# Patient Record
Sex: FEMALE | Race: BLACK OR AFRICAN AMERICAN | NOT HISPANIC OR LATINO | Employment: OTHER | URBAN - METROPOLITAN AREA
[De-identification: names, ages, dates, MRNs, and addresses within clinical notes are randomized per-mention and may not be internally consistent; named-entity substitution may affect disease eponyms.]

---

## 2017-02-07 ENCOUNTER — HOSPITAL ENCOUNTER (EMERGENCY)
Facility: HOSPITAL | Age: 72
Discharge: HOME | End: 2017-02-07
Attending: FAMILY MEDICINE
Payer: MEDICARE

## 2017-02-07 VITALS
OXYGEN SATURATION: 99 % | WEIGHT: 283 LBS | RESPIRATION RATE: 20 BRPM | BODY MASS INDEX: 47.15 KG/M2 | HEIGHT: 65 IN | SYSTOLIC BLOOD PRESSURE: 195 MMHG | DIASTOLIC BLOOD PRESSURE: 94 MMHG | TEMPERATURE: 97 F | HEART RATE: 62 BPM

## 2017-02-07 DIAGNOSIS — Z79.4 CONTROLLED TYPE 2 DIABETES MELLITUS WITH HYPOGLYCEMIA, WITH LONG-TERM CURRENT USE OF INSULIN: ICD-10-CM

## 2017-02-07 DIAGNOSIS — R00.2 PALPITATIONS: ICD-10-CM

## 2017-02-07 DIAGNOSIS — E11.649 CONTROLLED TYPE 2 DIABETES MELLITUS WITH HYPOGLYCEMIA, WITH LONG-TERM CURRENT USE OF INSULIN: ICD-10-CM

## 2017-02-07 DIAGNOSIS — E87.6 ACUTE HYPOKALEMIA: Primary | ICD-10-CM

## 2017-02-07 LAB
ALBUMIN SERPL BCP-MCNC: 3.9 G/DL
ALP SERPL-CCNC: 114 IU/L
ALT SERPL W/O P-5'-P-CCNC: 34 IU/L
ANION GAP SERPL CALC-SCNC: 11 MMOL/L
AST SERPL-CCNC: 31 IU/L
BACTERIA #/AREA URNS AUTO: ABNORMAL /HPF
BASOPHILS # BLD AUTO: 0.03 K/UL
BASOPHILS NFR BLD: 0.4 %
BILIRUB SERPL-MCNC: 0.3 MG/DL
BILIRUB UR QL STRIP: NEGATIVE
BUN SERPL-MCNC: 12 MG/DL
CALCIUM SERPL-MCNC: 9.7 MG/DL
CHLORIDE SERPL-SCNC: 105 MMOL/L
CLARITY UR REFRACT.AUTO: CLEAR
CO2 SERPL-SCNC: 26 MMOL/L
COLOR UR AUTO: YELLOW
CREAT SERPL-MCNC: 0.58 MG/DL
DIFFERENTIAL METHOD: ABNORMAL
EOSINOPHIL # BLD AUTO: 0.3 K/UL
EOSINOPHIL NFR BLD: 3.7 %
ERYTHROCYTE [DISTWIDTH] IN BLOOD BY AUTOMATED COUNT: 12.2 %
EST. GFR  (AFRICAN AMERICAN): >60 ML/MIN/1.73 M^2
EST. GFR  (NON AFRICAN AMERICAN): >60 ML/MIN/1.73 M^2
GLUCOSE SERPL-MCNC: 68 MG/DL
GLUCOSE UR QL STRIP: NEGATIVE
HCT VFR BLD AUTO: 37.2 %
HGB BLD-MCNC: 12.4 G/DL
HGB UR QL STRIP: ABNORMAL
KETONES UR QL STRIP: NEGATIVE
LEUKOCYTE ESTERASE UR QL STRIP: ABNORMAL
LYMPHOCYTES # BLD AUTO: 3.3 K/UL
LYMPHOCYTES NFR BLD: 46.6 %
MCH RBC QN AUTO: 31.1 PG
MCHC RBC AUTO-ENTMCNC: 33.3 %
MCV RBC AUTO: 93 FL
MICROSCOPIC COMMENT: ABNORMAL
MONOCYTES # BLD AUTO: 0.7 K/UL
MONOCYTES NFR BLD: 9.5 %
NEUTROPHILS # BLD AUTO: 2.8 K/UL
NEUTROPHILS NFR BLD: 39.7 %
NITRITE UR QL STRIP: NEGATIVE
PH UR STRIP: 5 [PH] (ref 5–8)
PLATELET # BLD AUTO: 252 K/UL
PMV BLD AUTO: 10.6 FL
POCT GLUCOSE: 180 MG/DL (ref 70–110)
POCT GLUCOSE: 77 MG/DL (ref 70–110)
POCT GLUCOSE: 90 MG/DL (ref 70–110)
POTASSIUM SERPL-SCNC: 3 MMOL/L
PROT SERPL-MCNC: 7.6 G/DL
PROT UR QL STRIP: NEGATIVE
RBC # BLD AUTO: 3.99 M/UL
RBC #/AREA URNS AUTO: 0 /HPF (ref 0–4)
SODIUM SERPL-SCNC: 142 MMOL/L
SP GR UR STRIP: 1.02 (ref 1–1.03)
TROPONIN I SERPL DL<=0.01 NG/ML-MCNC: <0.012 NG/ML
URN SPEC COLLECT METH UR: ABNORMAL
UROBILINOGEN UR STRIP-ACNC: NEGATIVE EU/DL
WBC # BLD AUTO: 7.06 K/UL
WBC #/AREA URNS AUTO: 10 /HPF (ref 0–5)

## 2017-02-07 PROCEDURE — 85025 COMPLETE CBC W/AUTO DIFF WBC: CPT

## 2017-02-07 PROCEDURE — 93005 ELECTROCARDIOGRAM TRACING: CPT

## 2017-02-07 PROCEDURE — 82962 GLUCOSE BLOOD TEST: CPT

## 2017-02-07 PROCEDURE — 81000 URINALYSIS NONAUTO W/SCOPE: CPT

## 2017-02-07 PROCEDURE — 25000003 PHARM REV CODE 250: Performed by: FAMILY MEDICINE

## 2017-02-07 PROCEDURE — 80053 COMPREHEN METABOLIC PANEL: CPT

## 2017-02-07 PROCEDURE — 99284 EMERGENCY DEPT VISIT MOD MDM: CPT | Mod: 25

## 2017-02-07 PROCEDURE — 84484 ASSAY OF TROPONIN QUANT: CPT

## 2017-02-07 RX ORDER — POTASSIUM CHLORIDE 20 MEQ/1
40 TABLET, EXTENDED RELEASE ORAL
Status: COMPLETED | OUTPATIENT
Start: 2017-02-07 | End: 2017-02-07

## 2017-02-07 RX ORDER — INSULIN LISPRO 100 [IU]/ML
INJECTION, SOLUTION INTRAVENOUS; SUBCUTANEOUS
COMMUNITY
End: 2017-08-15

## 2017-02-07 RX ADMIN — POTASSIUM CHLORIDE 40 MEQ: 1500 TABLET, EXTENDED RELEASE ORAL at 04:02

## 2017-02-07 NOTE — ED NOTES
MD notified , states pt ok for D/C. Pt educated on hypocalcemia and importance of checking blood sugar prior to administering insulin, pt and spouse verbalize understanding. HR 62. Safety maintained. No falls or injuries noted.

## 2017-02-07 NOTE — ED TRIAGE NOTES
"Pt states "My heart is beating triple fast." Denies history of a-fib. Denies chest pain. Pt states she was diaphoretic. Denies nausea. Pt she took two 81 mg ASA prior to arrival.   "

## 2017-02-07 NOTE — ED NOTES
Pt given apple juice, oral fluids encouraged per MD orders. Pt asleep, respirations even and unlabored. MD notified HR 46-47, no new orders given.

## 2017-02-07 NOTE — ED NOTES
Patient discharge has been delayed due to CBG 90. Orders noted to give another 4 oz of juice until CBG is 100. Pt tolerating juice well. Will continue to monitor.

## 2017-02-07 NOTE — ED PROVIDER NOTES
"Encounter Date: 2017       History     Chief Complaint   Patient presents with    Palpitations     Pt states "My heart is beating triple fast." Denies history of a-fib. Denies chest pain.      Review of patient's allergies indicates:   Allergen Reactions    Tylenol [acetaminophen] Other (See Comments)     Affects memory/confused     HPI Comments: She complains of palpitations about 30 minutes prior to arrival.  No chest pain or shortness of breath.  Palpitations resolved on arrival to the ER.  He shouldn't is comfortable in the bed without any distress.  Patient with computer broke and she was not checking regularly but taking her insulin as he was supposed to.    The history is provided by the patient.     Past Medical History   Diagnosis Date    Breast cancer      Right breast     Diabetes mellitus     Hyperlipidemia     Hypertension      No past medical history pertinent negatives.  Past Surgical History   Procedure Laterality Date    Cholecystectomy       laparoscopic     section, low transverse  ,1979     x2    Tubal ligation       @ time of     Dilation and curettage of uterus  2012     Family History   Problem Relation Age of Onset    Hypertension Mother     Heart failure Mother     Hypertension Brother     Hypertension Sister     Breast cancer Cousin     Breast cancer Cousin     Coronary artery disease Brother     Stroke Brother     Coronary artery disease Brother     Stroke Brother     Colon cancer Neg Hx     Ovarian cancer Neg Hx      Social History   Substance Use Topics    Smoking status: Former Smoker     Packs/day: 1.00     Years: 14.00     Types: Cigarettes     Quit date: 1975    Smokeless tobacco: Never Used    Alcohol use No     Review of Systems   Constitutional: Positive for diaphoresis. Negative for activity change, appetite change, chills, fatigue and fever.   HENT: Negative for ear pain and sore throat.    Eyes: Negative " for pain.   Respiratory: Negative for cough, choking, chest tightness, shortness of breath and wheezing.    Cardiovascular: Positive for palpitations. Negative for chest pain.   Gastrointestinal: Negative for abdominal distention, diarrhea, nausea and vomiting.   Endocrine: Negative for polyphagia and polyuria.   Genitourinary: Negative for dysuria and flank pain.   Musculoskeletal: Negative for back pain, neck pain and neck stiffness.   Skin: Negative for rash.   Neurological: Negative for dizziness, weakness, light-headedness, numbness and headaches.   Psychiatric/Behavioral: The patient is not nervous/anxious.    All other systems reviewed and are negative.      Physical Exam   Initial Vitals   BP Pulse Resp Temp SpO2   02/07/17 1500 02/07/17 1500 02/07/17 1500 02/07/17 1500 02/07/17 1500   207/92 88 20 97.6 °F (36.4 °C) 100 %     Physical Exam    Nursing note and vitals reviewed.  Constitutional: She appears well-developed and well-nourished.   HENT:   Head: Normocephalic.   Right Ear: External ear normal.   Left Ear: External ear normal.   Nose: Nose normal.   Mouth/Throat: Oropharynx is clear and moist.   Eyes: Conjunctivae and EOM are normal. Pupils are equal, round, and reactive to light.   Neck: Normal range of motion. Neck supple.   Cardiovascular: Normal rate, regular rhythm, normal heart sounds and intact distal pulses. Exam reveals no gallop and no friction rub.    No murmur heard.  Pulmonary/Chest: Breath sounds normal.   Abdominal: Soft. Bowel sounds are normal. She exhibits no distension and no mass. There is no tenderness. There is no rebound and no guarding.   Musculoskeletal: Normal range of motion.   Neurological: She is alert and oriented to person, place, and time. She has normal strength and normal reflexes. She displays normal reflexes. No cranial nerve deficit or sensory deficit.   Skin: Skin is warm.   Psychiatric: She has a normal mood and affect. Her behavior is normal. Judgment and  thought content normal.         ED Course   Procedures  Labs Reviewed   CBC W/ AUTO DIFFERENTIAL   COMPREHENSIVE METABOLIC PANEL   TROPONIN I   URINALYSIS   POCT GLUCOSE        ECG Results         EKG 12-LEAD (Final result) Result time:  02/07/17 15:42:58    Final result by Interface, Lab In [facility] (02/07/17 15:42:58)    Narrative:    Test Reason : r00.2  Blood Pressure :  mmHG  Vent. Rate : 062 BPM     Atrial Rate : 062 BPM     P-R Int : 176 ms          QRS Dur : 090 ms      QT Int : 430 ms       P-R-T Axes : 049 -06 026 degrees     QTc Int : 436 ms    Normal sinus rhythm  Left atrial enlargement  LVH  Abnormal ECG  When compared with ECG of 24-JAN-2012 11:21,  No significant change was found  Confirmed by Miguel Tejeda MD ([id number]) on 2/7/2017 3:42:48 PM    Referred By: SELF REFERRAL           Confirmed By:Miguel Tejeda MD                                 ED Course     Clinical Impression:   The primary encounter diagnosis was Acute hypokalemia. Diagnoses of Palpitations and Controlled type 2 diabetes mellitus with hypoglycemia, with long-term current use of insulin were also pertinent to this visit.    Disposition:   Disposition: Discharged  Condition: Fair  Advised to get a new glucometer and have regular Accu-Cheks readings also.  And decrease insulin intake or candy but for hypoglycemic episodes.       Rubén Diane MD  02/07/17 6904

## 2017-02-07 NOTE — ED AVS SNAPSHOT
[facility]  [address]  [place] [address]               Noris Demarco   2017  2:58 PM   ED    Description:  Female : 1945   Department:  [facility]           Your Care was Coordinated By:     Provider Role From To    Rubén Diane MD Attending Provider 17 2162 --      Reason for Visit     Palpitations           Diagnoses this Visit        Comments    Acute hypokalemia    -  Primary     Palpitations         Controlled type 2 diabetes mellitus with hypoglycemia, with long-term current use of insulin           ED Disposition     None           To Do List           [facility]     [facility] -  Assistance  Registered nurses in the [facility] provide clinical advisement, health education, appointment booking, and other advisory services.  Call for this free service at [phone].             Medications           Message regarding Medications     Verify the changes and/or additions to your medication regime listed below are the same as discussed with your clinician today.  If any of these changes or additions are incorrect, please notify your healthcare provider.        These medications were administered today        Dose Freq    potassium chloride SA CR tablet 40 mEq 40 mEq ED 1 Time    Sig: Take 2 tablets (40 mEq total) by mouth ED 1 Time.    Class: Normal    Route: Oral           Verify that the below list of medications is an accurate representation of the medications you are currently taking.  If none reported, the list may be blank. If incorrect, please contact your healthcare provider. Carry this list with you in case of emergency.           Current Medications     atenolol (TENORMIN) 50 MG tablet Take 50 mg by mouth once daily.     cloNIDine (CATAPRES) 0.1 MG tablet Take 1 tablet (0.1 mg total) by mouth 2 (two) times daily.    coenzyme Q10 (COQ-10) 100 mg capsule Take 100 mg by mouth  "once daily.      fish oil-omega-3 fatty acids 300-1,000 mg capsule Take 1 g by mouth once daily.    furosemide (LASIX) 20 MG tablet Take 1 tablet by mouth once daily.    insulin glargine (LANTUS) 100 unit/mL injection Inject 15 Units into the skin 2 (two) times daily.    insulin lispro (HUMALOG) 100 unit/mL injection Inject into the skin 3 (three) times daily before meals.    insulin NPH (NOVOLIN N) 100 unit/mL injection Inject 10 Units into the skin 2 (two) times daily before meals.    insulin regular 100 unit/mL Inj injection Inject 4 Units into the skin 3 (three) times daily before meals.    lisinopril (PRINIVIL,ZESTRIL) 40 MG tablet Take 40 mg by mouth 2 (two) times daily.     metformin (GLUCOPHAGE) 1000 MG tablet Take 2 tablets by mouth 2 (two) times daily.    MV,FE,MIN/LUTEIN (CENTRUM SILVER ULTRA WOMEN'S ORAL) Take 1 tablet by mouth once daily.    neomycin-polymyxin-hydrocortisone (CORTISPORIN) otic solution INSTILL 2 DROPS 4 TIMES A DAY FOR 10 DAYS IN THE AFFECTED EAR    niacin 500 MG Tab Take 500 mg by mouth every evening.     potassium chloride SA CR tablet 40 mEq Take 2 tablets (40 mEq total) by mouth ED 1 Time.           Clinical Reference Information           Your Vitals Were     BP Pulse Temp Resp Height Weight    153/75 47 97.6 °F (36.4 °C) (Oral) 18 5' 5" (1.651 m) 128.4 kg (283 lb)    SpO2 BMI             100% 47.09 kg/m2         Allergies as of 2/7/2017        Reactions    Tylenol [Acetaminophen] Other (See Comments)    Affects memory/confused      Immunizations Administered on Date of Encounter - 2/7/2017     None      ED Micro, Lab, POCT     Start Ordered       Status Ordering Provider    02/07/17 1537 02/07/17 1537  POCT glucose  Once      Final result     02/07/17 1517 02/07/17 1516  Urinalysis  STAT      In process     02/07/17 1516 02/07/17 1516  CBC auto differential  STAT      Final result     02/07/17 1516 02/07/17 1516  Comprehensive metabolic panel  STAT      Final result     02/07/17 " 1516 02/07/17 1516  Troponin I  STAT      Final result       ED Imaging Orders     Start Ordered       Status Ordering Provider    02/07/17 1510 02/07/17 1510  X-Ray Chest PA And Lateral  1 time imaging      Final result       Discharge References/Attachments     BLOOD SUGAR, LOW; HYPOGLYCEMIA (ENGLISH)      [facility] Sign-Up     Activating your [facility] account is as easy as 1-2-3!     1) Visit [email or web address], select Sign Up Now, enter this activation code and your date of birth, then select Next.  [email or web address]  Expires: 3/24/2017  4:33 PM      2) Create a username and password to use when you visit [facility] in the future and select a security question in case you lose your password and select Next.    3) Enter your e-mail address and click Sign Up!    Additional Information  If you have questions, please e-mail [email or web address] or call [phone] to talk to our [facility] staff. Remember, [facility] is NOT to be used for urgent needs. For medical emergencies, dial 911.         Smoking Cessation     If you would like to quit smoking:   You may be eligible for free services if you are a [place] resident and started smoking cigarettes before September 1, 1988.  Call the [employer]oking [employer]essation [employer] ([employer]) toll free at (787) 148-2926 or (444) [phone].   Call 9-210-QUIT-NOW if you do not meet the above criteria.             [facility] complies with applicable Federal civil rights laws and does not discriminate on the basis of race, color, national origin, age, disability, or sex.        Language Assistance Services     ATTENTION: Language assistance services are available, free of charge. Please call [phone].      ATENCIÓN: Si habla español, tiene a nichole disposición servicios gratuitos de asistencia lingüística. Llame al [phone].     CHÚ Ý: N?u b?n nói Ti?ng Vi?t, có các d?ch v? h? tr? ngôn ng? mi?n phí dành cho b?n. G?i s? [phone].

## 2017-03-13 ENCOUNTER — TELEPHONE (OUTPATIENT)
Dept: HEMATOLOGY/ONCOLOGY | Facility: CLINIC | Age: 72
End: 2017-03-13

## 2017-03-13 NOTE — TELEPHONE ENCOUNTER
I called pt back to get dates and times she would be available to come after 4/27/17 for her mammogram. She states she has to talk to her daughter and call me back.    ----- Message from Helen Garner sent at 3/13/2017 10:37 AM CDT -----  Contact: [phone]  Patient is requesting orders for a mammogram

## 2017-04-26 ENCOUNTER — TELEPHONE (OUTPATIENT)
Dept: HEMATOLOGY/ONCOLOGY | Facility: CLINIC | Age: 72
End: 2017-04-26

## 2017-04-26 NOTE — TELEPHONE ENCOUNTER
Pt was given the number to scheduling dept [phone] to schedule her mammogram. She will give them a call.    -- Message from Carlos Talley sent at 4/26/2017  9:12 AM CDT -----  Contact: [phone]/self  Pt would like to speak with the nurse concerning scheduling an breast cancer Mammo.  Pt states she can only come on tomorrow.  Please advise

## 2017-05-02 ENCOUNTER — HOSPITAL ENCOUNTER (OUTPATIENT)
Dept: RADIOLOGY | Facility: HOSPITAL | Age: 72
Discharge: HOME | End: 2017-05-02
Attending: INTERNAL MEDICINE
Payer: MEDICARE

## 2017-05-02 DIAGNOSIS — D05.11 DUCTAL CARCINOMA IN SITU (DCIS) OF RIGHT BREAST: ICD-10-CM

## 2017-05-02 PROCEDURE — 77066 DX MAMMO INCL CAD BI: CPT | Mod: 26,,, | Performed by: RADIOLOGY

## 2017-05-02 PROCEDURE — 77062 BREAST TOMOSYNTHESIS BI: CPT | Mod: TC

## 2017-05-02 PROCEDURE — 77062 BREAST TOMOSYNTHESIS BI: CPT | Mod: 26,,, | Performed by: RADIOLOGY

## 2017-07-20 ENCOUNTER — HOSPITAL ENCOUNTER (EMERGENCY)
Facility: HOSPITAL | Age: 72
Discharge: HOME | End: 2017-07-20
Attending: EMERGENCY MEDICINE
Payer: MEDICARE

## 2017-07-20 VITALS
SYSTOLIC BLOOD PRESSURE: 170 MMHG | WEIGHT: 180 LBS | DIASTOLIC BLOOD PRESSURE: 82 MMHG | HEART RATE: 69 BPM | OXYGEN SATURATION: 98 % | BODY MASS INDEX: 29.99 KG/M2 | RESPIRATION RATE: 18 BRPM | TEMPERATURE: 98 F | HEIGHT: 65 IN

## 2017-07-20 DIAGNOSIS — M17.12 OSTEOARTHRITIS OF LEFT KNEE, UNSPECIFIED OSTEOARTHRITIS TYPE: Primary | ICD-10-CM

## 2017-07-20 DIAGNOSIS — M79.89 LEG SWELLING: ICD-10-CM

## 2017-07-20 PROCEDURE — 99284 EMERGENCY DEPT VISIT MOD MDM: CPT

## 2017-07-20 RX ORDER — FUROSEMIDE 40 MG/1
40 TABLET ORAL DAILY
COMMUNITY

## 2017-07-20 RX ORDER — KETOROLAC TROMETHAMINE 10 MG/1
10 TABLET, FILM COATED ORAL EVERY 8 HOURS
Qty: 15 TABLET | Refills: 0 | Status: SHIPPED | OUTPATIENT
Start: 2017-07-20 | End: 2017-08-15

## 2017-07-20 RX ORDER — AMLODIPINE BESYLATE 2.5 MG/1
2.5 TABLET ORAL DAILY
COMMUNITY
End: 2017-08-15 | Stop reason: DRUGHIGH

## 2017-07-20 RX ORDER — METOPROLOL TARTRATE 50 MG/1
50 TABLET ORAL 2 TIMES DAILY
COMMUNITY
End: 2017-08-15

## 2017-07-21 NOTE — ED PROVIDER NOTES
Encounter Date: 2017       History     Chief Complaint   Patient presents with    Leg Pain     left leg pain for 3 weeks denies injury     The history is provided by the patient.   Leg Pain    The injury mechanism is unknown. The incident occurred several weeks ago. The pain location is generalized. The pain is at a severity of 5/10. The pain has been constant since onset. Pertinent negatives include no numbness, no inability to bear weight, no loss of motion, no muscle weakness, no loss of sensation and no tingling. She reports no foreign bodies present. Nothing aggravates the symptoms. She has tried nothing for the symptoms.     Review of patient's allergies indicates:   Allergen Reactions    Tylenol [acetaminophen] Other (See Comments)     Affects memory/confused     Past Medical History:   Diagnosis Date    Breast cancer     Right breast     Diabetes mellitus     Hyperlipidemia     Hypertension      Past Surgical History:   Procedure Laterality Date     SECTION, LOW TRANSVERSE  ,1979    x2    CHOLECYSTECTOMY      laparoscopic    DILATION AND CURETTAGE OF UTERUS  2012    TUBAL LIGATION      @ time of      Family History   Problem Relation Age of Onset    Hypertension Mother     Heart failure Mother     Hypertension Brother     Hypertension Sister     Breast cancer Cousin     Breast cancer Cousin     Coronary artery disease Brother     Stroke Brother     Coronary artery disease Brother     Stroke Brother     Colon cancer Neg Hx     Ovarian cancer Neg Hx      Social History   Substance Use Topics    Smoking status: Former Smoker     Packs/day: 1.00     Years: 14.00     Types: Cigarettes     Quit date: 1975    Smokeless tobacco: Never Used    Alcohol use No     Review of Systems   Musculoskeletal: Positive for arthralgias and myalgias.   Neurological: Negative for tingling and numbness.   All other systems reviewed and are  negative.      Physical Exam     Initial Vitals [07/20/17 2011]   BP Pulse Resp Temp SpO2   (!) 184/94 97 18 98 °F (36.7 °C) 98 %      MAP       124         Physical Exam    Nursing note and vitals reviewed.  Constitutional: She appears well-developed and well-nourished.   HENT:   Head: Normocephalic and atraumatic.   Eyes: EOM are normal.   Neck: Normal range of motion. Neck supple.   Cardiovascular: Normal rate, regular rhythm, normal heart sounds and intact distal pulses.   Pulmonary/Chest: Breath sounds normal.   Abdominal: Soft.   Musculoskeletal: Normal range of motion.        Legs:  Neurological: She is alert and oriented to person, place, and time.   Skin: Skin is warm and dry. Capillary refill takes less than 2 seconds.   Psychiatric: She has a normal mood and affect. Her behavior is normal. Judgment and thought content normal.         ED Course   Procedures  Labs Reviewed - No data to display       X-Rays:   Independently Interpreted Readings:   Other Readings:  No DVT present    Medical Decision Making:   Clinical Tests:   Radiological Study: Ordered and Reviewed                   ED Course     Clinical Impression:   The primary encounter diagnosis was Osteoarthritis of left knee, unspecified osteoarthritis type. A diagnosis of Leg swelling was also pertinent to this visit.    Disposition:   Disposition: Discharged  Condition: Stable                        Sonia Woodall MD  07/20/17 2300

## 2017-08-15 ENCOUNTER — OFFICE VISIT (OUTPATIENT)
Dept: CARDIOLOGY | Facility: CLINIC | Age: 72
End: 2017-08-15
Payer: MEDICARE

## 2017-08-15 VITALS
SYSTOLIC BLOOD PRESSURE: 154 MMHG | DIASTOLIC BLOOD PRESSURE: 84 MMHG | OXYGEN SATURATION: 99 % | HEIGHT: 65 IN | HEART RATE: 82 BPM

## 2017-08-15 DIAGNOSIS — I10 HTN (HYPERTENSION), BENIGN: ICD-10-CM

## 2017-08-15 DIAGNOSIS — R60.0 BILATERAL LEG EDEMA: ICD-10-CM

## 2017-08-15 DIAGNOSIS — I10 ESSENTIAL HYPERTENSION: ICD-10-CM

## 2017-08-15 DIAGNOSIS — R01.1 CARDIAC MURMUR: ICD-10-CM

## 2017-08-15 PROCEDURE — 99999 PR PBB SHADOW E&M-EST. PATIENT-LVL III: CPT | Mod: PBBFAC,,, | Performed by: INTERNAL MEDICINE

## 2017-08-15 PROCEDURE — 3008F BODY MASS INDEX DOCD: CPT | Mod: S$GLB,,, | Performed by: INTERNAL MEDICINE

## 2017-08-15 PROCEDURE — 1126F AMNT PAIN NOTED NONE PRSNT: CPT | Mod: S$GLB,,, | Performed by: INTERNAL MEDICINE

## 2017-08-15 PROCEDURE — 3079F DIAST BP 80-89 MM HG: CPT | Mod: S$GLB,,, | Performed by: INTERNAL MEDICINE

## 2017-08-15 PROCEDURE — 93000 ELECTROCARDIOGRAM COMPLETE: CPT | Mod: S$GLB,,, | Performed by: INTERNAL MEDICINE

## 2017-08-15 PROCEDURE — 99204 OFFICE O/P NEW MOD 45 MIN: CPT | Mod: S$GLB,,, | Performed by: INTERNAL MEDICINE

## 2017-08-15 PROCEDURE — 1159F MED LIST DOCD IN RCRD: CPT | Mod: S$GLB,,, | Performed by: INTERNAL MEDICINE

## 2017-08-15 PROCEDURE — 3077F SYST BP >= 140 MM HG: CPT | Mod: S$GLB,,, | Performed by: INTERNAL MEDICINE

## 2017-08-15 RX ORDER — AMLODIPINE BESYLATE 10 MG/1
1 TABLET ORAL DAILY
COMMUNITY
Start: 2017-08-07 | End: 2022-09-06 | Stop reason: SDUPTHER

## 2017-08-15 RX ORDER — MELOXICAM 15 MG/1
1 TABLET ORAL DAILY
COMMUNITY
Start: 2017-07-24 | End: 2018-06-13

## 2017-08-15 RX ORDER — ASPIRIN 81 MG/1
81 TABLET ORAL DAILY
COMMUNITY

## 2017-08-15 RX ORDER — UBIDECARENONE 75 MG
500 CAPSULE ORAL DAILY
COMMUNITY

## 2017-08-15 NOTE — PROGRESS NOTES
Subjective:   Patient ID:  Noris Demarco is a 71 y.o. female who presents for evaluation of Consult; Knee Pain; and Angioedema      HPI: 70 y/o AA female with PMH of HTN and HLD present with complaints of leg edema located bilaterally left> right. Leg swelling started 1 month ago and is associated with some aching. As per patient clonidine was discontinued 1 month ago and she was started on norvasc. BP is controlled. She denies chest pain or dyspnea. No orthopnea and PND. Ef unknown. She has been on lasix. She does not eat out but admit to using salt. US of legs last month showed no DVT. As per patient pain was more intense to the touch a few days ago but now she is able to touch leg especially left knee. No injury to left leg that is able to recall.     Past Medical History:   Diagnosis Date    Breast cancer     Right breast     Diabetes mellitus     Hyperlipidemia     Hypertension        Past Surgical History:   Procedure Laterality Date     SECTION, LOW TRANSVERSE  ,1979    x2    CHOLECYSTECTOMY      laparoscopic    DILATION AND CURETTAGE OF UTERUS  2012    TUBAL LIGATION      @ time of        Social History   Substance Use Topics    Smoking status: Former Smoker     Packs/day: 1.00     Years: 14.00     Types: Cigarettes     Quit date: 1975    Smokeless tobacco: Never Used    Alcohol use No       Family History   Problem Relation Age of Onset    Hypertension Mother     Heart failure Mother     Hypertension Brother     Hypertension Sister     Breast cancer Cousin     Breast cancer Cousin     Coronary artery disease Brother     Stroke Brother     Coronary artery disease Brother     Stroke Brother     Colon cancer Neg Hx     Ovarian cancer Neg Hx        Patient's Medications   New Prescriptions    No medications on file   Previous Medications    AMLODIPINE (NORVASC) 10 MG TABLET    Take 1 tablet by mouth once daily.    ASPIRIN (ECOTRIN) 81 MG EC  TABLET    Take 81 mg by mouth once daily.    CLONIDINE (CATAPRES) 0.1 MG TABLET    Take 1 tablet (0.1 mg total) by mouth 2 (two) times daily.    COENZYME Q10 (COQ-10) 100 MG CAPSULE    Take 100 mg by mouth once daily.      CYANOCOBALAMIN (VITAMIN B-12) 500 MCG TABLET    Take 500 mcg by mouth once daily.    FISH OIL-OMEGA-3 FATTY ACIDS 300-1,000 MG CAPSULE    Take 1 g by mouth once daily.    FUROSEMIDE (LASIX) 40 MG TABLET    Take 40 mg by mouth 2 (two) times daily.    INSULIN NPH (NOVOLIN N) 100 UNIT/ML INJECTION    Inject 12 Units into the skin 2 (two) times daily before meals.     LISINOPRIL (PRINIVIL,ZESTRIL) 40 MG TABLET    Take 40 mg by mouth 2 (two) times daily.     MELOXICAM (MOBIC) 15 MG TABLET    Take 1 tablet by mouth once daily.    UNABLE TO FIND    400 Int'l Units. Vitamin d3   Modified Medications    No medications on file   Discontinued Medications    AMLODIPINE (NORVASC) 2.5 MG TABLET    Take 2.5 mg by mouth once daily.    ATENOLOL (TENORMIN) 50 MG TABLET    Take 50 mg by mouth once daily.     FUROSEMIDE (LASIX) 20 MG TABLET    Take 1 tablet by mouth once daily.    INSULIN GLARGINE (LANTUS) 100 UNIT/ML INJECTION    Inject 15 Units into the skin 2 (two) times daily.    INSULIN LISPRO (HUMALOG) 100 UNIT/ML INJECTION    Inject into the skin 3 (three) times daily before meals.    INSULIN REGULAR 100 UNIT/ML INJ INJECTION    Inject 4 Units into the skin 3 (three) times daily before meals.    KETOROLAC (TORADOL) 10 MG TABLET    Take 1 tablet (10 mg total) by mouth every 8 (eight) hours.    METFORMIN (GLUCOPHAGE) 1000 MG TABLET    Take 2 tablets by mouth 2 (two) times daily.    METOPROLOL TARTRATE (LOPRESSOR) 50 MG TABLET    Take 50 mg by mouth 2 (two) times daily.    MV,FE,MIN/LUTEIN (CENTRUM SILVER ULTRA WOMEN'S ORAL)    Take 1 tablet by mouth once daily.    NEOMYCIN-POLYMYXIN-HYDROCORTISONE (CORTISPORIN) OTIC SOLUTION    INSTILL 2 DROPS 4 TIMES A DAY FOR 10 DAYS IN THE AFFECTED EAR    NIACIN 500 MG TAB     Take 500 mg by mouth every evening.         Review of patient's allergies indicates:   Allergen Reactions    Tylenol [acetaminophen] Other (See Comments)     Affects memory/confused       Review of Systems   Constitution: Negative.   HENT: Negative.    Eyes: Negative.    Cardiovascular: Positive for leg swelling.   Respiratory: Negative.    Endocrine: Negative.    Hematologic/Lymphatic: Negative.    Skin: Negative.    Musculoskeletal: Negative.    Gastrointestinal: Negative.    Neurological: Negative.    Psychiatric/Behavioral: Negative.      Objective:   Physical Exam   Constitutional: She is oriented to person, place, and time. She appears well-developed and well-nourished. No distress.   Examination of the digits showed no clubbing or cyanosis   HENT:   Head: Normocephalic and atraumatic.   Eyes: Conjunctivae are normal. Pupils are equal, round, and reactive to light. Right eye exhibits no discharge.   Neck: Normal range of motion. Neck supple. No JVD present. No thyromegaly present.   No carotid bruits   Cardiovascular: Normal rate, regular rhythm, S1 normal, S2 normal, intact distal pulses and normal pulses.  PMI is not displaced.  Exam reveals no gallop, no friction rub and no opening snap.    Murmur heard.  Pulmonary/Chest: Effort normal and breath sounds normal. No respiratory distress. She has no wheezes. She has no rales. She exhibits no tenderness.   Abdominal: Soft. Bowel sounds are normal. She exhibits no distension and no mass. There is no tenderness. There is no guarding.   No hepatosplenomegaly   Musculoskeletal: Normal range of motion. She exhibits edema. She exhibits no tenderness.   Lymphadenopathy:     She has no cervical adenopathy.   Neurological: She is alert and oriented to person, place, and time.   Skin: Skin is warm. No rash noted. She is not diaphoretic. No erythema.   Psychiatric: She has a normal mood and affect.   Nursing note and vitals reviewed.      Lab Results   Component Value  Date    WBC 7.06 02/07/2017    HGB 12.4 02/07/2017    HCT 37.2 02/07/2017    MCV 93 02/07/2017     02/07/2017         Chemistry        Component Value Date/Time     02/07/2017 1535    K 3.0 (L) 02/07/2017 1535     02/07/2017 1535    CO2 26 02/07/2017 1535    BUN 12 02/07/2017 1535    BUN 13 10/19/2015 1439    CREATININE 0.58 02/07/2017 1535    GLU 68 (L) 02/07/2017 1535        Component Value Date/Time    CALCIUM 9.7 02/07/2017 1535    ALKPHOS 114 02/07/2017 1535    ALKPHOS 93 10/19/2015 1439    AST 31 02/07/2017 1535    AST 32 10/19/2015 1439    ALT 34 02/07/2017 1535    BILITOT 0.3 02/07/2017 1535    ESTGFRAFRICA >60.0 02/07/2017 1535    EGFRNONAA >60.0 02/07/2017 1535            ECGs reviewed-NSR  LABS reviewed      Assessment:     1. Bilateral leg edema    2. HTN (hypertension), benign    3. Cardiac murmur        Plan:     Swelling likely post gouty attack.   Leg elevation  Compression stocking  Low salt diet  2d echo complete    F/u in 1 month. If still swollen will make medication changes. Reduce Norvasc and start HCTZ    Clinic time spent with this patient is 40 minutes.     .

## 2017-08-15 NOTE — LETTER
August 15, 2017      Leanne Estrada MD  [address]   [place] [address]           [place] [facility]  [address], [address]  [facility] [address]  Phone: [phone]          Patient: Noris Deamrco   MR Number: [id number]   YOB: 1945   Date of Visit: 8/15/2017       Dear Dr. Leanne Estrada:    Thank you for referring Noris Demarco to me for evaluation. Attached you will find relevant portions of my assessment and plan of care.    If you have questions, please do not hesitate to call me. I look forward to following Noris Demarco along with you.    Sincerely,    Jayne Burgos MD    Enclosure  CC:  No Recipients    If you would like to receive this communication electronically, please contact [email or web address] or [phone] to request more information on [facility] Link access.    For providers and/or their staff who would like to refer a patient to [facility], please contact us through our one-stop-shop provider referral line, [facility], at [phone].    If you feel you have received this communication in error or would no longer like to receive these types of communications, please e-mail [email or web address]

## 2017-08-17 DIAGNOSIS — I10 ESSENTIAL HYPERTENSION: Primary | ICD-10-CM

## 2017-08-23 ENCOUNTER — HOSPITAL ENCOUNTER (OUTPATIENT)
Dept: CARDIOLOGY | Facility: HOSPITAL | Age: 72
Discharge: HOME | End: 2017-08-23
Attending: INTERNAL MEDICINE
Payer: MEDICARE

## 2017-08-23 DIAGNOSIS — R60.0 BILATERAL LEG EDEMA: ICD-10-CM

## 2017-08-23 DIAGNOSIS — R01.1 CARDIAC MURMUR: ICD-10-CM

## 2017-08-23 LAB
DIASTOLIC DYSFUNCTION: YES
ESTIMATED PA SYSTOLIC PRESSURE: 31.52
GLOBAL PERICARDIAL EFFUSION: ABNORMAL
MITRAL VALVE REGURGITATION: ABNORMAL
RETIRED EF AND QEF - SEE NOTES: 65 (ref 55–65)
TRICUSPID VALVE REGURGITATION: ABNORMAL

## 2017-08-23 PROCEDURE — 93306 TTE W/DOPPLER COMPLETE: CPT | Mod: PO

## 2017-08-23 PROCEDURE — 93306 TTE W/DOPPLER COMPLETE: CPT | Mod: 26,,, | Performed by: INTERNAL MEDICINE

## 2017-08-28 ENCOUNTER — TELEPHONE (OUTPATIENT)
Dept: CARDIOLOGY | Facility: CLINIC | Age: 72
End: 2017-08-28

## 2017-08-28 NOTE — TELEPHONE ENCOUNTER
Informed patient that her echo is normal except for slow relaxation which is secondary to aging, weight or blood pressure and doctor  will monitor this. No concerning findings.   Patient expressed understanding.

## 2017-09-15 ENCOUNTER — OFFICE VISIT (OUTPATIENT)
Dept: CARDIOLOGY | Facility: CLINIC | Age: 72
End: 2017-09-15
Payer: MEDICARE

## 2017-09-15 VITALS
OXYGEN SATURATION: 95 % | HEART RATE: 73 BPM | WEIGHT: 184.31 LBS | SYSTOLIC BLOOD PRESSURE: 146 MMHG | DIASTOLIC BLOOD PRESSURE: 86 MMHG | BODY MASS INDEX: 30.71 KG/M2 | HEIGHT: 65 IN

## 2017-09-15 DIAGNOSIS — I51.89 DIASTOLIC DYSFUNCTION: ICD-10-CM

## 2017-09-15 DIAGNOSIS — I10 HTN (HYPERTENSION), BENIGN: Primary | ICD-10-CM

## 2017-09-15 DIAGNOSIS — R01.1 CARDIAC MURMUR: ICD-10-CM

## 2017-09-15 PROCEDURE — 1159F MED LIST DOCD IN RCRD: CPT | Mod: S$GLB,,, | Performed by: INTERNAL MEDICINE

## 2017-09-15 PROCEDURE — 1126F AMNT PAIN NOTED NONE PRSNT: CPT | Mod: S$GLB,,, | Performed by: INTERNAL MEDICINE

## 2017-09-15 PROCEDURE — 99999 PR PBB SHADOW E&M-EST. PATIENT-LVL III: CPT | Mod: PBBFAC,,, | Performed by: INTERNAL MEDICINE

## 2017-09-15 PROCEDURE — 3077F SYST BP >= 140 MM HG: CPT | Mod: S$GLB,,, | Performed by: INTERNAL MEDICINE

## 2017-09-15 PROCEDURE — 3079F DIAST BP 80-89 MM HG: CPT | Mod: S$GLB,,, | Performed by: INTERNAL MEDICINE

## 2017-09-15 PROCEDURE — 99213 OFFICE O/P EST LOW 20 MIN: CPT | Mod: S$GLB,,, | Performed by: INTERNAL MEDICINE

## 2017-09-15 PROCEDURE — 3008F BODY MASS INDEX DOCD: CPT | Mod: S$GLB,,, | Performed by: INTERNAL MEDICINE

## 2017-09-15 NOTE — PROGRESS NOTES
Subjective:   Patient ID:  Noris Demarco is a 71 y.o. female who presents for follow-up of Follow-up      Problem List Items Addressed This Visit        Cardiac/Vascular    HTN (hypertension), benign - Primary    Cardiac murmur    Diastolic dysfunction      Other Visit Diagnoses    None.         HPI: Patient is here for f/u of leg swelling though to be gout. Leg swelling is now better. BP is controlled on norvasc and lisinopril. No chest pain or dyspnea. No orthopnea or PND.     Review of Systems   Constitution: Negative.   HENT: Negative.    Eyes: Negative.    Cardiovascular: Negative.    Respiratory: Negative.    Endocrine: Negative.    Hematologic/Lymphatic: Negative.    Skin: Negative.    Musculoskeletal: Negative.    Gastrointestinal: Negative.    Neurological: Negative.    Psychiatric/Behavioral: Negative.        Patient's Medications   New Prescriptions    No medications on file   Previous Medications    AMLODIPINE (NORVASC) 10 MG TABLET    Take 1 tablet by mouth once daily.    ASPIRIN (ECOTRIN) 81 MG EC TABLET    Take 81 mg by mouth once daily.    CLONIDINE (CATAPRES) 0.1 MG TABLET    Take 1 tablet (0.1 mg total) by mouth 2 (two) times daily.    COENZYME Q10 (COQ-10) 100 MG CAPSULE    Take 100 mg by mouth once daily.      CYANOCOBALAMIN (VITAMIN B-12) 500 MCG TABLET    Take 500 mcg by mouth once daily.    FISH OIL-OMEGA-3 FATTY ACIDS 300-1,000 MG CAPSULE    Take 1 g by mouth once daily.    FUROSEMIDE (LASIX) 40 MG TABLET    Take 40 mg by mouth 2 (two) times daily.    INSULIN NPH (NOVOLIN N) 100 UNIT/ML INJECTION    Inject 12 Units into the skin 2 (two) times daily before meals.     LISINOPRIL (PRINIVIL,ZESTRIL) 40 MG TABLET    Take 40 mg by mouth 2 (two) times daily.     MELOXICAM (MOBIC) 15 MG TABLET    Take 1 tablet by mouth once daily.    UNABLE TO FIND    400 Int'l Units. Vitamin d3   Modified Medications    No medications on file   Discontinued Medications    No medications on file       Objective:    Physical Exam   Constitutional: She is oriented to person, place, and time. She appears well-developed and well-nourished. No distress.   Examination of the digits showed no clubbing or cyanosis   HENT:   Head: Normocephalic and atraumatic.   Eyes: Conjunctivae are normal. Pupils are equal, round, and reactive to light. Right eye exhibits no discharge.   Neck: Normal range of motion. Neck supple. No JVD present. No thyromegaly present.   No carotid bruits   Cardiovascular: Normal rate, regular rhythm, S1 normal, S2 normal, normal heart sounds, intact distal pulses and normal pulses.  PMI is not displaced.  Exam reveals no gallop, no friction rub and no opening snap.    No murmur heard.  Pulmonary/Chest: Effort normal and breath sounds normal. No respiratory distress. She has no wheezes. She has no rales. She exhibits no tenderness.   Abdominal: Soft. Bowel sounds are normal. She exhibits no distension and no mass. There is no tenderness. There is no guarding.   No hepatosplenomegaly   Musculoskeletal: Normal range of motion. She exhibits no edema or tenderness.   Lymphadenopathy:     She has no cervical adenopathy.   Neurological: She is alert and oriented to person, place, and time.   Skin: Skin is warm. No rash noted. She is not diaphoretic. No erythema.   Psychiatric: She has a normal mood and affect.   Nursing note and vitals reviewed.      ECGs reviewed-NSR  LABS reviewed  Imaging including Echoes reviewed- normal ef with DD    Assessment:     1. HTN (hypertension), benign    2. Cardiac murmur    3. Diastolic dysfunction        Plan:   Echo showed DD with no CHF symptoms.  Continue current medication  Leg elevations  Low salt diet  F/u in 6 months

## 2017-12-07 ENCOUNTER — LAB VISIT (OUTPATIENT)
Dept: LAB | Facility: HOSPITAL | Age: 72
End: 2017-12-07
Attending: INTERNAL MEDICINE
Payer: MEDICARE

## 2017-12-07 DIAGNOSIS — C50.911 BREAST CANCER, RIGHT: ICD-10-CM

## 2017-12-07 LAB
ALBUMIN SERPL BCP-MCNC: 3.8 G/DL
ALP SERPL-CCNC: 128 U/L
ALT SERPL W/O P-5'-P-CCNC: 34 U/L
ANION GAP SERPL CALC-SCNC: 7 MMOL/L
AST SERPL-CCNC: 25 U/L
BASOPHILS # BLD AUTO: 0.03 K/UL
BASOPHILS NFR BLD: 0.6 %
BILIRUB SERPL-MCNC: 0.6 MG/DL
BUN SERPL-MCNC: 17 MG/DL
CALCIUM SERPL-MCNC: 9.4 MG/DL
CHLORIDE SERPL-SCNC: 102 MMOL/L
CO2 SERPL-SCNC: 30 MMOL/L
CREAT SERPL-MCNC: 0.65 MG/DL
DIFFERENTIAL METHOD: ABNORMAL
EOSINOPHIL # BLD AUTO: 0.3 K/UL
EOSINOPHIL NFR BLD: 4.9 %
ERYTHROCYTE [DISTWIDTH] IN BLOOD BY AUTOMATED COUNT: 12.1 %
EST. GFR  (AFRICAN AMERICAN): >60 ML/MIN/1.73 M^2
EST. GFR  (NON AFRICAN AMERICAN): >60 ML/MIN/1.73 M^2
GLUCOSE SERPL-MCNC: 213 MG/DL
HCT VFR BLD AUTO: 38.8 %
HGB BLD-MCNC: 12.9 G/DL
LYMPHOCYTES # BLD AUTO: 2.2 K/UL
LYMPHOCYTES NFR BLD: 43.8 %
MCH RBC QN AUTO: 31.1 PG
MCHC RBC AUTO-ENTMCNC: 33.2 G/DL
MCV RBC AUTO: 94 FL
MONOCYTES # BLD AUTO: 0.6 K/UL
MONOCYTES NFR BLD: 11.3 %
NEUTROPHILS # BLD AUTO: 2 K/UL
NEUTROPHILS NFR BLD: 39.4 %
PLATELET # BLD AUTO: 256 K/UL
PMV BLD AUTO: 11.1 FL
POTASSIUM SERPL-SCNC: 3.8 MMOL/L
PROT SERPL-MCNC: 7.3 G/DL
RBC # BLD AUTO: 4.15 M/UL
SODIUM SERPL-SCNC: 139 MMOL/L
WBC # BLD AUTO: 5.12 K/UL

## 2017-12-07 PROCEDURE — 36415 COLL VENOUS BLD VENIPUNCTURE: CPT | Mod: PO

## 2017-12-07 PROCEDURE — 85025 COMPLETE CBC W/AUTO DIFF WBC: CPT | Mod: PO

## 2017-12-07 PROCEDURE — 80053 COMPREHEN METABOLIC PANEL: CPT | Mod: PO

## 2017-12-08 ENCOUNTER — OFFICE VISIT (OUTPATIENT)
Dept: HEMATOLOGY/ONCOLOGY | Facility: CLINIC | Age: 72
End: 2017-12-08
Payer: MEDICARE

## 2017-12-08 VITALS
BODY MASS INDEX: 31.11 KG/M2 | DIASTOLIC BLOOD PRESSURE: 85 MMHG | WEIGHT: 186.75 LBS | HEART RATE: 73 BPM | SYSTOLIC BLOOD PRESSURE: 163 MMHG | HEIGHT: 65 IN

## 2017-12-08 DIAGNOSIS — D05.11 DUCTAL CARCINOMA IN SITU (DCIS) OF RIGHT BREAST: Primary | ICD-10-CM

## 2017-12-08 PROCEDURE — 99213 OFFICE O/P EST LOW 20 MIN: CPT | Mod: S$GLB,,, | Performed by: INTERNAL MEDICINE

## 2017-12-08 PROCEDURE — 99999 PR PBB SHADOW E&M-EST. PATIENT-LVL III: CPT | Mod: PBBFAC,,, | Performed by: INTERNAL MEDICINE

## 2017-12-08 RX ORDER — FLUTICASONE PROPIONATE 50 MCG
SPRAY, SUSPENSION (ML) NASAL
COMMUNITY
Start: 2017-10-04 | End: 2018-06-13

## 2017-12-08 RX ORDER — CITALOPRAM 10 MG/1
10 TABLET ORAL DAILY
COMMUNITY
Start: 2017-11-01 | End: 2018-06-13

## 2017-12-08 NOTE — PROGRESS NOTES
Subjective:       Patient ID: Noris Demarco is a 72 y.o. female.    Chief Complaint: Breast Cancer    DIAGNOSIS: right breast ductal carcinoma in situ.     HPI   Ms.Emma Demarco underwent a lumpectomy in 01/2008 for a 5-mm Right Breast DCIS that was found on a routine screening mammogram.   She completed adjuvant RT in July 2008.  She was started on Arimidex at [facility] following radiation treatment and she was intolerant to it because of debilitating and disabling joint pains, she hence was switched to tamoxifen in 12/2008 and completed it in Dec 2013.    She is here for a followup today.   She performs self-breast examinations frequently and has not felt any lumps or bumps recently.   She denies any enlarged lymph nodes or new skin rashes.    Review of Systems    CONSTITUTIONAL: No weight loss or fevers.  HEME/LYMPH: No lymph node enlargements or bruises  CARDIOVASCULAR: No chest pain or palpitations.  RESPIRATORY: No cough, hemoptysis or dyspnea.  GASTROINTESTINAL: No abdominal pain, nausea or change in bowel habits.    Objective:      Physical Exam    GENERAL: Well-groomed, moderately-built. Vitals noted.  NECK: Supple without any masses. Thyroid is non-tender.  LYMPH NODES: None felt in the neck or supraclavicular areas.  LUNGS: Clear bilaterally without crackles or wheeze. Breathing nonlabored.  BREASTS: She has large bilateral symmetrical pendulous breasts with evidence of a lumpectomy scar on the right breast. No breast abnormalities or discrete masses felt on physical examination of either breast. There is no nipple inversion or nipple discharge. There is no palpable lymphadenopathy in either axilla.   HEART: S1, S2 heard. Rhythm regular. No tachycardia or gallops. No pedal edema.   ABDOMEN: Soft and non-tender. No palpable masses, hepatosplenomegaly or ascites.    Assessment:       1. Ductal carcinoma in situ (DCIS) of right breast        Plan:   She is doing well from a breast cancer standpoint with MARSHALL.    She has taken tamoxifen for 5 years and discontinued it in December 2013.   Next mammogram due May 2018.  RTC in 1 year for follow-up.   Will check cbc, cmp - once a year - 1 day prior to f/u - labs at [facility].

## 2018-03-23 ENCOUNTER — OFFICE VISIT (OUTPATIENT)
Dept: CARDIOLOGY | Facility: CLINIC | Age: 73
End: 2018-03-23
Payer: MEDICARE

## 2018-03-23 VITALS
DIASTOLIC BLOOD PRESSURE: 76 MMHG | SYSTOLIC BLOOD PRESSURE: 160 MMHG | HEART RATE: 76 BPM | BODY MASS INDEX: 31.18 KG/M2 | WEIGHT: 187.13 LBS | HEIGHT: 65 IN

## 2018-03-23 DIAGNOSIS — R01.1 CARDIAC MURMUR: ICD-10-CM

## 2018-03-23 DIAGNOSIS — I87.2 VENOUS INSUFFICIENCY: ICD-10-CM

## 2018-03-23 DIAGNOSIS — I51.89 DIASTOLIC DYSFUNCTION: ICD-10-CM

## 2018-03-23 DIAGNOSIS — I10 HTN (HYPERTENSION), BENIGN: Primary | ICD-10-CM

## 2018-03-23 PROCEDURE — 99999 PR PBB SHADOW E&M-EST. PATIENT-LVL III: CPT | Mod: PBBFAC,,, | Performed by: INTERNAL MEDICINE

## 2018-03-23 PROCEDURE — 3077F SYST BP >= 140 MM HG: CPT | Mod: CPTII,S$GLB,, | Performed by: INTERNAL MEDICINE

## 2018-03-23 PROCEDURE — 3078F DIAST BP <80 MM HG: CPT | Mod: CPTII,S$GLB,, | Performed by: INTERNAL MEDICINE

## 2018-03-23 PROCEDURE — 99214 OFFICE O/P EST MOD 30 MIN: CPT | Mod: S$GLB,,, | Performed by: INTERNAL MEDICINE

## 2018-03-23 NOTE — PROGRESS NOTES
Subjective:   Patient ID:  Noris Demarco is a 72 y.o. female who presents for follow-up of Follow-up      Problem List Items Addressed This Visit        Cardiac/Vascular    HTN (hypertension), benign - Primary    Cardiac murmur    Diastolic dysfunction          HPI: Patient with the above medical problems present for f/u. She is doing well with no complaints. She denies chest pain or dyspnea. No orthopnea or PND. BP is mildly elevated today. She is compliant with medications. No further leg swelling. She exercise 4-5 days weekly for 35-40 minutes.Repeat /72.    Review of Systems   Constitution: Negative.   HENT: Negative.    Eyes: Negative.    Cardiovascular: Negative.    Respiratory: Negative.    Endocrine: Negative.    Hematologic/Lymphatic: Negative.    Skin: Negative.    Musculoskeletal: Negative.    Gastrointestinal: Negative.    Neurological: Negative.    Psychiatric/Behavioral: Negative.        Patient's Medications   New Prescriptions    No medications on file   Previous Medications    AMLODIPINE (NORVASC) 10 MG TABLET    Take 1 tablet by mouth once daily.    ASPIRIN (ECOTRIN) 81 MG EC TABLET    Take 81 mg by mouth once daily.    CITALOPRAM (CELEXA) 10 MG TABLET        CLONIDINE (CATAPRES) 0.1 MG TABLET    Take 1 tablet (0.1 mg total) by mouth 2 (two) times daily.    COENZYME Q10 (COQ-10) 100 MG CAPSULE    Take 100 mg by mouth once daily.      CYANOCOBALAMIN (VITAMIN B-12) 500 MCG TABLET    Take 500 mcg by mouth once daily.    FISH OIL-OMEGA-3 FATTY ACIDS 300-1,000 MG CAPSULE    Take 1 g by mouth once daily.    FLUTICASONE (FLONASE) 50 MCG/ACTUATION NASAL SPRAY        FUROSEMIDE (LASIX) 40 MG TABLET    Take 40 mg by mouth 2 (two) times daily.    INSULIN NPH (NOVOLIN N) 100 UNIT/ML INJECTION    Inject 12 Units into the skin 2 (two) times daily before meals.     LISINOPRIL (PRINIVIL,ZESTRIL) 40 MG TABLET    Take 40 mg by mouth 2 (two) times daily.     MELOXICAM (MOBIC) 15 MG TABLET    Take 1 tablet by  mouth once daily.    UNABLE TO FIND    400 Int'l Units. Vitamin d3   Modified Medications    No medications on file   Discontinued Medications    No medications on file       Objective:   Physical Exam   Constitutional: She is oriented to person, place, and time. She appears well-developed and well-nourished. No distress.   Examination of the digits showed no clubbing or cyanosis   HENT:   Head: Normocephalic and atraumatic.   Eyes: Conjunctivae are normal. Pupils are equal, round, and reactive to light. Right eye exhibits no discharge.   Neck: Normal range of motion. Neck supple. No JVD present. No thyromegaly present.   No carotid bruits   Cardiovascular: Normal rate, regular rhythm, S1 normal, S2 normal, normal heart sounds, intact distal pulses and normal pulses.  PMI is not displaced.  Exam reveals no gallop, no friction rub and no opening snap.    No murmur heard.  Pulmonary/Chest: Effort normal and breath sounds normal. No respiratory distress. She has no wheezes. She has no rales. She exhibits no tenderness.   Abdominal: Soft. Bowel sounds are normal. She exhibits no distension and no mass. There is no tenderness. There is no guarding.   No hepatosplenomegaly   Musculoskeletal: Normal range of motion. She exhibits no edema or tenderness.   Lymphadenopathy:     She has no cervical adenopathy.   Neurological: She is alert and oriented to person, place, and time.   Skin: Skin is warm. No rash noted. She is not diaphoretic. No erythema.   Psychiatric: She has a normal mood and affect.   Nursing note and vitals reviewed.      ECGs reviewed-NSR  LABS reviewed  Imaging including Echoes reviewed- normal ef with DD    Assessment:     1. HTN (hypertension), benign    2. Diastolic dysfunction    3. Cardiac murmur        Plan:   Echo showed DD with no CHF symptoms.  Continue current medication  Leg elevations  Low salt diet  F/u in 6 months

## 2018-05-07 ENCOUNTER — HOSPITAL ENCOUNTER (OUTPATIENT)
Dept: RADIOLOGY | Facility: HOSPITAL | Age: 73
Discharge: HOME | End: 2018-05-07
Attending: INTERNAL MEDICINE
Payer: MEDICARE

## 2018-05-07 DIAGNOSIS — D05.11 DUCTAL CARCINOMA IN SITU (DCIS) OF RIGHT BREAST: ICD-10-CM

## 2018-05-07 DIAGNOSIS — R92.8 ABNORMAL MAMMOGRAM: ICD-10-CM

## 2018-05-07 PROCEDURE — 77066 DX MAMMO INCL CAD BI: CPT | Mod: 26,,, | Performed by: RADIOLOGY

## 2018-05-07 PROCEDURE — 77066 DX MAMMO INCL CAD BI: CPT | Mod: TC

## 2018-05-07 PROCEDURE — 76642 ULTRASOUND BREAST LIMITED: CPT | Mod: TC,LT

## 2018-05-07 PROCEDURE — 77062 BREAST TOMOSYNTHESIS BI: CPT | Mod: 26,,, | Performed by: RADIOLOGY

## 2018-05-07 PROCEDURE — 76642 ULTRASOUND BREAST LIMITED: CPT | Mod: 26,LT,, | Performed by: RADIOLOGY

## 2018-05-08 ENCOUNTER — TELEPHONE (OUTPATIENT)
Dept: RADIOLOGY | Facility: HOSPITAL | Age: 73
End: 2018-05-08

## 2018-05-08 NOTE — TELEPHONE ENCOUNTER
Spoke with patient. Reviewed breast biopsy procedure and reviewed instructions for breast biopsy. Patient expressed understanding and all questions were answered. Provided patient with my phone number to call for any further concerns or questions.   Patient scheduled breast biopsy at the [facility] on 5/25/18.

## 2018-05-25 ENCOUNTER — HOSPITAL ENCOUNTER (OUTPATIENT)
Dept: RADIOLOGY | Facility: HOSPITAL | Age: 73
Discharge: HOME | End: 2018-05-25
Attending: INTERNAL MEDICINE
Payer: MEDICARE

## 2018-05-25 DIAGNOSIS — N63.0 LUMP OR MASS IN BREAST: Primary | ICD-10-CM

## 2018-05-25 DIAGNOSIS — R92.8 ABNORMAL MAMMOGRAM: ICD-10-CM

## 2018-05-25 PROCEDURE — 19081 BX BREAST 1ST LESION STRTCTC: CPT | Mod: TC,PO,LT

## 2018-05-25 PROCEDURE — 88305 TISSUE EXAM BY PATHOLOGIST: CPT | Mod: 26,,, | Performed by: PATHOLOGY

## 2018-05-25 PROCEDURE — 19081 BX BREAST 1ST LESION STRTCTC: CPT | Mod: LT,,, | Performed by: RADIOLOGY

## 2018-05-25 PROCEDURE — 27201044 MAMMO BREAST STEREOTACTIC BREAST BIOPSY LEFT: Mod: PO

## 2018-05-25 PROCEDURE — 88305 TISSUE EXAM BY PATHOLOGIST: CPT | Performed by: PATHOLOGY

## 2018-05-25 PROCEDURE — 88360 TUMOR IMMUNOHISTOCHEM/MANUAL: CPT | Mod: 26,,, | Performed by: PATHOLOGY

## 2018-05-29 ENCOUNTER — TELEPHONE (OUTPATIENT)
Dept: SURGERY | Facility: CLINIC | Age: 73
End: 2018-05-29

## 2018-05-29 NOTE — TELEPHONE ENCOUNTER
Called patient to discuss biopsy results from Friday. Explained that biopsy came back with invasive ductal carcinoma. We discussed what this means, and that the next step is to meet with a breast surgeon to discuss treatment options. Patient had previous breast surgery on the contralateral breast, but had that done in [place]. She would like to continue her care with the breast center. She is interested in Dr. Johnson since he will be spending some days in [place] in the future. She states [place] is much more convenient for her. Scheduled with Dr. Johnson for Thursday, reviewed the location of the breast center and provided my direct number. Encouraged her to call with any questions/concerns

## 2018-05-29 NOTE — TELEPHONE ENCOUNTER
----- Message from Rocío Krishnan RT sent at 5/28/2018  5:21 PM CDT -----  Regarding: RE: biopsy results  Yes, please.  Thank you,  Rocío  ----- Message -----  From: Helen Taveras RN  Sent: 5/28/2018   4:42 PM  To: Reilly Peralta MD, Rocío Krishnan, RT  Subject: biopsy results                                   Hi Dr. Peralta,     Ms. Deamrco's biopsy unfortunately came back positive. Please let me know if you'd like me to call her and give her the results.     Since Dr. Johnson will be at [place] every other Friday starting late June, I'm happy to get her in to see him for a consult this week or early next week at [facility]. Thanks in advance

## 2018-05-29 NOTE — TELEPHONE ENCOUNTER
Called patient with the results of her breast biopsy from 5/25/18. No answer, left voicemial with my callback number requesting return call at earliest convenience

## 2018-05-31 ENCOUNTER — OFFICE VISIT (OUTPATIENT)
Dept: SURGERY | Facility: CLINIC | Age: 73
End: 2018-05-31
Payer: MEDICARE

## 2018-05-31 ENCOUNTER — RESEARCH ENCOUNTER (OUTPATIENT)
Dept: RESEARCH | Facility: HOSPITAL | Age: 73
End: 2018-05-31

## 2018-05-31 ENCOUNTER — DOCUMENTATION ONLY (OUTPATIENT)
Dept: SURGERY | Facility: CLINIC | Age: 73
End: 2018-05-31

## 2018-05-31 VITALS
DIASTOLIC BLOOD PRESSURE: 90 MMHG | TEMPERATURE: 99 F | BODY MASS INDEX: 30.49 KG/M2 | HEIGHT: 65 IN | SYSTOLIC BLOOD PRESSURE: 187 MMHG | WEIGHT: 183 LBS | HEART RATE: 85 BPM

## 2018-05-31 DIAGNOSIS — C50.219: Primary | ICD-10-CM

## 2018-05-31 PROCEDURE — 3080F DIAST BP >= 90 MM HG: CPT | Mod: CPTII,S$GLB,, | Performed by: SURGERY

## 2018-05-31 PROCEDURE — 99999 PR PBB SHADOW E&M-EST. PATIENT-LVL III: CPT | Mod: PBBFAC,,, | Performed by: SURGERY

## 2018-05-31 PROCEDURE — 99204 OFFICE O/P NEW MOD 45 MIN: CPT | Mod: S$GLB,,, | Performed by: SURGERY

## 2018-05-31 PROCEDURE — 3077F SYST BP >= 140 MM HG: CPT | Mod: CPTII,S$GLB,, | Performed by: SURGERY

## 2018-05-31 NOTE — LETTER
Sarita 3, 2018      Reilly Peralta MD  [address] [place] [address]           [facility]  [address]  [facility] [address]  Phone: [phone]  Fax: [phone]          Patient: Noris Demarco   MR Number: [id number]   YOB: 1945   Date of Visit: 5/31/2018       Dear Dr. Reilly Peralta:    Thank you for referring Noris Demarco to me for evaluation. Attached you will find relevant portions of my assessment and plan of care.    If you have questions, please do not hesitate to call me. I look forward to following Noris Demarco along with you.    Sincerely,    Luca Johnson MD    Enclosure  CC:  No Recipients    If you would like to receive this communication electronically, please contact [email or web address] or [phone] to request more information on [email or web address] Link access.    For providers and/or their staff who would like to refer a patient to [facility], please contact us through our one-stop-shop provider referral line, [facility] [place], at [phone].    If you feel you have received this communication in error or would no longer like to receive these types of communications, please e-mail [email or web address]

## 2018-05-31 NOTE — PROGRESS NOTES
[facility]  Attending: Alex  Resident: Emmett   CC: L breast IDC    HPI: Noris Demarco is a pleasant 72 y.o. woman with hx R breast DCIS s/p lumpectomy (), radiation, and endocrine therapy x 5 years, who presents to clinic for newly diagnosed invasive ductal carcinoma.    She is asymptomatic, specifically no breast masses, pain, or nipple discharge.      After screening mammogram demonstrated the abnormality, a diagnostic MMG was performed.  This showed a 1.3 cm lesion.  This was not sonographically evident.    Eze guided bx was performed, which revealed Grade 3, 1.4 cm IDC and DCIS.  ER (95%) +, MD moderate to strongly positive (5%), and Her 2 negative.  Ki 67 30%.    Would like excision of left forearm lesion, which has been prsent for 40 years, but occasionally changes and ulcerates    Personal hx of DCIS, as stated above  Family history- no immediate family history of breast cancer.  Her maternal cousin (who does not have breast cancer) has 5 kids who had early breast cancer, perhaps in their 20s.    , first child at age 21  Menarche: 13  Menopause status  OCP use: None  HRT use: None    Onc hx:  R breast DCIS s/p lumpectomy (), radiation, and endocrine therapy x 5 years.  Unable to tolerate Arimidex due to joint pain, so was switched to tamoxifen.  Completed in     Past Medical History:   Diagnosis Date    Breast cancer 2008    Right breast     Diabetes mellitus     Hyperlipidemia     Hypertension        Past Surgical History:   Procedure Laterality Date    BREAST BIOPSY Right 2008    BREAST LUMPECTOMY Right 2008     SECTION, LOW TRANSVERSE  ,1979    x2    CHOLECYSTECTOMY      laparoscopic    DILATION AND CURETTAGE OF UTERUS  2012    TUBAL LIGATION      @ time of        Family History   Problem Relation Age of Onset    Hypertension Mother     Heart failure Mother     Hypertension Brother     Hypertension Sister     Breast cancer Cousin      Breast cancer Cousin     Coronary artery disease Brother     Stroke Brother     Coronary artery disease Brother     Stroke Brother     Colon cancer Neg Hx     Ovarian cancer Neg Hx        Social History     Social History    Marital status:      Spouse name: N/A    Number of children: N/A    Years of education: N/A     Occupational History    Not on file.     Social History Main Topics    Smoking status: Former Smoker     Packs/day: 1.00     Years: 14.00     Types: Cigarettes     Quit date: 4/30/1975    Smokeless tobacco: Never Used    Alcohol use No    Drug use: No    Sexual activity: Yes     Partners: Male     Birth control/ protection: Post-menopausal     Other Topics Concern    Not on file     Social History Narrative    No narrative on file       Current Outpatient Prescriptions on File Prior to Visit   Medication Sig Dispense Refill    amlodipine (NORVASC) 10 MG tablet Take 1 tablet by mouth once daily.      aspirin (ECOTRIN) 81 MG EC tablet Take 81 mg by mouth once daily.      coenzyme Q10 (COQ-10) 100 mg capsule Take 100 mg by mouth once daily.        cyanocobalamin (VITAMIN B-12) 500 MCG tablet Take 500 mcg by mouth once daily.      fish oil-omega-3 fatty acids 300-1,000 mg capsule Take 1 g by mouth once daily.      fluticasone (FLONASE) 50 mcg/actuation nasal spray       furosemide (LASIX) 40 MG tablet Take 40 mg by mouth 2 (two) times daily.      insulin NPH (NOVOLIN N) 100 unit/mL injection Inject 12 Units into the skin 2 (two) times daily before meals.       lisinopril (PRINIVIL,ZESTRIL) 40 MG tablet Take 40 mg by mouth 2 (two) times daily.       meloxicam (MOBIC) 15 MG tablet Take 1 tablet by mouth once daily.      citalopram (CELEXA) 10 MG tablet       cloNIDine (CATAPRES) 0.1 MG tablet Take 1 tablet (0.1 mg total) by mouth 2 (two) times daily. (Patient taking differently: Take 0.2 mg by mouth 3 (three) times daily. ) 60 tablet 0    UNABLE TO FIND 400 Int'l  "Units. Vitamin d3       No current facility-administered medications on file prior to visit.        Review of patient's allergies indicates:   Allergen Reactions    Tylenol [acetaminophen] Other (See Comments)     Affects memory/confused       ROS: Constitutional: no fever or chills, pain controlled   Respiratory: no cough or shortness of breath   Cardiovascular: no chest pain or palpitations   Gastrointestinal: no abdominal pain or vomiting   Genitourinary: no hematuria or dysuria   Hematologic/Lymphatic: no easy bruising or lymphadenopathy   Musculoskeletal: no arthralgias or myalgias   Neurological: no seizures or tremors     Phys:  Vitals:    05/31/18 0911   BP: (!) 187/90   BP Location: Left arm   Patient Position: Sitting   BP Method: Medium (Automatic)   Pulse: 85   Temp: 98.6 °F (37 °C)   TempSrc: Oral   Weight: 83 kg (183 lb)   Height: 5' 5" (1.651 m)     Phys:   Gen: NAD   HEENT: NCAT, trachea midline  CV: RRR, no m/r/g   Breast: Biopsy cavity palpable in upper inner quadrant.  Otherwise, no masses or axillary LAD  Pulm: Unlabored  Abd: Soft, nttp. No rebound, guarding.   Extremities: 1.5 x 1 cm left medial forearm skin lesion  Skin: Skin color, texture, turgor normal. No rashes or lesions     A/P 72 year old woman with 1.4 cm ER+, NE 5%+ Her 2 negative IDC of L breast    To OR for L magseed lumpectomy with SLNBx and excisional biopsy of 1.5 x 1 cm left medial forearm skin lesion  Genetic counseling    Jac Christine MD  General Surgery, PGY-4  [phone]   I have personally taken the history and examined this patient and agree with the resident's note as stated above.  DATE OF INITIAL CONSULTATION:  05/31/2018    Noris Demarco is a very pleasant 72-year-old -American female who   presents with her , Gregory, to discuss her newly diagnosed left invasive   breast carcinoma of the upper inner quadrant.    Briefly, she had a history of contralateral right-sided DCIS in 2008, 10 years   ago, treated with " breast conservation surgery, whole breast radiotherapy and   endocrine therapy.  She was initially treated with aromatase inhibitor and   subsequently switched to tamoxifen, which she took until 2013.    She now presents with a 1.3 cm area of abnormality in the left upper inner   quadrant noted on mammogram, but not on ultrasound.  The subsequent stereotactic   image-guided core needle biopsy revealed a high-grade grade III 1.4 cm invasive   ductal carcinoma that was estrogen receptor positive, HER-2/josé miguel negative as   noted above with Ki-67 index of 30%.    The lesion is not palpable.  There are no abnormal skin changes.  There is no   palpable mass.  There is no lymphadenopathy and her clinical breast exam is   relatively within normal limits.  Options of breast conservation surgery and   radiotherapy versus mastectomy with or without reconstruction were discussed at   length.  She is highly motivated again for attempted breast conservation and   adjuvant radiotherapy since this is how her right side had been treated 10 years   ago.  She is tentatively scheduled, consented and set up for left breast   conservation surgery with Magseed localization, partial mastectomy (lumpectomy   for margins) with left axillary sentinel lymph node biopsy on Friday,   06/15/2018.  Since now she has had bilateral breast cancer, we will also set her   up for a Genetics counseling appointment and referral to discuss whether or not   she would qualify for possible genetic testing based on family pedigree,   although there is no immediate family history of breast cancer.  She states she   has a maternal cousin who herself does not have breast cancer, but has five   children who have had early breast cancer, perhaps as early as in their 20s, but   this may be a familial trait coming from that family's paternal side, which   would not be related to our patient.  Nonetheless, since she has had bilateral   breast cancer, we will refer her  for genetic counseling and she is tentatively   again scheduled for breast conservation surgery with Magseed localization for a   T1c 1.4 cm invasive ductal cancer in the left upper inner quadrant with sentinel   lymph node biopsy of the left axilla on Friday, 06/15/2018.  Time spent with   the patient today was greater than 45 minutes with greater than 50% of this time   in counseling and coordination of care.      RLC/HN  dd: 06/03/2018 11:24:01 (CDT)  td: 06/04/2018 02:03:31 (CDT)  Doc ID   #[id number]  Job ID #[id number]    CC:     Job # [id number].  Patient also has a left medial forearm skin lesion which she brought attention to at the end of the consultation which is pigmented and has been present for 40 years but recently changing with some ulcerative changes.  Will perform excisional biopsy of this lesion at the time of her breast cancer surgery.  It measures 1 x 1.5 cm and likely is consistent with a pigmented SK, but I will perform excisional biopsy at time of surgery give recently reported changes although it has been longstanding.

## 2018-05-31 NOTE — PROGRESS NOTES
The patient and her  were approached by me in [facility] regarding participation in [employer] ([id number]) study (IRB#[id number].B). Pt and her  were agreeable.    The Informed Consent Form (ICF) was reviewed with pt and her . The discussion included:    - participation is voluntary and will not prohibit her from participating in future research studies;  - the blood specimen (3-4 tablespoons) will be collected pre-operatively, if possible;    - tissue will be collected from Pathology after routine tests have been conducted;   - pt can change her mind about participating at any time;  - if she changes her mind about participation, she can call us at contact info in the ICF, and we will discard samples remaining;  - samples that have been used prior to her notification will still be included in research;  - specimens will be stored with unique code that can only be linked to pt by [employer] staff;  - all medical information released to researchers will be stripped of identifiers;  - samples will not be released to outside researchers unless approved by internal committee;  - there is a small risk of loss of confidentiality, but we make every effort to ensure privacy;  - no other physical risks outside of those involved in standard of care procedure.    Dr. Johnson approved of the patient's participation and will continue to take care of the patient per his usual protocol - participation in [employer] program will not change the patient's present or future medical care. Pt did not have any questions. Pt willingly and independently signed the ICF. A copy of the signed ICF and my business card were given to pt with instructions to call with any questions that may arise or if she should change her mind regarding participation in [employer] program.

## 2018-05-31 NOTE — PROGRESS NOTES
Nurse Navigator Note:     Met with patient during her consult with Dr. Johnson. Patient and I reviewed the information she discussed with Dr. Johnson, including treatment options, diagnosis, and future plans for workup. Patient and I went through the new patient binder, explained some of the information and why it is provided.     Also offered patient consults with our other specialty clinics: Dr. Shook for gynecological health during treatment, Vianey Kapoor for physical therapy evaluation, Dr. Sams for psychological support, and Aimee Wetzel for nutritional counseling. Explained to patient that all of these support services are completely optional. Discussed that physical therapy is the only service that is recommended pre-op specifically, everything else can be requested at a later time. Patient was given a copy of  Dr. Johnson's card and my card. Encouraged her to call me if she has any questions or concerns or would like to schedule any additional appointments. Verbalized understanding of all information.

## 2018-05-31 NOTE — LETTER
[facility]  [address]  [facility] [address]  Phone: [phone]  Fax: [phone] Sarita 3, 2018      Reilly Peralta MD  [address]  [place] [address]    Patient: Noris Demarco   MR Number: [id number]   YOB: 1945   Date of Visit: 5/31/2018     Dear Dr. Peralta:    Thank you for referring Noris Demarco to me for evaluation. Below are the relevant portions of my assessment and plan of care.    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    Briefly, she had a history of contralateral right-sided DCIS in 2008, 10 years ago, treated with breast conservation surgery, whole breast radiotherapy and endocrine therapy.  She was initially treated with aromatase inhibitor and subsequently switched to tamoxifen, which she took until 2013.    She now presents with a 1.3 cm area of abnormality in the left upper inner quadrant noted on mammogram, but not on ultrasound.  The subsequent stereotactic image-guided core needle biopsy revealed a high-grade grade III 1.4 cm invasive ductal carcinoma that was estrogen receptor positive, HER-2/josé miguel negative as noted above with Ki-67 index of 30%.    The lesion is not palpable.  There are no abnormal skin changes.  There is no palpable mass.  There is no lymphadenopathy and her clinical breast exam is relatively within normal limits.  Options of breast conservation surgery and radiotherapy versus mastectomy with or without reconstruction were discussed at length.  She is highly motivated again for attempted breast conservation and adjuvant radiotherapy since this is how her right side had been treated 10 years ago.  She is tentatively scheduled, consented and set up for left breast conservation surgery with Magseed localization, partial mastectomy (lumpectomy for margins) with left  axillary sentinel lymph node biopsy on Friday, 06/15/2018.  Since now she has had bilateral breast cancer, we will also set her up for a Genetics counseling appointment and referral to discuss whether or not she would qualify for possible genetic testing based on family pedigree, although there is no immediate family history of breast cancer.  She states she has a maternal cousin who herself does not have breast cancer, but has five children who have had early breast cancer, perhaps as early as in their 20s, but this may be a familial trait coming from that family's paternal side, which would not be related to our patient.  Nonetheless, since she has had bilateral breast cancer, we will refer her for genetic counseling and she is tentatively again scheduled for breast conservation surgery with Magseed localization for a T1c 1.4 cm invasive ductal cancer in the left upper inner quadrant with sentinel lymph node biopsy of the left axilla on Friday, 06/15/2018.  Time spent with the patient today was greater than 45 minutes with greater than 50% of this time in counseling and coordination of care.    Patient also has a left medial forearm skin lesion which she brought attention to at the end of the consultation which is pigmented and has been present for 40 years but recently changing with some ulcerative changes. Will perform excisional biopsy of this lesion at the time of her breast cancer surgery.  It measures 1 x 1.5 cm and likely is consistent with a pigmented SK, but I will perform excisional biopsy at time of surgery give recently reported changes although it has been longstanding.    If you have questions, please do not hesitate to call me. I look forward to following Noris along with you.    Sincerely,    Luca Johnson MD   Medical Director, [facility]  Staff Attending Surgeon - Department of Surgery  [facility]  Associate Professor of Surgery  [employer]  [employer]    RLC/hcr    CC  Escipion MD Leanne Tolbert MD

## 2018-06-01 DIAGNOSIS — L98.9 SKIN LESION OF LEFT ARM: ICD-10-CM

## 2018-06-01 DIAGNOSIS — D05.11 DUCTAL CARCINOMA IN SITU (DCIS) OF RIGHT BREAST: Primary | ICD-10-CM

## 2018-06-03 PROBLEM — C50.219: Status: ACTIVE | Noted: 2018-06-03

## 2018-06-06 ENCOUNTER — HOSPITAL ENCOUNTER (OUTPATIENT)
Dept: RADIOLOGY | Facility: HOSPITAL | Age: 73
Discharge: HOME | End: 2018-06-06
Attending: SURGERY
Payer: MEDICARE

## 2018-06-06 DIAGNOSIS — C50.912 BREAST CANCER, LEFT: ICD-10-CM

## 2018-06-06 PROCEDURE — 19281 PERQ DEVICE BREAST 1ST IMAG: CPT | Mod: LT,,, | Performed by: RADIOLOGY

## 2018-06-06 PROCEDURE — A4648 IMPLANTABLE TISSUE MARKER: HCPCS | Mod: PO

## 2018-06-14 ENCOUNTER — TELEPHONE (OUTPATIENT)
Dept: SURGERY | Facility: CLINIC | Age: 73
End: 2018-06-14

## 2018-06-14 NOTE — TELEPHONE ENCOUNTER
Spoke with pt regarding surgery, pt advised to arrive to [facility] at 0530 for 0700 surgery, pt verbalized understanding, pre-op education reinforced, all questions answered at this time, pt given reassurance

## 2018-06-15 ENCOUNTER — SURGERY (OUTPATIENT)
Age: 73
End: 2018-06-15

## 2018-06-15 ENCOUNTER — ANESTHESIA (OUTPATIENT)
Dept: SURGERY | Facility: HOSPITAL | Age: 73
End: 2018-06-15
Payer: MEDICARE

## 2018-06-15 ENCOUNTER — HOSPITAL ENCOUNTER (OUTPATIENT)
Dept: RADIOLOGY | Facility: HOSPITAL | Age: 73
Discharge: HOME | End: 2018-06-15
Attending: SURGERY | Admitting: SURGERY
Payer: MEDICARE

## 2018-06-15 ENCOUNTER — HOSPITAL ENCOUNTER (OUTPATIENT)
Facility: HOSPITAL | Age: 73
Discharge: HOME | End: 2018-06-15
Attending: SURGERY | Admitting: SURGERY
Payer: MEDICARE

## 2018-06-15 ENCOUNTER — ANESTHESIA EVENT (OUTPATIENT)
Dept: SURGERY | Facility: HOSPITAL | Age: 73
End: 2018-06-15
Payer: MEDICARE

## 2018-06-15 VITALS
RESPIRATION RATE: 16 BRPM | OXYGEN SATURATION: 100 % | BODY MASS INDEX: 27.74 KG/M2 | HEART RATE: 73 BPM | HEIGHT: 68 IN | TEMPERATURE: 98 F | DIASTOLIC BLOOD PRESSURE: 80 MMHG | SYSTOLIC BLOOD PRESSURE: 163 MMHG | WEIGHT: 183 LBS

## 2018-06-15 DIAGNOSIS — C50.912 INFILTRATING DUCTAL CARCINOMA OF BREAST, LEFT: ICD-10-CM

## 2018-06-15 DIAGNOSIS — C50.912 BREAST CANCER, LEFT: ICD-10-CM

## 2018-06-15 LAB
POCT GLUCOSE: 212 MG/DL (ref 70–110)
POCT GLUCOSE: 246 MG/DL (ref 70–110)

## 2018-06-15 PROCEDURE — 82962 GLUCOSE BLOOD TEST: CPT | Performed by: SURGERY

## 2018-06-15 PROCEDURE — S0020 INJECTION, BUPIVICAINE HYDRO: HCPCS | Performed by: SURGERY

## 2018-06-15 PROCEDURE — 94761 N-INVAS EAR/PLS OXIMETRY MLT: CPT

## 2018-06-15 PROCEDURE — 88342 IMHCHEM/IMCYTCHM 1ST ANTB: CPT | Mod: 26,,, | Performed by: PATHOLOGY

## 2018-06-15 PROCEDURE — 88305 TISSUE EXAM BY PATHOLOGIST: CPT | Performed by: PATHOLOGY

## 2018-06-15 PROCEDURE — 38900 IO MAP OF SENT LYMPH NODE: CPT | Mod: ,,, | Performed by: SURGERY

## 2018-06-15 PROCEDURE — 64461 PVB THORACIC SINGLE INJ SITE: CPT | Mod: 59,LT,, | Performed by: ANESTHESIOLOGY

## 2018-06-15 PROCEDURE — 76098 X-RAY EXAM SURGICAL SPECIMEN: CPT | Mod: TC,PO

## 2018-06-15 PROCEDURE — D9220A PRA ANESTHESIA: Mod: CRNA,,, | Performed by: NURSE ANESTHETIST, CERTIFIED REGISTERED

## 2018-06-15 PROCEDURE — 19301 PARTIAL MASTECTOMY: CPT | Mod: 51,LT,, | Performed by: SURGERY

## 2018-06-15 PROCEDURE — 63600175 PHARM REV CODE 636 W HCPCS: Performed by: NURSE ANESTHETIST, CERTIFIED REGISTERED

## 2018-06-15 PROCEDURE — D9220A PRA ANESTHESIA: Mod: ANES,,, | Performed by: ANESTHESIOLOGY

## 2018-06-15 PROCEDURE — 63600175 PHARM REV CODE 636 W HCPCS: Mod: JW,JG | Performed by: SURGERY

## 2018-06-15 PROCEDURE — 63600175 PHARM REV CODE 636 W HCPCS: Performed by: ANESTHESIOLOGY

## 2018-06-15 PROCEDURE — 88307 TISSUE EXAM BY PATHOLOGIST: CPT | Mod: 59 | Performed by: PATHOLOGY

## 2018-06-15 PROCEDURE — 88305 TISSUE EXAM BY PATHOLOGIST: CPT | Mod: 26,,, | Performed by: PATHOLOGY

## 2018-06-15 PROCEDURE — 12032 INTMD RPR S/A/T/EXT 2.6-7.5: CPT | Mod: 59,LT,, | Performed by: SURGERY

## 2018-06-15 PROCEDURE — 63600175 PHARM REV CODE 636 W HCPCS: Performed by: STUDENT IN AN ORGANIZED HEALTH CARE EDUCATION/TRAINING PROGRAM

## 2018-06-15 PROCEDURE — 76098 X-RAY EXAM SURGICAL SPECIMEN: CPT | Mod: 26,,, | Performed by: RADIOLOGY

## 2018-06-15 PROCEDURE — 38525 BIOPSY/REMOVAL LYMPH NODES: CPT | Mod: 51,LT,, | Performed by: SURGERY

## 2018-06-15 PROCEDURE — 25000003 PHARM REV CODE 250: Performed by: SURGERY

## 2018-06-15 PROCEDURE — 25000003 PHARM REV CODE 250: Performed by: NURSE ANESTHETIST, CERTIFIED REGISTERED

## 2018-06-15 PROCEDURE — 71000033 HC RECOVERY, INTIAL HOUR: Performed by: SURGERY

## 2018-06-15 PROCEDURE — 82962 GLUCOSE BLOOD TEST: CPT | Mod: 91 | Performed by: SURGERY

## 2018-06-15 PROCEDURE — 88341 IMHCHEM/IMCYTCHM EA ADD ANTB: CPT | Mod: 26,,, | Performed by: PATHOLOGY

## 2018-06-15 PROCEDURE — 64520 N BLOCK LUMBAR/THORACIC: CPT | Performed by: ANESTHESIOLOGY

## 2018-06-15 PROCEDURE — 37000008 HC ANESTHESIA 1ST 15 MINUTES: Performed by: SURGERY

## 2018-06-15 PROCEDURE — 36000707: Performed by: SURGERY

## 2018-06-15 PROCEDURE — 36000706: Performed by: SURGERY

## 2018-06-15 PROCEDURE — 11403 EXC TR-EXT B9+MARG 2.1-3CM: CPT | Mod: 59,LT,, | Performed by: SURGERY

## 2018-06-15 PROCEDURE — 71000015 HC POSTOP RECOV 1ST HR: Performed by: SURGERY

## 2018-06-15 PROCEDURE — 14301 TIS TRNFR ANY 30.1-60 SQ CM: CPT | Mod: LT,,, | Performed by: SURGERY

## 2018-06-15 PROCEDURE — 37000009 HC ANESTHESIA EA ADD 15 MINS: Performed by: SURGERY

## 2018-06-15 PROCEDURE — 27000221 HC OXYGEN, UP TO 24 HOURS

## 2018-06-15 PROCEDURE — 88342 IMHCHEM/IMCYTCHM 1ST ANTB: CPT | Performed by: PATHOLOGY

## 2018-06-15 PROCEDURE — 88307 TISSUE EXAM BY PATHOLOGIST: CPT | Mod: 26,,, | Performed by: PATHOLOGY

## 2018-06-15 PROCEDURE — 25000003 PHARM REV CODE 250: Performed by: STUDENT IN AN ORGANIZED HEALTH CARE EDUCATION/TRAINING PROGRAM

## 2018-06-15 RX ORDER — DOCUSATE SODIUM 100 MG/1
100 CAPSULE, LIQUID FILLED ORAL 2 TIMES DAILY
Refills: 0 | COMMUNITY
Start: 2018-06-15 | End: 2018-06-28

## 2018-06-15 RX ORDER — ISOSULFAN BLUE 50 MG/5ML
INJECTION, SOLUTION SUBCUTANEOUS
Status: DISCONTINUED | OUTPATIENT
Start: 2018-06-15 | End: 2018-06-15 | Stop reason: HOSPADM

## 2018-06-15 RX ORDER — ONDANSETRON 2 MG/ML
INJECTION INTRAMUSCULAR; INTRAVENOUS
Status: DISCONTINUED | OUTPATIENT
Start: 2018-06-15 | End: 2018-06-15

## 2018-06-15 RX ORDER — MIDAZOLAM HYDROCHLORIDE 2 MG/2ML
2 INJECTION, SOLUTION INTRAMUSCULAR; INTRAVENOUS ONCE
Status: COMPLETED | OUTPATIENT
Start: 2018-06-15 | End: 2018-06-15

## 2018-06-15 RX ORDER — SODIUM CHLORIDE 9 MG/ML
INJECTION, SOLUTION INTRAVENOUS CONTINUOUS
Status: DISCONTINUED | OUTPATIENT
Start: 2018-06-15 | End: 2018-06-15 | Stop reason: HOSPADM

## 2018-06-15 RX ORDER — CEFAZOLIN SODIUM 1 G/3ML
2 INJECTION, POWDER, FOR SOLUTION INTRAMUSCULAR; INTRAVENOUS
Status: COMPLETED | OUTPATIENT
Start: 2018-06-15 | End: 2018-06-15

## 2018-06-15 RX ORDER — PHENYLEPHRINE HYDROCHLORIDE 10 MG/ML
INJECTION INTRAVENOUS
Status: DISCONTINUED | OUTPATIENT
Start: 2018-06-15 | End: 2018-06-15

## 2018-06-15 RX ORDER — BUPIVACAINE HYDROCHLORIDE 5 MG/ML
INJECTION, SOLUTION EPIDURAL; INTRACAUDAL
Status: DISCONTINUED | OUTPATIENT
Start: 2018-06-15 | End: 2018-06-15 | Stop reason: HOSPADM

## 2018-06-15 RX ORDER — PROPOFOL 10 MG/ML
VIAL (ML) INTRAVENOUS
Status: DISCONTINUED | OUTPATIENT
Start: 2018-06-15 | End: 2018-06-15

## 2018-06-15 RX ORDER — FENTANYL CITRATE 50 UG/ML
INJECTION, SOLUTION INTRAMUSCULAR; INTRAVENOUS
Status: DISCONTINUED | OUTPATIENT
Start: 2018-06-15 | End: 2018-06-15

## 2018-06-15 RX ORDER — SODIUM CHLORIDE 0.9 % (FLUSH) 0.9 %
3 SYRINGE (ML) INJECTION
Status: DISCONTINUED | OUTPATIENT
Start: 2018-06-15 | End: 2018-06-15 | Stop reason: HOSPADM

## 2018-06-15 RX ORDER — DIPHENHYDRAMINE HYDROCHLORIDE 50 MG/ML
INJECTION INTRAMUSCULAR; INTRAVENOUS
Status: DISCONTINUED
Start: 2018-06-15 | End: 2018-06-15 | Stop reason: HOSPADM

## 2018-06-15 RX ORDER — EPHEDRINE SULFATE 50 MG/ML
INJECTION, SOLUTION INTRAVENOUS
Status: DISCONTINUED | OUTPATIENT
Start: 2018-06-15 | End: 2018-06-15

## 2018-06-15 RX ORDER — OXYCODONE HYDROCHLORIDE 5 MG/1
5-10 CAPSULE ORAL EVERY 4 HOURS PRN
Qty: 20 CAPSULE | Refills: 0 | Status: SHIPPED | OUTPATIENT
Start: 2018-06-15 | End: 2018-06-28

## 2018-06-15 RX ORDER — LIDOCAINE HCL/PF 100 MG/5ML
SYRINGE (ML) INTRAVENOUS
Status: DISCONTINUED | OUTPATIENT
Start: 2018-06-15 | End: 2018-06-15

## 2018-06-15 RX ORDER — FENTANYL CITRATE 50 UG/ML
25 INJECTION, SOLUTION INTRAMUSCULAR; INTRAVENOUS ONCE
Status: COMPLETED | OUTPATIENT
Start: 2018-06-15 | End: 2018-06-15

## 2018-06-15 RX ORDER — DIPHENHYDRAMINE HYDROCHLORIDE 50 MG/ML
12.5 INJECTION INTRAMUSCULAR; INTRAVENOUS ONCE
Status: COMPLETED | OUTPATIENT
Start: 2018-06-15 | End: 2018-06-15

## 2018-06-15 RX ORDER — MIDAZOLAM HYDROCHLORIDE 1 MG/ML
INJECTION, SOLUTION INTRAMUSCULAR; INTRAVENOUS
Status: DISCONTINUED | OUTPATIENT
Start: 2018-06-15 | End: 2018-06-15

## 2018-06-15 RX ADMIN — PROPOFOL 170 MG: 10 INJECTION, EMULSION INTRAVENOUS at 07:06

## 2018-06-15 RX ADMIN — PHENYLEPHRINE HYDROCHLORIDE 200 MCG: 10 INJECTION INTRAVENOUS at 07:06

## 2018-06-15 RX ADMIN — PHENYLEPHRINE HYDROCHLORIDE 100 MCG: 10 INJECTION INTRAVENOUS at 07:06

## 2018-06-15 RX ADMIN — FENTANYL CITRATE 25 MCG: 50 INJECTION, SOLUTION INTRAMUSCULAR; INTRAVENOUS at 06:06

## 2018-06-15 RX ADMIN — SODIUM CHLORIDE: 0.9 INJECTION, SOLUTION INTRAVENOUS at 06:06

## 2018-06-15 RX ADMIN — BUPIVACAINE HYDROCHLORIDE 7 ML: 5 INJECTION, SOLUTION EPIDURAL; INTRACAUDAL; PERINEURAL at 08:06

## 2018-06-15 RX ADMIN — CEFAZOLIN 2 G: 330 INJECTION, POWDER, FOR SOLUTION INTRAMUSCULAR; INTRAVENOUS at 07:06

## 2018-06-15 RX ADMIN — EPHEDRINE SULFATE 10 MG: 50 INJECTION, SOLUTION INTRAMUSCULAR; INTRAVENOUS; SUBCUTANEOUS at 08:06

## 2018-06-15 RX ADMIN — FENTANYL CITRATE 50 MCG: 50 INJECTION, SOLUTION INTRAMUSCULAR; INTRAVENOUS at 07:06

## 2018-06-15 RX ADMIN — MIDAZOLAM HYDROCHLORIDE 2 MG: 1 INJECTION, SOLUTION INTRAMUSCULAR; INTRAVENOUS at 06:06

## 2018-06-15 RX ADMIN — SODIUM CHLORIDE, SODIUM GLUCONATE, SODIUM ACETATE, POTASSIUM CHLORIDE, MAGNESIUM CHLORIDE, SODIUM PHOSPHATE, DIBASIC, AND POTASSIUM PHOSPHATE: .53; .5; .37; .037; .03; .012; .00082 INJECTION, SOLUTION INTRAVENOUS at 08:06

## 2018-06-15 RX ADMIN — ISOSULFAN BLUE 3 ML: 10 INJECTION, SOLUTION SUBCUTANEOUS at 07:06

## 2018-06-15 RX ADMIN — ONDANSETRON 4 MG: 2 INJECTION INTRAMUSCULAR; INTRAVENOUS at 08:06

## 2018-06-15 RX ADMIN — SODIUM CHLORIDE, SODIUM GLUCONATE, SODIUM ACETATE, POTASSIUM CHLORIDE, MAGNESIUM CHLORIDE, SODIUM PHOSPHATE, DIBASIC, AND POTASSIUM PHOSPHATE: .53; .5; .37; .037; .03; .012; .00082 INJECTION, SOLUTION INTRAVENOUS at 07:06

## 2018-06-15 RX ADMIN — DIPHENHYDRAMINE HYDROCHLORIDE 12.5 MG: 50 INJECTION, SOLUTION INTRAMUSCULAR; INTRAVENOUS at 09:06

## 2018-06-15 RX ADMIN — LIDOCAINE HYDROCHLORIDE 75 MG: 20 INJECTION, SOLUTION INTRAVENOUS at 07:06

## 2018-06-15 NOTE — INTERVAL H&P NOTE
The patient has been examined and the H&P has been reviewed:    I concur with the findings and no changes have occurred since H&P was written.    Anesthesia/Surgery risks, benefits and alternative options discussed and understood by patient/family.          Active Hospital Problems    Diagnosis  POA    Infiltrating ductal carcinoma of breast, left [C50.912]  Yes      Resolved Hospital Problems    Diagnosis Date Resolved POA   No resolved problems to display.

## 2018-06-15 NOTE — ANESTHESIA PROCEDURE NOTES
Paravertebral Single Injection Block(s)    Patient location during procedure: pre-op   Block not for primary anesthetic.  Reason for block: at surgeon's request and post-op pain management   Post-op Pain Location: left breast   Start time: 6/15/2018 6:32 AM  Timeout: 6/15/2018 6:30 AM   End time: 6/15/2018 6:49 AM  Surgery related to: left lumbectomy LN Dissection   Staffing  Anesthesiologist: GÉNESIS RICE  Resident/CRNA: CYNTHIA GOYAL  Performed: resident/CRNA   Preanesthetic Checklist  Completed: patient identified, site marked, surgical consent, pre-op evaluation, timeout performed, IV checked, risks and benefits discussed and monitors and equipment checked  Peripheral Block  Patient position: sitting  Prep: ChloraPrep  Patient monitoring: heart rate, cardiac monitor, continuous pulse ox, continuous capnometry and frequent blood pressure checks  Block type: paravertebral - thoracic  Laterality: left  Injection technique: single shot  Needle  Needle type: Tuohy   Needle gauge: 17 G  Needle length: 3.5 in  Needle localization: anatomical landmarks     Assessment  Injection assessment: negative aspiration and negative parasthesia  Paresthesia pain: none  Heart rate change: no  Slow fractionated injection: yes  Medications:  Bolus administered: 30 mL of 0.5 ropivacaine  Epinephrine added: 3.75 mcg/mL (1/300,000)  Additional Notes  T2 os at  cm  T4 os at 3.5 cm  VSS.  DOS[facility] RN monitoring vitals throughout procedure.  Patient tolerated procedure well.

## 2018-06-15 NOTE — BRIEF OP NOTE
[facility]  Brief Operative Note     SUMMARY     Surgery Date: 6/15/2018     Surgeon(s) and Role:     * Dandy Cat MD - Resident - Assisting     * Luca Johnson MD - Primary        Pre-op Diagnosis:  Skin lesion of left arm [L98.9]  Ductal carcinoma in situ (DCIS) of right breast [D05.11]    Post-op Diagnosis:  Post-Op Diagnosis Codes:     * Skin lesion of left arm [L98.9]     * Ductal carcinoma in situ (DCIS) of right breast [D05.11]    Procedure(s) (LRB):  LUMPECTOMY,BREAST with MAGSEED (Left)  BIOPSY, LYMPH NODE, SENTINEL (Left)  INJECTION, FOR SENTINEL NODE IDENTIFICATION (Left)  EXCISION-WIDE LOCAL LEFT FOREARM SKIN (Left)  ADJACENT TISSUE TRANSFER Tissue Rearrangement (Left)    Anesthesia: General    Description of the findings of the procedure: Left forearm excisional skin biopsy 2 mm gross margins, 3x2cm.  Left breast lumpectomy and SLNB.  Clip and magseed in specimen on [place].  SLNB 400 and blue    Findings/Key Components: As above    Estimated Blood Loss: minimal         Specimens:   Specimen (12h ago through future)    Start     Ordered    06/15/18 0839  Specimen to Pathology - Surgery  Once     Comments:  1.) Left Arm Excisional Biopsy-Permanent.2.) Left Axillary [place] Lymph Node; Hot, Blue, 400-Permanent.3.) Left Additional Axillary Tissue-Permanent.4.) Left Breast Lumpectomy; Green=Inferior, Blue=Superior, Orange=Lateral, Yellow=Anterior, Black=Posterior, Red=Medial-Permanent      06/15/18 0840          Discharge Note    SUMMARY     Admit Date: 6/15/2018    Discharge Date and Time:  06/15/2018 9:27 AM    Hospital Course (synopsis of major diagnoses, care, treatment, and services provided during the course of the hospital stay): OP surgery     Final Diagnosis: Post-Op Diagnosis Codes:     * Skin lesion of left arm [L98.9]     * Ductal carcinoma in situ (DCIS) of right breast [D05.11]    Disposition: Home or Self Care    Follow Up/Patient Instructions:      Medications:  Reconciled Home Medications:      Medication List      START taking these medications    docusate sodium 100 MG capsule  Commonly known as:  COLACE  Take 1 capsule (100 mg total) by mouth 2 (two) times daily.     oxyCODONE 5 mg Cap  Commonly known as:  OXY-IR  Take 1-2 capsules (5-10 mg total) by mouth every 4 (four) hours as needed for Pain.        CONTINUE taking these medications    amLODIPine 10 MG tablet  Commonly known as:  NORVASC  Take 1 tablet by mouth once daily.     aspirin 81 MG EC tablet  Commonly known as:  ECOTRIN  Take 81 mg by mouth once daily.     COQ-10 100 mg capsule  Generic drug:  coenzyme Q10  Take 100 mg by mouth once daily.     fish oil-omega-3 fatty acids 300-1,000 mg capsule  Take 1 g by mouth once daily.     furosemide 40 MG tablet  Commonly known as:  LASIX  Take 40 mg by mouth once daily.     insulin  unit/mL injection  Commonly known as:  NovoLIN N  Inject 12 Units into the skin 2 (two) times daily before meals.     insulin regular 100 unit/mL injection  Commonly known as:  Humulin R  Inject into the skin 3 (three) times daily before meals.     lisinopril 40 MG tablet  Commonly known as:  PRINIVIL,ZESTRIL  Take 40 mg by mouth 2 (two) times daily.     VITAMIN B-12 500 MCG tablet  Generic drug:  cyanocobalamin  Take 500 mcg by mouth once daily.            Discharge Procedure Orders  Lifting restrictions   Order Comments: Avoid lifting with surgical extremity for the next couple weeks.     Notify your health care provider if you experience any of the following:  increased confusion or weakness     Notify your health care provider if you experience any of the following:  persistent dizziness, light-headedness, or visual disturbances     Notify your health care provider if you experience any of the following:  worsening rash     Notify your health care provider if you experience any of the following:  severe persistent headache     Notify your health care provider  if you experience any of the following:  difficulty breathing or increased cough     Notify your health care provider if you experience any of the following:  redness, tenderness, or signs of infection (pain, swelling, redness, odor or green/yellow discharge around incision site)     Notify your health care provider if you experience any of the following:  severe uncontrolled pain     Notify your health care provider if you experience any of the following:  persistent nausea and vomiting or diarrhea     Notify your health care provider if you experience any of the following:  temperature >100.4     Remove dressing in 48 hours   Order Comments: Ok to remove arm dressing in 48 hours and shower gently over the incision.  No swimming or soaking.  Please keep gentle compression to left breast for the next 2 weeks.  Ok to change to a different bra and remove gauze fluffs in 48 hours.       Follow-up Information     Luca Johnson MD. Schedule an appointment as soon as possible for a visit in 2 weeks.    Specialty:  General Surgery  Contact information:  [address]  [facility] [id number] [phone]

## 2018-06-15 NOTE — OP NOTE
DATE OF PROCEDURE:  06/15/2018.    PRIMARY SURGEON:  Luca Johnson M.D.    ASSISTANT SURGEON:  Dandy Cat M.D., Surgery resident.    PREOPERATIVE DIAGNOSIS:  T1c invasive infiltrating carcinoma of the left breast   lower inner quadrant.    POSTOPERATIVE DIAGNOSIS:  T1c invasive infiltrating carcinoma of the left breast   lower inner quadrant.    PROCEDURES PERFORMED:  1.  Left breast Magseed seed-localization partial mastectomy (lumpectomy for   margins with Magseed localization).  2.  Injection of left breast with isosulfan Lymphazurin blue dye and   technetium-labeled radiocolloid for sentinel lymph node mapping and   identification;  3.  Identification and isolation of a left deep axillary sentinel lymph node x1.  4.  Left deep axillary sentinel lymph node biopsy x1.  5.  Excisional biopsy of left forearm pigmented skin lesion with specimen of   resection 2 x 3 cm.  6.  Local tissue rearrangement of the left breast, greater than 30 sq cm for   oncoplastic closure of left breast partial mastectomy defect.    PROCEDURE IN DETAIL:  The patient underwent informed consent.  The history and   physical examination was reviewed and updated.  The left breast was marked.  She   underwent a regional paravertebral block in the preop holding area by the   Regional Anesthesia team.  She was brought to the Operating Room under general   anesthesia.  She was in a supine position.  The left breast subareolar region   was injected with the isosulfan Lymphazurin blue dye and technetium-labeled   radiocolloid in the standard fashion.  This allowed mapping to the left axilla   while the left breast, left anterior chest, left arm and axilla were prepped and   draped in a sterile fashion.  The left forearm was prepped for the pigmented 1   x 1.5 cm lesion on the left forearm.  Approximately a 2 x 3 cm longitudinal   vertical ellipse was marked to excise this.  Full-thickness skin and   subcutaneous tissue was excised and this  was submitted as left forearm pigmented   lesion.  The wound was made hemostatic with cautery and closed with a deep   dermal and subcutaneous running 3-0 Vicryl suture followed by a running 4-0   Monocryl subcuticular skin closure.    We turned our attention to the left breast and axilla.  A hot spot was noted in   the axilla.  A small transverse inferior axillary incision was made over the hot   spot.  We dissected through the skin sharply and through the subcutaneous   tissue with cautery.  We opened the clavipectoral fascia.  We could see a blue   afferent lymphatic channel leading to a level 1 hot blue sentinel lymph node,   which was circumferentially dissected with Harmonic scalpel.  The lymph node was   excised.  It was hot and blue with an ex vivo count of 400 and submitted to   Pathology as specimen #2 for permanent sectioning.  We had trimmed some   additional axillary tissue, which may have had additional lymphatic tissue, off   of the true sentinel lymph node tissue on the back table.  The specimen #3 was   the left additional axillary tissue, which was nonsentinel tissue trimmed off of   the sentinel lymph node.  The axilla was irrigated.  It was made hemostatic   with no evidence of bleeding.  The clavipectoral fascia was reapproximated with   interrupted 3-0 Vicryl figure-of-eight sutures.  The deep dermal and   subcutaneous layers were also closed with 3-0 Vicryl suture and the skin was   closed with a running 4-0 Monocryl subcuticular skin closure.  There had been no   further palpable nodes in the axilla and no further blue dye staining and no   radioactivity of any significance in the level 1 or level 2 region indicating   appropriate and adequate sentinel lymph node identification, mapping, staging,   and biopsy.    We then turned our attention to the left breast itself.  A curvilinear   circumareolar incision was made along the lower outer aspect of the areolar   margin.  We created a 1 cm  anterior subcutaneous flap around the area of   interest as detected by the Magseed detector.  The dissection was carried   beneath the nipple-areolar complex posteriorly, but not down to the pectoralis   fascia.  The lump was circumferentially dissected with intermittent use of the   Magseed to identify the area of interest to keep it centered and within the area   of resection.  The specimen was removed.  It was oriented on the back table   with the multicolored inks in the standard fashion, fixed with acetic acid, and   specimen radiograph was performed with 3D tomosynthesis, which confirmed the   clip, Magseed, and area of interest within the center of the specimen on the 3D   evaluation, indicating adequate gross excision.  The lumpectomy cavity was   irrigated.  It was made hemostatic.  To fill in the breast defect from the   lumpectomy, we mobilized approximately a 4 x 4 sq cm area of breast parenchyma   from superolaterally as well as another 4 x 4 cm area of breast parenchyma   inferomedially and these were rotated together and used to fill in the defect.    The hemostasis was assured.  The breast parenchyma rotational flaps were   reapproximated with interrupted Vicryl suture and this filled in the defect   nicely with an excellent cosmetic results.  With hemostasis achieved, the deep   dermal and subcutaneous layers at the circumareolar site were closed with 3-0   Vicryl suture.  The skin was closed with a running 4-0 Monocryl subcuticular   skin closure.  Dermabond was placed over all incision sites followed by sterile   fluff gauze dressing.  She tolerated the procedure well without complication and   was turned over to Anesthesia for extubation and transport to the Recovery area   in a satisfactory condition.  Again, the total amount of tissue mobilized for   the oncoplastic closure was 4 x 4 cm superolaterally and 4 x 4 cm   inferomedially, which were rotated together equaling an area of mobilization  of   greater than 30 sq cm for oncoplastic closure.      RLC/HN  dd: 06/15/2018 09:01:51 (CDT)  td: 06/15/2018 10:57:49 (CDT)  Doc ID   #[id number]  Job ID #[id number]    CC:

## 2018-06-15 NOTE — TRANSFER OF CARE
"Anesthesia Transfer of Care Note    Patient: Noris Demarco    Procedure(s) Performed: Procedure(s) (LRB):  LUMPECTOMY,BREAST with MAGSEED (Left)  BIOPSY, LYMPH NODE, SENTINEL (Left)  INJECTION, FOR SENTINEL NODE IDENTIFICATION (Left)  EXCISION-WIDE LOCAL LEFT FOREARM SKIN (Left)  ADJACENT TISSUE TRANSFER Tissue Rearrangement (Left)    Patient location: PACU    Anesthesia Type: general    Transport from OR: Transported from OR on 6-10 L/min O2 by face mask with adequate spontaneous ventilation    Post pain: adequate analgesia    Post assessment: no apparent anesthetic complications and tolerated procedure well    Post vital signs: stable    Level of consciousness: awake, alert and oriented    Nausea/Vomiting: no nausea/vomiting    Complications: none    Transfer of care protocol was followed      Last vitals:   Visit Vitals  BP (!) 140/63 (BP Location: Left arm, Patient Position: Lying)   Pulse 65   Temp 36.5 °C (97.7 °F) (Oral)   Resp 16   Ht 5' 8" (1.727 m)   Wt 83 kg (183 lb)   LMP  (LMP Unknown)   SpO2 100%   Breastfeeding? No   BMI 27.83 kg/m²     "

## 2018-06-15 NOTE — PROGRESS NOTES
I spoke to Dr. Worley about patient's elevated blood pressure she said it was okay to discharge patient with current vitals just to tell her to take her blood pressure medication at home.

## 2018-06-15 NOTE — ANESTHESIA PREPROCEDURE EVALUATION
06/15/2018  Noris Demarco is a 72 y.o., female.  Past Medical History:   Diagnosis Date    Breast cancer 2008    Right breast     Diabetes mellitus     Hyperlipidemia     Hypertension      Patient Active Problem List   Diagnosis    Ductal carcinoma in situ (DCIS) of right breast    Bilateral leg edema    HTN (hypertension), benign    Cardiac murmur    Diastolic dysfunction    Venous insufficiency    Primary cancer of upper inner quadrant of female breast    Infiltrating ductal carcinoma of breast, left     Past Surgical History:   Procedure Laterality Date    BREAST BIOPSY Right 2008    BREAST LUMPECTOMY Right 2008     SECTION, LOW TRANSVERSE  1977,1979    x2    CHOLECYSTECTOMY      laparoscopic    DILATION AND CURETTAGE OF UTERUS  2012    TUBAL LIGATION  1979    @ time of      Review of patient's allergies indicates:   Allergen Reactions    Tylenol [acetaminophen]      Affects memory/confused       Anesthesia Evaluation    I have reviewed the Patient Summary Reports.    I have reviewed the Nursing Notes.   I have reviewed the Medications.     Review of Systems  Cardiovascular:   Hypertension        Physical Exam  General:  Well nourished    Airway/Jaw/Neck:  Airway Findings: Mouth Opening: Normal Tongue: Normal  TM Distance: Normal, at least 6 cm      Dental:  Dental Findings:        Mental Status:  Mental Status Findings:  Cooperative         Anesthesia Plan  Type of Anesthesia, risks & benefits discussed:  Anesthesia Type:  general  Patient's Preference: ga  Intra-op Monitoring Plan: standard ASA monitors  Intra-op Monitoring Plan Comments:   Post Op Pain Control Plan: per primary service following discharge from PACU and multimodal analgesia  Post Op Pain Control Plan Comments:   Induction:   IV  Beta Blocker:  Patient is not currently on a Beta-Blocker (No  further documentation required).       Informed Consent: Patient understands risks and agrees with Anesthesia plan.  Questions answered. Anesthesia consent signed with patient.  ASA Score: 2     Day of Surgery Review of History & Physical:    H&P update referred to the surgeon.     Anesthesia Plan Notes: Tylenol Allergy        Ready For Surgery From Anesthesia Perspective.       CONCLUSIONS     1 - Normal left ventricular systolic function (EF 60-65%).     2 - Impaired LV relaxation, normal LAP (grade 1 diastolic dysfunction).     3 - Normal right ventricular systolic function .     4 - The estimated PA systolic pressure is 32 mmHg.     5 - Mild mitral regurgitation.     6 - Mild tricuspid regurgitation.

## 2018-06-15 NOTE — ANESTHESIA POSTPROCEDURE EVALUATION
"Anesthesia Post Evaluation    Patient: Noris Demarco    Procedure(s) Performed: Procedure(s) (LRB):  LUMPECTOMY,BREAST with MAGSEED (Left)  BIOPSY, LYMPH NODE, SENTINEL (Left)  INJECTION, FOR SENTINEL NODE IDENTIFICATION (Left)  EXCISION-WIDE LOCAL LEFT FOREARM SKIN (Left)  ADJACENT TISSUE TRANSFER Tissue Rearrangement (Left)    Final Anesthesia Type: general  Patient location during evaluation: PACU  Patient participation: Yes- Able to Participate  Level of consciousness: awake and alert and oriented  Post-procedure vital signs: reviewed and stable  Pain management: adequate  Airway patency: patent  PONV status at discharge: No PONV  Anesthetic complications: no      Cardiovascular status: hemodynamically stable  Respiratory status: unassisted and spontaneous ventilation  Hydration status: euvolemic  Follow-up not needed.        Visit Vitals  BP (!) 163/80   Pulse 73   Temp 36.8 °C (98.2 °F) (Temporal)   Resp 16   Ht 5' 8" (1.727 m)   Wt 83 kg (183 lb)   LMP  (LMP Unknown)   SpO2 100%   Breastfeeding? No   BMI 27.83 kg/m²       Pain/Carlos Score: Pain Assessment Performed: Yes (6/15/2018 11:00 AM)  Presence of Pain: denies (6/15/2018 11:00 AM)  Pain Rating Prior to Med Admin: 0 (6/15/2018  6:30 AM)  Carlos Score: 10 (6/15/2018  9:50 AM)      "

## 2018-06-15 NOTE — DISCHARGE INSTRUCTIONS
Ok to remove arm dressing in 48 hours and shower gently over the incision.  No swimming or soaking.  Please keep gentle compression to left breast for the next 2 weeks.  Ok to change to a different bra and remove gauze fluffs in 48 hours.    Discharge Instructions for Lumpectomy or Breast Biopsy  You just had a procedure to remove a lump or a small piece of tissue from your breast. After surgery, be sure to have an adult drive you home. Ask your healthcare provider when you will get the results of the biopsy. Will they call you or will you talk about it at your next visit?  Make a follow-up appointment as directed by your healthcare provider.  What to expect  The following are common after a lumpectomy or breast biopsy:   · Bruising and mild swelling around the incision  · Mild discomfort for a few days.  · Feeling tired for a day or so.  · Feeling anxious or down.   Diet  What to eat and drink after a lumpectomy or breast biopsy:   · Start with liquids and light, easy-to-digest foods, such as bananas and dry toast. As you feel up to it, slowly return to your normal diet.  · Drink at least 6 to 8 glasses of water or other nonalcoholic fluids a day, unless directed otherwise.  Activity  What you can do after a lumpectomy or breast biopsy:   · After the procedure, take it easy for the rest of the day.  · If you had general anesthesia, don't use machinery or power tools, drink alcohol, or make any major decisions for at least the first 24 hours.  · Ask your healthcare providers how you should care for the dressing and when you can take it off.  · You may shower and pat the incision (cut) dry, but don't soak in a tub until you see the healthcare provider again.   · Return to normal activities (including driving) in 24 hours unless otherwise instructed by your healthcare provider.   Bandage and incision care  Suggestions for care include:  · Take pain medicines as directed. Dont wait until the pain gets bad before  taking them. Dont drink alcohol while on pain medicines.  · Wrap a waterproof ice pack or bag of frozen peas in a thin cloth. Place this over the bandaged incision for no longer than 20 minutes at a time. Do this as instructed by your healthcare provider.  · Wear a comfortable, snug-fitting bra at all times, even to bed, to help keep swelling down.  · If your incision has been closed with glue, do not apply lotion, ointments, or creams to the area. Doing so can cause the glue to dissolve.  · If strips of tape have been used to close your incision, do not pull them off. Let them fall off on their own.  · If you have a gauze bandage, keep it and the wound dry for 48 hours. If the gauze bandage gets wet, replace it with a clean, dry bandage.    When to call your healthcare provider  Call your healthcare provider right away if you have any of the following:  · Vomiting or nausea that does not go away  · Fever over 100.4°F (38°C) or chills  · Foul-smelling discharge from the incision  · Pain not relieved by pain medicines  · Bleeding, warmth, redness, or hard swelling around the incision  · Chest pain or shortness of breath  Be sure you know how to get help anytime you have problems or questions, including after office hours, on weekends, and on holidays.   Date Last Reviewed: 10/31/2015  © 3553-3340 [employer]. [address] [place] [address]. All rights reserved. This information is not intended as a substitute for professional medical care. Always follow your healthcare professional's instructions.          Surgical Breast Biopsy: Types of Biopsies  A surgical breast biopsy requires an incision in the skin. This allows your doctor to take a large sample of tissue from the breast. In fact, the whole lump is often removed. The sample is then sent to a lab for study.        Open surgical biopsy       Wire localization      Open surgical biopsy  Open surgical biopsy removes a tissue sample  through a skin incision. To keep you from feeling pain during the biopsy, you are likely to be given intravenous (IV) sedation. This produces a light sleep and you don't feel the surgery. Your surgeon then makes one incision in your breast. If possible, this is done in a way that hides the scar. In most cases, all of the lump is removed. The incision is closed with stitches. Some stitches dissolve on their own. Others may need to be removed when the incision heals.  Wire localization  A lump that cant be felt may be hard to locate. In such a case, a mammogram or ultrasound is used to locate the area. One or more thin guide wires may be placed in your breast before biopsy surgery to cheikh the tissue that is to be removed. Then youre taken to the operating room for surgery. The wire is removed during the biopsy.  Date Last Reviewed: 10/31/2015  © 3525-7513 [employer]. [address] [place] [address]. All rights reserved. This information is not intended as a substitute for professional medical care. Always follow your healthcare professional's instructions.        PATIENT INSTRUCTIONS  POST-ANESTHESIA    IMMEDIATELY FOLLOWING SURGERY:  Do not drive or operate machinery for the first twenty four hours after surgery.  Do not make any important decisions for twenty four hours after surgery or while taking narcotic pain medications or sedatives.  If you develop intractable nausea and vomiting or a severe headache please notify your doctor immediately.    FOLLOW-UP:  Please make an appointment with your surgeon as instructed. You do not need to follow up with anesthesia unless specifically instructed to do so.          QUESTIONS?:  Please feel free to call your physician or the hospital  if you have any questions, and they will be happy to assist you.       [facility] Anesthesia Department  [address] [facility]  [phone]

## 2018-06-15 NOTE — H&P (VIEW-ONLY)
Surgery H and P  Attending: Alex  Resident: Emmett   CC: L breast IDC    HPI: Noris Demarco is a pleasant 72 y.o. woman with hx R breast DCIS s/p lumpectomy (), radiation, and endocrine therapy x 5 years, who presents to clinic for newly diagnosed invasive ductal carcinoma.    She is asymptomatic, specifically no breast masses, pain, or nipple discharge.      After screening mammogram demonstrated the abnormality, a diagnostic MMG was performed.  This showed a 1.3 cm lesion.  This was not sonographically evident.    Eze guided bx was performed, which revealed Grade 3, 1.4 cm IDC and DCIS.  ER (95%) +, ND moderate to strongly positive (5%), and Her 2 negative.  Ki 67 30%.    Would like excision of left forearm lesion, which has been prsent for 40 years, but occasionally changes and ulcerates    Personal hx of DCIS, as stated above  Family history- no immediate family history of breast cancer.  Her maternal cousin (who does not have breast cancer) has 5 kids who had early breast cancer, perhaps in their 20s.    , first child at age 21  Menarche: 13  Menopause status  OCP use: None  HRT use: None    Onc hx:  R breast DCIS s/p lumpectomy (), radiation, and endocrine therapy x 5 years.  Unable to tolerate Arimidex due to joint pain, so was switched to tamoxifen.  Completed in     Past Medical History:   Diagnosis Date    Breast cancer 2008    Right breast     Diabetes mellitus     Hyperlipidemia     Hypertension        Past Surgical History:   Procedure Laterality Date    BREAST BIOPSY Right 2008    BREAST LUMPECTOMY Right 2008     SECTION, LOW TRANSVERSE  ,1979    x2    CHOLECYSTECTOMY      laparoscopic    DILATION AND CURETTAGE OF UTERUS  2012    TUBAL LIGATION      @ time of        Family History   Problem Relation Age of Onset    Hypertension Mother     Heart failure Mother     Hypertension Brother     Hypertension Sister     Breast cancer Cousin      Breast cancer Cousin     Coronary artery disease Brother     Stroke Brother     Coronary artery disease Brother     Stroke Brother     Colon cancer Neg Hx     Ovarian cancer Neg Hx        Social History     Social History    Marital status:      Spouse name: N/A    Number of children: N/A    Years of education: N/A     Occupational History    Not on file.     Social History Main Topics    Smoking status: Former Smoker     Packs/day: 1.00     Years: 14.00     Types: Cigarettes     Quit date: 4/30/1975    Smokeless tobacco: Never Used    Alcohol use No    Drug use: No    Sexual activity: Yes     Partners: Male     Birth control/ protection: Post-menopausal     Other Topics Concern    Not on file     Social History Narrative    No narrative on file       Current Outpatient Prescriptions on File Prior to Visit   Medication Sig Dispense Refill    amlodipine (NORVASC) 10 MG tablet Take 1 tablet by mouth once daily.      aspirin (ECOTRIN) 81 MG EC tablet Take 81 mg by mouth once daily.      coenzyme Q10 (COQ-10) 100 mg capsule Take 100 mg by mouth once daily.        cyanocobalamin (VITAMIN B-12) 500 MCG tablet Take 500 mcg by mouth once daily.      fish oil-omega-3 fatty acids 300-1,000 mg capsule Take 1 g by mouth once daily.      fluticasone (FLONASE) 50 mcg/actuation nasal spray       furosemide (LASIX) 40 MG tablet Take 40 mg by mouth 2 (two) times daily.      insulin NPH (NOVOLIN N) 100 unit/mL injection Inject 12 Units into the skin 2 (two) times daily before meals.       lisinopril (PRINIVIL,ZESTRIL) 40 MG tablet Take 40 mg by mouth 2 (two) times daily.       meloxicam (MOBIC) 15 MG tablet Take 1 tablet by mouth once daily.      citalopram (CELEXA) 10 MG tablet       cloNIDine (CATAPRES) 0.1 MG tablet Take 1 tablet (0.1 mg total) by mouth 2 (two) times daily. (Patient taking differently: Take 0.2 mg by mouth 3 (three) times daily. ) 60 tablet 0    UNABLE TO FIND 400 Int'l  "Units. Vitamin d3       No current facility-administered medications on file prior to visit.        Review of patient's allergies indicates:   Allergen Reactions    Tylenol [acetaminophen] Other (See Comments)     Affects memory/confused       ROS: Constitutional: no fever or chills, pain controlled   Respiratory: no cough or shortness of breath   Cardiovascular: no chest pain or palpitations   Gastrointestinal: no abdominal pain or vomiting   Genitourinary: no hematuria or dysuria   Hematologic/Lymphatic: no easy bruising or lymphadenopathy   Musculoskeletal: no arthralgias or myalgias   Neurological: no seizures or tremors     Phys:  Vitals:    05/31/18 0911   BP: (!) 187/90   BP Location: Left arm   Patient Position: Sitting   BP Method: Medium (Automatic)   Pulse: 85   Temp: 98.6 °F (37 °C)   TempSrc: Oral   Weight: 83 kg (183 lb)   Height: 5' 5" (1.651 m)     Phys:   Gen: NAD   HEENT: NCAT, trachea midline  CV: RRR, no m/r/g   Breast: Biopsy cavity palpable in upper inner quadrant.  Otherwise, no masses or axillary LAD  Pulm: Unlabored  Abd: Soft, nttp. No rebound, guarding.   Extremities: 1.5 x 1 cm left medial forearm skin lesion  Skin: Skin color, texture, turgor normal. No rashes or lesions     A/P 72 year old woman with 1.4 cm ER+, OK 5%+ Her 2 negative IDC of L breast    To OR for L magseed lumpectomy with SLNBx and excisional biopsy of 1.5 x 1 cm left medial forearm skin lesion  Genetic counseling    Jac Christine MD  General Surgery, PGY-4  [phone]   I have personally taken the history and examined this patient and agree with the resident's note as stated above.  DATE OF INITIAL CONSULTATION:  05/31/2018    Noris Demarco is a very pleasant 72-year-old -American female who   presents with her , Gregory, to discuss her newly diagnosed left invasive   breast carcinoma of the upper inner quadrant.    Briefly, she had a history of contralateral right-sided DCIS in 2008, 10 years   ago, treated with " breast conservation surgery, whole breast radiotherapy and   endocrine therapy.  She was initially treated with aromatase inhibitor and   subsequently switched to tamoxifen, which she took until 2013.    She now presents with a 1.3 cm area of abnormality in the left upper inner   quadrant noted on mammogram, but not on ultrasound.  The subsequent stereotactic   image-guided core needle biopsy revealed a high-grade grade III 1.4 cm invasive   ductal carcinoma that was estrogen receptor positive, HER-2/josé miguel negative as   noted above with Ki-67 index of 30%.    The lesion is not palpable.  There are no abnormal skin changes.  There is no   palpable mass.  There is no lymphadenopathy and her clinical breast exam is   relatively within normal limits.  Options of breast conservation surgery and   radiotherapy versus mastectomy with or without reconstruction were discussed at   length.  She is highly motivated again for attempted breast conservation and   adjuvant radiotherapy since this is how her right side had been treated 10 years   ago.  She is tentatively scheduled, consented and set up for left breast   conservation surgery with Magseed localization, partial mastectomy (lumpectomy   for margins) with left axillary sentinel lymph node biopsy on Friday,   06/15/2018.  Since now she has had bilateral breast cancer, we will also set her   up for a Genetics counseling appointment and referral to discuss whether or not   she would qualify for possible genetic testing based on family pedigree,   although there is no immediate family history of breast cancer.  She states she   has a maternal cousin who herself does not have breast cancer, but has five   children who have had early breast cancer, perhaps as early as in their 20s, but   this may be a familial trait coming from that family's paternal side, which   would not be related to our patient.  Nonetheless, since she has had bilateral   breast cancer, we will refer her  for genetic counseling and she is tentatively   again scheduled for breast conservation surgery with Magseed localization for a   T1c 1.4 cm invasive ductal cancer in the left upper inner quadrant with sentinel   lymph node biopsy of the left axilla on Friday, 06/15/2018.  Time spent with   the patient today was greater than 45 minutes with greater than 50% of this time   in counseling and coordination of care.      RLC/HN  dd: 06/03/2018 11:24:01 (CDT)  td: 06/04/2018 02:03:31 (CDT)  Doc ID   #[id number]  Job ID #[id number]    CC:     Job # [id number].  Patient also has a left medial forearm skin lesion which she brought attention to at the end of the consultation which is pigmented and has been present for 40 years but recently changing with some ulcerative changes.  Will perform excisional biopsy of this lesion at the time of her breast cancer surgery.  It measures 1 x 1.5 cm and likely is consistent with a pigmented SK, but I will perform excisional biopsy at time of surgery give recently reported changes although it has been longstanding.

## 2018-06-20 ENCOUNTER — TELEPHONE (OUTPATIENT)
Dept: SURGERY | Facility: CLINIC | Age: 73
End: 2018-06-20

## 2018-06-20 NOTE — TELEPHONE ENCOUNTER
Spoke with pt, schedule post op appointment, and appointment with Dr Chavez per DR Johnson,   The pt will schedule a F/U with Dr Peralta

## 2018-06-26 ENCOUNTER — TUMOR BOARD CONFERENCE (OUTPATIENT)
Dept: SURGERY | Facility: CLINIC | Age: 73
End: 2018-06-26

## 2018-06-26 NOTE — PROGRESS NOTES
Interdisciplinary Breast Cancer Conference    Noris Demarco    Female    Date Presented to Tumor Board: 06/26/18    HOSPTIAL/CLINIC PRESENTING: [facility]    TUMOR SITE: LEFT    TUMOR SITE: LOQ (middle depth)    Presenter: Jac Rodney (on behalf of Luca Johnson MD)    Reason For Consultation: Initial Presentation   History of DCIS on contralateral breast    Specialties Present: Medical Oncology;Radiation Oncology;Research;Radiology;Navigation;Surgical Oncology;Pathology    Patient Status: a current patient    Treatment to Date: Surgical Intervention(s) (partial mastectomy SLNB)    Clinical Trial Eligibility: None available    Estrogen Receptor Status: Positive    Progesterone Status: Positive (5%)    Her2/BETO Status: Negative    Cancer Staging  Invasive ductal carcinoma, grade 2, T1c(sn)N0  Stage IA per AJCC 8th ed. pathologic prognostic staging system      RECOMMENDED PLAN: Radiation;Endocrine Therapy   Send Oncotype to ensure chemo is not necessary, high Ki67    History of AI, unable to tolerate due to joint pain. May try exemestane      UNSTAGEABLE: No         PRESENTATION AT CANCER CONFERENCE: [facility]

## 2018-06-28 ENCOUNTER — OFFICE VISIT (OUTPATIENT)
Dept: SURGERY | Facility: CLINIC | Age: 73
End: 2018-06-28
Payer: MEDICARE

## 2018-06-28 VITALS
HEART RATE: 69 BPM | SYSTOLIC BLOOD PRESSURE: 176 MMHG | TEMPERATURE: 98 F | HEIGHT: 65 IN | DIASTOLIC BLOOD PRESSURE: 85 MMHG | BODY MASS INDEX: 30.49 KG/M2 | WEIGHT: 183 LBS

## 2018-06-28 DIAGNOSIS — C50.912 INFILTRATING DUCTAL CARCINOMA OF BREAST, LEFT: Primary | ICD-10-CM

## 2018-06-28 PROCEDURE — 99024 POSTOP FOLLOW-UP VISIT: CPT | Mod: S$GLB,,, | Performed by: SURGERY

## 2018-06-28 PROCEDURE — 99999 PR PBB SHADOW E&M-EST. PATIENT-LVL III: CPT | Mod: PBBFAC,,, | Performed by: SURGERY

## 2018-06-28 NOTE — LETTER
[facility]  [address]  [place] [address]  Phone: [phone]  Fax: [phone] June 30, 2018      Yan Estrada MD  [address]  [place] [address]    Patient: Noris Demarco   MR Number: [id number]   YOB: 1945   Date of Visit: 6/28/2018     Dear Dr. Estrada:    Thank you for referring Noris Demarco to me for evaluation. Below are the relevant portions of my assessment and plan of care.    Noris Demarco is a 72-year-old  female well known to me, status post breast conservation surgery on 06/15/2018 when she underwent a left Magseed localization lumpectomy through an upper outer curvilinear circumareolar incision using hidden scar technique of the left breast along with a left axillary sentinel lymph node biopsy.  We also excised a benign skin lesion from her left forearm.  She offers no subjective complaints other than some postop pruritus, but there are no signs of infection and all incision sites are clean, dry and intact, healing appropriately without signs of infection.  The pathology was reviewed from 06/15/2018.  It revealed a 1.3 cm invasive infiltrating ductal carcinoma grade 2 with associated DCIS.  The closest margin was 1 mm from the deep posterior margin. The sentinel lymph node biopsies were negative and she had a total of four negative nodes.  The tumor was estrogen and progesterone receptor positive with 95% ER positivity and 5% progesterone positivity.  It was HER-2/josé miguel negative. Postoperatively, her incision sites are clean, dry and intact, healing appropriately without signs of infection.  She has excellent symmetry and no signs of infection.    Her past history had included right-sided breast cancer treated in 2008 with surgery at [facility] in [place] with radiation treatment at [facility].  She underwent endocrine treatment at [facility] and initially was on aromatase inhibitors, but subsequently changed to  tamoxifen due to side effects.    The patient will be referred to both Medical and Radiation Oncology for consult. She has a Medical Oncology consultation on 07/10/2018 with Dr. Tam and Radiation Oncology consult with Dr. Chavez on 07/12/2018.  She was presented at Breast Cancer conference today.  Oncotype DX result is currently ordered and pending.  She will be a potential candidate for Aromasin, exemestane aromatase inhibitor therapy given her history as noted above and she will otherwise follow up with me in six months.  The patient declines outpatient PT/OT consults at this point in time.    If you have questions, please do not hesitate to call me. I look forward to following Noris along with you.    Sincerely,    Luca Johnson MD   Medical Director, [facility]  Staff Attending Surgeon - Department of Surgery  [facility]  Associate Professor of Surgery  [employer]  [employer]    RLC/hcr    CC  MD Theresa Rodrigues MD

## 2018-06-28 NOTE — PROGRESS NOTES
"Patient is a 72 y.o. woman s/p Left breast magseed guided lumpectomy and SLNbx, with excision of left forearm skin lesion 2 weeks ago who presents to clinic today for post op check    S:  Pain is resolving.  No redness or discharge around incision.  Denies fever, chills, nausea, vomiting.  Did have one day of clear yellow drainage from incision on Sunday, which has since resolved.    O:   Vitals:    06/28/18 0813   BP: (!) 176/85   BP Location: Right arm   Patient Position: Sitting   BP Method: Medium (Automatic)   Pulse: 69   Temp: 98.3 °F (36.8 °C)   TempSrc: Oral   Weight: 83 kg (183 lb)   Height: 5' 5" (1.651 m)     Phys:   Gen: NAD   HEENT: NCAT, trachea midline  Breast: Lumpectomy and axillary incisions c/d/i, no signs of hematoma or infection  Pulm: Unlabored  Extremities: Left forearm incision c/d/i, no signs of hematoma or infection    Path: 1.3 cm IDC with DCIS.  Closest margin is DCIS 1mm from posterior margin.  Negative SLN.      Forearm lesion VERRUCOUS SQUAMOUS EPIDERMAL HYPERPLASIA WITH NO MALIGNANCY  IDENTIFIED    A/P 72 y.o. woman with hx R breast cancer, L breast 1.3 cm IDC, negative SLN and negative margins s/p lumpectomy SLNBx    Refer to rad onc for adjuvant RT  Refer to med onc for AI. At tumor board, we had discusses exemestane (as anastrazole gave her joint pain).  Oncotype is pending  F/u with us in 6 mo    Jac Christine MD  General Surgery, PGY-4  [phone]  I have personally taken the history and examined this patient and agree with the resident's note as stated above.  HISTORY OF PRESENT ILLNESS:  Noris Demarco is a 72-year-old  female   well known to me, status post breast conservation surgery on 06/15/2018 when she   underwent a left Magseed localization lumpectomy through an upper outer   curvilinear circumareolar incision using hidden scar technique of the left   breast along with a left axillary sentinel lymph node biopsy.  We also excised a   benign skin lesion from her left " forearm.  She offers no subjective complaints   other than some postop pruritus, but there are no signs of infection and all   incision sites are clean, dry and intact, healing appropriately without signs of   infection.  The pathology was reviewed from 06/15/2018.  It revealed a 1.3 cm   invasive infiltrating ductal carcinoma grade 2 with associated DCIS.  The   closest margin was 1 mm from the deep posterior margin.  The sentinel lymph node   biopsies were negative and she had a total of four negative nodes.  The tumor   was estrogen and progesterone receptor positive with 95% ER positivity and 5%   progesterone positivity.  It was HER-2/josé miguel negative.  Postoperatively, her   incision sites are clean, dry and intact, healing appropriately without signs of   infection.  She has excellent symmetry and no signs of infection.    Her past history had included right-sided breast cancer treated in 2008 with   surgery at [facility] in [place] with radiation treatment at   [facility].  She underwent endocrine treatment at [facility] and initially was on   aromatase inhibitors, but subsequently changed to tamoxifen due to side effects.    ASSESSMENT AND PLAN:  The patient will be referred to both Medical and Radiation   Oncology for consult.  She has a Medical Oncology consultation on 07/10/2018   with Dr. Tam and Radiation Oncology consult with Dr. Chavez on   07/12/2018.  She was presented at Breast Cancer conference today.  Oncotype DX   result is currently ordered and pending.  She will be a potential candidate for   Aromasin, exemestane aromatase inhibitor therapy given her history as noted   above and she will otherwise follow up with me in six months.  The patient   declines outpatient PT/OT consults at this point in time.      RLC/HN  dd: 06/30/2018 16:59:09 (CDT)  td: 07/01/2018 03:12:01 (CDT)  Doc ID   #[id number]  Job ID #[id number]    CC:     Job # [id number].

## 2018-07-05 ENCOUNTER — OFFICE VISIT (OUTPATIENT)
Dept: SURGERY | Facility: CLINIC | Age: 73
End: 2018-07-05
Payer: MEDICARE

## 2018-07-05 DIAGNOSIS — C50.912 INFILTRATING DUCTAL CARCINOMA OF BREAST, LEFT: ICD-10-CM

## 2018-07-05 DIAGNOSIS — Z85.3 PERSONAL HISTORY OF BREAST CANCER: ICD-10-CM

## 2018-07-05 DIAGNOSIS — Z71.83 ENCOUNTER FOR NONPROCREATIVE GENETIC COUNSELING: Primary | ICD-10-CM

## 2018-07-05 DIAGNOSIS — Z80.3 FAMILY HISTORY OF BREAST CANCER: ICD-10-CM

## 2018-07-05 PROCEDURE — 99215 OFFICE O/P EST HI 40 MIN: CPT | Mod: 24,S$GLB,, | Performed by: NURSE PRACTITIONER

## 2018-07-05 NOTE — PROGRESS NOTES
Mrs Demarco presents for genetic counseling, referred by Dr Johnson. She is a 72 year old  female with recent diagnosis of IDC, ER/PA+ and history of contralateral breast cancer diagnosed at age 62. See pedigree for full family history which will be scanned into Epic media.  This patient does not have a known hereditary cancer genetic mutation on either side of the family and does not have known Ashkenazi Shinto ancestry.      We reviewed her medical and family history and discussed the genetics of breast cancer, cancer risks associated with a hereditary predisposition to cancer, and the benefits, risks, and limitations of genetic testing according to current NCCN guidelines.  Discussed sporadic verses family clustering verses hereditary cancer. The patients history raises the possibility of a genetic susceptibility to breast cancer, with the two genes most strongly associated with early-onset breast cancer are BRCA1 and BRCA2. Mutations in these genes increase the risk for both breast and ovarian cancer in women and breast and early prostate cancer in men, along with an elevated risk of pancreatic cancer and melanoma. Due to significant clinical overlap in hereditary cancer susceptibility genes, a molecular diagnosis of a hereditary cancer condition is necessary to clarify the cancer risks for this patient and multigene testing was discussed based on his history of breast cancer. Genetic testing for mutations in the BRCA1 and BRCA2 genes involves the complete sequencing of both BRCA1 and BRCA2, testing for 5 large gene rearrangement mutations in the BRCA1 gene, and the BRACAnalysis Large Rearrangement Test (WILLIE accounts for 6-10% of all mutations in HBOC). This testing is estimated to identify greater than 92% of mutations in the BRCA1 and BRCA2 genes.      Possible results of genetic testing: positive result would mean that a mutation known to be associated with a higher risk of cancer was  identified; negative result would mean that no mutation known to increase the risk of cancer was identified; variant of uncertain significance (VUS) in which a genetic change was identified in a gene, but it is unclear if this change causes an increase in cancer risk normally associated with mutations in the gene. There is an estimated 1-2% chance of a reported variant of uncertain significance in BRCA1 and BRCA2 and up to a 30% with newer genes included in multigene panels. Due to the clinical uncertainty of this type of change, VUSs are not used to make medical management decisions for a patient. Finally, I advised the patient that his medical management may change based on these results and may include increased surveillance, use of chemoprevention, or prophylactic surgery. A tailored cancer prevention plan and implications of the patients test results for relatives will be reviewed once results are available.      Women who carry mutations in the BRCA2 gene have a 41-87% risk to develop breast cancer and up to a 11-27 % risk of developing ovarian cancer during their lifetime. Women who carry a BRCA2 mutation also have a greater chance (about 62%) of developing contralateral breast cancer. Men who carry a BRCA2 gene mutation have up to a 6-7% risk to develop breast cancer and an increased risk for early-onset aggressive prostate cancer (2-6 fold increase). Mutations in the BRCA2 gene have also been associated with an increased risk other types of cancer in both men and women in some families, most notably pancreatic cancer and melanoma. Specific cancer risks vary depending on the tumor suppressor gene in which a mutation arises. We reviewed that if she carries a mutation within an autosomal inherited gene, her daughter would have a 50% chance of having the same mutation, along with her siblings.      Discussed the cost of testing, various labs which can conduct genetic testing and potential insurance coverage.  She desires to proceed with testing, expressed her understanding of the information discussed today and provided informed consent for testing.       RECOMMENDATIONS:                                                                1. Integrated BRACAnalysis with [facility] through [employer], results expected in approximately 2-3 weeks.   2. Post test counseling will be provided once results are available with healthcare management per results, including testing of any additional family members if pathogenic mutation is identified.     Time in counseling 45 min, total time 45 min

## 2018-07-10 ENCOUNTER — OFFICE VISIT (OUTPATIENT)
Dept: SURGERY | Facility: CLINIC | Age: 73
End: 2018-07-10
Payer: MEDICARE

## 2018-07-10 VITALS
SYSTOLIC BLOOD PRESSURE: 150 MMHG | DIASTOLIC BLOOD PRESSURE: 60 MMHG | TEMPERATURE: 98 F | WEIGHT: 183 LBS | BODY MASS INDEX: 30.49 KG/M2 | HEIGHT: 65 IN | RESPIRATION RATE: 16 BRPM

## 2018-07-10 DIAGNOSIS — D05.11 DUCTAL CARCINOMA IN SITU (DCIS) OF RIGHT BREAST: ICD-10-CM

## 2018-07-10 DIAGNOSIS — C50.912 INFILTRATING DUCTAL CARCINOMA OF BREAST, LEFT: Primary | ICD-10-CM

## 2018-07-10 PROCEDURE — 3077F SYST BP >= 140 MM HG: CPT | Mod: CPTII,S$GLB,, | Performed by: INTERNAL MEDICINE

## 2018-07-10 PROCEDURE — 99999 PR PBB SHADOW E&M-EST. PATIENT-LVL III: CPT | Mod: PBBFAC,,, | Performed by: INTERNAL MEDICINE

## 2018-07-10 PROCEDURE — 3078F DIAST BP <80 MM HG: CPT | Mod: CPTII,S$GLB,, | Performed by: INTERNAL MEDICINE

## 2018-07-10 PROCEDURE — 99215 OFFICE O/P EST HI 40 MIN: CPT | Mod: S$GLB,,, | Performed by: INTERNAL MEDICINE

## 2018-07-10 NOTE — PROGRESS NOTES
Subjective:       Patient ID: Noris Demarco is a 72 y.o. female.    Chief Complaint: Consult (torsten referral)    HPI   REFERRING PHYSICIAN:  Luca Johnson M.D.    REASON FOR REFERRAL:  Recent diagnosis of infiltrating ductal carcinoma,   consideration for adjuvant treatment.    HISTORY OF PRESENT ILLNESS:  Mrs. Demarco is a 72-year-old -American   female with a remote history of right-sided DCIS for which she underwent a right   lumpectomy and radiation therapy.  She was subsequently treated with Arimidex,   which she tolerated poorly and was switched to tamoxifen.  She completed five   years of adjuvant hormonal therapy in .  Apparently, she recently had an   abnormal mammogram.  A biopsy was performed at our facility on 2018 and   showed an infiltrating ductal carcinoma that was ER strongly positive, LA   positive, and HER-2 negative.  The Ki-67 was 30%.  The patient was seen by Dr. Johnson and on 06/15/2018, she underwent a left-sided lumpectomy and sentinel   lymph node biopsy.  The pathology report from the procedure indicates that she   had a 1.3 cm carcinoma; resection margins were clear; the left sentinel lymph   node and three additional left axillary lymph nodes were negative for metastatic   disease.  She has made an uneventful recovery and she is here to discuss   adjuvant treatment options.  Of note, an Oncotype was sent and her Oncotype   score is 40, suggesting that she has a 25% chance of recurrence within 10 years.    PAST MEDICAL HISTORY:  As above.  She has a history of hyperlipidemia,   hypertension and diabetes.    PAST SURGICAL HISTORY:  Significant for the prior lumpectomy in , two   C-sections in  and , laparoscopic cholecystectomy in  and tubal   ligation in  at the time of her  section.    FAMILY HISTORY:  Two cousins have been diagnosed with breast cancer.  There is   no cancer in the immediate family.    SOCIAL HISTORY:  She is   and has a [employer] where she works.  She   quit smoking more than 50 years ago.  She does not drink alcohol.    GYN HISTORY:  Menarche was at 11, first live birth was at 31 and menopause in   her 50s.      Review of Systems    Overall she feels well. She is still somewhat sore from her surgery, but she denies any anxiety, depression, easy bruising, fevers, chills, night  sweats, weight loss, nausea, vomiting, diarrhea, constipation, diplopia,     blurred vision, headache, chest pain, palpitations, shortness of breath, breast pain, abdominal pain, extremity pain, or difficulty ambulating.  The remainder of the ten-point ROS, including general, skin, lymph, H/N, cardiorespiratory, GI, , Neuro, Endocrine, and psychiatric is negative.     Objective:      Physical Exam      She is alert, oriented to time, place, person, pleasant, well      nourished, in no acute physical distress.  She is here with her .                                  VITAL SIGNS:  Reviewed                                      HEENT:  Normal.  There are no nasal, oral, lip, gingival, auricular, lid,    or conjunctival lesions.  Mucosae are moist and pink, and there is no        thrush.  Pupils are equal, reactive to light and accommodation.              Extraocular muscle movements are intact.  Dentition is good.  There is no frontal or maxillary tenderness.                                     NECK:  Supple without JVD, adenopathy, or thyromegaly.                       LUNGS:  Clear to auscultation without wheezing, rales, or rhonchi.           CARDIOVASCULAR:  Reveals an S1, S2, no murmurs, no rubs, no gallops.         ABDOMEN:  Soft, nontender, without organomegaly.  Bowel sounds are    present.                                                                     EXTREMITIES:  No cyanosis, clubbing, or edema.                               BREASTS:  She is status post left lumpectomy with a well-healed periareolar incision.     There are no masses in either breast.  The left breast is somewhat swollen and she may have a fluid collection.   A sentinel node biopsy scar is seen in the left axilla.  It is also well  Healed.                                     A lumpectomy scar is seen in the upper outer quadrant of the contralateral (right) breast.  It is also well healed, and there are no messes in her right breast.  LYMPHATIC:  There is no cervical, axillary, or supraclavicular adenopathy.   SKIN:  Warm and moist, without petechiae, rashes, induration, or ecchymoses.           NEUROLOGIC:  DTRs are 0-1+ bilaterally, symmetrical, motor function is 5/5,  and cranial nerves are  within normal limits.      Assessment:       1. Infiltrating ductal carcinoma of breast, left    2. Ductal carcinoma in situ (DCIS) of right breast      3.    Possible seroma, left breast  Plan:        I  had a long discussion with her and her .  Based on the Oncotype score of 40, I recommended that she consider chemotherapy, however, she stated in no uncertain terms that she will not do that.  In view of the above, I recommended she see the radiation oncologist and discuss adjuvant radiation therapy. She actually has an appointment with Dr. Chavez two days from now.    Assuming she will be offered XRT, I would offer adjuvant hormonal therapy with examestane upon completion of the XRT.  If she does not tolerate the examestane, I would switch her to tamoxifen.  The pros and cons of such an approach were explained to her.   She has previously seen Dr. Peralta, and I recommended that she follow up with him, however, she stated she would prefer that we assume her care at this point.  RTC 4 weeks.  I spent approximately 60 minutes reviewing the available records and evaluating the patient, out of which over 50% of the time was spent face to face with the patient in counseling and coordinating this patient's care.    Her multiple questions were answered to her  satisfaction.

## 2018-07-12 ENCOUNTER — OFFICE VISIT (OUTPATIENT)
Dept: SURGERY | Facility: CLINIC | Age: 73
End: 2018-07-12
Payer: MEDICARE

## 2018-07-12 VITALS
RESPIRATION RATE: 20 BRPM | WEIGHT: 184.31 LBS | HEART RATE: 68 BPM | SYSTOLIC BLOOD PRESSURE: 185 MMHG | TEMPERATURE: 98 F | BODY MASS INDEX: 27.3 KG/M2 | HEIGHT: 69 IN | DIASTOLIC BLOOD PRESSURE: 97 MMHG

## 2018-07-12 DIAGNOSIS — D05.11 DUCTAL CARCINOMA IN SITU (DCIS) OF RIGHT BREAST: Primary | ICD-10-CM

## 2018-07-12 DIAGNOSIS — C50.912 INFILTRATING DUCTAL CARCINOMA OF BREAST, LEFT: ICD-10-CM

## 2018-07-12 PROCEDURE — 99204 OFFICE O/P NEW MOD 45 MIN: CPT | Mod: S$GLB,,, | Performed by: RADIOLOGY

## 2018-07-12 PROCEDURE — 99999 PR PBB SHADOW E&M-EST. PATIENT-LVL III: CPT | Mod: PBBFAC,,, | Performed by: RADIOLOGY

## 2018-07-12 PROCEDURE — 3080F DIAST BP >= 90 MM HG: CPT | Mod: CPTII,S$GLB,, | Performed by: RADIOLOGY

## 2018-07-12 PROCEDURE — 3077F SYST BP >= 140 MM HG: CPT | Mod: CPTII,S$GLB,, | Performed by: RADIOLOGY

## 2018-07-12 NOTE — PATIENT INSTRUCTIONS
Radiation Therapy Treatment  Radiation therapy can help you in your fight against cancer. It begins with a session to discuss treatment with your doctor. If you and your doctor decide on radiation, you will return for a simulation. The simulation is a planning session that helps the doctor target your cancer. He or she will design a radiation plan to protect normal tissues. When the simulation and plan are completed, you will begin your daily treatments. Treatment is usually once daily Monday to Friday. It takes less than a half an hour. Sometimes you may need radiation twice a day, with usually 6 hours between treatments. After the course of radiation is complete, you will be scheduled for follow-up appointments. This is to make sure the cancer is under control. The follow-ups will also make sure that any side effects from the treatment are taken care of.  Radiation therapy uses high-energy X-rays to kill cancer cells.   Your treatment planning visit: The simulation  Your radiation therapy team uses a special machine called a simulator to map out your treatment. The simulator is usually an X-ray machine (fluoroscopy), CT scanner, MRI scanner, or PET-CT scanner machine. Laser lights act as guides to help position your body accurately. During this visit:  · The team figures out the best position for your body. They make notes in your chart so youll be placed the same way each time.  · You may use special devices to keep your body correctly positioned and still during treatment. These may include molds, masks, rests, and blocks.  · The team makes ink marks on your skin. These will help you get in the same position for each treatment. Tiny permanent tattoos may also be used.   · Markers such as metal balls or wires may be put on or in your body. Sometime these are taped to the skin to help with the imaging process. These work with the X-rays to position your body. The markers are removed when the visit is  over.  After the team has the imaging and data, the information is sent into the computer planning system. Your doctor and the team of physicists and dosimetrists design a treatment field. The field will best target your cancer and how it might spread. It will also help limit radiation to nearby normal tissues.  Your treatments  Each treatment usually takes 10 to 30 minutes. You may need to change into a hospital gown. The radiation therapist puts you in the correct position on the treatment table, then leaves the room. Sometimes you may need more imaging before each treatment. The machine may take digital X-rays or a CT scan to help make sure you are lined up correctly. During treatment, lie as still as you can and breathe normally. You will hear noises coming from the machine. You can talk to the radiation therapist, who watches you from the control room on a TV monitor. After treatment, the therapist will help you off the table. You can then get dressed and go back to your normal activities.  Date Last Reviewed: 1/14/2016 © 2000-2017 [employer]. [address] [place] [address]. All rights reserved. This information is not intended as a substitute for professional medical care. Always follow your healthcare professional's instructions.        Discharge Instructions for Radiation Therapy  Radiation therapy uses high-energy X-rays to kill cancer cells and help you in your fight against cancer. Radiation destroys cancer cells gradually, over time. The goal of therapy is to focus on and kill as many cancer cells as possible. Radiation can also damage or kill some of the normal cells that are closest to the tumor. Damaged normal cells can repair themselves, often within a few days.  Caring for your skin  You should ask your healthcare provider for specific products that he or she recommends for washing and bathing. In general, use a mild nondetergent soap and warm (not hot) water to clean the  "area receiving radiation. Pat the region dry rather than rubbing.  Your healthcare provider may give you products to moisturize the skin and prevent infection. The goal is to prevent cracks or breaks in the skin that may be sensitive from treatment:   · Dont be surprised if your treatment causes skin redness, and a type of "sunburn" over time. Some radiation treatments can cause this.   · Ask your therapy team what lotion to use. Also ask for directions about when and how to apply it.  · Avoid prolonged or direct sunlight on the treated area. Ask your therapy team about using a sunscreen. You do not have to avoid going outside altogether, but must take appropriate precautions.  · Dont remove ink marks unless your radiation therapist says its OK. Dont scrub or use soap on the marks when you wash. Let water run over them and pat them dry.  · Protect your skin from heat or cold. Avoid hot tubs, saunas, heating pads, or ice packs.  · Avoid clothing that causes friction or rubbing on the skin.  Fighting fatigue  Radiation therapy may cause you to feel tired. Your body is working hard to heal and repair itself. To feel better, try these things:  · Do light exercise each day. Take short walks.  · Plan tasks for the times when you tend to have the most energy. Ask for help when you need it.  · Relax before you go to bed. This will help you sleep better. Try reading or listening to soothing music.  Coping with appetite changes  Here are ways to cope:  · Tell your therapy team if you find it hard to eat or you have no appetite. You may be referred to a nutritionist to help you with meal planning.  · Radiation to certain internal sites can cause nausea, depending on the location of treatment. This can affect your appetite. Think of healthy eating as part of your treatment. Try these tips:  ¨ Eat slowly.  ¨ Eat small meals several times a day.  ¨ Eat more food when youre feeling better.  ¨ Ask others to keep you company " when you eat.  ¨ Stock up on easy-to-prepare foods.  ¨ Eat foods high in protein and calories. Your healthcare provider may recommend liquid meal supplements.  ¨ Drink plenty of water and other fluids.  ¨ Ask your healthcare provider before taking any vitamins or over-the-counter supplements. Such products are not regulated by the FDA and can sometimes interfere with your treatments.   Dealing with other side effects  Here are suggestions to deal with other side effects:   · Be prepared for hair loss in the area being treated. The hair loss may be permanent. Be sure to discuss this with your healthcare provider.  · Sip cool water if your mouth or throat becomes dry or sore. Ice chips may also help.  · Tell your healthcare provider if you have diarrhea or constipation. You may be given a special diet.  · If you have trouble swallowing liquids, tell your healthcare provider.  Follow-up  Make a follow-up appointment as directed by your healthcare provider.     When to call your healthcare provider  Call your healthcare provider right away if you have any of the following:  · Unexpected headaches  · Trouble concentrating  · Ongoing fatigue  · Wheezing, shortness of breath, or trouble breathing  · Pain that doesnt go away, especially if its always in the same place  · New or unusual lumps, bumps, or swelling  · Dizziness or lightheadedness  · Unusual rashes, bruises, or bleeding  · Fever of 100.4°F (38°C) or higher, or chills  · Nausea and vomiting  · Diarrhea that doesnt improve with time  · Skin breakdown; significant pain due to skin irritation   Date Last Reviewed: 1/13/2016  © 8226-5260 [employer]. [address] [place] [address], [place] [address]. All rights reserved. This information is not intended as a substitute for professional medical care. Always follow your healthcare professional's instructions.        Radiation Therapy: Managing Short-Term Side Effects     Take short walks daily.   Radiation  therapy uses high-energy X-rays or particles to kill cancer cells. Some normal cells can also be affected. This causes side effects such as dry skin, tiredness (fatigue), or changes in your appetite. Most side effects go away when your radiation therapy is over.  Having side effects of radiation therapy does not mean that your cancer is getting worse or that therapy isnt working.   Caring for your skin  Skin problems may happen where your body gets radiation. Your skin may become dry, itchy, red, and peeling. It may darken in that spot, like a tan. To care for your skin:  · Dont scrub on the treatment area. Clean that area of the skin every day. Use warm water and mild soap, or as your healthcare provider advises. Pat the skin afterward or let it air dry.  · Ask your therapy team what lotion to use and when to use it.  · Keep the treated area out of the sun. Ask your team about using a sunscreen.  · Don't remove ink marks unless your radiation therapist says you can. Dont scrub the marks when you wash. Let water run over them and pat them dry.  · Protect your skin from heat or cold. Avoid hot tubs, saunas, hot pads, and ice packs.  · Wear soft, loose clothing to avoid rubbing skin.  Fighting tiredness  The cancer itself or the radiation therapy may cause you to feel tired. Your body is working hard to heal and repair itself. To feel better:  · Try light exercise each day. Take short walks.  · Plan tasks for the times when you tend to have the most energy. Ask for help when you need it.  · Relax before you go to bed to sleep better. Try reading or listening to soothing music.  · Be sure to let your cancer care team know if you continue to have fatigue that is not getting better. They may be able to offer ways to help.   Coping with appetite changes  Tell your therapy team if you find it hard to eat or have no appetite. You may need to see a nutritionist. This is a healthcare provider with special training in meal  planning. To keep your strength up, you need to eat well and maintain your weight. Think of healthy eating as part of your treatment. Try these tips:  · Eat slowly.  · Eat small meals several times a day.  · Eat more food when youre feeling better, even if it is not mealtime.  · Ask others to keep you company when you eat.  · Stock up on easy-to-prepare foods.  · Eat foods high in protein and calories.  · Drink plenty of water and other fluids.  · Ask your healthcare provider before taking any vitamins.  Site-specific side effects  These side effects include the following:   · You may lose hair in the area being treated. The hair often grows back after treatment.  · Your mouth or throat can become dry or sore if your head or neck is being treated. Sip cool water to help ease discomfort.  · Nausea and bowel changes can happen with radiation to the pelvic region. Tell your healthcare provider if you have nausea, diarrhea, or constipation. You may need to take medicine or follow a special diet.  Talk with your healthcare team  Radiation therapy can also have other side effects, including some that might not show up until years later. Be sure to talk with your healthcare team about what to expect with the type of radiation therapy you are getting, including when you should call them with concerns.   Date Last Reviewed: 5/1/2016  © 8500-4612 [employer]. [address] [place] [address], [place] [address]. All rights reserved. This information is not intended as a substitute for professional medical care. Always follow your healthcare professional's instructions.    Return for ct/si in 3 weeks

## 2018-07-12 NOTE — PROGRESS NOTES
REFERRING PHYSICIAN:   Luca Johnson M.D.    DIAGNOSIS:    1) pT1c N0 M0,  infiltrating ductal carcinoma of the left breast  2) history of DCIS of the right breast     HISTORY OF PRESENT ILLNESS:   Ms. Demarco is a 72 year old female with history of DCIS of the right breast cancer in  status post lumpectomy, radiation, and Tamoxifen, who was recently diagnosed with left breast cancer after an abnormal mammogram in May 2018 which revealed a focal asymmetry in the lower outer quadrant in the left breast. A core needle biopsy on 2018 revealed grade 3, invasive ductal carcinoma, which is ER positive (>95%), NC positive (5%), and Her 2 negative, Ki 67 30%. On 2018 she underwent left breast lumpectomy and sentinel node biopsy. The pathology revealed the left breast with grade 2, invasive ductal carcinoma measuring 1.3 cm with the closest margin posteriorly at 0.1 cm. One out of one sentinel lymph node and 4 additional lymph nodes from the left axilla were negative for involvement. Her Oncotype score returned at 40, with 25% chance of recurrence within 10 years. Therefore, adjuvant chemotherapy was recommended after consultation with Dr. Tam, which she declined. She is here today for recommendation regarding radiation.     Of note, she received whole breast radiation to the right breast in  at [facility] per patient.  She reports moist desquamation of the right axillary region secondary to radiation at that time. At present, she reports heaviness of the left breast without pain,erythema,  discharge, or fever. She denies left arm edema, fever, night sweats, or weight loss.     REVIEW OF SYSTEMS:  As above.  In addition, patient denies headaches, visual problems, dizziness, chest pain, shortness of breath, cough, nausea, vomiting, diarrhea, or any new bony pains. Patient also denies easy bruising, skin rashes, or numbness or tingling.    GYN HISTORY:   Menarche at age 11, , menopause at age  59. She reports history of hormone replacement therapy of unknown duration.     ECO-1    PAST MEDICAL HISTORY:  Past Medical History:   Diagnosis Date    Breast cancer     Right breast     Diabetes mellitus     Hyperlipidemia     Hypertension        PAST SURGICAL HISTORY:  Past Surgical History:   Procedure Laterality Date    ADJACENT TISSUE TRANSFER Left 6/15/2018    Procedure: ADJACENT TISSUE TRANSFER Tissue Rearrangement;  Surgeon: Luca Johnson MD;  Location: [facility] OR [address] [place] [address];  Service: General;  Laterality: Left;    BREAST BIOPSY Right 2008    BREAST LUMPECTOMY Right 2008     SECTION, LOW TRANSVERSE  1977,1979    x2    CHOLECYSTECTOMY      laparoscopic    DILATION AND CURETTAGE OF UTERUS  2012    INJECTION FOR SENTINEL NODE IDENTIFICATION Left 6/15/2018    Procedure: INJECTION, FOR SENTINEL NODE IDENTIFICATION;  Surgeon: Luca Johnson MD;  Location: [facility] OR [address] [place] [address];  Service: General;  Laterality: Left;    SENTINEL LYMPH NODE BIOPSY Left 6/15/2018    Procedure: BIOPSY, LYMPH NODE, SENTINEL;  Surgeon: Luca Johnson MD;  Location: [facility] OR [address] [place] [address];  Service: General;  Laterality: Left;    TUBAL LIGATION      @ time of        ALLERGIES:   Review of patient's allergies indicates:   Allergen Reactions    Tylenol [acetaminophen]      Affects memory/confused       MEDICATIONS:  Current Outpatient Prescriptions   Medication Sig    amlodipine (NORVASC) 10 MG tablet Take 1 tablet by mouth once daily.    aspirin (ECOTRIN) 81 MG EC tablet Take 81 mg by mouth once daily.    coenzyme Q10 (COQ-10) 100 mg capsule Take 100 mg by mouth once daily.      cyanocobalamin (VITAMIN B-12) 500 MCG tablet Take 500 mcg by mouth once daily.    fish oil-omega-3 fatty acids 300-1,000 mg capsule Take 1 g by mouth once daily.    furosemide (LASIX) 40 MG tablet Take 40 mg by mouth once daily.     insulin NPH (NOVOLIN N) 100 unit/mL injection Inject 12 Units into the  "skin 2 (two) times daily before meals.     insulin regular 100 unit/mL Inj injection Inject into the skin 3 (three) times daily before meals.    lisinopril (PRINIVIL,ZESTRIL) 40 MG tablet Take 40 mg by mouth 2 (two) times daily.      No current facility-administered medications for this visit.        SOCIAL HISTORY:  Social History     Social History    Marital status:      Spouse name: N/A    Number of children: N/A    Years of education: N/A     Occupational History    Not on file.     Social History Main Topics    Smoking status: Former Smoker     Packs/day: 1.00     Years: 14.00     Types: Cigarettes     Quit date: 4/30/1975    Smokeless tobacco: Never Used    Alcohol use No    Drug use: No    Sexual activity: Yes     Partners: Male     Birth control/ protection: Post-menopausal     Other Topics Concern    Not on file     Social History Narrative    No narrative on file       FAMILY HISTORY:  Family History   Problem Relation Age of Onset    Hypertension Mother     Heart failure Mother     Hypertension Brother     Hypertension Sister     Breast cancer Cousin     Breast cancer Cousin     Coronary artery disease Brother     Stroke Brother     Coronary artery disease Brother     Stroke Brother     Colon cancer Neg Hx     Ovarian cancer Neg Hx          PHYSICAL EXAMINATION:  Vitals:    07/12/18 0827   BP: (!) 185/97   BP Location: Right arm   Pulse: 68   Resp: 20   Temp: 98.2 °F (36.8 °C)   TempSrc: Oral   Weight: 83.6 kg (184 lb 4.9 oz)   Height: 5' 9" (1.753 m)   Body mass index is 27.22 kg/m².  GENERAL: Patient is alert and oriented, in no acute distress.  HEENT:Extraocular muscles are intact.  Oropharynx is clear without lesions.  There is no cervical or supraclavicular lymphadenopathy palpated.  No thyromegaly noted.  HEART: Regular rate and rhythm.  LUNGS: Clear to auscultation bilaterally.  BREAST EXAM: The has large pendulous breasts bilaterally. The left breast is slightly " larger than the right. The scar secondary to recent lumpectomy is noted in the periareolar region of the left breast. THere is also a scar in the left axilla secondary to sentinel node biopsy. THere is a seroma collection palpated at the lumpectomy site, without pain or erythema. No suspicious masses palpated in the left breast or left axilla. On the right breast, the scar secondary to lumpectomy is noted in the UOQ of the right breast. No abnormal masses palpated in the righ brast or right axilla.   ABDOMEN:Soft, nontender, nondistended, without hepatosplenomegaly.  Normoactive bowel sounds.  EXTREMITIES: No clubbing, cyanosis, or edema.  NEUROLOGICAL: Cranial nerve II through XII grossly intact.  Sensation is intact.  Strength is 5 out of 5 in the upper and lower extremities bilaterally.     ASSESSMENT:   This is a 72 year old female with history of DCIS of the right breast in 2008 treated with lumpectomy, radiation, and endocriner therapy, now with p T1c N0 M0, Stage IA, invaisive ductal carcinoma of the lef tbreast who underwent lumpecomy and seninel node biopsy on 6/15/2018 with 1.3 cm lesion, which is ER/ND positive and HER 2 negative, with Ki 67 30%, Oncotype score 40%.    PLAN:   After review of the avaialble pathology and radiological images, Ms. Demarco is recommended to undergo post operative radiation to the left breast to reduce her chance of local recurrence. I recommend 4500 cGy + 2000 cGy boost based on very close margins after lumpectomy. After discussion with Dr. Tam, she has declined chemotherapy and plans to initiate endocrine therapy upon completion of radiation.     The risks, benefits, and side effects of radiation were explained in detail to the patient and her .  All questions were answered and informed consent was signed.  I plan to see the patient back for radiation planning CT in approximately 3 weeks.    Psychosocial Distress screening score of Distress Score: 0 noted and  reviewed. No intervention indicated.    I spent approximately 60 minutes reviewing the available records and evaluating the patient, out of which over 50% of the time was spent face to face with the patient in counseling and coordinating this patient's care.

## 2018-07-13 ENCOUNTER — DOCUMENTATION ONLY (OUTPATIENT)
Dept: SURGERY | Facility: CLINIC | Age: 73
End: 2018-07-13

## 2018-07-13 ENCOUNTER — TELEPHONE (OUTPATIENT)
Dept: HEMATOLOGY/ONCOLOGY | Facility: CLINIC | Age: 73
End: 2018-07-13

## 2018-07-13 NOTE — TELEPHONE ENCOUNTER
----- Message from Emma Sullivan sent at 7/13/2018  9:04 AM CDT -----  No.  [phone]   Patient will start radiation on 8/1/18.   She will be cancelling the appointment with Dr. Tam.   She wants to see Dr. Peralta.  When should she schedule her follow up appointment.    Please call.

## 2018-07-13 NOTE — PROGRESS NOTES
Results received from [facility], negative [facility] with [facility], patient was phoned and results discussed. Genetic testing for her daughter is not recommended since she tested negative.

## 2018-07-18 ENCOUNTER — TELEPHONE (OUTPATIENT)
Dept: SURGERY | Facility: CLINIC | Age: 73
End: 2018-07-18

## 2018-07-18 NOTE — TELEPHONE ENCOUNTER
Returned the call to Miri at [facility] regarding the patient.  Answered of the questions asked regarding genetic testing for the patient.  Lali Ahsley NP notified of this matter.

## 2018-07-20 ENCOUNTER — NURSE TRIAGE (OUTPATIENT)
Dept: ADMINISTRATIVE | Facility: CLINIC | Age: 73
End: 2018-07-20

## 2018-07-20 ENCOUNTER — TELEPHONE (OUTPATIENT)
Dept: SURGERY | Facility: CLINIC | Age: 73
End: 2018-07-20

## 2018-07-20 NOTE — TELEPHONE ENCOUNTER
"  Reason for Disposition   Caller has URGENT question and triager unable to answer question    Answer Assessment - Initial Assessment Questions  1. SYMPTOM: "What's the main symptom you're concerned about?" (e.g., pain, fever, vomiting)      Left breast hard, painless knot the size of a basebll  2. ONSET: "When did ________  start?"      2 days ago  3. SURGERY: "What surgery was performed?"      Tumor removal  4. DATE of SURGERY: "When was surgery performed?"       7/12/18  5. ANESTHESIA: " What type of anesthesia did you have?" (e.g., general, spinal, epidural, local)      General  6. PAIN: "Is there any pain?" If so, ask: "How bad is it?"  (Scale 1-10; or mild, moderate, severe)      No.   7. FEVER: "Do you have a fever?" If so, ask: "What is your temperature, how was it measured, and when did it start?"      No  8. VOMITING: "Is there any vomiting?" If yes, ask: "How many times?"      No  9. BLEEDING: "Is there any bleeding?" If so, ask: "How much?" and "Where?"      No  10. OTHER SYMPTOMS: "Do you have any other symptoms?" (e.g., drainage from wound, painful urination, constipation)        No    Protocols used: ST POST-OP SYMPTOMS AND [id number]    Patient transferred to 's office to speak with nurse. Please contact caller directly to discuss any further care advice.  "

## 2018-07-20 NOTE — TELEPHONE ENCOUNTER
Pt stated her questions were answered by Dr. Johnson's nurse-Nancy Bass. Client had no other concerns.

## 2018-07-20 NOTE — TELEPHONE ENCOUNTER
Spoke with patient , she is having fluid build up in her breast, she denies pain and signs  of infection, no fever or redness. I instructed her to continue to place warm compresses on the elenita to help with the fluid, she is schedule to se Dr Johnson on Tuesday, she will call the office in Monday to let me know if she needs to seen

## 2018-07-24 ENCOUNTER — OFFICE VISIT (OUTPATIENT)
Dept: SURGERY | Facility: CLINIC | Age: 73
End: 2018-07-24
Payer: MEDICARE

## 2018-07-24 VITALS
BODY MASS INDEX: 26.94 KG/M2 | DIASTOLIC BLOOD PRESSURE: 86 MMHG | TEMPERATURE: 98 F | HEART RATE: 76 BPM | WEIGHT: 181.88 LBS | HEIGHT: 69 IN | SYSTOLIC BLOOD PRESSURE: 154 MMHG

## 2018-07-24 DIAGNOSIS — C50.912 INFILTRATING DUCTAL CARCINOMA OF BREAST, LEFT: Primary | ICD-10-CM

## 2018-07-24 PROCEDURE — 99024 POSTOP FOLLOW-UP VISIT: CPT | Mod: S$GLB,,, | Performed by: SURGERY

## 2018-07-24 PROCEDURE — 99999 PR PBB SHADOW E&M-EST. PATIENT-LVL III: CPT | Mod: PBBFAC,,, | Performed by: SURGERY

## 2018-07-29 NOTE — PROGRESS NOTES
Pt presents with  to evaluate her left breast nopw approx 6 weeks post op.  Concerned about induration arpound lumpectomy site.  Benign seroma of left lumpectomy cavity of left breast, s/p L BCS on 6-15-18 as previously outlined.  Seroma and post-op induration WNL as for what is typically expected s/p BCS at this point.  No signs of infection. No erythema or drainage.  No hematoma or ecchymosis.  Wounds healing well with excellent B symmetry.  Pt reassured.  She may proceed with Radiotherapy as scheduled.  F/U as previously outlined.

## 2018-08-01 ENCOUNTER — HOSPITAL ENCOUNTER (OUTPATIENT)
Dept: RADIATION THERAPY | Facility: HOSPITAL | Age: 73
Discharge: HOME | End: 2018-08-01
Attending: RADIOLOGY
Payer: MEDICARE

## 2018-08-01 PROCEDURE — 77290 THER RAD SIMULAJ FIELD CPLX: CPT | Mod: 26,,, | Performed by: RADIOLOGY

## 2018-08-01 PROCEDURE — 77334 RADIATION TREATMENT AID(S): CPT | Mod: 26,,, | Performed by: RADIOLOGY

## 2018-08-01 PROCEDURE — 77263 THER RADIOLOGY TX PLNG CPLX: CPT | Mod: ,,, | Performed by: RADIOLOGY

## 2018-08-01 PROCEDURE — 77290 THER RAD SIMULAJ FIELD CPLX: CPT | Mod: TC | Performed by: RADIOLOGY

## 2018-08-01 PROCEDURE — 77334 RADIATION TREATMENT AID(S): CPT | Mod: TC | Performed by: RADIOLOGY

## 2018-08-01 PROCEDURE — 77014 HC CT GUIDANCE RADIATION THERAPY FLDS PLACEMENT: CPT | Mod: TC | Performed by: RADIOLOGY

## 2018-08-06 PROCEDURE — 77300 RADIATION THERAPY DOSE PLAN: CPT | Mod: TC | Performed by: RADIOLOGY

## 2018-08-06 PROCEDURE — 77334 RADIATION TREATMENT AID(S): CPT | Mod: 26,,, | Performed by: RADIOLOGY

## 2018-08-06 PROCEDURE — 77300 RADIATION THERAPY DOSE PLAN: CPT | Mod: 26,,, | Performed by: RADIOLOGY

## 2018-08-06 PROCEDURE — 77295 3-D RADIOTHERAPY PLAN: CPT | Mod: TC | Performed by: RADIOLOGY

## 2018-08-06 PROCEDURE — 77295 3-D RADIOTHERAPY PLAN: CPT | Mod: 26,,, | Performed by: RADIOLOGY

## 2018-08-06 PROCEDURE — 77334 RADIATION TREATMENT AID(S): CPT | Mod: TC | Performed by: RADIOLOGY

## 2018-08-07 PROCEDURE — 77280 THER RAD SIMULAJ FIELD SMPL: CPT | Mod: 26,,, | Performed by: RADIOLOGY

## 2018-08-07 PROCEDURE — 77280 THER RAD SIMULAJ FIELD SMPL: CPT | Mod: TC | Performed by: RADIOLOGY

## 2018-08-08 PROCEDURE — 77412 RADIATION TX DELIVERY LVL 3: CPT | Performed by: RADIOLOGY

## 2018-08-09 PROCEDURE — 77412 RADIATION TX DELIVERY LVL 3: CPT | Performed by: RADIOLOGY

## 2018-08-10 PROCEDURE — 77412 RADIATION TX DELIVERY LVL 3: CPT | Performed by: RADIOLOGY

## 2018-08-13 PROCEDURE — 77412 RADIATION TX DELIVERY LVL 3: CPT | Performed by: RADIOLOGY

## 2018-08-13 PROCEDURE — 77336 RADIATION PHYSICS CONSULT: CPT | Performed by: RADIOLOGY

## 2018-08-14 ENCOUNTER — DOCUMENTATION ONLY (OUTPATIENT)
Dept: RADIATION ONCOLOGY | Facility: CLINIC | Age: 73
End: 2018-08-14

## 2018-08-14 PROCEDURE — 77412 RADIATION TX DELIVERY LVL 3: CPT | Performed by: RADIOLOGY

## 2018-08-14 NOTE — PLAN OF CARE
Problem: Patient Care Overview  Goal: Plan of Care Review  Outcome: Ongoing (interventions implemented as appropriate)  Pt. On day 4 of xrt to breast.  Pt. Doing well.  Just started.  Using cream.

## 2018-08-15 PROCEDURE — 77412 RADIATION TX DELIVERY LVL 3: CPT | Performed by: RADIOLOGY

## 2018-08-15 PROCEDURE — 77417 THER RADIOLOGY PORT IMAGE(S): CPT | Performed by: RADIOLOGY

## 2018-08-16 PROCEDURE — 77412 RADIATION TX DELIVERY LVL 3: CPT | Performed by: RADIOLOGY

## 2018-08-17 PROCEDURE — 77412 RADIATION TX DELIVERY LVL 3: CPT | Performed by: RADIOLOGY

## 2018-08-20 ENCOUNTER — DOCUMENTATION ONLY (OUTPATIENT)
Dept: RADIATION ONCOLOGY | Facility: CLINIC | Age: 73
End: 2018-08-20

## 2018-08-20 PROCEDURE — 77336 RADIATION PHYSICS CONSULT: CPT | Performed by: RADIOLOGY

## 2018-08-20 PROCEDURE — 77412 RADIATION TX DELIVERY LVL 3: CPT | Performed by: RADIOLOGY

## 2018-08-20 NOTE — PLAN OF CARE
Problem: Patient Care Overview  Goal: Plan of Care Review  Outcome: Ongoing (interventions implemented as appropriate)  Pt. On day 9 of xrt. To breast.  Pt. Doing well.  Using cream. No c/o.

## 2018-08-21 PROCEDURE — 77412 RADIATION TX DELIVERY LVL 3: CPT | Performed by: RADIOLOGY

## 2018-08-22 PROCEDURE — 77412 RADIATION TX DELIVERY LVL 3: CPT | Performed by: RADIOLOGY

## 2018-08-22 PROCEDURE — 77417 THER RADIOLOGY PORT IMAGE(S): CPT | Performed by: RADIOLOGY

## 2018-08-23 PROCEDURE — 77412 RADIATION TX DELIVERY LVL 3: CPT | Performed by: RADIOLOGY

## 2018-08-24 PROCEDURE — 77412 RADIATION TX DELIVERY LVL 3: CPT | Performed by: RADIOLOGY

## 2018-08-27 PROCEDURE — 77412 RADIATION TX DELIVERY LVL 3: CPT | Performed by: RADIOLOGY

## 2018-08-27 PROCEDURE — 77336 RADIATION PHYSICS CONSULT: CPT | Performed by: RADIOLOGY

## 2018-08-28 PROCEDURE — 77412 RADIATION TX DELIVERY LVL 3: CPT | Performed by: RADIOLOGY

## 2018-08-29 ENCOUNTER — DOCUMENTATION ONLY (OUTPATIENT)
Dept: RADIATION ONCOLOGY | Facility: CLINIC | Age: 73
End: 2018-08-29

## 2018-08-29 PROCEDURE — 77412 RADIATION TX DELIVERY LVL 3: CPT | Performed by: RADIOLOGY

## 2018-08-29 NOTE — PLAN OF CARE
Problem: Patient Care Overview  Goal: Plan of Care Review  Outcome: Ongoing (interventions implemented as appropriate)  Pt. On day 10 of xrt to breast.  Pt. Doing well.  No c/o.  Using cream.

## 2018-08-29 NOTE — PLAN OF CARE
Problem: Patient Care Overview  Goal: Plan of Care Review  Outcome: Ongoing (interventions implemented as appropriate)  Pt. On day 10 of xrt to breast.  Pt. With no c/o.  Using cream.

## 2018-08-30 PROCEDURE — 77412 RADIATION TX DELIVERY LVL 3: CPT | Performed by: RADIOLOGY

## 2018-08-30 PROCEDURE — 77417 THER RADIOLOGY PORT IMAGE(S): CPT | Performed by: RADIOLOGY

## 2018-08-31 PROCEDURE — 77412 RADIATION TX DELIVERY LVL 3: CPT | Performed by: RADIOLOGY

## 2018-09-04 ENCOUNTER — HOSPITAL ENCOUNTER (OUTPATIENT)
Dept: RADIATION THERAPY | Facility: HOSPITAL | Age: 73
Discharge: HOME | End: 2018-09-04
Attending: RADIOLOGY
Payer: MEDICARE

## 2018-09-05 ENCOUNTER — DOCUMENTATION ONLY (OUTPATIENT)
Dept: RADIATION ONCOLOGY | Facility: CLINIC | Age: 73
End: 2018-09-05

## 2018-09-05 PROCEDURE — 77412 RADIATION TX DELIVERY LVL 3: CPT | Performed by: RADIOLOGY

## 2018-09-05 PROCEDURE — 77336 RADIATION PHYSICS CONSULT: CPT | Performed by: RADIOLOGY

## 2018-09-05 NOTE — PLAN OF CARE
Problem: Patient Care Overview  Goal: Plan of Care Review  Outcome: Ongoing (interventions implemented as appropriate)  Pt. On day 19 of xrt to breast.  Scheduled CT for boost.  Pt. C/o slight pain.  Increase miaderm 3-4x daily.

## 2018-09-06 PROCEDURE — 77412 RADIATION TX DELIVERY LVL 3: CPT | Performed by: RADIOLOGY

## 2018-09-07 PROCEDURE — 77412 RADIATION TX DELIVERY LVL 3: CPT | Performed by: RADIOLOGY

## 2018-09-10 ENCOUNTER — DOCUMENTATION ONLY (OUTPATIENT)
Dept: RADIATION ONCOLOGY | Facility: CLINIC | Age: 73
End: 2018-09-10

## 2018-09-10 PROCEDURE — 77417 THER RADIOLOGY PORT IMAGE(S): CPT | Performed by: RADIOLOGY

## 2018-09-10 PROCEDURE — 77014 HC CT GUIDANCE RADIATION THERAPY FLDS PLACEMENT: CPT | Mod: TC | Performed by: RADIOLOGY

## 2018-09-10 PROCEDURE — 77412 RADIATION TX DELIVERY LVL 3: CPT | Performed by: RADIOLOGY

## 2018-09-10 NOTE — PLAN OF CARE
Problem: Patient Care Overview  Goal: Plan of Care Review  Outcome: Ongoing (interventions implemented as appropriate)  Pt. On day 22 of xrt to breast.  Pt. Re/ct'd for boost.  Skin intact.  Hyperpigmentation noted.

## 2018-09-11 PROCEDURE — 77412 RADIATION TX DELIVERY LVL 3: CPT | Performed by: RADIOLOGY

## 2018-09-12 PROCEDURE — 77334 RADIATION TREATMENT AID(S): CPT | Mod: 26,,, | Performed by: RADIOLOGY

## 2018-09-12 PROCEDURE — 77334 RADIATION TREATMENT AID(S): CPT | Mod: TC | Performed by: RADIOLOGY

## 2018-09-12 PROCEDURE — 77336 RADIATION PHYSICS CONSULT: CPT | Performed by: RADIOLOGY

## 2018-09-12 PROCEDURE — 77412 RADIATION TX DELIVERY LVL 3: CPT | Performed by: RADIOLOGY

## 2018-09-12 PROCEDURE — 77307 TELETHX ISODOSE PLAN CPLX: CPT | Mod: TC | Performed by: RADIOLOGY

## 2018-09-12 PROCEDURE — 77307 TELETHX ISODOSE PLAN CPLX: CPT | Mod: 26,,, | Performed by: RADIOLOGY

## 2018-09-13 PROCEDURE — 77412 RADIATION TX DELIVERY LVL 3: CPT | Performed by: RADIOLOGY

## 2018-09-14 PROCEDURE — 77417 THER RADIOLOGY PORT IMAGE(S): CPT | Performed by: RADIOLOGY

## 2018-09-14 PROCEDURE — 77412 RADIATION TX DELIVERY LVL 3: CPT | Performed by: RADIOLOGY

## 2018-09-17 ENCOUNTER — DOCUMENTATION ONLY (OUTPATIENT)
Dept: RADIATION ONCOLOGY | Facility: CLINIC | Age: 73
End: 2018-09-17

## 2018-09-17 PROCEDURE — 77412 RADIATION TX DELIVERY LVL 3: CPT | Performed by: RADIOLOGY

## 2018-09-17 NOTE — PLAN OF CARE
Problem: Patient Care Overview  Goal: Plan of Care Review  Outcome: Ongoing (interventions implemented as appropriate)  Pt. On dya 27 of xrt to breast.  Pt. With hyperpigmentation and brisk erythema.  Skin intact.  Pt. Given gel packs for relief of discomfort.

## 2018-09-18 PROCEDURE — 77412 RADIATION TX DELIVERY LVL 3: CPT | Performed by: RADIOLOGY

## 2018-09-19 PROCEDURE — 77336 RADIATION PHYSICS CONSULT: CPT | Performed by: RADIOLOGY

## 2018-09-19 PROCEDURE — 77412 RADIATION TX DELIVERY LVL 3: CPT | Performed by: RADIOLOGY

## 2018-09-20 PROCEDURE — 77412 RADIATION TX DELIVERY LVL 3: CPT | Performed by: RADIOLOGY

## 2018-09-21 PROCEDURE — 77412 RADIATION TX DELIVERY LVL 3: CPT | Performed by: RADIOLOGY

## 2018-09-21 PROCEDURE — 77417 THER RADIOLOGY PORT IMAGE(S): CPT | Performed by: RADIOLOGY

## 2018-09-24 ENCOUNTER — DOCUMENTATION ONLY (OUTPATIENT)
Dept: RADIATION ONCOLOGY | Facility: CLINIC | Age: 73
End: 2018-09-24

## 2018-09-24 PROCEDURE — 77412 RADIATION TX DELIVERY LVL 3: CPT | Performed by: RADIOLOGY

## 2018-09-24 NOTE — PLAN OF CARE
Problem: Patient Care Overview  Goal: Plan of Care Review  Outcome: Ongoing (interventions implemented as appropriate)  Pt. On day 32 of xrt to breast.  Brisk erythema and hyperpigmentation.  Skin intact.  Using miaderm.

## 2018-09-25 ENCOUNTER — TELEPHONE (OUTPATIENT)
Dept: HEMATOLOGY/ONCOLOGY | Facility: CLINIC | Age: 73
End: 2018-09-25

## 2018-09-25 PROCEDURE — 77412 RADIATION TX DELIVERY LVL 3: CPT | Performed by: RADIOLOGY

## 2018-09-25 NOTE — TELEPHONE ENCOUNTER
Appts made for 10/3 and 10/5. Pt confirmed.     ----- Message from Zahira Arias sent at 9/25/2018  9:02 AM CDT -----  Contact: [phone]/self  Patient would like to be seen Friday 9/28/18 for a follow up. Please advise.

## 2018-09-26 PROCEDURE — 77412 RADIATION TX DELIVERY LVL 3: CPT | Performed by: RADIOLOGY

## 2018-09-27 ENCOUNTER — DOCUMENTATION ONLY (OUTPATIENT)
Dept: RADIATION ONCOLOGY | Facility: CLINIC | Age: 73
End: 2018-09-27

## 2018-09-27 PROCEDURE — 77412 RADIATION TX DELIVERY LVL 3: CPT | Performed by: RADIOLOGY

## 2018-09-27 PROCEDURE — 77336 RADIATION PHYSICS CONSULT: CPT | Performed by: RADIOLOGY

## 2018-09-27 NOTE — PLAN OF CARE
Problem: Patient Care Overview  Goal: Plan of Care Review  Outcome: Outcome(s) achieved Date Met: 09/27/18  Pt. Completed xrt to 9/27 to breast.  rtc 6 weeks.

## 2018-10-03 ENCOUNTER — LAB VISIT (OUTPATIENT)
Dept: LAB | Facility: HOSPITAL | Age: 73
End: 2018-10-03
Attending: INTERNAL MEDICINE
Payer: MEDICARE

## 2018-10-03 DIAGNOSIS — D05.11 DUCTAL CARCINOMA IN SITU (DCIS) OF RIGHT BREAST: ICD-10-CM

## 2018-10-03 LAB
ALBUMIN SERPL BCP-MCNC: 3.6 G/DL
ALP SERPL-CCNC: 144 U/L
ALT SERPL W/O P-5'-P-CCNC: 16 U/L
ANION GAP SERPL CALC-SCNC: 6 MMOL/L
AST SERPL-CCNC: 24 U/L
BASOPHILS # BLD AUTO: 0.03 K/UL
BASOPHILS NFR BLD: 0.8 %
BILIRUB SERPL-MCNC: 0.7 MG/DL
BUN SERPL-MCNC: 11 MG/DL
CALCIUM SERPL-MCNC: 9.1 MG/DL
CHLORIDE SERPL-SCNC: 103 MMOL/L
CO2 SERPL-SCNC: 26 MMOL/L
CREAT SERPL-MCNC: 0.55 MG/DL
DIFFERENTIAL METHOD: ABNORMAL
EOSINOPHIL # BLD AUTO: 0.2 K/UL
EOSINOPHIL NFR BLD: 6.1 %
ERYTHROCYTE [DISTWIDTH] IN BLOOD BY AUTOMATED COUNT: 12 %
EST. GFR  (AFRICAN AMERICAN): >60 ML/MIN/1.73 M^2
EST. GFR  (NON AFRICAN AMERICAN): >60 ML/MIN/1.73 M^2
GLUCOSE SERPL-MCNC: 341 MG/DL
HCT VFR BLD AUTO: 38.5 %
HGB BLD-MCNC: 12.6 G/DL
LYMPHOCYTES # BLD AUTO: 1.1 K/UL
LYMPHOCYTES NFR BLD: 29.6 %
MCH RBC QN AUTO: 31 PG
MCHC RBC AUTO-ENTMCNC: 32.7 G/DL
MCV RBC AUTO: 95 FL
MONOCYTES # BLD AUTO: 0.6 K/UL
MONOCYTES NFR BLD: 15.3 %
NEUTROPHILS # BLD AUTO: 1.8 K/UL
NEUTROPHILS NFR BLD: 48.2 %
PLATELET # BLD AUTO: 220 K/UL
PMV BLD AUTO: 10.5 FL
POTASSIUM SERPL-SCNC: 4.3 MMOL/L
PROT SERPL-MCNC: 7.2 G/DL
RBC # BLD AUTO: 4.07 M/UL
SODIUM SERPL-SCNC: 135 MMOL/L
WBC # BLD AUTO: 3.78 K/UL

## 2018-10-03 PROCEDURE — 80053 COMPREHEN METABOLIC PANEL: CPT | Mod: PO

## 2018-10-03 PROCEDURE — 36415 COLL VENOUS BLD VENIPUNCTURE: CPT | Mod: PO

## 2018-10-03 PROCEDURE — 85025 COMPLETE CBC W/AUTO DIFF WBC: CPT | Mod: PO

## 2018-10-04 ENCOUNTER — OFFICE VISIT (OUTPATIENT)
Dept: HEMATOLOGY/ONCOLOGY | Facility: CLINIC | Age: 73
End: 2018-10-04
Payer: MEDICARE

## 2018-10-04 VITALS
TEMPERATURE: 98 F | HEART RATE: 68 BPM | WEIGHT: 175.69 LBS | BODY MASS INDEX: 25.95 KG/M2 | OXYGEN SATURATION: 96 % | SYSTOLIC BLOOD PRESSURE: 155 MMHG | DIASTOLIC BLOOD PRESSURE: 86 MMHG | RESPIRATION RATE: 16 BRPM

## 2018-10-04 DIAGNOSIS — M94.9 DISORDER OF CARTILAGE: ICD-10-CM

## 2018-10-04 DIAGNOSIS — E11.9 TYPE 2 DIABETES MELLITUS WITHOUT COMPLICATION, WITH LONG-TERM CURRENT USE OF INSULIN: ICD-10-CM

## 2018-10-04 DIAGNOSIS — C50.912 INFILTRATING DUCTAL CARCINOMA OF BREAST, LEFT: ICD-10-CM

## 2018-10-04 DIAGNOSIS — C50.219: ICD-10-CM

## 2018-10-04 DIAGNOSIS — D05.11 DUCTAL CARCINOMA IN SITU (DCIS) OF RIGHT BREAST: Primary | ICD-10-CM

## 2018-10-04 DIAGNOSIS — Z79.4 TYPE 2 DIABETES MELLITUS WITHOUT COMPLICATION, WITH LONG-TERM CURRENT USE OF INSULIN: ICD-10-CM

## 2018-10-04 PROCEDURE — 99214 OFFICE O/P EST MOD 30 MIN: CPT | Mod: PBBFAC,PO | Performed by: INTERNAL MEDICINE

## 2018-10-04 PROCEDURE — 3077F SYST BP >= 140 MM HG: CPT | Mod: CPTII,,, | Performed by: INTERNAL MEDICINE

## 2018-10-04 PROCEDURE — 99215 OFFICE O/P EST HI 40 MIN: CPT | Mod: S$PBB,,, | Performed by: INTERNAL MEDICINE

## 2018-10-04 PROCEDURE — 99999 PR PBB SHADOW E&M-EST. PATIENT-LVL IV: CPT | Mod: PBBFAC,,, | Performed by: INTERNAL MEDICINE

## 2018-10-04 PROCEDURE — 1101F PT FALLS ASSESS-DOCD LE1/YR: CPT | Mod: CPTII,,, | Performed by: INTERNAL MEDICINE

## 2018-10-04 PROCEDURE — 3079F DIAST BP 80-89 MM HG: CPT | Mod: CPTII,,, | Performed by: INTERNAL MEDICINE

## 2018-10-04 RX ORDER — EXEMESTANE 25 MG/1
25 TABLET ORAL DAILY
Qty: 30 TABLET | Refills: 0 | Status: SHIPPED | OUTPATIENT
Start: 2018-10-04 | End: 2018-10-08

## 2018-10-04 NOTE — PROGRESS NOTES
Subjective:       Patient ID: Noris Demarco is a 72 y.o. female.    Chief Complaint: Breast Cancer (DCIS)    DIAGNOSIS: right breast ductal carcinoma in situ.     HPI   Ms.Emma Demarco underwent a lumpectomy in 01/2008 for a 5-mm Right Breast DCIS that was found on a routine screening mammogram, completed adjuvant RT in July 2008.  She was started on Arimidex at [facility] following radiation treatment and she was intolerant to it because of debilitating and disabling joint pains, she hence was switched to tamoxifen in 12/2008 and completed it in Dec 2013.    She was then diagnosed with infiltrating ductal carcinoma of the lower outer quadrant of the left breast on routine screening mammogram done in May 2018.  Lumpectomy with sentinel lymph node biopsy was done in June 2018 and a 1.3 cm grade 2 ER positive 95%, OH positive 5% and HER2 Luther negative, Ki-67 30% was noted. Five lymph nodes were removed from the axilla and they were all negative.  Oncotype score came back at 40, she declined adjuvant chemotherapy.  She completed adjuvant radiation therapy under the care of Dr. Chavez on 09/27/2018.    She has a golf ball-sized seroma in the left breast that is hard to palpation but is not painful.    She presents for a follow-up visit to discuss adjuvant hormonal therapy options.  She is accompanied by her .    Review of Systems  Review of Systems   Constitutional: Negative for weight loss.   HENT: Negative for nosebleeds and sore throat.    Eyes: Negative for photophobia.   Respiratory: Negative for shortness of breath and wheezing.    Cardiovascular: Negative for palpitations, claudication and leg swelling.   Gastrointestinal: Negative for diarrhea, melena and vomiting.   Musculoskeletal: Negative for myalgias and neck pain.   Skin: Negative for itching and rash.   Neurological: Negative for speech change, focal weakness and weakness.   Endo/Heme/Allergies: Negative for polydipsia.   Psychiatric/Behavioral:  Negative for depression and substance abuse. The patient is not nervous/anxious.    All other systems reviewed and are negative.        Objective:      Physical Exam   Constitutional: She is oriented to person, place, and time. She appears well-developed and well-nourished. No distress.   HENT:   Head: Normocephalic and atraumatic.   Right Ear: Tympanic membrane, external ear and ear canal normal.   Left Ear: Tympanic membrane, external ear and ear canal normal.   Nose: Nose normal. No mucosal edema, sinus tenderness, septal deviation or nasal septal hematoma. No epistaxis. Right sinus exhibits no maxillary sinus tenderness and no frontal sinus tenderness. Left sinus exhibits no maxillary sinus tenderness and no frontal sinus tenderness.   Mouth/Throat: Oropharynx is clear and moist and mucous membranes are normal. Mucous membranes are not cyanotic. No oral lesions. No dental caries. No oropharyngeal exudate.   Hard and soft palate, tongue and posterior pharyngeal wall unremarkable   Eyes: Conjunctivae and lids are normal. No scleral icterus.   Neck: Trachea normal. Neck supple. No tracheal tenderness present. No tracheal deviation present. No thyroid mass (thyroid is non-tender) present.   Cardiovascular: Normal rate, regular rhythm, normal heart sounds, intact distal pulses and normal pulses. Exam reveals no gallop.   No murmur heard.  No edema   Pulmonary/Chest: Effort normal and breath sounds normal. No accessory muscle usage or stridor. No respiratory distress. She has no decreased breath sounds. She has no wheezes. She has no rhonchi. She has no rales.       Abdominal: Soft. Bowel sounds are normal. She exhibits no distension and no mass. There is no hepatosplenomegaly, splenomegaly or hepatomegaly. There is no tenderness.   Musculoskeletal: She exhibits no edema or tenderness.   Lymphadenopathy:        Head (right side): No submental and no submandibular adenopathy present.        Head (left side): No  submental and no submandibular adenopathy present.     She has no cervical adenopathy.     She has no axillary adenopathy.        Right: No supraclavicular adenopathy present.        Left: No supraclavicular adenopathy present.   Neurological: She is alert and oriented to person, place, and time. She has normal strength. She is not disoriented. No cranial nerve deficit or sensory deficit.   Skin: Skin is warm and intact. No petechiae and no rash noted. She is not diaphoretic. No cyanosis. No pallor. Nails show no clubbing.   Psychiatric: She has a normal mood and affect. Her speech is normal and behavior is normal. Judgment and thought content normal. Her mood appears not anxious. She expresses no homicidal and no suicidal ideation.     Assessment:       1. Ductal carcinoma in situ (DCIS) of right breast    2. 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 Infiltrating ductal carcinoma of breast, left        Plan:   She has DCIS of right breast diagnosed 2008, status post tamoxifen that was discontinued in December 2013.    She now has hormone receptor positive T1c N0 stage I infiltrating ductal carcinoma of the left breast status post lumpectomy and radiation therapy.    She has a hard seroma in the left breast that is painless and she will follow up with  for this.    Discussed adjuvant hormonal therapy.  She does not want to go back on Arimidex.  She had disabling joint pains in the past.  Will try Aromasin.  Discussed pros and cons.  Gave her a prescription.  She will try this and if she ends up with disabling arthralgias from this then we will put her on tamoxifen.    Her blood sugars are elevated and she is on insulin and states that this sometimes get very low.  Will check hemoglobin A1c.    Will see her back for follow-up in 4 weeks with repeat labs, vitamin-D and a baseline bone density study.    If she does not tolerate Aromasin, will switch to tamoxifen.    Plan next mammogram in  6 months.

## 2018-10-08 ENCOUNTER — TELEPHONE (OUTPATIENT)
Dept: HEMATOLOGY/ONCOLOGY | Facility: CLINIC | Age: 73
End: 2018-10-08

## 2018-10-08 DIAGNOSIS — C50.912 INFILTRATING DUCTAL CARCINOMA OF BREAST, LEFT: Primary | ICD-10-CM

## 2018-10-08 RX ORDER — TAMOXIFEN CITRATE 20 MG/1
20 TABLET ORAL DAILY
Qty: 90 TABLET | Refills: 3 | Status: SHIPPED | OUTPATIENT
Start: 2018-10-08 | End: 2019-11-26 | Stop reason: SDUPTHER

## 2018-10-08 NOTE — TELEPHONE ENCOUNTER
Pt called. Spoke to . She doesn't want to start AROMASIN. She wants prescription for Tamoxifen. Will honor her wishes. Prescription for Tamoxifen sent to [facility] in [place].

## 2018-10-08 NOTE — TELEPHONE ENCOUNTER
Pt has questions regarding medications. Requesting to speak to . Phone call transferred to MD.     ----- Message from Porsche Barrera sent at 10/8/2018  3:51 PM CDT -----  Contact: [phone] /self  Patient is requesting a call back regarding changing from exemestane (AROMASIN) 25 mg tablet TOMOXIFEN sent to [facility] #[id number] - [address] AT [facility]

## 2018-10-16 ENCOUNTER — TELEPHONE (OUTPATIENT)
Dept: RADIATION ONCOLOGY | Facility: CLINIC | Age: 73
End: 2018-10-16

## 2018-11-01 ENCOUNTER — HOSPITAL ENCOUNTER (OUTPATIENT)
Dept: RADIOLOGY | Facility: HOSPITAL | Age: 73
Discharge: HOME | End: 2018-11-01
Attending: INTERNAL MEDICINE
Payer: MEDICARE

## 2018-11-01 DIAGNOSIS — C50.912 INFILTRATING DUCTAL CARCINOMA OF BREAST, LEFT: ICD-10-CM

## 2018-11-01 DIAGNOSIS — M94.9 DISORDER OF CARTILAGE: ICD-10-CM

## 2018-11-01 PROCEDURE — 77080 DXA BONE DENSITY AXIAL: CPT | Mod: TC,PO

## 2018-11-05 ENCOUNTER — OFFICE VISIT (OUTPATIENT)
Dept: HEMATOLOGY/ONCOLOGY | Facility: CLINIC | Age: 73
End: 2018-11-05
Payer: MEDICARE

## 2018-11-05 VITALS
WEIGHT: 173.75 LBS | TEMPERATURE: 97 F | RESPIRATION RATE: 16 BRPM | OXYGEN SATURATION: 98 % | DIASTOLIC BLOOD PRESSURE: 86 MMHG | SYSTOLIC BLOOD PRESSURE: 153 MMHG | BODY MASS INDEX: 25.65 KG/M2 | HEART RATE: 71 BPM

## 2018-11-05 DIAGNOSIS — D05.11 DUCTAL CARCINOMA IN SITU (DCIS) OF RIGHT BREAST: ICD-10-CM

## 2018-11-05 DIAGNOSIS — M94.9 DISORDER OF CARTILAGE: ICD-10-CM

## 2018-11-05 DIAGNOSIS — E55.9 VITAMIN D DEFICIENCY: ICD-10-CM

## 2018-11-05 DIAGNOSIS — Z79.4 TYPE 2 DIABETES MELLITUS WITHOUT COMPLICATION, WITH LONG-TERM CURRENT USE OF INSULIN: ICD-10-CM

## 2018-11-05 DIAGNOSIS — Z71.89 ADVANCE CARE PLANNING: ICD-10-CM

## 2018-11-05 DIAGNOSIS — C50.219: ICD-10-CM

## 2018-11-05 DIAGNOSIS — C50.912 INFILTRATING DUCTAL CARCINOMA OF BREAST, LEFT: Primary | ICD-10-CM

## 2018-11-05 DIAGNOSIS — E11.9 TYPE 2 DIABETES MELLITUS WITHOUT COMPLICATION, WITH LONG-TERM CURRENT USE OF INSULIN: ICD-10-CM

## 2018-11-05 PROCEDURE — 3079F DIAST BP 80-89 MM HG: CPT | Mod: CPTII,S$GLB,, | Performed by: INTERNAL MEDICINE

## 2018-11-05 PROCEDURE — 99214 OFFICE O/P EST MOD 30 MIN: CPT | Mod: S$GLB,,, | Performed by: INTERNAL MEDICINE

## 2018-11-05 PROCEDURE — 99999 PR PBB SHADOW E&M-EST. PATIENT-LVL III: CPT | Mod: PBBFAC,,, | Performed by: INTERNAL MEDICINE

## 2018-11-05 PROCEDURE — 99497 ADVNCD CARE PLAN 30 MIN: CPT | Mod: S$GLB,,, | Performed by: INTERNAL MEDICINE

## 2018-11-05 PROCEDURE — 1101F PT FALLS ASSESS-DOCD LE1/YR: CPT | Mod: CPTII,S$GLB,, | Performed by: INTERNAL MEDICINE

## 2018-11-05 PROCEDURE — 3046F HEMOGLOBIN A1C LEVEL >9.0%: CPT | Mod: CPTII,S$GLB,, | Performed by: INTERNAL MEDICINE

## 2018-11-05 PROCEDURE — 3077F SYST BP >= 140 MM HG: CPT | Mod: CPTII,S$GLB,, | Performed by: INTERNAL MEDICINE

## 2018-11-05 NOTE — PROGRESS NOTES
Subjective:       Patient ID: Noris Demarco is a 72 y.o. female.    Chief Complaint: DCIS    DIAGNOSIS: right breast ductal carcinoma in situ.     HPI Ms.Emma Demarco underwent a lumpectomy in 01/2008 for a 5-mm Right Breast DCIS that was found on a routine screening mammogram, completed adjuvant RT in July 2008. She was started on Arimidex at [facility] following radiation treatment and she was intolerant to it because of debilitating and disabling joint pains, she hence was switched to tamoxifen in 12/2008 and completed it in Dec 2013.    She was then diagnosed with infiltrating ductal carcinoma of the lower outer quadrant of the left breast on routine screening mammogram done in May 2018.  Lumpectomy with SLN biopsy was done in June 2018 and a 1.3 cm grade 2 ER positive 95%, IN positive 5% and HER2 Luther negative, Ki-67 30% was noted. Five lymph nodes were removed from the axilla and they were all negative.  Oncotype score came back at 40, she declined adjuvant chemotherapy.  She completed adjuvant radiation therapy under the care of Dr. Chavez on 09/27/2018.    She has a golf ball-sized seroma in the left breast that is hard to palpation but is not painful.    She was intolerant to Arimidex before.  So she was given a prescription for Aromasin, she did not take it secondary to fear of arthralgias.  Hence tamoxifen was started on 10/08/2018.    Review of Systems   Constitutional: Negative for weight loss.   HENT: Negative for nosebleeds and sore throat.    Eyes: Negative for photophobia.   Respiratory: Negative for shortness of breath and wheezing.    Cardiovascular: Negative for palpitations, claudication and leg swelling.   Gastrointestinal: Negative for diarrhea, melena and vomiting.   Endocrine: Negative for polydipsia.   Musculoskeletal: Negative for myalgias and neck pain.   Skin: Negative for itching and rash.   Neurological: Negative for speech change, focal weakness and weakness.   Psychiatric/Behavioral:  Negative for depression and substance abuse. The patient is not nervous/anxious.    All other systems reviewed and are negative.    Review of Systems   Constitutional: Negative for weight loss.   HENT: Negative for nosebleeds and sore throat.    Eyes: Negative for photophobia.   Respiratory: Negative for shortness of breath and wheezing.    Cardiovascular: Negative for palpitations, claudication and leg swelling.   Gastrointestinal: Negative for diarrhea, melena and vomiting.   Musculoskeletal: Negative for myalgias and neck pain.   Skin: Negative for itching and rash.   Neurological: Negative for speech change, focal weakness and weakness.   Endo/Heme/Allergies: Negative for polydipsia.   Psychiatric/Behavioral: Negative for depression and substance abuse. The patient is not nervous/anxious.    All other systems reviewed and are negative.        Objective:      Physical Exam   Constitutional: She is oriented to person, place, and time. She appears well-developed and well-nourished. No distress.   HENT:   Head: Normocephalic and atraumatic.   Right Ear: Tympanic membrane, external ear and ear canal normal.   Left Ear: Tympanic membrane, external ear and ear canal normal.   Nose: Nose normal. No mucosal edema, sinus tenderness, septal deviation or nasal septal hematoma. No epistaxis. Right sinus exhibits no maxillary sinus tenderness and no frontal sinus tenderness. Left sinus exhibits no maxillary sinus tenderness and no frontal sinus tenderness.   Mouth/Throat: Oropharynx is clear and moist and mucous membranes are normal. Mucous membranes are not cyanotic. No oral lesions. No dental caries. No oropharyngeal exudate.   Hard and soft palate, tongue and posterior pharyngeal wall unremarkable   Eyes: Conjunctivae and lids are normal. No scleral icterus.   Neck: Trachea normal. Neck supple. No tracheal tenderness present. No tracheal deviation present. No thyroid mass (thyroid is non-tender) present.   Cardiovascular:  Normal rate, regular rhythm, normal heart sounds, intact distal pulses and normal pulses. Exam reveals no gallop.   No murmur heard.  No edema   Pulmonary/Chest: Effort normal and breath sounds normal. No accessory muscle usage or stridor. No respiratory distress. She has no decreased breath sounds. She has no wheezes. She has no rhonchi. She has no rales.       Abdominal: Soft. Bowel sounds are normal. She exhibits no distension and no mass. There is no hepatosplenomegaly, splenomegaly or hepatomegaly. There is no tenderness.   Musculoskeletal: She exhibits no edema or tenderness.   Lymphadenopathy:        Head (right side): No submental and no submandibular adenopathy present.        Head (left side): No submental and no submandibular adenopathy present.     She has no cervical adenopathy.     She has no axillary adenopathy.        Right: No supraclavicular adenopathy present.        Left: No supraclavicular adenopathy present.   Neurological: She is alert and oriented to person, place, and time. She has normal strength. She is not disoriented. No cranial nerve deficit or sensory deficit.   Skin: Skin is warm and intact. No petechiae and no rash noted. She is not diaphoretic. No cyanosis. No pallor. Nails show no clubbing.   Psychiatric: She has a normal mood and affect. Her speech is normal and behavior is normal. Judgment and thought content normal. Her mood appears not anxious. She expresses no homicidal and no suicidal ideation.     Assessment:       1. Infiltrating ductal carcinoma of breast, left    2. Ductal carcinoma in situ (DCIS) of right breast    3. Primary cancer of upper inner quadrant of female breast    4. Disorder of cartilage     5. Vitamin D deficiency    6. Type 2 diabetes mellitus without complication, with long-term current use of insulin        Plan:   She has DCIS of right breast diagnosed 2008, status post tamoxifen that was discontinued in December 2013.    She now has hormone receptor  positive T1c N0 stage I infiltrating ductal carcinoma of the left breast status post lumpectomy (June 2018) and radiation therapy (September 2018).    She has a hard seroma in the left breast that is painless and she will follow up with  for this.    She started tamoxifen October 8, 2018 and is tolerating it well.    Labs reviewed.  Low vitamin-D noted.  Will start over-the-counter vitamin-D 2000 units daily.    A1c elevated, greater than 11.  She will follow up with primary care regarding this.    DEXA scan 11/01/2018 within normal limits.    Check CBC, chemistries, vitamin-D, A1c with a repeat mammogram and follow-up in 6 months.    Advance Care Planning - I initiated the process of advance care planning today and explained the importance of this process. The concept of Advance Directive was introduced. The patient received detailed information about the importance of designating a Health Care Power of  (HCPOA) and was also instructed to communicate with this person about their wishes for future healthcare, should they become sick and lose decision-making capacity. After our discussion, the patient filled out the HCPOA form which will be scanned into [employer]. I spent over 16 mins discussing this aspect with the patient.

## 2018-11-07 ENCOUNTER — TELEPHONE (OUTPATIENT)
Dept: HEMATOLOGY/ONCOLOGY | Facility: CLINIC | Age: 73
End: 2018-11-07

## 2018-11-07 NOTE — TELEPHONE ENCOUNTER
Spoke with pt regarding wanting to cancel appt on 11/12/2018. Pt informed that she already saw Dr Peralta on 11/5 and feels she does not need to be seen by anyone else this month. Pt knows to call if she needs anything else.       ----- Message from Gerardo Nelson sent at 11/7/2018  9:14 AM CST -----  Contact: pt  Needs Advice    Reason for call: states she would like to cancel appt on 11-12-18        Communication Preference:    Additional Information:

## 2018-11-08 ENCOUNTER — OFFICE VISIT (OUTPATIENT)
Dept: RADIATION ONCOLOGY | Facility: CLINIC | Age: 73
End: 2018-11-08
Payer: MEDICARE

## 2018-11-08 VITALS
TEMPERATURE: 98 F | RESPIRATION RATE: 16 BRPM | HEIGHT: 69 IN | BODY MASS INDEX: 25.61 KG/M2 | WEIGHT: 172.88 LBS | SYSTOLIC BLOOD PRESSURE: 131 MMHG | HEART RATE: 71 BPM | DIASTOLIC BLOOD PRESSURE: 74 MMHG

## 2018-11-08 DIAGNOSIS — D05.11 DUCTAL CARCINOMA IN SITU (DCIS) OF RIGHT BREAST: ICD-10-CM

## 2018-11-08 DIAGNOSIS — C50.912 INFILTRATING DUCTAL CARCINOMA OF BREAST, LEFT: Primary | ICD-10-CM

## 2018-11-08 PROCEDURE — 1101F PT FALLS ASSESS-DOCD LE1/YR: CPT | Mod: CPTII,S$GLB,, | Performed by: RADIOLOGY

## 2018-11-08 PROCEDURE — 99999 PR PBB SHADOW E&M-EST. PATIENT-LVL III: CPT | Mod: PBBFAC,,, | Performed by: RADIOLOGY

## 2018-11-08 PROCEDURE — 99213 OFFICE O/P EST LOW 20 MIN: CPT | Mod: S$GLB,,, | Performed by: RADIOLOGY

## 2018-11-08 PROCEDURE — 3075F SYST BP GE 130 - 139MM HG: CPT | Mod: CPTII,S$GLB,, | Performed by: RADIOLOGY

## 2018-11-08 PROCEDURE — 3078F DIAST BP <80 MM HG: CPT | Mod: CPTII,S$GLB,, | Performed by: RADIOLOGY

## 2018-11-08 RX ORDER — ACETAMINOPHEN 500 MG
1 TABLET ORAL DAILY
COMMUNITY

## 2018-11-08 NOTE — PATIENT INSTRUCTIONS
Continue tamoxifen as planned.  Followup with Dr. Johnson during November 2018 and repeat mammogram  Return to see me in 6 months.

## 2018-11-08 NOTE — PROGRESS NOTES
REFERRING PHYSICIAN: Alvino Peralta MD    DIAGNOSIS:   1) pT1c N0 M0, infiltrating ductal carcinoma of the left breast  2) history of DCIS of the right breast 2008    Radiation Treatment Summary:  Ms. Demarco completed radiation to the left breast as shown below.     Treatment Site Energy Dose/Fx (Gy) #Fx Total Dose (Gy) Start Date End Date   BOOST PHOTON_ 6X 2 10 / 10 20 9/14/2018 9/27/2018   LT BREAST 6X 1.8 25 / 25 45 8/8/2018 9/13/2018     INTERVAL HISTORY:   Ms. Demarco is a 72 year old female with history of DCIS of the right breast in 2008 status post lumpectomy, radiation, and Tamoxifen, who was recently diagnosed with left breast cancer after an abnormal mammogram in May 2018 which revealed a focal asymmetry in the lower outer quadrant in the left breast. A core needle biopsy on 5/25/2018 revealed grade 3, invasive ductal carcinoma, which is ER positive (>95%), AZ positive (5%), and Her 2 negative, Ki 67 30%. On June 16, 2018 she underwent left breast lumpectomy and sentinel node biopsy. The pathology revealed the left breast with grade 2, invasive ductal carcinoma measuring 1.3 cm with the closest margin posteriorly at 0.1 cm. One out of one sentinel lymph node and 4 additional lymph nodes from the left axilla were negative for involvement. Her Oncotype score returned at 40, with 25% chance of recurrence within 10 years. Therefore, adjuvant chemotherapy was recommended after consultation with Dr. Tam, which she declined. To reduce her chance of local recurrence, she received postoperative radiation to the left breast as above.  She is here today for routine follow-up.    She is currently on tamoxifen.  She reports occasional shooting pain in the left breast.  She denies left breast edema or nipple discharge. She has intentional weight loss of approximately 12 lb, but denies fever or night sweats.    PHYSICAL EXAMINATION:  VITAL SINGS: /74   Pulse 71   Temp 97.6 °F (36.4 °C) (Oral)   Resp  "16   Ht 5' 9" (1.753 m)   Wt 78.4 kg (172 lb 14.4 oz)   LMP  (LMP Unknown)   BMI 25.53 kg/m²   GENERAL: Patient is alert and oriented, in no acute distress.  HEENT: Extraocular muscles are intact.  Oropharynx is clear without lesions.  There is no cervical or supraclavicular adenopathy palpated.    CHEST: Breath sounds clear bilaterally.  No rales.  No rhonchi.  Unlabored respirations.  CARDIOVASCULAR: Normal S1, S2.  Normal rate.  Regular rhythm.  BREAST EXAM: The has large pendulous breasts bilaterally. The left breast is larger than the right.  There is hyperpigmentation of the skin and mild lymphatic stasis noted in the left breast.  The scar secondary to recent lumpectomy is noted in the periareolar region of the left breast.  There is palpable fibrosis due to previous seroma associated with the lumpectomy cavity.  There is also a scar in the left axilla secondary to sentinel node biopsy. No suspicious masses palpated in the left breast or left axilla. On the right breast, the scar secondary to lumpectomy is noted in the UOQ of the right breast. No abnormal masses palpated in the righ brast or right axilla  ABDOMEN: Bowel sounds normal.  No tenderness.  No abdominal distention.  No hepatomegaly.  No splenomegaly.  EXTREMITIES: No clu cyst bbing, cyanosis, edema  NEUROLOGIC: Cranial nerves II through XII are grossly intact.  Sensation is intact.  Strength is 5 out of 5 in the upper and lower extremities bilaterally.    ASSESSMENT:   This is a 72 year old female with history of DCIS of the right breast in 2008 treated with lumpectomy, radiation, and endocriner therapy, now with p T1c N0 M0, Stage IA, invaisive ductal carcinoma of the lef tbreast who underwent lumpecomy and seninel node biopsy on 6/15/2018 with 1.3 cm lesion, which is ER/NE positive and HER 2 negative, with Ki 67 30%, Oncotype score 40%.    PLAN:   Ms. Demarco is recovering from the radiation without any unexpected side effects.  She is " currently on tamoxifen which she will continue under the care of Dr. Peralta.  She has planned follow-up with Dr. Johnson later this month and repeat a mammogram.  She will continue follow-up with Dr. Peralta as planned.  I plan to see her back in approximately 6 months, unless symptoms warrant otherwise.

## 2018-12-03 ENCOUNTER — TELEPHONE (OUTPATIENT)
Dept: SURGERY | Facility: CLINIC | Age: 73
End: 2018-12-03

## 2018-12-04 ENCOUNTER — OFFICE VISIT (OUTPATIENT)
Dept: SURGERY | Facility: CLINIC | Age: 73
End: 2018-12-04
Payer: MEDICARE

## 2018-12-04 VITALS
SYSTOLIC BLOOD PRESSURE: 172 MMHG | DIASTOLIC BLOOD PRESSURE: 83 MMHG | BODY MASS INDEX: 26.07 KG/M2 | HEIGHT: 69 IN | HEART RATE: 69 BPM | WEIGHT: 176 LBS | TEMPERATURE: 98 F

## 2018-12-04 DIAGNOSIS — N64.52 NIPPLE DISCHARGE: Primary | ICD-10-CM

## 2018-12-04 DIAGNOSIS — C50.912 INFILTRATING DUCTAL CARCINOMA OF LEFT BREAST: ICD-10-CM

## 2018-12-04 PROCEDURE — 3077F SYST BP >= 140 MM HG: CPT | Mod: CPTII,S$GLB,, | Performed by: SURGERY

## 2018-12-04 PROCEDURE — 1101F PT FALLS ASSESS-DOCD LE1/YR: CPT | Mod: CPTII,S$GLB,, | Performed by: SURGERY

## 2018-12-04 PROCEDURE — 99213 OFFICE O/P EST LOW 20 MIN: CPT | Mod: S$GLB,,, | Performed by: SURGERY

## 2018-12-04 PROCEDURE — 99999 PR PBB SHADOW E&M-EST. PATIENT-LVL III: CPT | Mod: PBBFAC,,, | Performed by: SURGERY

## 2018-12-04 PROCEDURE — 3079F DIAST BP 80-89 MM HG: CPT | Mod: CPTII,S$GLB,, | Performed by: SURGERY

## 2018-12-04 NOTE — MEDICAL/APP STUDENT
"Subjective    Interval History:    Mrs. Demarco is a 73 y/o woman who presents for f/u appointment 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 She completed radiation therapy, and is currently on tamoxifen being treated by Dr. Chavez.     Today she admits to having nipple discharge for the past few days from her left incisional site, paul-areolar. The discharge was described as "yellow" changed to "red" and then eventually resolving today. She also admits to occasional shooting pain in her right breast. She denies swelling of her breast, and any constant pain, but complains of a "knot" in her left breast after some recent trauma.       HPI    Noris Demarco is a pleasant 72 y.o. woman with hx R breast DCIS s/p lumpectomy (), radiation, and endocrine therapy x 5 years, who presents to clinic for newly diagnosed invasive ductal carcinoma.     She is asymptomatic, specifically no breast masses, pain, or nipple discharge.       After screening mammogram demonstrated the abnormality, a diagnostic MMG was performed.  This showed a 1.3 cm lesion.  This was not sonographically evident.     Eze guided bx was performed, which revealed Grade 3, 1.4 cm IDC and DCIS.  ER (95%) +, TX moderate to strongly positive (5%), and Her 2 negative.  Ki 67 30%.     Would like excision of left forearm lesion, which has been prsent for 40 years, but occasionally changes and ulcerates     Personal hx of DCIS, as stated above  Family history- no immediate family history of breast cancer.  Her maternal cousin (who does not have breast cancer) has 5 kids who had early breast cancer, perhaps in their 20s.    , first child at age 21  Menarche: 13  Menopause status  OCP use: None  HRT use: None     Onc hx:  R breast DCIS s/p lumpectomy (), radiation, and endocrine therapy x 5 years.  Unable to tolerate Arimidex due to joint pain, so was switched to tamoxifen.  Completed in          Review of Systems   Constitutional: " Negative.    HENT: Negative.    Respiratory: Negative.    Cardiovascular: Negative.    Gastrointestinal: Negative.    Genitourinary: Negative.    Musculoskeletal: Negative.    Skin: Negative.         Seeping incisional site on the left breast, not actively draining fluid currently     Past Medical History:   Diagnosis Date    Breast cancer     Right breast     Diabetes mellitus     Hyperlipidemia     Hypertension      Past Surgical History:   Procedure Laterality Date    ADJACENT TISSUE TRANSFER Left 6/15/2018    Procedure: ADJACENT TISSUE TRANSFER Tissue Rearrangement;  Surgeon: Luca Johnson MD;  Location: [facility] OR [address] [place] [address];  Service: General;  Laterality: Left;    ADJACENT TISSUE TRANSFER Tissue Rearrangement Left 6/15/2018    Performed by Luca Johnson MD at [facility] OR [address] [place] [address]    BIOPSY, LYMPH NODE, SENTINEL Left 6/15/2018    Performed by Luca Johnson MD at [facility] OR [address] [place] [address]    BREAST BIOPSY Right 2008    BREAST LUMPECTOMY Right 2008     SECTION, LOW TRANSVERSE  1977,1979    x2    CHOLECYSTECTOMY      laparoscopic    DILATION AND CURETTAGE OF UTERUS  2012    EXCISION-WIDE LOCAL LEFT FOREARM SKIN Left 6/15/2018    Performed by Luca Johnson MD at [facility] OR [address] [place] [address]    INJECTION FOR SENTINEL NODE IDENTIFICATION Left 6/15/2018    Procedure: INJECTION, FOR SENTINEL NODE IDENTIFICATION;  Surgeon: Luca Johnson MD;  Location: [address];  Service: General;  Laterality: Left;    INJECTION, FOR SENTINEL NODE IDENTIFICATION Left 6/15/2018    Performed by Luca Johnson MD at [facility] OR [address] [place] [address]    LUMPECTOMY,BREAST with MAGSEED Left 6/15/2018    Performed by Luca Johnson MD at [facility] OR [address] [place] [address]    SENTINEL LYMPH NODE BIOPSY Left 6/15/2018    Procedure: BIOPSY, LYMPH NODE, SENTINEL;  Surgeon: Luca Johnson MD;  Location: [facility] OR [address] [place] [address];  Service: General;  Laterality: Left;    TUBAL LIGATION      @ time of        Vitals:    18 1105    BP: (!) 172/83   Pulse: 69   Temp: 98.1 °F (36.7 °C)       Physical Exam   Pulmonary/Chest: Left breast exhibits nipple discharge and skin change. Left breast exhibits no tenderness.   There is some benign post-radiation therapy skin changes (follicular dimpling).              Assessment/Plan:    1) S/P left lumpectomy with ALNB for IDC   - Order f/u b/l MMG and left sided U/S in May    -

## 2018-12-05 NOTE — PROGRESS NOTES
"Interval History:     Mrs. Demarco is a 73 y/o woman who presents for f/u appointment 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 She completed radiation therapy, and is currently on tamoxifen being treated by Dr. Chavez.      Today she admits to having nipple discharge for the past few days from her left incisional site, paul-areolar. The discharge was described as "yellow" changed to "red" and then eventually resolving today. She also admits to occasional shooting pain in her right breast. She denies swelling of her breast, and any constant pain, but complains of a "knot" in her left breast after some recent trauma.         HPI     Noris Demarco is a pleasant 72 y.o. woman with hx R breast DCIS s/p lumpectomy (), radiation, and endocrine therapy x 5 years, who presents to clinic for newly diagnosed invasive ductal carcinoma.     She is asymptomatic, specifically no breast masses, pain, or nipple discharge.       After screening mammogram demonstrated the abnormality, a diagnostic MMG was performed.  This showed a 1.3 cm lesion.  This was not sonographically evident.     Eze guided bx was performed, which revealed Grade 3, 1.4 cm IDC and DCIS.  ER (95%) +, UT moderate to strongly positive (5%), and Her 2 negative.  Ki 67 30%.     Would like excision of left forearm lesion, which has been prsent for 40 years, but occasionally changes and ulcerates     Personal hx of DCIS, as stated above  Family history- no immediate family history of breast cancer.  Her maternal cousin (who does not have breast cancer) has 5 kids who had early breast cancer, perhaps in their 20s.    , first child at age 21  Menarche: 13  Menopause status  OCP use: None  HRT use: None     Onc hx:  R breast DCIS s/p lumpectomy (), radiation, and endocrine therapy x 5 years.  Unable to tolerate Arimidex due to joint pain, so was switched to tamoxifen.  Completed in            Review of Systems   Constitutional: Negative.  "   HENT: Negative.    Respiratory: Negative.    Cardiovascular: Negative.    Gastrointestinal: Negative.    Genitourinary: Negative.    Musculoskeletal: Negative.    Skin: Negative.         Seeping incisional site on the left breast, not actively draining fluid currently           Past Medical History:   Diagnosis Date    Breast cancer      Right breast     Diabetes mellitus      Hyperlipidemia      Hypertension              Past Surgical History:   Procedure Laterality Date    ADJACENT TISSUE TRANSFER Left 6/15/2018     Procedure: ADJACENT TISSUE TRANSFER Tissue Rearrangement;  Surgeon: Luca Johnson MD;  Location: [facility] OR [address] [place] [address];  Service: General;  Laterality: Left;    ADJACENT TISSUE TRANSFER Tissue Rearrangement Left 6/15/2018     Performed by Luca Johnson MD at [facility] OR [address] [place] [address]    BIOPSY, LYMPH NODE, SENTINEL Left 6/15/2018     Performed by Luca Johnson MD at [facility] OR [address] [place] [address]    BREAST BIOPSY Right 2008    BREAST LUMPECTOMY Right 2008     SECTION, LOW TRANSVERSE   ,     x2    CHOLECYSTECTOMY        laparoscopic    DILATION AND CURETTAGE OF UTERUS   2012    EXCISION-WIDE LOCAL LEFT FOREARM SKIN Left 6/15/2018     Performed by Luca Johnson MD at [facility] OR [address] [place] [address]    INJECTION FOR SENTINEL NODE IDENTIFICATION Left 6/15/2018     Procedure: INJECTION, FOR SENTINEL NODE IDENTIFICATION;  Surgeon: Luca Johnson MD;  Location: [facility] OR [address] [place] [address];  Service: General;  Laterality: Left;    INJECTION, FOR SENTINEL NODE IDENTIFICATION Left 6/15/2018     Performed by Luca Johnson MD at [facility] OR [address] [place] [address]    LUMPECTOMY,BREAST with MAGSEED Left 6/15/2018     Performed by Luca Johnson MD at [facility] OR [address] [place] [address]    SENTINEL LYMPH NODE BIOPSY Left 6/15/2018     Procedure: BIOPSY, LYMPH NODE, SENTINEL;  Surgeon: Luca Johnson MD;  Location: [facility] OR [address] [place] [address];  Service: General;  Laterality: Left;    TUBAL LIGATION        @ time of               Vitals:     12/04/18 1105   BP: (!) 172/83   Pulse: 69   Temp: 98.1 °F (36.7 °C)         Physical Exam   Pulmonary/Chest: Left breast exhibits nipple discharge and skin change. Left breast exhibits no tenderness.   There is some benign post-radiation therapy skin changes (follicular dimpling).                 Assessment/Plan:     1) S/P left lumpectomy with ALNB for IDC              - Order f/u b/l MMG and left sided U/S in May               -      I have personally taken the history and examined this patient and agree with the student's note as stated above.  HISTORY OF PRESENT ILLNESS:  Noris Demarco presents with her  for a   followup visit.  Her left breast surgery was on 06/15/2018 when we performed   breast conservation surgery and oncoplastic closure of a lumpectomy site via an   inferior lower outer circumareolar incision.  She completed her adjuvant   radiotherapy on 09/20/2018.  She did not receive any adjuvant chemotherapy.  She   is currently on tamoxifen 20 mg daily.    She recently had some trauma to the left breast.  She bumped it against a door   and this caused trauma to the left breast.  There was some opening of the   inferior circumareolar margin and some resultant drainage.  The area has now   scabbed up and it is not draining today.  There are no signs of infection.    There is significant fibrosis following the oncoplastic closure and adjuvant   radiotherapy.  There is benign breast edema secondary to the breast conservation   surgery, sentinel lymph node biopsy and radiotherapy with benign pitting edema   of the left breast.  There is significant fibrosis from the breast conservation   surgery and radiotherapy to the central lower outer left breast.  There are no   suspicious clinical findings.  We offered the patient a six-month interval   followup imaging.  She states that her breast is too sore at the present time.    Certainly, there are no signs of infection and the area where  it had   spontaneously opened and drained from the trauma has now scabbed over and   healed.  The patient would like to delay the bilateral diagnostic mammogram and   left breast ultrasound until middle May 2019 after 05/07/2019 when she would be   due for her contralateral imaging.  We will see her the same day.  Clinical   findings are benign, likely trauma related.  There are no suspicious clinical   findings as outlined above.    The patient has been scheduled for a bilateral diagnostic mammogram with left   breast ultrasound in mid May 2019 after 05/07/2019, which is when she would be   due for her annual followup.  We will see her same day for clinical breast exam   as well.      JAGDISH/JORGE  dd: 12/05/2018 11:48:25 (CST)  td: 12/06/2018 06:59:50 (CST)  Doc ID   #[id number]  Job ID #[id number]    CC:     Job # [id number].

## 2019-03-17 ENCOUNTER — HOSPITAL ENCOUNTER (EMERGENCY)
Facility: HOSPITAL | Age: 74
Discharge: HOME | End: 2019-03-17
Attending: FAMILY MEDICINE
Payer: MEDICARE

## 2019-03-17 VITALS
TEMPERATURE: 98 F | BODY MASS INDEX: 26.07 KG/M2 | DIASTOLIC BLOOD PRESSURE: 79 MMHG | HEART RATE: 79 BPM | HEIGHT: 69 IN | OXYGEN SATURATION: 97 % | WEIGHT: 176 LBS | RESPIRATION RATE: 27 BRPM | SYSTOLIC BLOOD PRESSURE: 160 MMHG

## 2019-03-17 DIAGNOSIS — I10 HYPERTENSION, UNSPECIFIED TYPE: Primary | ICD-10-CM

## 2019-03-17 DIAGNOSIS — R07.9 CHEST PAIN: ICD-10-CM

## 2019-03-17 LAB
ALBUMIN SERPL BCP-MCNC: 3.7 G/DL
ALP SERPL-CCNC: 110 U/L
ALT SERPL W/O P-5'-P-CCNC: 16 U/L
ANION GAP SERPL CALC-SCNC: 7 MMOL/L
AST SERPL-CCNC: 26 U/L
BASOPHILS # BLD AUTO: 0.02 K/UL
BASOPHILS NFR BLD: 0.4 %
BILIRUB SERPL-MCNC: 0.3 MG/DL
BUN SERPL-MCNC: 15 MG/DL
CALCIUM SERPL-MCNC: 9.3 MG/DL
CHLORIDE SERPL-SCNC: 103 MMOL/L
CO2 SERPL-SCNC: 27 MMOL/L
CREAT SERPL-MCNC: 0.75 MG/DL
DIFFERENTIAL METHOD: ABNORMAL
EOSINOPHIL # BLD AUTO: 0.1 K/UL
EOSINOPHIL NFR BLD: 2.6 %
ERYTHROCYTE [DISTWIDTH] IN BLOOD BY AUTOMATED COUNT: 11.8 %
EST. GFR  (AFRICAN AMERICAN): >60 ML/MIN/1.73 M^2
EST. GFR  (NON AFRICAN AMERICAN): >60 ML/MIN/1.73 M^2
GLUCOSE SERPL-MCNC: 183 MG/DL
HCT VFR BLD AUTO: 36.8 %
HGB BLD-MCNC: 12.1 G/DL
LYMPHOCYTES # BLD AUTO: 1.5 K/UL
LYMPHOCYTES NFR BLD: 28.3 %
MCH RBC QN AUTO: 31.3 PG
MCHC RBC AUTO-ENTMCNC: 32.9 G/DL
MCV RBC AUTO: 95 FL
MONOCYTES # BLD AUTO: 0.6 K/UL
MONOCYTES NFR BLD: 11.7 %
NEUTROPHILS # BLD AUTO: 3.1 K/UL
NEUTROPHILS NFR BLD: 57 %
PLATELET # BLD AUTO: 251 K/UL
PMV BLD AUTO: 10.2 FL
POTASSIUM SERPL-SCNC: 4 MMOL/L
PROT SERPL-MCNC: 7.2 G/DL
RBC # BLD AUTO: 3.87 M/UL
SODIUM SERPL-SCNC: 137 MMOL/L
TROPONIN I SERPL DL<=0.01 NG/ML-MCNC: 0.01 NG/ML
WBC # BLD AUTO: 5.38 K/UL

## 2019-03-17 PROCEDURE — 99285 EMERGENCY DEPT VISIT HI MDM: CPT | Mod: ER

## 2019-03-17 PROCEDURE — 93010 ELECTROCARDIOGRAM REPORT: CPT | Mod: ,,, | Performed by: INTERNAL MEDICINE

## 2019-03-17 PROCEDURE — 85025 COMPLETE CBC W/AUTO DIFF WBC: CPT | Mod: ER

## 2019-03-17 PROCEDURE — 96360 HYDRATION IV INFUSION INIT: CPT | Mod: ER

## 2019-03-17 PROCEDURE — 80053 COMPREHEN METABOLIC PANEL: CPT | Mod: ER

## 2019-03-17 PROCEDURE — 25000003 PHARM REV CODE 250: Mod: ER | Performed by: FAMILY MEDICINE

## 2019-03-17 PROCEDURE — 93010 EKG 12-LEAD: ICD-10-PCS | Mod: ,,, | Performed by: INTERNAL MEDICINE

## 2019-03-17 PROCEDURE — 93005 ELECTROCARDIOGRAM TRACING: CPT | Mod: ER

## 2019-03-17 PROCEDURE — 84484 ASSAY OF TROPONIN QUANT: CPT | Mod: ER

## 2019-03-17 RX ADMIN — LIDOCAINE HYDROCHLORIDE: 20 SOLUTION ORAL; TOPICAL at 06:03

## 2019-03-17 RX ADMIN — SODIUM CHLORIDE 500 ML: 0.9 INJECTION, SOLUTION INTRAVENOUS at 06:03

## 2019-03-17 NOTE — ED PROVIDER NOTES
"Encounter Date: 3/17/2019       History     Chief Complaint   Patient presents with    Hypertension     states having up and down blood pressures and sugars since "earlier in the week" when she had heartburn and took 8 tums throguhout the day. states "it never been the same since" denies any complaints currerntly.     73-year-old female patient comes in with hypertension after further questioning it sounds that patient possibly for got that she had lisinopril at home and just has not been taking it.  Otherwise patient has no chest pain no shortness of breath no dizziness nausea or vomiting.          Review of patient's allergies indicates:   Allergen Reactions    Tylenol [acetaminophen]      Affects memory/confused     Past Medical History:   Diagnosis Date    Breast cancer     Right breast     Diabetes mellitus     Hyperlipidemia     Hypertension      Past Surgical History:   Procedure Laterality Date    ADJACENT TISSUE TRANSFER Tissue Rearrangement Left 6/15/2018    [facility]    BIOPSY, LYMPH NODE, SENTINEL Left 6/15/2018    Performed by [facility] at [facility]    BREAST BIOPSY Right 2008    BREAST LUMPECTOMY Right      SECTION, LOW TRANSVERSE  ,1979    x2    CHOLECYSTECTOMY      laparoscopic    DILATION AND CURETTAGE OF UTERUS  2012    EXCISION-WIDE LOCAL LEFT FOREARM SKIN Left 6/15/2018    Performed by [facility] at [facility]    INJECTION, FOR SENTINEL NODE IDENTIFICATION Left 6/15/2018    Performed by [facility] at [facility]    LUMPECTOMY,BREAST with MAGSEED Left 6/15/2018    Performed by [facility] Johnson [facility] at [facility] OR [facility]    TUBAL LIGATION      @ time of      Family History   Problem Relation Age of Onset    Hypertension Mother     Heart failure Mother     Hypertension Brother     Hypertension Sister     Breast cancer Cousin     Breast cancer " Cousin     Coronary artery disease Brother     Stroke Brother     Coronary artery disease Brother     Stroke Brother     Colon cancer Neg Hx     Ovarian cancer Neg Hx      Social History     Tobacco Use    Smoking status: Former Smoker     Packs/day: 1.00     Years: 14.00     Pack years: 14.00     Types: Cigarettes     Last attempt to quit: 1975     Years since quittin.9    Smokeless tobacco: Never Used   Substance Use Topics    Alcohol use: No    Drug use: No     Review of Systems   Constitutional: Negative for fever.   HENT: Negative for sore throat.    Respiratory: Negative for shortness of breath.    Cardiovascular: Negative for chest pain.   Gastrointestinal: Negative for nausea.   Genitourinary: Negative for dysuria.   Musculoskeletal: Negative for back pain.   Skin: Negative for rash.   Neurological: Negative for weakness.   Hematological: Does not bruise/bleed easily.   All other systems reviewed and are negative.      Physical Exam     Initial Vitals   BP Pulse Resp Temp SpO2   19 1751 19 1816 19 1751 19 1751 19 1751   (!) 179/84 75 18 98.1 °F (36.7 °C) 98 %      MAP       --                Physical Exam    Nursing note and vitals reviewed.  Constitutional: She appears well-developed.   HENT:   Head: Normocephalic and atraumatic.   Right Ear: External ear normal.   Left Ear: External ear normal.   Nose: Nose normal.   Mouth/Throat: Oropharynx is clear and moist.   Eyes: Conjunctivae and EOM are normal. Pupils are equal, round, and reactive to light. Right eye exhibits no discharge. Left eye exhibits no discharge.   Neck: Normal range of motion. Neck supple. No tracheal deviation present.   Cardiovascular: Normal rate, regular rhythm and normal heart sounds.   No murmur heard.  Pulmonary/Chest: Breath sounds normal. No respiratory distress. She has no wheezes.   Abdominal: Soft. Bowel sounds are normal.   Neurological: She is alert and oriented to person,  place, and time.   Skin: Skin is warm and dry.         ED Course   Procedures  Labs Reviewed   CBC W/ AUTO DIFFERENTIAL - Abnormal; Notable for the following components:       Result Value    RBC 3.87 (*)     Hematocrit 36.8 (*)     MCH 31.3 (*)     All other components within normal limits   COMPREHENSIVE METABOLIC PANEL   TROPONIN I   POCT GLUCOSE MONITORING CONTINUOUS     EKG Readings: (Independently Interpreted)   Rhythm: Normal Sinus Rhythm. Heart Rate: 79. Ectopy: No Ectopy. Conduction: Normal. ST Segments: Normal ST Segments. T Waves: Normal. Clinical Impression: Normal Sinus Rhythm       Imaging Results          X-Ray Chest AP Portable (Final result)  Result time 03/17/19 18:21:37    Final result by Harley Henry III, MD (03/17/19 18:21:37)                 Impression:      No acute cardiopulmonary abnormality suggested.      Electronically signed by: Harley Henry MD  Date:    03/17/2019  Time:    18:21             Narrative:    EXAMINATION:  XR CHEST AP PORTABLE    CLINICAL HISTORY:  Chest Pain;    COMPARISON:  Two thousand seventeen    FINDINGS:  Heart size is upper limits of normal with moderate tortuosity of the thoracic aorta.  Lung fields are clear.                              X-Rays:   Independently Interpreted Readings:   Other Readings:  Xray reviewed by myself and is negative. Awaiting radiology report.      Medical Decision Making:   Initial Assessment:   Patient sitting in no distress and pleasant. Patient has no other complaints other than documented.     Differential Diagnosis:   Angina, unstable angina, hypertension, hypertension urgency, hypertension emergency, myocardial infarction                        Clinical Impression:       ICD-10-CM ICD-9-CM   1. Hypertension, unspecified type I10 401.9   2. Chest pain R07.9 786.50                                Chidi Warner MD  03/17/19 1948

## 2019-03-17 NOTE — ED NOTES
Pt arrival to the ED with complaints of hypertension, pt states she took her norvasc earlier today. Pt stated she found her Lisinopril earlier this week and replaced  she hadn't been taking it in   over a month, informed MD

## 2019-05-03 ENCOUNTER — LAB VISIT (OUTPATIENT)
Dept: LAB | Facility: HOSPITAL | Age: 74
End: 2019-05-03
Attending: INTERNAL MEDICINE
Payer: MEDICARE

## 2019-05-03 DIAGNOSIS — E11.9 TYPE 2 DIABETES MELLITUS WITHOUT COMPLICATION, WITH LONG-TERM CURRENT USE OF INSULIN: ICD-10-CM

## 2019-05-03 DIAGNOSIS — E55.9 VITAMIN D DEFICIENCY: ICD-10-CM

## 2019-05-03 DIAGNOSIS — C50.912 INFILTRATING DUCTAL CARCINOMA OF BREAST, LEFT: ICD-10-CM

## 2019-05-03 DIAGNOSIS — Z79.4 TYPE 2 DIABETES MELLITUS WITHOUT COMPLICATION, WITH LONG-TERM CURRENT USE OF INSULIN: ICD-10-CM

## 2019-05-03 LAB
25(OH)D3+25(OH)D2 SERPL-MCNC: 30 NG/ML (ref 30–96)
ALBUMIN SERPL BCP-MCNC: 3.8 G/DL (ref 3.5–5.2)
ALP SERPL-CCNC: 99 U/L (ref 38–126)
ALT SERPL W/O P-5'-P-CCNC: 17 U/L (ref 10–44)
ANION GAP SERPL CALC-SCNC: 4 MMOL/L (ref 8–16)
AST SERPL-CCNC: 25 U/L (ref 15–46)
BASOPHILS # BLD AUTO: 0.04 K/UL (ref 0–0.2)
BASOPHILS NFR BLD: 1.1 % (ref 0–1.9)
BILIRUB SERPL-MCNC: 0.6 MG/DL (ref 0.1–1)
BUN SERPL-MCNC: 10 MG/DL (ref 7–17)
CALCIUM SERPL-MCNC: 9 MG/DL (ref 8.7–10.5)
CHLORIDE SERPL-SCNC: 106 MMOL/L (ref 95–110)
CO2 SERPL-SCNC: 29 MMOL/L (ref 23–29)
CREAT SERPL-MCNC: 0.54 MG/DL (ref 0.5–1.4)
DIFFERENTIAL METHOD: ABNORMAL
EOSINOPHIL # BLD AUTO: 0.2 K/UL (ref 0–0.5)
EOSINOPHIL NFR BLD: 5.6 % (ref 0–8)
ERYTHROCYTE [DISTWIDTH] IN BLOOD BY AUTOMATED COUNT: 12 % (ref 11.5–14.5)
EST. GFR  (AFRICAN AMERICAN): >60 ML/MIN/1.73 M^2
EST. GFR  (NON AFRICAN AMERICAN): >60 ML/MIN/1.73 M^2
GLUCOSE SERPL-MCNC: 191 MG/DL (ref 70–110)
HCT VFR BLD AUTO: 38.7 % (ref 37–48.5)
HGB BLD-MCNC: 12.6 G/DL (ref 12–16)
LYMPHOCYTES # BLD AUTO: 1.5 K/UL (ref 1–4.8)
LYMPHOCYTES NFR BLD: 41.4 % (ref 18–48)
MCH RBC QN AUTO: 31.5 PG (ref 27–31)
MCHC RBC AUTO-ENTMCNC: 32.6 G/DL (ref 32–36)
MCV RBC AUTO: 97 FL (ref 82–98)
MONOCYTES # BLD AUTO: 0.5 K/UL (ref 0.3–1)
MONOCYTES NFR BLD: 13.1 % (ref 4–15)
NEUTROPHILS # BLD AUTO: 1.4 K/UL (ref 1.8–7.7)
NEUTROPHILS NFR BLD: 38.8 % (ref 38–73)
PLATELET # BLD AUTO: 229 K/UL (ref 150–350)
PMV BLD AUTO: 10.5 FL (ref 9.2–12.9)
POTASSIUM SERPL-SCNC: 4.2 MMOL/L (ref 3.5–5.1)
PROT SERPL-MCNC: 7.3 G/DL (ref 6–8.4)
RBC # BLD AUTO: 4 M/UL (ref 4–5.4)
SODIUM SERPL-SCNC: 139 MMOL/L (ref 136–145)
WBC # BLD AUTO: 3.6 K/UL (ref 3.9–12.7)

## 2019-05-03 PROCEDURE — 80053 COMPREHEN METABOLIC PANEL: CPT | Mod: PO

## 2019-05-03 PROCEDURE — 82306 VITAMIN D 25 HYDROXY: CPT | Mod: PO

## 2019-05-03 PROCEDURE — 85025 COMPLETE CBC W/AUTO DIFF WBC: CPT | Mod: PO

## 2019-05-28 ENCOUNTER — HOSPITAL ENCOUNTER (OUTPATIENT)
Dept: RADIOLOGY | Facility: HOSPITAL | Age: 74
Discharge: HOME | End: 2019-05-28
Attending: SURGERY
Payer: MEDICARE

## 2019-05-28 ENCOUNTER — OFFICE VISIT (OUTPATIENT)
Dept: SURGERY | Facility: CLINIC | Age: 74
End: 2019-05-28
Payer: MEDICARE

## 2019-05-28 VITALS
BODY MASS INDEX: 25.84 KG/M2 | HEIGHT: 69 IN | DIASTOLIC BLOOD PRESSURE: 83 MMHG | SYSTOLIC BLOOD PRESSURE: 141 MMHG | HEART RATE: 62 BPM | TEMPERATURE: 98 F

## 2019-05-28 VITALS — WEIGHT: 175 LBS | BODY MASS INDEX: 25.92 KG/M2 | HEIGHT: 69 IN

## 2019-05-28 DIAGNOSIS — D05.11 DUCTAL CARCINOMA IN SITU (DCIS) OF RIGHT BREAST: Primary | ICD-10-CM

## 2019-05-28 DIAGNOSIS — C50.912 INFILTRATING DUCTAL CARCINOMA OF LEFT BREAST: ICD-10-CM

## 2019-05-28 DIAGNOSIS — N64.52 NIPPLE DISCHARGE: ICD-10-CM

## 2019-05-28 PROCEDURE — 99999 PR PBB SHADOW E&M-EST. PATIENT-LVL III: ICD-10-PCS | Mod: PBBFAC,,, | Performed by: SURGERY

## 2019-05-28 PROCEDURE — 77066 DX MAMMO INCL CAD BI: CPT | Mod: TC,PO

## 2019-05-28 PROCEDURE — 76642 ULTRASOUND BREAST LIMITED: CPT | Mod: 26,LT,, | Performed by: RADIOLOGY

## 2019-05-28 PROCEDURE — 77066 DX MAMMO INCL CAD BI: CPT | Mod: 26,,, | Performed by: RADIOLOGY

## 2019-05-28 PROCEDURE — 3077F SYST BP >= 140 MM HG: CPT | Mod: CPTII,S$GLB,, | Performed by: SURGERY

## 2019-05-28 PROCEDURE — 77062 MAMMO DIGITAL DIAGNOSTIC BILAT WITH TOMOSYNTHESIS_CAD: ICD-10-PCS | Mod: 26,,, | Performed by: RADIOLOGY

## 2019-05-28 PROCEDURE — 1101F PT FALLS ASSESS-DOCD LE1/YR: CPT | Mod: CPTII,S$GLB,, | Performed by: SURGERY

## 2019-05-28 PROCEDURE — 1101F PR PT FALLS ASSESS DOC 0-1 FALLS W/OUT INJ PAST YR: ICD-10-PCS | Mod: CPTII,S$GLB,, | Performed by: SURGERY

## 2019-05-28 PROCEDURE — 99999 PR PBB SHADOW E&M-EST. PATIENT-LVL III: CPT | Mod: PBBFAC,,, | Performed by: SURGERY

## 2019-05-28 PROCEDURE — 77066 MAMMO DIGITAL DIAGNOSTIC BILAT WITH TOMOSYNTHESIS_CAD: ICD-10-PCS | Mod: 26,,, | Performed by: RADIOLOGY

## 2019-05-28 PROCEDURE — 99213 PR OFFICE/OUTPT VISIT, EST, LEVL III, 20-29 MIN: ICD-10-PCS | Mod: S$GLB,,, | Performed by: SURGERY

## 2019-05-28 PROCEDURE — 3079F DIAST BP 80-89 MM HG: CPT | Mod: CPTII,S$GLB,, | Performed by: SURGERY

## 2019-05-28 PROCEDURE — 77062 BREAST TOMOSYNTHESIS BI: CPT | Mod: 26,,, | Performed by: RADIOLOGY

## 2019-05-28 PROCEDURE — 3077F PR MOST RECENT SYSTOLIC BLOOD PRESSURE >= 140 MM HG: ICD-10-PCS | Mod: CPTII,S$GLB,, | Performed by: SURGERY

## 2019-05-28 PROCEDURE — 76642 ULTRASOUND BREAST LIMITED: CPT | Mod: TC,PO,LT

## 2019-05-28 PROCEDURE — 99213 OFFICE O/P EST LOW 20 MIN: CPT | Mod: S$GLB,,, | Performed by: SURGERY

## 2019-05-28 PROCEDURE — 3079F PR MOST RECENT DIASTOLIC BLOOD PRESSURE 80-89 MM HG: ICD-10-PCS | Mod: CPTII,S$GLB,, | Performed by: SURGERY

## 2019-05-28 PROCEDURE — 76642 US BREAST LEFT LIMITED: ICD-10-PCS | Mod: 26,LT,, | Performed by: RADIOLOGY

## 2019-05-28 NOTE — Clinical Note
F/u with CAROLINA in 1 year with B screening MMG and B CBE as part of her ongoing breast cancer follow up.F/U w/ me prn.

## 2019-05-28 NOTE — PROGRESS NOTES
[facility]  Clinic Visit    Interval History  Mrs. Demarco is a 73 y/o woman who presents for f/u appointment 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 She completed radiation therapy 2018, and is currently on tamoxifen.     She had some seroma drainage from her incision 6 months ago, this is resolved. Otherwise doing well. No subjective complaints.     HPI  Noris Demarco is a pleasant 73 y/o woman with hx R breast DCIS s/p lumpectomy (), radiation, and endocrine therapy x 5 years, who presents to clinic for newly diagnosed invasive ductal carcinoma.     She is asymptomatic, specifically no breast masses, pain, or nipple discharge.       After screening mammogram demonstrated the abnormality, a diagnostic MMG was performed.  This showed a 1.3 cm lesion.  This was not sonographically evident.     Eze guided bx was performed, which revealed Grade 3, 1.4 cm IDC and DCIS.  ER (95%) +, MT moderate to strongly positive (5%), and Her 2 negative.  Ki 67 30%.     Would like excision of left forearm lesion, which has been present for 40 years, but occasionally changes and ulcerates     Personal hx of DCIS, as stated above  Family history- no immediate family history of breast cancer.  Her maternal cousin (who does not have breast cancer) has 5 kids who had early breast cancer, perhaps in their 20s.    , first child at age 21  Menarche: 13  Menopause status  OCP use: None  HRT use: None     Onc hx:  R breast DCIS s/p lumpectomy (), radiation, and endocrine therapy x 5 years.  Unable to tolerate Arimidex due to joint pain, so was switched to tamoxifen.  Completed in .     Review of Systems   Constitutional: Negative.    HENT: Negative.    Respiratory: Negative.    Cardiovascular: Negative.    Gastrointestinal: Negative.    Genitourinary: Negative.    Musculoskeletal: Negative.    Skin: Negative.       Left incision well healed, no erythema or drainage          Past Medical History:    Diagnosis Date    Breast cancer      Right breast     Diabetes mellitus      Hyperlipidemia      Hypertension              Past Surgical History:   Procedure Laterality Date    ADJACENT TISSUE TRANSFER Left 6/15/2018     Procedure: ADJACENT TISSUE TRANSFER Tissue Rearrangement;  Surgeon: Luca Johnson MD;  Location: [facility] OR [address] [place] [address];  Service: General;  Laterality: Left;    ADJACENT TISSUE TRANSFER Tissue Rearrangement Left 6/15/2018     Performed by Luca Johnson MD at [facility] OR [address] [place] [address]    BIOPSY, LYMPH NODE, SENTINEL Left 6/15/2018     Performed by Luca Johnson MD at [facility] OR [address] [place] [address]    BREAST BIOPSY Right 2008    BREAST LUMPECTOMY Right 2008     SECTION, LOW TRANSVERSE   1977,1979     x2    CHOLECYSTECTOMY        laparoscopic    DILATION AND CURETTAGE OF UTERUS   2012    EXCISION-WIDE LOCAL LEFT FOREARM SKIN Left 6/15/2018     Performed by Luca Johnson MD at [facility] OR [address] [place] [address]    INJECTION FOR SENTINEL NODE IDENTIFICATION Left 6/15/2018     Procedure: INJECTION, FOR SENTINEL NODE IDENTIFICATION;  Surgeon: Luca Johnson MD;  Location: [facility] OR [address] [place] [address];  Service: General;  Laterality: Left;    INJECTION, FOR SENTINEL NODE IDENTIFICATION Left 6/15/2018     Performed by Luca Johnson MD at [facility] OR [address] [place] [address]    LUMPECTOMY,BREAST with MAGSEED Left 6/15/2018     Performed by Luca Johnson MD at [facility] OR [address] [place] [address]    SENTINEL LYMPH NODE BIOPSY Left 6/15/2018     Procedure: BIOPSY, LYMPH NODE, SENTINEL;  Surgeon: Luca Johnson MD;  Location: [facility] OR [address] [place] [address];  Service: General;  Laterality: Left;    TUBAL LIGATION        @ time of              Vitals:     18 1105   BP: (!) 172/83   Pulse: 69   Temp: 98.1 °F (36.7 °C)         Physical Exam   Pulmonary/Chest: Left breast exhibits fibrosis around BCS lumpectomy site via inferior lateral curvilinear circumareolar incision. Left breast exhibits no tenderness. Pt with bilateral nipple  "inversion (chronic) and left breast with sliding hiogher riding NAC after BCS and XRT.  Pt states left breast "feels bigger" but likely secondary to benign reactive edema of left breast following BCS and radiotherapy.  R breast WNL with well healed oblique lumpectomy scar in the RUOQ.  No suspicious masses, skin changes, or LAD of the right breast.   There is some benign post-radiation therapy skin changes (follicular dimpling).              U/S, BL MMG 5/28/19: BIRADS 2    Assessment/Plan:  S/P left lumpectomy with ALNB for IDC 6/15/18    - follow- up in 1 year with mid level provider  - bilateral MMG due 5/2020  - monitor for skin changes or new masses    Vianey Stone MD  General Surgery, PGY-III    I have personally taken the history and examined this patient and agree with the resident's note as stated above.  MARSHALL  B MMG today benign Birads 2.  B CBE WNL as above s/p hx of B BCS and radiotherapy.  F/U with CAROLINA for B screening MMG with B CBE in 1 year.  "

## 2019-05-31 DIAGNOSIS — Z12.31 ENCOUNTER FOR SCREENING MAMMOGRAM FOR BREAST CANCER: Primary | ICD-10-CM

## 2019-06-03 ENCOUNTER — TELEPHONE (OUTPATIENT)
Dept: HEMATOLOGY/ONCOLOGY | Facility: CLINIC | Age: 74
End: 2019-06-03

## 2019-07-07 DIAGNOSIS — M94.9 DISORDER OF CARTILAGE: ICD-10-CM

## 2019-07-07 DIAGNOSIS — Z79.4 TYPE 2 DIABETES MELLITUS WITHOUT COMPLICATION, WITH LONG-TERM CURRENT USE OF INSULIN: ICD-10-CM

## 2019-07-07 DIAGNOSIS — C50.912 INFILTRATING DUCTAL CARCINOMA OF BREAST, LEFT: Primary | ICD-10-CM

## 2019-07-07 DIAGNOSIS — E11.9 TYPE 2 DIABETES MELLITUS WITHOUT COMPLICATION, WITH LONG-TERM CURRENT USE OF INSULIN: ICD-10-CM

## 2019-08-07 ENCOUNTER — OFFICE VISIT (OUTPATIENT)
Dept: OPTOMETRY | Facility: CLINIC | Age: 74
End: 2019-08-07
Payer: MEDICARE

## 2019-08-07 DIAGNOSIS — H52.4 PRESBYOPIA: ICD-10-CM

## 2019-08-07 DIAGNOSIS — H25.13 NUCLEAR SCLEROSIS, BILATERAL: ICD-10-CM

## 2019-08-07 DIAGNOSIS — Z13.5 GLAUCOMA SCREENING: ICD-10-CM

## 2019-08-07 DIAGNOSIS — E11.9 TYPE 2 DIABETES MELLITUS WITHOUT RETINOPATHY: Primary | ICD-10-CM

## 2019-08-07 PROCEDURE — 92004 PR EYE EXAM, NEW PATIENT,COMPREHESV: ICD-10-PCS | Mod: S$GLB,,, | Performed by: OPTOMETRIST

## 2019-08-07 PROCEDURE — 92015 DETERMINE REFRACTIVE STATE: CPT | Mod: S$GLB,,, | Performed by: OPTOMETRIST

## 2019-08-07 PROCEDURE — 92004 COMPRE OPH EXAM NEW PT 1/>: CPT | Mod: S$GLB,,, | Performed by: OPTOMETRIST

## 2019-08-07 PROCEDURE — 99999 PR PBB SHADOW E&M-EST. PATIENT-LVL II: CPT | Mod: PBBFAC,,, | Performed by: OPTOMETRIST

## 2019-08-07 PROCEDURE — 99999 PR PBB SHADOW E&M-EST. PATIENT-LVL II: ICD-10-PCS | Mod: PBBFAC,,, | Performed by: OPTOMETRIST

## 2019-08-07 PROCEDURE — 92015 PR REFRACTION: ICD-10-PCS | Mod: S$GLB,,, | Performed by: OPTOMETRIST

## 2019-08-07 NOTE — PROGRESS NOTES
HPI     ERICK: 2017--broke most recent pair  Pt states she has RX glasses that she uses for reading, states no Va   problems.  Occ. Floater, no flashes  No gtts  No sx  LBS: 252  Hemoglobin A1C       Date                     Value               Ref Range             Status                05/03/2019               9.9 (H)             4.0 - 5.6 %           Final                    11/01/2018               11.1 (H)            4.0 - 5.6 %           Final                    12/06/2010               6.0                 4.0 - 6.2 %           Final            ----------      Last edited by Javi Mg, OD on 8/7/2019  3:41 PM. (History)        ROS     Positive for: Endocrine (DM)    Negative for: Constitutional, Gastrointestinal, Neurological, Skin,   Genitourinary, Musculoskeletal, HENT, Cardiovascular, Eyes, Respiratory,   Psychiatric, Allergic/Imm, Heme/Lymph    Last edited by Javi Mg, OD on 8/7/2019  3:45 PM. (History)        Assessment /Plan     For exam results, see Encounter Report.    Type 2 diabetes mellitus without retinopathy    Nuclear sclerosis, bilateral    Glaucoma screening    Presbyopia      1. Reduced VA 2 to Cat OD>OS--pt wishes surgery  2. DM- WITHOUT RETINOPATHY.  Advised yearly DFE    PLAN:    Surgical consult--Dr Cole

## 2019-08-13 ENCOUNTER — OFFICE VISIT (OUTPATIENT)
Dept: OBSTETRICS AND GYNECOLOGY | Facility: CLINIC | Age: 74
End: 2019-08-13
Attending: OBSTETRICS & GYNECOLOGY
Payer: MEDICARE

## 2019-08-13 VITALS
WEIGHT: 163.38 LBS | DIASTOLIC BLOOD PRESSURE: 90 MMHG | HEIGHT: 69 IN | SYSTOLIC BLOOD PRESSURE: 145 MMHG | BODY MASS INDEX: 24.2 KG/M2

## 2019-08-13 DIAGNOSIS — Z01.411 ENCOUNTER FOR GYNECOLOGICAL EXAMINATION WITH ABNORMAL FINDING: Primary | ICD-10-CM

## 2019-08-13 DIAGNOSIS — R10.2 FEMALE PELVIC PAIN: ICD-10-CM

## 2019-08-13 PROCEDURE — 81001 URINALYSIS AUTO W/SCOPE: CPT

## 2019-08-13 PROCEDURE — 99999 PR PBB SHADOW E&M-EST. PATIENT-LVL III: CPT | Mod: PBBFAC,,, | Performed by: OBSTETRICS & GYNECOLOGY

## 2019-08-13 PROCEDURE — 99999 PR PBB SHADOW E&M-EST. PATIENT-LVL III: ICD-10-PCS | Mod: PBBFAC,,, | Performed by: OBSTETRICS & GYNECOLOGY

## 2019-08-13 PROCEDURE — 87086 URINE CULTURE/COLONY COUNT: CPT

## 2019-08-13 PROCEDURE — G0101 PR CA SCREEN;PELVIC/BREAST EXAM: ICD-10-PCS | Mod: S$GLB,,, | Performed by: OBSTETRICS & GYNECOLOGY

## 2019-08-13 PROCEDURE — G0101 CA SCREEN;PELVIC/BREAST EXAM: HCPCS | Mod: S$GLB,,, | Performed by: OBSTETRICS & GYNECOLOGY

## 2019-08-13 NOTE — PROGRESS NOTES
Subjective:       Patient ID: Noris Demarco is a 73 y.o. female.    Chief Complaint:  Pelvic Pain and Well Woman      History of Present Illness  HPI    Noris Demarco is a 73 y.o. female  here for her GYN exam, has not been seen in over 5 years, and has complaints of pelvic pain intermittently for the past month after her 9 month old granddaughter jumped on her stomach.       She describes her periods as stopped with menopause about 20 years ago,  denies break through bleeding.   denies vaginal itching or irritation.  Denies vaginal discharge.  She is sexually active. She has had 1 partner for 56 years .   History of abnormal pap: No  Last Pap: approximate date  and was normal  Last MMG: normal--routine follow-up in 12 months  Last Colonoscopy:  colonoscopy 8 years ago without abnormalities.  denies domestic violence. She does feel safe at home.     Past Medical History:   Diagnosis Date    Breast cancer     Right breast     Breast cancer 2018    left breast    Diabetes mellitus     Hyperlipidemia     Hypertension      Past Surgical History:   Procedure Laterality Date    ADJACENT TISSUE TRANSFER Tissue Rearrangement Left 6/15/2018    [facility]    BIOPSY, LYMPH NODE, SENTINEL Left 6/15/2018    [facility]    BREAST BIOPSY Right 2008    BREAST BIOPSY Left 2018    BREAST LUMPECTOMY Right 2008    BREAST LUMPECTOMY Left 2018     SECTION, LOW TRANSVERSE  1977,1979    x2    CHOLECYSTECTOMY      laparoscopic    DILATION AND CURETTAGE OF UTERUS  2012    EXCISION-WIDE LOCAL LEFT FOREARM SKIN Left 6/15/2018    [facility]    INJECTION, FOR SENTINEL NODE IDENTIFICATION Left 6/15/2018    [facility]    LUMPECTOMY,BREAST with MAGSEED Left 6/15/2018    [facility] [address] [place] [address]     TUBAL LIGATION      @ time of      Social History     Socioeconomic History    Marital status:      Spouse name: Not on file    Number of children: Not on file    Years of education: Not on file    Highest education level: Not on file   Occupational History    Not on file   Social Needs    Financial resource strain: Not on file    Food insecurity:     Worry: Not on file     Inability: Not on file    Transportation needs:     Medical: Not on file     Non-medical: Not on file   Tobacco Use    Smoking status: Former Smoker     Packs/day: 1.00     Years: 14.00     Pack years: 14.00     Types: Cigarettes     Last attempt to quit: 1975     Years since quittin.3    Smokeless tobacco: Never Used   Substance and Sexual Activity    Alcohol use: No    Drug use: No    Sexual activity: Yes     Partners: Male     Birth control/protection: Post-menopausal     Comment:  x 56 years   Lifestyle    Physical activity:     Days per week: Not on file     Minutes per session: Not on file    Stress: Not on file   Relationships    Social connections:     Talks on phone: Not on file     Gets together: Not on file     Attends Sikh service: Not on file     Active member of club or organization: Not on file     Attends meetings of clubs or organizations: Not on file     Relationship status: Not on file   Other Topics Concern    Not on file   Social History Narrative    Not on file     Family History   Problem Relation Age of Onset    Hypertension Mother     Heart failure Mother     Hypertension Brother     Hypertension Sister     Breast cancer Cousin     Breast cancer Cousin     Coronary artery disease Brother     Stroke Brother     Coronary artery disease Brother     Stroke Brother     Colon cancer Neg Hx     Ovarian cancer Neg Hx      OB History        2    Para   2    Term   2       0    AB   0    Living   2       SAB   0    TAB   0    Ectopic   0     "Multiple   0    Live Births   2                 BP (!) 145/90 (BP Location: Right arm, Patient Position: Sitting)   Ht 5' 9" (1.753 m)   Wt 74.1 kg (163 lb 5.8 oz)   LMP 1999 (Approximate)   BMI 24.12 kg/m²         GYN & OB History  Patient's last menstrual period was 1999 (approximate).   Date of Last Pap: 5/10/2013    OB History    Para Term  AB Living   2 2 2 0 0 2   SAB TAB Ectopic Multiple Live Births   0 0 0 0 2      # Outcome Date GA Lbr Rahsi/2nd Weight Sex Delivery Anes PTL Lv   2 Term      CS-LTranv   JONATHAN   1 Term      CS-LTranv   JONATHAN       Review of Systems  Review of Systems   Constitutional: Negative for activity change, appetite change, fatigue and unexpected weight change.   HENT: Negative.    Eyes: Negative for visual disturbance.   Respiratory: Negative for shortness of breath and wheezing.    Cardiovascular: Positive for leg swelling. Negative for chest pain and palpitations.   Gastrointestinal: Negative for abdominal pain, bloating and blood in stool.   Endocrine: Positive for diabetes. Negative for hair loss and hot flashes.   Genitourinary: Positive for pelvic pain. Negative for decreased libido, dyspareunia, hot flashes, postmenopausal bleeding and vaginal dryness.   Musculoskeletal: Negative for back pain and joint swelling.   Integumentary:  Negative for acne, hair changes and nipple discharge.   Neurological: Negative for headaches.   Hematological: Does not bruise/bleed easily.   Psychiatric/Behavioral: Negative for depression and sleep disturbance. The patient is not nervous/anxious.    Breast: Positive for breast self exam.Negative for mastodynia and nipple discharge          Objective:      Physical Exam:   Constitutional: She is oriented to person, place, and time. She appears well-developed and well-nourished.    HENT:   Head: Normocephalic and atraumatic.    Eyes: Pupils are equal, round, and reactive to light. EOM are normal.    Neck: Normal range of " motion. Neck supple.    Cardiovascular: Normal rate and regular rhythm.     Pulmonary/Chest: Effort normal and breath sounds normal.   BREASTS:  no mass, no tenderness, no deformity and no retraction. Right breast exhibits no inverted nipple, no mass, no nipple discharge, no skin change, no tenderness, no bleeding and no swelling. Left breast exhibits no inverted nipple, no mass, no nipple discharge, no skin change, no tenderness, no bleeding and no swelling. Breasts are symmetrical.      Lumpectomy scars bilaterally.        Abdominal: Soft. Bowel sounds are normal.     Genitourinary: Pelvic exam was performed with patient supine.   Genitourinary Comments: PELVIC: Normal external genitalia without lesions.  Normal hair distribution.  Adequate perineal body, normal urethral meatus.  Vagina  Dry and poorly rugated, atrophic, without lesions or discharge.  Cervix pink, without lesions, discharge or tenderness.  No significant cystocele or rectocele.  Bimanual exam shows uterus to be 10-12 weeks size, Nodular, Irregular, mobile and nontender.  Adnexa without masses or tenderness.    RECTAL:Deferred             Musculoskeletal: Normal range of motion and moves all extremeties.       Neurological: She is alert and oriented to person, place, and time.    Skin: Skin is warm and dry.    Psychiatric: She has a normal mood and affect.              Assessment:        1. Encounter for gynecological examination with abnormal finding    2. Female pelvic pain                Plan:        1. Encounter for gynecological examination with abnormal finding  COUNSELING:  The patient was counseled today on regular weight bearing exercise. Patient was counseled today on the new ACS guidelines for cervical cytology screening as well as the current recommendations for breast cancer screening. Counseling session lasted approximately 10 minutes, and all her questions were answered. She was advised to see her primary care physician for all other  health maintenance.   FOLLOW-UP with me for next routine visit.         2. Female pelvic pain      - Urinalysis  - Urine culture  - US Pelvis Comp with Transvag NON-OB (xpd; Future       Follow up if symptoms worsen or fail to improve.

## 2019-08-14 LAB
BACTERIA #/AREA URNS AUTO: NORMAL /HPF
BILIRUB UR QL STRIP: NEGATIVE
CLARITY UR REFRACT.AUTO: CLEAR
COLOR UR AUTO: ABNORMAL
GLUCOSE UR QL STRIP: ABNORMAL
HGB UR QL STRIP: NEGATIVE
KETONES UR QL STRIP: NEGATIVE
LEUKOCYTE ESTERASE UR QL STRIP: NEGATIVE
MICROSCOPIC COMMENT: NORMAL
NITRITE UR QL STRIP: NEGATIVE
PH UR STRIP: 6 [PH] (ref 5–8)
PROT UR QL STRIP: NEGATIVE
RBC #/AREA URNS AUTO: 0 /HPF (ref 0–4)
SP GR UR STRIP: 1.01 (ref 1–1.03)
SQUAMOUS #/AREA URNS AUTO: 0 /HPF
URN SPEC COLLECT METH UR: ABNORMAL
YEAST UR QL AUTO: NORMAL

## 2019-08-15 LAB
BACTERIA UR CULT: NORMAL
BACTERIA UR CULT: NORMAL

## 2019-08-22 ENCOUNTER — HOSPITAL ENCOUNTER (OUTPATIENT)
Dept: RADIOLOGY | Facility: HOSPITAL | Age: 74
Discharge: HOME | End: 2019-08-22
Attending: OBSTETRICS & GYNECOLOGY
Payer: MEDICARE

## 2019-08-22 DIAGNOSIS — R10.2 FEMALE PELVIC PAIN: ICD-10-CM

## 2019-08-22 PROCEDURE — 76856 US EXAM PELVIC COMPLETE: CPT | Mod: TC,PO

## 2019-09-03 ENCOUNTER — TELEPHONE (OUTPATIENT)
Dept: OBSTETRICS AND GYNECOLOGY | Facility: CLINIC | Age: 74
End: 2019-09-03

## 2019-09-04 ENCOUNTER — TELEPHONE (OUTPATIENT)
Dept: OBSTETRICS AND GYNECOLOGY | Facility: CLINIC | Age: 74
End: 2019-09-04

## 2019-09-04 NOTE — TELEPHONE ENCOUNTER
Called patient and reported u/s with small fibroids and no change. Normal ovaries. Normal endometrial stripe.     Nothing abnormal to follow up on .

## 2019-09-04 NOTE — TELEPHONE ENCOUNTER
Please contact patient with recent ultrasound results       ----- Message from Natasha Doyle, Patient Care Assistant sent at 9/4/2019 12:15 PM CDT -----  Contact: Mukund (daughter)  Name of Who is Calling: Mukund (daughter)    What is the request in detail:Mukund (daughter) requesting pelvic ultrasound results.  Please contact to further discuss and advise      Can the clinic reply by [facility]: No    What Number to Call Back if not in [facility]:

## 2019-11-26 DIAGNOSIS — C50.912 INFILTRATING DUCTAL CARCINOMA OF BREAST, LEFT: ICD-10-CM

## 2019-11-26 RX ORDER — TAMOXIFEN CITRATE 20 MG/1
20 TABLET ORAL DAILY
Qty: 90 TABLET | Refills: 3 | Status: SHIPPED | OUTPATIENT
Start: 2019-11-26 | End: 2020-09-22

## 2019-11-26 NOTE — TELEPHONE ENCOUNTER
rx sent for signature    ----- Message from Clari Sanders sent at 11/26/2019  9:32 AM CST -----  Pt called regarding a refill on tamoxifen (NOLVADEX) 20 MG Tab. Pt asked for a call back.      Pt contact # [phone].      Thanks

## 2020-01-13 ENCOUNTER — LAB VISIT (OUTPATIENT)
Dept: LAB | Facility: HOSPITAL | Age: 75
End: 2020-01-13
Attending: INTERNAL MEDICINE
Payer: MEDICARE

## 2020-01-13 DIAGNOSIS — E11.9 TYPE 2 DIABETES MELLITUS WITHOUT COMPLICATION, WITH LONG-TERM CURRENT USE OF INSULIN: ICD-10-CM

## 2020-01-13 DIAGNOSIS — Z79.4 TYPE 2 DIABETES MELLITUS WITHOUT COMPLICATION, WITH LONG-TERM CURRENT USE OF INSULIN: ICD-10-CM

## 2020-01-13 DIAGNOSIS — M94.9 DISORDER OF CARTILAGE: ICD-10-CM

## 2020-01-13 DIAGNOSIS — C50.912 INFILTRATING DUCTAL CARCINOMA OF BREAST, LEFT: ICD-10-CM

## 2020-01-13 LAB
25(OH)D3+25(OH)D2 SERPL-MCNC: 39 NG/ML (ref 30–96)
ALBUMIN SERPL BCP-MCNC: 3.6 G/DL (ref 3.5–5.2)
ALP SERPL-CCNC: 74 U/L (ref 38–126)
ALT SERPL W/O P-5'-P-CCNC: 15 U/L (ref 10–44)
ANION GAP SERPL CALC-SCNC: 5 MMOL/L (ref 8–16)
AST SERPL-CCNC: 26 U/L (ref 15–46)
BASOPHILS # BLD AUTO: 0.04 K/UL (ref 0–0.2)
BASOPHILS NFR BLD: 0.9 % (ref 0–1.9)
BILIRUB SERPL-MCNC: 0.6 MG/DL (ref 0.1–1)
BUN SERPL-MCNC: 12 MG/DL (ref 7–17)
CALCIUM SERPL-MCNC: 9.3 MG/DL (ref 8.7–10.5)
CHLORIDE SERPL-SCNC: 106 MMOL/L (ref 95–110)
CO2 SERPL-SCNC: 28 MMOL/L (ref 23–29)
CREAT SERPL-MCNC: 0.56 MG/DL (ref 0.5–1.4)
DIFFERENTIAL METHOD: ABNORMAL
EOSINOPHIL # BLD AUTO: 0.1 K/UL (ref 0–0.5)
EOSINOPHIL NFR BLD: 2.6 % (ref 0–8)
ERYTHROCYTE [DISTWIDTH] IN BLOOD BY AUTOMATED COUNT: 11.6 % (ref 11.5–14.5)
EST. GFR  (AFRICAN AMERICAN): >60 ML/MIN/1.73 M^2
EST. GFR  (NON AFRICAN AMERICAN): >60 ML/MIN/1.73 M^2
ESTIMATED AVG GLUCOSE: 260 MG/DL (ref 68–131)
GLUCOSE SERPL-MCNC: 153 MG/DL (ref 70–110)
HBA1C MFR BLD HPLC: 10.7 % (ref 4–5.6)
HCT VFR BLD AUTO: 39.2 % (ref 37–48.5)
HGB BLD-MCNC: 12.8 G/DL (ref 12–16)
IMM GRANULOCYTES # BLD AUTO: 0.01 K/UL (ref 0–0.04)
IMM GRANULOCYTES NFR BLD AUTO: 0.2 % (ref 0–0.5)
LYMPHOCYTES # BLD AUTO: 1.7 K/UL (ref 1–4.8)
LYMPHOCYTES NFR BLD: 37 % (ref 18–48)
MCH RBC QN AUTO: 31.7 PG (ref 27–31)
MCHC RBC AUTO-ENTMCNC: 32.7 G/DL (ref 32–36)
MCV RBC AUTO: 97 FL (ref 82–98)
MONOCYTES # BLD AUTO: 0.6 K/UL (ref 0.3–1)
MONOCYTES NFR BLD: 11.9 % (ref 4–15)
NEUTROPHILS # BLD AUTO: 2.2 K/UL (ref 1.8–7.7)
NEUTROPHILS NFR BLD: 47.4 % (ref 38–73)
NRBC BLD-RTO: 0 /100 WBC
PLATELET # BLD AUTO: 248 K/UL (ref 150–350)
PMV BLD AUTO: 10.6 FL (ref 9.2–12.9)
POTASSIUM SERPL-SCNC: 4 MMOL/L (ref 3.5–5.1)
PROT SERPL-MCNC: 7 G/DL (ref 6–8.4)
RBC # BLD AUTO: 4.04 M/UL (ref 4–5.4)
SODIUM SERPL-SCNC: 139 MMOL/L (ref 136–145)
WBC # BLD AUTO: 4.62 K/UL (ref 3.9–12.7)

## 2020-01-13 PROCEDURE — 85025 COMPLETE CBC W/AUTO DIFF WBC: CPT | Mod: PO

## 2020-01-13 PROCEDURE — 82306 VITAMIN D 25 HYDROXY: CPT | Mod: PO

## 2020-01-13 PROCEDURE — 83036 HEMOGLOBIN GLYCOSYLATED A1C: CPT

## 2020-01-13 PROCEDURE — 80053 COMPREHEN METABOLIC PANEL: CPT | Mod: PO

## 2020-01-13 PROCEDURE — 36415 COLL VENOUS BLD VENIPUNCTURE: CPT | Mod: PO

## 2020-01-14 ENCOUNTER — OFFICE VISIT (OUTPATIENT)
Dept: HEMATOLOGY/ONCOLOGY | Facility: CLINIC | Age: 75
End: 2020-01-14
Payer: MEDICARE

## 2020-01-14 VITALS
WEIGHT: 160.94 LBS | RESPIRATION RATE: 18 BRPM | BODY MASS INDEX: 23.77 KG/M2 | DIASTOLIC BLOOD PRESSURE: 96 MMHG | TEMPERATURE: 98 F | HEART RATE: 74 BPM | SYSTOLIC BLOOD PRESSURE: 184 MMHG | OXYGEN SATURATION: 99 %

## 2020-01-14 DIAGNOSIS — N64.89 SEROMA OF BREAST: ICD-10-CM

## 2020-01-14 DIAGNOSIS — E11.9 TYPE 2 DIABETES MELLITUS WITHOUT COMPLICATION, WITH LONG-TERM CURRENT USE OF INSULIN: ICD-10-CM

## 2020-01-14 DIAGNOSIS — Z79.4 TYPE 2 DIABETES MELLITUS WITHOUT COMPLICATION, WITH LONG-TERM CURRENT USE OF INSULIN: ICD-10-CM

## 2020-01-14 DIAGNOSIS — C50.912 INFILTRATING DUCTAL CARCINOMA OF BREAST, LEFT: Primary | ICD-10-CM

## 2020-01-14 DIAGNOSIS — I10 HTN (HYPERTENSION), BENIGN: ICD-10-CM

## 2020-01-14 DIAGNOSIS — R92.8 OTHER ABNORMAL AND INCONCLUSIVE FINDINGS ON DIAGNOSTIC IMAGING OF BREAST: ICD-10-CM

## 2020-01-14 DIAGNOSIS — E55.9 VITAMIN D DEFICIENCY: ICD-10-CM

## 2020-01-14 PROCEDURE — 3046F PR MOST RECENT HEMOGLOBIN A1C LEVEL > 9.0%: ICD-10-PCS | Mod: CPTII,S$GLB,, | Performed by: INTERNAL MEDICINE

## 2020-01-14 PROCEDURE — 99214 OFFICE O/P EST MOD 30 MIN: CPT | Mod: S$GLB,,, | Performed by: INTERNAL MEDICINE

## 2020-01-14 PROCEDURE — 1126F AMNT PAIN NOTED NONE PRSNT: CPT | Mod: S$GLB,,, | Performed by: INTERNAL MEDICINE

## 2020-01-14 PROCEDURE — 1126F PR PAIN SEVERITY QUANTIFIED, NO PAIN PRESENT: ICD-10-PCS | Mod: S$GLB,,, | Performed by: INTERNAL MEDICINE

## 2020-01-14 PROCEDURE — 3080F PR MOST RECENT DIASTOLIC BLOOD PRESSURE >= 90 MM HG: ICD-10-PCS | Mod: CPTII,S$GLB,, | Performed by: INTERNAL MEDICINE

## 2020-01-14 PROCEDURE — 99214 PR OFFICE/OUTPT VISIT, EST, LEVL IV, 30-39 MIN: ICD-10-PCS | Mod: S$GLB,,, | Performed by: INTERNAL MEDICINE

## 2020-01-14 PROCEDURE — 3077F PR MOST RECENT SYSTOLIC BLOOD PRESSURE >= 140 MM HG: ICD-10-PCS | Mod: CPTII,S$GLB,, | Performed by: INTERNAL MEDICINE

## 2020-01-14 PROCEDURE — 99999 PR PBB SHADOW E&M-EST. PATIENT-LVL III: ICD-10-PCS | Mod: PBBFAC,,, | Performed by: INTERNAL MEDICINE

## 2020-01-14 PROCEDURE — 1159F MED LIST DOCD IN RCRD: CPT | Mod: S$GLB,,, | Performed by: INTERNAL MEDICINE

## 2020-01-14 PROCEDURE — 1101F PT FALLS ASSESS-DOCD LE1/YR: CPT | Mod: CPTII,S$GLB,, | Performed by: INTERNAL MEDICINE

## 2020-01-14 PROCEDURE — 3080F DIAST BP >= 90 MM HG: CPT | Mod: CPTII,S$GLB,, | Performed by: INTERNAL MEDICINE

## 2020-01-14 PROCEDURE — 3046F HEMOGLOBIN A1C LEVEL >9.0%: CPT | Mod: CPTII,S$GLB,, | Performed by: INTERNAL MEDICINE

## 2020-01-14 PROCEDURE — 3077F SYST BP >= 140 MM HG: CPT | Mod: CPTII,S$GLB,, | Performed by: INTERNAL MEDICINE

## 2020-01-14 PROCEDURE — 99999 PR PBB SHADOW E&M-EST. PATIENT-LVL III: CPT | Mod: PBBFAC,,, | Performed by: INTERNAL MEDICINE

## 2020-01-14 PROCEDURE — 1159F PR MEDICATION LIST DOCUMENTED IN MEDICAL RECORD: ICD-10-PCS | Mod: S$GLB,,, | Performed by: INTERNAL MEDICINE

## 2020-01-14 PROCEDURE — 1101F PR PT FALLS ASSESS DOC 0-1 FALLS W/OUT INJ PAST YR: ICD-10-PCS | Mod: CPTII,S$GLB,, | Performed by: INTERNAL MEDICINE

## 2020-01-14 NOTE — PROGRESS NOTES
Subjective:       Patient ID: Noris Demarco is a 74 y.o. female.    Chief Complaint: Breast Cancer    HPI She underwent a lumpectomy in Jan 2008 for a 5-mm Right Breast DCIS that was found on a routine screening mammogram, completed adjuvant RT in July 2008. She was started on Arimidex at [facility] following radiation treatment and she was intolerant to it because of debilitating and disabling joint pains, she hence was switched to tamoxifen in 12/2008 and completed it in Dec 2013.    She was then diagnosed with infiltrating ductal carcinoma of the lower outer quadrant of the left breast on routine screening mammogram done in May 2018.  Lumpectomy with SLN biopsy was done in June 2018 and a 1.3 cm grade 2 ER positive 95%, NH positive 5% and HER2 Luther negative, Ki-67 30% was noted. Five lymph nodes were removed from the axilla and they were all negative.  Oncotype score came back at 40, she declined adjuvant chemotherapy.  She completed adjuvant radiation therapy under the care of Dr. Chavez on 09/27/2018.    She has a golf ball-sized seroma in the left breast that is hard to palpation but is not painful.    She was intolerant to Arimidex before.  So she was given a prescription for Aromasin, she did not take it secondary to fear of arthralgias.  Hence tamoxifen was started on 10/08/2018.    INTERVAL HISTORY:   -she is here for follow-up of history of DCIS of the right breast and invasive breast cancer of the left breast  -she has been on tamoxifen since October 2018  -she states her blood pressure is well controlled at home and the blood pressure is elevated because of anxiety when she comes to physician visits  -she has a hard palpable seroma in the left breast, it is not painful.  It is not increasing in size  -she also has diabetes, not optimally controlled and follows up with Dr. Estrada    Review of Systems   Constitutional: Negative for fever and unexpected weight change.   HENT: Negative for mouth sores and  nosebleeds.    Respiratory: Negative for shortness of breath and wheezing.    Cardiovascular: Negative for chest pain and palpitations.   Gastrointestinal: Negative for abdominal pain and nausea.   Allergic/Immunologic: Negative for food allergies.   Psychiatric/Behavioral: Negative for behavioral problems and confusion. The patient is nervous/anxious.      Review of Systems   Constitutional: Negative for fever and unexpected weight change.   HENT: Negative for mouth sores and nosebleeds.    Respiratory: Negative for shortness of breath and wheezing.    Cardiovascular: Negative for chest pain and palpitations.   Gastrointestinal: Negative for abdominal pain and nausea.   Psychiatric/Behavioral: Negative for behavioral problems and confusion. The patient is nervous/anxious.        Objective:      Physical Exam   Constitutional: She is oriented to person, place, and time. She appears well-developed and well-nourished. No distress.   HENT:   Head: Normocephalic and atraumatic.   Right Ear: Tympanic membrane, external ear and ear canal normal.   Left Ear: Tympanic membrane, external ear and ear canal normal.   Nose: Nose normal. No mucosal edema, sinus tenderness, septal deviation or nasal septal hematoma. No epistaxis. Right sinus exhibits no maxillary sinus tenderness and no frontal sinus tenderness. Left sinus exhibits no maxillary sinus tenderness and no frontal sinus tenderness.   Mouth/Throat: Oropharynx is clear and moist and mucous membranes are normal. Mucous membranes are not cyanotic. No oral lesions. No dental caries. No oropharyngeal exudate.   Hard and soft palate, tongue and posterior pharyngeal wall unremarkable   Eyes: Conjunctivae and lids are normal. No scleral icterus.   Neck: Trachea normal. Neck supple. No tracheal tenderness present. No tracheal deviation present. No thyroid mass (thyroid is non-tender) present.   Cardiovascular: Normal rate, regular rhythm, normal heart sounds, intact distal  pulses and normal pulses. Exam reveals no gallop.   No murmur heard.  No edema   Pulmonary/Chest: Effort normal and breath sounds normal. No accessory muscle usage or stridor. No respiratory distress. She has no decreased breath sounds. She has no wheezes. She has no rhonchi. She has no rales.       Abdominal: Soft. Bowel sounds are normal. She exhibits no distension and no mass. There is no hepatosplenomegaly, splenomegaly or hepatomegaly. There is no tenderness.   Musculoskeletal: She exhibits no edema or tenderness.   Lymphadenopathy:        Head (right side): No submental and no submandibular adenopathy present.        Head (left side): No submental and no submandibular adenopathy present.     She has no cervical adenopathy.     She has no axillary adenopathy.        Right: No supraclavicular adenopathy present.        Left: No supraclavicular adenopathy present.   Neurological: She is alert and oriented to person, place, and time. She has normal strength. She is not disoriented. No cranial nerve deficit or sensory deficit.   Skin: Skin is warm and intact. No petechiae and no rash noted. She is not diaphoretic. No cyanosis. No pallor. Nails show no clubbing.   Psychiatric: She has a normal mood and affect. Her speech is normal and behavior is normal. Judgment and thought content normal. Her mood appears not anxious. She expresses no homicidal and no suicidal ideation.     Assessment:       1. Infiltrating ductal carcinoma of breast, left    2. Vitamin D deficiency     3. Other abnormal and inconclusive findings on diagnostic imaging of breast     4. Type 2 diabetes mellitus without complication, with long-term current use of insulin    5. Seroma of Left breast    6. HTN (hypertension), benign        Plan:   DCIS right breast  -diagnosed in 2008  -took tamoxifen for 5 years until December 2013    Stage I T1c N0 infiltrating ductal carcinoma of the left breast  -status post lumpectomy and radiation therapy  -on  tamoxifen since October 8, 2018  -tolerating it well  -she will continue until October 2023  -next bilateral diagnostic mammogram due May 2020    Seroma left breast  -hard to palpate but otherwise painless  -asymptomatic  -will continue to monitor    Uncontrolled diabetes  -reviewed hemoglobin A1c value with the patient  -asked her to follow up with Dr. Estrada    Vitamin-D deficiency  -continue over-the-counter vitamin D3 2000 international units daily

## 2020-05-14 ENCOUNTER — HOSPITAL ENCOUNTER (OUTPATIENT)
Dept: RADIOLOGY | Facility: HOSPITAL | Age: 75
Discharge: HOME | End: 2020-05-14
Attending: INTERNAL MEDICINE
Payer: MEDICARE

## 2020-05-14 DIAGNOSIS — R92.8 OTHER ABNORMAL AND INCONCLUSIVE FINDINGS ON DIAGNOSTIC IMAGING OF BREAST: ICD-10-CM

## 2020-05-14 DIAGNOSIS — Z12.31 ENCOUNTER FOR SCREENING MAMMOGRAM FOR BREAST CANCER: ICD-10-CM

## 2020-05-14 DIAGNOSIS — C50.912 INFILTRATING DUCTAL CARCINOMA OF BREAST, LEFT: ICD-10-CM

## 2020-05-14 PROCEDURE — 77066 DX MAMMO INCL CAD BI: CPT | Mod: 26,,, | Performed by: RADIOLOGY

## 2020-05-14 PROCEDURE — 77062 BREAST TOMOSYNTHESIS BI: CPT | Mod: 26,,, | Performed by: RADIOLOGY

## 2020-05-14 PROCEDURE — 77066 MAMMO DIGITAL DIAGNOSTIC BILAT WITH TOMOSYNTHESIS_CAD: ICD-10-PCS | Mod: 26,,, | Performed by: RADIOLOGY

## 2020-05-14 PROCEDURE — 77062 MAMMO DIGITAL DIAGNOSTIC BILAT WITH TOMOSYNTHESIS_CAD: ICD-10-PCS | Mod: 26,,, | Performed by: RADIOLOGY

## 2020-05-14 PROCEDURE — 77066 DX MAMMO INCL CAD BI: CPT | Mod: TC

## 2020-07-14 ENCOUNTER — TELEPHONE (OUTPATIENT)
Dept: HEMATOLOGY/ONCOLOGY | Facility: CLINIC | Age: 75
End: 2020-07-14

## 2020-07-14 ENCOUNTER — OFFICE VISIT (OUTPATIENT)
Dept: HEMATOLOGY/ONCOLOGY | Facility: CLINIC | Age: 75
End: 2020-07-14
Payer: MEDICARE

## 2020-07-14 DIAGNOSIS — C50.912 INFILTRATING DUCTAL CARCINOMA OF BREAST, LEFT: ICD-10-CM

## 2020-07-14 DIAGNOSIS — E55.9 VITAMIN D DEFICIENCY: ICD-10-CM

## 2020-07-14 DIAGNOSIS — N64.89 SEROMA OF BREAST: ICD-10-CM

## 2020-07-14 DIAGNOSIS — D05.11 DUCTAL CARCINOMA IN SITU (DCIS) OF RIGHT BREAST: Primary | ICD-10-CM

## 2020-07-14 PROCEDURE — 1159F MED LIST DOCD IN RCRD: CPT | Mod: 95,,, | Performed by: INTERNAL MEDICINE

## 2020-07-14 PROCEDURE — 1101F PR PT FALLS ASSESS DOC 0-1 FALLS W/OUT INJ PAST YR: ICD-10-PCS | Mod: CPTII,95,, | Performed by: INTERNAL MEDICINE

## 2020-07-14 PROCEDURE — 99213 OFFICE O/P EST LOW 20 MIN: CPT | Mod: 95,,, | Performed by: INTERNAL MEDICINE

## 2020-07-14 PROCEDURE — 99213 PR OFFICE/OUTPT VISIT, EST, LEVL III, 20-29 MIN: ICD-10-PCS | Mod: 95,,, | Performed by: INTERNAL MEDICINE

## 2020-07-14 PROCEDURE — 1101F PT FALLS ASSESS-DOCD LE1/YR: CPT | Mod: CPTII,95,, | Performed by: INTERNAL MEDICINE

## 2020-07-14 PROCEDURE — 1159F PR MEDICATION LIST DOCUMENTED IN MEDICAL RECORD: ICD-10-PCS | Mod: 95,,, | Performed by: INTERNAL MEDICINE

## 2020-07-14 NOTE — PROGRESS NOTES
Subjective:       Patient ID: Noris Demarco is a 74 y.o. female.    Chief Complaint:  Breast cancer    The patient location is: home  The chief complaint leading to consultation is:  Breast cancer    Visit type: audio only    Face to Face time with patient: 10 min  15 minutes of total time spent on the encounter, which includes face to face time and non-face to face time preparing to see the patient (eg, review of tests), Obtaining and/or reviewing separately obtained history, Documenting clinical information in the electronic or other health record, Independently interpreting results (not separately reported) and communicating results to the patient/family/caregiver, or Care coordination (not separately reported).         Each patient to whom he or she provides medical services by telemedicine is:  (1) informed of the relationship between the physician and patient and the respective role of any other health care provider with respect to management of the patient; and (2) notified that he or she may decline to receive medical services by telemedicine and may withdraw from such care at any time.    Notes:     HPI She underwent a lumpectomy in Jan 2008 for a 5-mm Right Breast DCIS that was found on a routine screening mammogram, completed adjuvant RT in July 2008. She was started on Arimidex at [facility] following radiation treatment and she was intolerant to it because of debilitating and disabling joint pains, she hence was switched to tamoxifen in 12/2008 and completed it in Dec 2013.    She was then diagnosed with infiltrating ductal carcinoma of the lower outer quadrant of the left breast on routine screening mammogram done in May 2018.  Lumpectomy with SLN biopsy was done in June 2018 and a 1.3 cm grade 2 ER positive 95%, OR positive 5% and HER2 Luther negative, Ki-67 30% was noted. Five lymph nodes were removed from the axilla and they were all negative.  Oncotype score came back at 40, she declined adjuvant  chemotherapy.  She completed adjuvant radiation therapy under the care of Dr. Chavez on 09/27/2018.    She has a golf ball-sized seroma in the left breast that is hard to palpation but is not painful.    She was intolerant to Arimidex before.  So she was given a prescription for Aromasin, she did not take it secondary to fear of arthralgias.  Hence tamoxifen was started on 10/08/2018.    INTERVAL HISTORY:   -she is here for virtual follow-up of history of DCIS of the right breast and invasive breast cancer of the left breast  -she has been on tamoxifen since October 2018  -she has a hard palpable seroma in the left breast, it is not painful.  It is not increasing in size  -she also has diabetes, not optimally controlled and follows up with Dr. Estrada    Review of Systems   Constitutional: Negative for fever and unexpected weight change.   HENT: Negative for mouth sores and nosebleeds.    Respiratory: Negative for shortness of breath and wheezing.    Cardiovascular: Negative for chest pain and palpitations.   Gastrointestinal: Negative for abdominal pain and nausea.   Allergic/Immunologic: Negative for food allergies.   Psychiatric/Behavioral: Negative for behavioral problems and confusion. The patient is nervous/anxious.          Objective:      No physical examination was done as this is a virtual visit      Assessment:       1. Ductal carcinoma in situ (DCIS) of right breast    2. Infiltrating ductal carcinoma of breast, left    3. Seroma of Left breast    4. Vitamin D deficiency         Plan:   DCIS right breast  -diagnosed in 2008  -took tamoxifen for 5 years until December 2013    Stage I T1c N0 infiltrating ductal carcinoma of the left breast  -status post lumpectomy and radiation therapy  -on tamoxifen since October 8, 2018  -tolerating it well  -she will continue until October 2023  -next bilateral diagnostic mammogram due May 2021    Seroma left breast  -hard to palpate but otherwise  painless  -asymptomatic  -will continue to monitor    Uncontrolled diabetes  -reviewed hemoglobin A1c value with the patient  -asked her to follow up with Dr. Estrada    Vitamin-D deficiency  -continue over-the-counter vitamin D3 2000 international units daily

## 2020-12-20 NOTE — TELEPHONE ENCOUNTER
Return call but number has been disconnected  '  ----- Message from Emma Sullivan sent at 6/3/2019  2:34 PM CDT -----  No. [phone]    Please call with mammo results.  Also, does patient need to schedule an appointment.        2 seconds or less

## 2021-01-11 ENCOUNTER — TELEPHONE (OUTPATIENT)
Dept: HEMATOLOGY/ONCOLOGY | Facility: CLINIC | Age: 76
End: 2021-01-11

## 2021-01-12 ENCOUNTER — LAB VISIT (OUTPATIENT)
Dept: LAB | Facility: HOSPITAL | Age: 76
End: 2021-01-12
Attending: INTERNAL MEDICINE
Payer: MEDICARE

## 2021-01-12 DIAGNOSIS — E55.9 VITAMIN D DEFICIENCY: ICD-10-CM

## 2021-01-12 DIAGNOSIS — C50.912 INFILTRATING DUCTAL CARCINOMA OF BREAST, LEFT: ICD-10-CM

## 2021-01-12 LAB
25(OH)D3+25(OH)D2 SERPL-MCNC: 42 NG/ML (ref 30–96)
ALBUMIN SERPL BCP-MCNC: 3.9 G/DL (ref 3.5–5.2)
ALP SERPL-CCNC: 74 U/L (ref 38–126)
ALT SERPL W/O P-5'-P-CCNC: 14 U/L (ref 10–44)
ANION GAP SERPL CALC-SCNC: 5 MMOL/L (ref 8–16)
AST SERPL-CCNC: 26 U/L (ref 15–46)
BASOPHILS # BLD AUTO: 0.05 K/UL (ref 0–0.2)
BASOPHILS NFR BLD: 1 % (ref 0–1.9)
BILIRUB SERPL-MCNC: 0.6 MG/DL (ref 0.1–1)
CALCIUM SERPL-MCNC: 9.8 MG/DL (ref 8.7–10.5)
CHLORIDE SERPL-SCNC: 103 MMOL/L (ref 95–110)
CO2 SERPL-SCNC: 32 MMOL/L (ref 23–29)
CREAT SERPL-MCNC: 0.56 MG/DL (ref 0.5–1.4)
DIFFERENTIAL METHOD: ABNORMAL
EOSINOPHIL # BLD AUTO: 0.2 K/UL (ref 0–0.5)
EOSINOPHIL NFR BLD: 4 % (ref 0–8)
ERYTHROCYTE [DISTWIDTH] IN BLOOD BY AUTOMATED COUNT: 11.2 % (ref 11.5–14.5)
EST. GFR  (AFRICAN AMERICAN): >60 ML/MIN/1.73 M^2
EST. GFR  (NON AFRICAN AMERICAN): >60 ML/MIN/1.73 M^2
GLUCOSE SERPL-MCNC: 120 MG/DL (ref 70–110)
HCT VFR BLD AUTO: 37.7 % (ref 37–48.5)
HGB BLD-MCNC: 12.6 G/DL (ref 12–16)
IMM GRANULOCYTES # BLD AUTO: 0 K/UL (ref 0–0.04)
IMM GRANULOCYTES NFR BLD AUTO: 0 % (ref 0–0.5)
LYMPHOCYTES # BLD AUTO: 2.2 K/UL (ref 1–4.8)
LYMPHOCYTES NFR BLD: 44.9 % (ref 18–48)
MCH RBC QN AUTO: 32.6 PG (ref 27–31)
MCHC RBC AUTO-ENTMCNC: 33.4 G/DL (ref 32–36)
MCV RBC AUTO: 97 FL (ref 82–98)
MONOCYTES # BLD AUTO: 0.6 K/UL (ref 0.3–1)
MONOCYTES NFR BLD: 12.3 % (ref 4–15)
NEUTROPHILS # BLD AUTO: 1.8 K/UL (ref 1.8–7.7)
NEUTROPHILS NFR BLD: 37.8 % (ref 38–73)
NRBC BLD-RTO: 0 /100 WBC
PLATELET # BLD AUTO: 239 K/UL (ref 150–350)
PMV BLD AUTO: 10.5 FL (ref 9.2–12.9)
POTASSIUM SERPL-SCNC: 3.7 MMOL/L (ref 3.5–5.1)
PROT SERPL-MCNC: 7 G/DL (ref 6–8.4)
RBC # BLD AUTO: 3.87 M/UL (ref 4–5.4)
SODIUM SERPL-SCNC: 140 MMOL/L (ref 136–145)
UUN UR-MCNC: 13 MG/DL (ref 7–17)
WBC # BLD AUTO: 4.81 K/UL (ref 3.9–12.7)

## 2021-01-12 PROCEDURE — 36415 COLL VENOUS BLD VENIPUNCTURE: CPT | Mod: PO

## 2021-01-12 PROCEDURE — 82306 VITAMIN D 25 HYDROXY: CPT | Mod: PO

## 2021-01-12 PROCEDURE — 80053 COMPREHEN METABOLIC PANEL: CPT | Mod: PO

## 2021-01-12 PROCEDURE — 85025 COMPLETE CBC W/AUTO DIFF WBC: CPT | Mod: PO

## 2021-01-20 ENCOUNTER — TELEPHONE (OUTPATIENT)
Dept: HEMATOLOGY/ONCOLOGY | Facility: CLINIC | Age: 76
End: 2021-01-20

## 2021-01-21 ENCOUNTER — OFFICE VISIT (OUTPATIENT)
Dept: HEMATOLOGY/ONCOLOGY | Facility: CLINIC | Age: 76
End: 2021-01-21
Payer: MEDICARE

## 2021-01-21 VITALS
RESPIRATION RATE: 18 BRPM | HEART RATE: 85 BPM | WEIGHT: 151.25 LBS | OXYGEN SATURATION: 99 % | SYSTOLIC BLOOD PRESSURE: 161 MMHG | DIASTOLIC BLOOD PRESSURE: 88 MMHG | TEMPERATURE: 98 F | BODY MASS INDEX: 22.33 KG/M2

## 2021-01-21 DIAGNOSIS — C50.912 INFILTRATING DUCTAL CARCINOMA OF BREAST, LEFT: Primary | ICD-10-CM

## 2021-01-21 DIAGNOSIS — N64.89 SEROMA OF BREAST: ICD-10-CM

## 2021-01-21 DIAGNOSIS — Z12.31 ENCOUNTER FOR SCREENING MAMMOGRAM FOR MALIGNANT NEOPLASM OF BREAST: ICD-10-CM

## 2021-01-21 DIAGNOSIS — E55.9 VITAMIN D DEFICIENCY: ICD-10-CM

## 2021-01-21 DIAGNOSIS — M94.9 DISORDER OF CARTILAGE, UNSPECIFIED: ICD-10-CM

## 2021-01-21 PROCEDURE — 1159F PR MEDICATION LIST DOCUMENTED IN MEDICAL RECORD: ICD-10-PCS | Mod: S$GLB,,, | Performed by: INTERNAL MEDICINE

## 2021-01-21 PROCEDURE — 99214 OFFICE O/P EST MOD 30 MIN: CPT | Mod: S$GLB,,, | Performed by: INTERNAL MEDICINE

## 2021-01-21 PROCEDURE — 99999 PR PBB SHADOW E&M-EST. PATIENT-LVL III: CPT | Mod: PBBFAC,,, | Performed by: INTERNAL MEDICINE

## 2021-01-21 PROCEDURE — 1157F PR ADVANCE CARE PLAN OR EQUIV PRESENT IN MEDICAL RECORD: ICD-10-PCS | Mod: S$GLB,,, | Performed by: INTERNAL MEDICINE

## 2021-01-21 PROCEDURE — 1159F MED LIST DOCD IN RCRD: CPT | Mod: S$GLB,,, | Performed by: INTERNAL MEDICINE

## 2021-01-21 PROCEDURE — 3077F SYST BP >= 140 MM HG: CPT | Mod: CPTII,S$GLB,, | Performed by: INTERNAL MEDICINE

## 2021-01-21 PROCEDURE — 99999 PR PBB SHADOW E&M-EST. PATIENT-LVL III: ICD-10-PCS | Mod: PBBFAC,,, | Performed by: INTERNAL MEDICINE

## 2021-01-21 PROCEDURE — 3079F DIAST BP 80-89 MM HG: CPT | Mod: CPTII,S$GLB,, | Performed by: INTERNAL MEDICINE

## 2021-01-21 PROCEDURE — 3077F PR MOST RECENT SYSTOLIC BLOOD PRESSURE >= 140 MM HG: ICD-10-PCS | Mod: CPTII,S$GLB,, | Performed by: INTERNAL MEDICINE

## 2021-01-21 PROCEDURE — 1126F AMNT PAIN NOTED NONE PRSNT: CPT | Mod: S$GLB,,, | Performed by: INTERNAL MEDICINE

## 2021-01-21 PROCEDURE — 1126F PR PAIN SEVERITY QUANTIFIED, NO PAIN PRESENT: ICD-10-PCS | Mod: S$GLB,,, | Performed by: INTERNAL MEDICINE

## 2021-01-21 PROCEDURE — 1157F ADVNC CARE PLAN IN RCRD: CPT | Mod: S$GLB,,, | Performed by: INTERNAL MEDICINE

## 2021-01-21 PROCEDURE — 3079F PR MOST RECENT DIASTOLIC BLOOD PRESSURE 80-89 MM HG: ICD-10-PCS | Mod: CPTII,S$GLB,, | Performed by: INTERNAL MEDICINE

## 2021-01-21 PROCEDURE — 99214 PR OFFICE/OUTPT VISIT, EST, LEVL IV, 30-39 MIN: ICD-10-PCS | Mod: S$GLB,,, | Performed by: INTERNAL MEDICINE

## 2021-11-08 ENCOUNTER — TELEPHONE (OUTPATIENT)
Dept: HEMATOLOGY/ONCOLOGY | Facility: CLINIC | Age: 76
End: 2021-11-08
Payer: MEDICARE

## 2021-11-22 NOTE — Clinical Note
Surgery 6-15-18 for left magseed loc lumpectomy with SLNB with excisonal biopsy of left forearm pigmented skin lesion as well.
7

## 2021-12-08 ENCOUNTER — HOSPITAL ENCOUNTER (OUTPATIENT)
Dept: RADIOLOGY | Facility: HOSPITAL | Age: 76
Discharge: HOME | End: 2021-12-08
Attending: INTERNAL MEDICINE
Payer: MEDICARE

## 2021-12-08 DIAGNOSIS — Z85.3 HISTORY OF BREAST CANCER: ICD-10-CM

## 2021-12-08 DIAGNOSIS — C50.912 INFILTRATING DUCTAL CARCINOMA OF BREAST, LEFT: ICD-10-CM

## 2021-12-08 DIAGNOSIS — Z12.31 ENCOUNTER FOR SCREENING MAMMOGRAM FOR MALIGNANT NEOPLASM OF BREAST: ICD-10-CM

## 2021-12-08 PROCEDURE — 77062 BREAST TOMOSYNTHESIS BI: CPT | Mod: TC,PO

## 2021-12-09 ENCOUNTER — OFFICE VISIT (OUTPATIENT)
Dept: HEMATOLOGY/ONCOLOGY | Facility: CLINIC | Age: 76
End: 2021-12-09
Payer: MEDICARE

## 2021-12-09 VITALS
OXYGEN SATURATION: 99 % | DIASTOLIC BLOOD PRESSURE: 88 MMHG | TEMPERATURE: 99 F | HEIGHT: 66 IN | SYSTOLIC BLOOD PRESSURE: 184 MMHG | HEART RATE: 78 BPM | WEIGHT: 131.63 LBS | BODY MASS INDEX: 21.16 KG/M2

## 2021-12-09 DIAGNOSIS — N64.89 SEROMA OF BREAST: ICD-10-CM

## 2021-12-09 DIAGNOSIS — E55.9 VITAMIN D DEFICIENCY, UNSPECIFIED: ICD-10-CM

## 2021-12-09 DIAGNOSIS — R63.4 WEIGHT LOSS, UNINTENTIONAL: ICD-10-CM

## 2021-12-09 DIAGNOSIS — C50.912 INFILTRATING DUCTAL CARCINOMA OF BREAST, LEFT: ICD-10-CM

## 2021-12-09 DIAGNOSIS — D05.11 DUCTAL CARCINOMA IN SITU (DCIS) OF RIGHT BREAST: Primary | ICD-10-CM

## 2021-12-09 DIAGNOSIS — R41.82 ALTERED MENTAL STATUS, UNSPECIFIED ALTERED MENTAL STATUS TYPE: ICD-10-CM

## 2021-12-09 PROCEDURE — 99215 PR OFFICE/OUTPT VISIT, EST, LEVL V, 40-54 MIN: ICD-10-PCS | Mod: S$GLB,,, | Performed by: INTERNAL MEDICINE

## 2021-12-09 PROCEDURE — 1157F ADVNC CARE PLAN IN RCRD: CPT | Mod: CPTII,S$GLB,, | Performed by: INTERNAL MEDICINE

## 2021-12-09 PROCEDURE — 99215 OFFICE O/P EST HI 40 MIN: CPT | Mod: S$GLB,,, | Performed by: INTERNAL MEDICINE

## 2021-12-09 PROCEDURE — 99999 PR PBB SHADOW E&M-EST. PATIENT-LVL V: ICD-10-PCS | Mod: PBBFAC,,, | Performed by: INTERNAL MEDICINE

## 2021-12-09 PROCEDURE — 1157F PR ADVANCE CARE PLAN OR EQUIV PRESENT IN MEDICAL RECORD: ICD-10-PCS | Mod: CPTII,S$GLB,, | Performed by: INTERNAL MEDICINE

## 2021-12-09 PROCEDURE — 99999 PR PBB SHADOW E&M-EST. PATIENT-LVL V: CPT | Mod: PBBFAC,,, | Performed by: INTERNAL MEDICINE

## 2021-12-09 RX ORDER — INSULIN GLARGINE 100 [IU]/ML
20 INJECTION, SOLUTION SUBCUTANEOUS DAILY
COMMUNITY
Start: 2021-10-16 | End: 2022-09-19 | Stop reason: SDUPTHER

## 2021-12-09 RX ORDER — CLONIDINE HYDROCHLORIDE 0.1 MG/1
0.1 TABLET ORAL 2 TIMES DAILY
COMMUNITY
Start: 2021-10-15 | End: 2022-07-18

## 2021-12-09 RX ORDER — METFORMIN HYDROCHLORIDE 500 MG/1
500 TABLET ORAL DAILY
COMMUNITY
Start: 2021-10-15 | End: 2023-06-21

## 2021-12-09 RX ORDER — BUSPIRONE HYDROCHLORIDE 15 MG/1
15 TABLET ORAL DAILY
COMMUNITY
Start: 2021-09-25 | End: 2023-12-19

## 2021-12-09 RX ORDER — IBUPROFEN 600 MG/1
600 TABLET ORAL 3 TIMES DAILY
COMMUNITY
Start: 2021-12-04 | End: 2022-06-27

## 2021-12-09 RX ORDER — CLONIDINE HYDROCHLORIDE 0.2 MG/1
0.2 TABLET ORAL 2 TIMES DAILY
COMMUNITY
Start: 2021-10-19 | End: 2022-07-22 | Stop reason: SDUPTHER

## 2021-12-09 RX ORDER — PEN NEEDLE, DIABETIC 31 GX5/16"
NEEDLE, DISPOSABLE MISCELLANEOUS
COMMUNITY
Start: 2021-10-16

## 2022-01-03 ENCOUNTER — TELEPHONE (OUTPATIENT)
Dept: HEMATOLOGY/ONCOLOGY | Facility: CLINIC | Age: 77
End: 2022-01-03
Payer: MEDICARE

## 2022-01-05 ENCOUNTER — HOSPITAL ENCOUNTER (OUTPATIENT)
Dept: RADIOLOGY | Facility: HOSPITAL | Age: 77
Discharge: HOME | End: 2022-01-05
Attending: INTERNAL MEDICINE
Payer: MEDICARE

## 2022-01-05 DIAGNOSIS — C50.912 INFILTRATING DUCTAL CARCINOMA OF BREAST, LEFT: ICD-10-CM

## 2022-01-05 DIAGNOSIS — R41.82 ALTERED MENTAL STATUS, UNSPECIFIED ALTERED MENTAL STATUS TYPE: ICD-10-CM

## 2022-01-05 DIAGNOSIS — R63.4 WEIGHT LOSS, UNINTENTIONAL: ICD-10-CM

## 2022-01-05 PROCEDURE — 74177 CT ABD & PELVIS W/CONTRAST: CPT | Mod: TC,PO

## 2022-01-05 PROCEDURE — 71260 CT THORAX DX C+: CPT | Mod: TC,PO

## 2022-01-05 PROCEDURE — 25500020 PHARM REV CODE 255: Mod: PO | Performed by: INTERNAL MEDICINE

## 2022-01-05 PROCEDURE — 70553 MRI BRAIN STEM W/O & W/DYE: CPT | Mod: TC,PO

## 2022-01-05 PROCEDURE — A9585 GADOBUTROL INJECTION: HCPCS | Mod: PO | Performed by: INTERNAL MEDICINE

## 2022-01-05 RX ORDER — GADOBUTROL 604.72 MG/ML
8 INJECTION INTRAVENOUS
Status: COMPLETED | OUTPATIENT
Start: 2022-01-05 | End: 2022-01-05

## 2022-01-05 RX ADMIN — IOHEXOL 30 ML: 300 INJECTION, SOLUTION INTRAVENOUS at 08:01

## 2022-01-05 RX ADMIN — IOHEXOL 100 ML: 350 INJECTION, SOLUTION INTRAVENOUS at 09:01

## 2022-01-05 RX ADMIN — GADOBUTROL 8 ML: 604.72 INJECTION INTRAVENOUS at 08:01

## 2022-01-11 ENCOUNTER — TELEPHONE (OUTPATIENT)
Dept: HEMATOLOGY/ONCOLOGY | Facility: CLINIC | Age: 77
End: 2022-01-11
Payer: MEDICARE

## 2022-01-18 ENCOUNTER — OFFICE VISIT (OUTPATIENT)
Dept: HEMATOLOGY/ONCOLOGY | Facility: CLINIC | Age: 77
End: 2022-01-18
Payer: MEDICARE

## 2022-01-18 VITALS
WEIGHT: 132.06 LBS | OXYGEN SATURATION: 98 % | RESPIRATION RATE: 18 BRPM | BODY MASS INDEX: 21.31 KG/M2 | TEMPERATURE: 98 F | DIASTOLIC BLOOD PRESSURE: 78 MMHG | HEART RATE: 69 BPM | SYSTOLIC BLOOD PRESSURE: 129 MMHG

## 2022-01-18 DIAGNOSIS — C50.912 INFILTRATING DUCTAL CARCINOMA OF BREAST, LEFT: ICD-10-CM

## 2022-01-18 DIAGNOSIS — R63.4 WEIGHT LOSS, UNINTENTIONAL: ICD-10-CM

## 2022-01-18 DIAGNOSIS — D05.11 DUCTAL CARCINOMA IN SITU (DCIS) OF RIGHT BREAST: Primary | ICD-10-CM

## 2022-01-18 DIAGNOSIS — E55.9 VITAMIN D DEFICIENCY, UNSPECIFIED: ICD-10-CM

## 2022-01-18 DIAGNOSIS — N64.89 SEROMA OF BREAST: ICD-10-CM

## 2022-01-18 PROCEDURE — 99999 PR PBB SHADOW E&M-EST. PATIENT-LVL IV: CPT | Mod: PBBFAC,,, | Performed by: INTERNAL MEDICINE

## 2022-01-18 PROCEDURE — 3078F PR MOST RECENT DIASTOLIC BLOOD PRESSURE < 80 MM HG: ICD-10-PCS | Mod: CPTII,S$GLB,, | Performed by: INTERNAL MEDICINE

## 2022-01-18 PROCEDURE — 99214 OFFICE O/P EST MOD 30 MIN: CPT | Mod: S$GLB,,, | Performed by: INTERNAL MEDICINE

## 2022-01-18 PROCEDURE — 99499 UNLISTED E&M SERVICE: CPT | Mod: S$GLB,,, | Performed by: INTERNAL MEDICINE

## 2022-01-18 PROCEDURE — 1101F PT FALLS ASSESS-DOCD LE1/YR: CPT | Mod: CPTII,S$GLB,, | Performed by: INTERNAL MEDICINE

## 2022-01-18 PROCEDURE — 1159F MED LIST DOCD IN RCRD: CPT | Mod: CPTII,S$GLB,, | Performed by: INTERNAL MEDICINE

## 2022-01-18 PROCEDURE — 1160F RVW MEDS BY RX/DR IN RCRD: CPT | Mod: CPTII,S$GLB,, | Performed by: INTERNAL MEDICINE

## 2022-01-18 PROCEDURE — 1126F PR PAIN SEVERITY QUANTIFIED, NO PAIN PRESENT: ICD-10-PCS | Mod: CPTII,S$GLB,, | Performed by: INTERNAL MEDICINE

## 2022-01-18 PROCEDURE — 3288F PR FALLS RISK ASSESSMENT DOCUMENTED: ICD-10-PCS | Mod: CPTII,S$GLB,, | Performed by: INTERNAL MEDICINE

## 2022-01-18 PROCEDURE — 99499 RISK ADDL DX/OHS AUDIT: ICD-10-PCS | Mod: S$GLB,,, | Performed by: INTERNAL MEDICINE

## 2022-01-18 PROCEDURE — 1126F AMNT PAIN NOTED NONE PRSNT: CPT | Mod: CPTII,S$GLB,, | Performed by: INTERNAL MEDICINE

## 2022-01-18 PROCEDURE — 1157F PR ADVANCE CARE PLAN OR EQUIV PRESENT IN MEDICAL RECORD: ICD-10-PCS | Mod: CPTII,S$GLB,, | Performed by: INTERNAL MEDICINE

## 2022-01-18 PROCEDURE — 1160F PR REVIEW ALL MEDS BY PRESCRIBER/CLIN PHARMACIST DOCUMENTED: ICD-10-PCS | Mod: CPTII,S$GLB,, | Performed by: INTERNAL MEDICINE

## 2022-01-18 PROCEDURE — 99214 PR OFFICE/OUTPT VISIT, EST, LEVL IV, 30-39 MIN: ICD-10-PCS | Mod: S$GLB,,, | Performed by: INTERNAL MEDICINE

## 2022-01-18 PROCEDURE — 3074F SYST BP LT 130 MM HG: CPT | Mod: CPTII,S$GLB,, | Performed by: INTERNAL MEDICINE

## 2022-01-18 PROCEDURE — 1159F PR MEDICATION LIST DOCUMENTED IN MEDICAL RECORD: ICD-10-PCS | Mod: CPTII,S$GLB,, | Performed by: INTERNAL MEDICINE

## 2022-01-18 PROCEDURE — 3288F FALL RISK ASSESSMENT DOCD: CPT | Mod: CPTII,S$GLB,, | Performed by: INTERNAL MEDICINE

## 2022-01-18 PROCEDURE — 99999 PR PBB SHADOW E&M-EST. PATIENT-LVL IV: ICD-10-PCS | Mod: PBBFAC,,, | Performed by: INTERNAL MEDICINE

## 2022-01-18 PROCEDURE — 1157F ADVNC CARE PLAN IN RCRD: CPT | Mod: CPTII,S$GLB,, | Performed by: INTERNAL MEDICINE

## 2022-01-18 PROCEDURE — 3074F PR MOST RECENT SYSTOLIC BLOOD PRESSURE < 130 MM HG: ICD-10-PCS | Mod: CPTII,S$GLB,, | Performed by: INTERNAL MEDICINE

## 2022-01-18 PROCEDURE — 1101F PR PT FALLS ASSESS DOC 0-1 FALLS W/OUT INJ PAST YR: ICD-10-PCS | Mod: CPTII,S$GLB,, | Performed by: INTERNAL MEDICINE

## 2022-01-18 PROCEDURE — 3078F DIAST BP <80 MM HG: CPT | Mod: CPTII,S$GLB,, | Performed by: INTERNAL MEDICINE

## 2022-01-18 RX ORDER — CYPROHEPTADINE HYDROCHLORIDE 4 MG/1
4 TABLET ORAL 3 TIMES DAILY
Qty: 60 TABLET | Refills: 5 | Status: SHIPPED | OUTPATIENT
Start: 2022-01-18 | End: 2022-09-27

## 2022-01-18 NOTE — PROGRESS NOTES
Subjective:       Patient ID: Noris Demarco is a 76 y.o. female.    Chief Complaint:  Breast cancer    Follow-up  Pertinent negatives include no fever.     She underwent a lumpectomy in Jan 2008 for a 5-mm Right Breast DCIS that was found on a routine screening mammogram, completed adjuvant RT in July 2008. She was started on Arimidex at [facility] following radiation treatment and she was intolerant to it because of debilitating and disabling joint pains, she hence was switched to tamoxifen in 12/2008 and completed it in Dec 2013.    She was then diagnosed with infiltrating ductal carcinoma of the lower outer quadrant of the left breast on routine screening mammogram done in May 2018.  Lumpectomy with SLN biopsy was done in June 2018 and a 1.3 cm grade 2 ER positive 95%, IL positive 5% and HER2 Luther negative, Ki-67 30% was noted. Five lymph nodes were removed from the axilla and they were all negative.  Oncotype score came back at 40, she declined adjuvant chemotherapy.  She completed adjuvant radiation therapy under the care of Dr. Chavez on 09/27/2018.    She has a golf ball-sized seroma in the left breast that is hard to palpation but is not painful.    She was intolerant to Arimidex before.  So she was given a prescription for Aromasin, she did not take it secondary to fear of arthralgias.  Hence tamoxifen was started on 10/08/2018.    INTERVAL HISTORY:   -she is here for follow-up of history of DCIS of the right breast and invasive breast cancer of the left breast  -she has been on tamoxifen since October 2018  -she has a hard palpable seroma in the left breast, nonpainful.  -she is involuntarily losing weight since the last time I saw her  -accompanied by   -exercises vigorously, eats only greens and healthy food  -wants to gain some weight  -no longer has episodes of confusion  -feels good overall    Review of Systems   Constitutional: Negative for fever.         Objective:      Vitals:    01/18/22 1555    BP: 129/78   BP Location: Right arm   Patient Position: Sitting   BP Method: Medium (Automatic)   Pulse: 69   Resp: 18   Temp: 98.2 °F (36.8 °C)   TempSrc: Oral   SpO2: 98%   Weight: 59.9 kg (132 lb 0.9 oz)     Body mass index is 21.31 kg/m².    General-awake and alert.  No distress  Clear evidence of weight loss can be seen  Lungs-clear to auscultations, no wheeze  Neurologic-no focal deficits.       Assessment:       1. Ductal carcinoma in situ (DCIS) of right breast    2. Vitamin D deficiency, unspecified    3. Seroma of Left breast    4. Infiltrating ductal carcinoma of breast, left    5. Weight loss, unintentional        Plan:   Unintentional weight loss  -her weight is slowly stabilizing  - states that her family members have also lost weight unintentionally when they were in their 70s and 80s and feels if this could be hereditary  -reviewed CT chest/abdomen/pelvis done 01/05/2022 as well as reviewed MRI brain done 01/05/2022 which do not reveal any evidence of malignancy  -since the weight is now stabilizing and she feels better clinically, will monitor weight  -per patient's request, prescribed Periactin 4 mg b.i.d.    DCIS right breast  -diagnosed in 2008  -took tamoxifen for 5 years until December 2013    Stage I T1c N0 infiltrating ductal carcinoma of the left breast  -status post lumpectomy and radiation therapy  -on tamoxifen since October 8, 2018  -labs CBC, chemistries and vitamin-D reviewed  -continue tamoxifen until October 2023  -next bilateral screening mammogram due December 2022    Seroma left breast  -has hard to palpate seroma, that is improving  -will continue to monitor    Vitamin-D deficiency  -vitamin-D level reviewed  -cont otc vit D3, 2000 international units daily    RTC 6 months with labs, cbc, cmp.

## 2022-01-31 DIAGNOSIS — C50.912 INFILTRATING DUCTAL CARCINOMA OF BREAST, LEFT: ICD-10-CM

## 2022-01-31 RX ORDER — TAMOXIFEN CITRATE 20 MG/1
20 TABLET ORAL DAILY
Qty: 90 TABLET | Refills: 3 | Status: SHIPPED | OUTPATIENT
Start: 2022-01-31 | End: 2023-02-09 | Stop reason: SDUPTHER

## 2022-03-15 ENCOUNTER — TELEPHONE (OUTPATIENT)
Dept: HEMATOLOGY/ONCOLOGY | Facility: CLINIC | Age: 77
End: 2022-03-15
Payer: MEDICARE

## 2022-03-31 ENCOUNTER — TELEPHONE (OUTPATIENT)
Dept: HEMATOLOGY/ONCOLOGY | Facility: CLINIC | Age: 77
End: 2022-03-31
Payer: MEDICARE

## 2022-06-27 ENCOUNTER — LAB VISIT (OUTPATIENT)
Dept: LAB | Facility: HOSPITAL | Age: 77
End: 2022-06-27
Attending: INTERNAL MEDICINE
Payer: MEDICARE

## 2022-06-27 ENCOUNTER — TELEPHONE (OUTPATIENT)
Dept: FAMILY MEDICINE | Facility: CLINIC | Age: 77
End: 2022-06-27
Payer: MEDICARE

## 2022-06-27 ENCOUNTER — OFFICE VISIT (OUTPATIENT)
Dept: INTERNAL MEDICINE | Facility: CLINIC | Age: 77
End: 2022-06-27
Payer: MEDICARE

## 2022-06-27 VITALS
BODY MASS INDEX: 21.52 KG/M2 | SYSTOLIC BLOOD PRESSURE: 150 MMHG | WEIGHT: 129.19 LBS | HEART RATE: 66 BPM | OXYGEN SATURATION: 98 % | HEIGHT: 65 IN | DIASTOLIC BLOOD PRESSURE: 84 MMHG

## 2022-06-27 DIAGNOSIS — Z11.59 ENCOUNTER FOR HEPATITIS C SCREENING TEST FOR LOW RISK PATIENT: ICD-10-CM

## 2022-06-27 DIAGNOSIS — R41.3 MEMORY LOSS: ICD-10-CM

## 2022-06-27 DIAGNOSIS — E11.9 TYPE 2 DIABETES MELLITUS WITHOUT COMPLICATION, WITH LONG-TERM CURRENT USE OF INSULIN: Primary | ICD-10-CM

## 2022-06-27 DIAGNOSIS — Z79.4 TYPE 2 DIABETES MELLITUS WITHOUT COMPLICATION, WITH LONG-TERM CURRENT USE OF INSULIN: Primary | ICD-10-CM

## 2022-06-27 DIAGNOSIS — R63.4 WEIGHT LOSS: ICD-10-CM

## 2022-06-27 DIAGNOSIS — E11.59 HYPERTENSION ASSOCIATED WITH DIABETES: ICD-10-CM

## 2022-06-27 DIAGNOSIS — I15.2 HYPERTENSION ASSOCIATED WITH DIABETES: ICD-10-CM

## 2022-06-27 PROCEDURE — 1157F ADVNC CARE PLAN IN RCRD: CPT | Mod: CPTII,S$GLB,, | Performed by: INTERNAL MEDICINE

## 2022-06-27 PROCEDURE — 3288F FALL RISK ASSESSMENT DOCD: CPT | Mod: CPTII,S$GLB,, | Performed by: INTERNAL MEDICINE

## 2022-06-27 PROCEDURE — 99204 PR OFFICE/OUTPT VISIT, NEW, LEVL IV, 45-59 MIN: ICD-10-PCS | Mod: S$GLB,,, | Performed by: INTERNAL MEDICINE

## 2022-06-27 PROCEDURE — 1101F PT FALLS ASSESS-DOCD LE1/YR: CPT | Mod: CPTII,S$GLB,, | Performed by: INTERNAL MEDICINE

## 2022-06-27 PROCEDURE — 3077F SYST BP >= 140 MM HG: CPT | Mod: CPTII,S$GLB,, | Performed by: INTERNAL MEDICINE

## 2022-06-27 PROCEDURE — 99999 PR PBB SHADOW E&M-EST. PATIENT-LVL III: CPT | Mod: PBBFAC,,, | Performed by: INTERNAL MEDICINE

## 2022-06-27 PROCEDURE — 3079F DIAST BP 80-89 MM HG: CPT | Mod: CPTII,S$GLB,, | Performed by: INTERNAL MEDICINE

## 2022-06-27 PROCEDURE — 1101F PR PT FALLS ASSESS DOC 0-1 FALLS W/OUT INJ PAST YR: ICD-10-PCS | Mod: CPTII,S$GLB,, | Performed by: INTERNAL MEDICINE

## 2022-06-27 PROCEDURE — 3079F PR MOST RECENT DIASTOLIC BLOOD PRESSURE 80-89 MM HG: ICD-10-PCS | Mod: CPTII,S$GLB,, | Performed by: INTERNAL MEDICINE

## 2022-06-27 PROCEDURE — 1159F MED LIST DOCD IN RCRD: CPT | Mod: CPTII,S$GLB,, | Performed by: INTERNAL MEDICINE

## 2022-06-27 PROCEDURE — 1160F RVW MEDS BY RX/DR IN RCRD: CPT | Mod: CPTII,S$GLB,, | Performed by: INTERNAL MEDICINE

## 2022-06-27 PROCEDURE — 3077F PR MOST RECENT SYSTOLIC BLOOD PRESSURE >= 140 MM HG: ICD-10-PCS | Mod: CPTII,S$GLB,, | Performed by: INTERNAL MEDICINE

## 2022-06-27 PROCEDURE — 3288F PR FALLS RISK ASSESSMENT DOCUMENTED: ICD-10-PCS | Mod: CPTII,S$GLB,, | Performed by: INTERNAL MEDICINE

## 2022-06-27 PROCEDURE — 99999 PR PBB SHADOW E&M-EST. PATIENT-LVL III: ICD-10-PCS | Mod: PBBFAC,,, | Performed by: INTERNAL MEDICINE

## 2022-06-27 PROCEDURE — 1157F PR ADVANCE CARE PLAN OR EQUIV PRESENT IN MEDICAL RECORD: ICD-10-PCS | Mod: CPTII,S$GLB,, | Performed by: INTERNAL MEDICINE

## 2022-06-27 PROCEDURE — 99204 OFFICE O/P NEW MOD 45 MIN: CPT | Mod: S$GLB,,, | Performed by: INTERNAL MEDICINE

## 2022-06-27 PROCEDURE — 1160F PR REVIEW ALL MEDS BY PRESCRIBER/CLIN PHARMACIST DOCUMENTED: ICD-10-PCS | Mod: CPTII,S$GLB,, | Performed by: INTERNAL MEDICINE

## 2022-06-27 PROCEDURE — 1159F PR MEDICATION LIST DOCUMENTED IN MEDICAL RECORD: ICD-10-PCS | Mod: CPTII,S$GLB,, | Performed by: INTERNAL MEDICINE

## 2022-06-27 PROCEDURE — 1126F AMNT PAIN NOTED NONE PRSNT: CPT | Mod: CPTII,S$GLB,, | Performed by: INTERNAL MEDICINE

## 2022-06-27 PROCEDURE — 1126F PR PAIN SEVERITY QUANTIFIED, NO PAIN PRESENT: ICD-10-PCS | Mod: CPTII,S$GLB,, | Performed by: INTERNAL MEDICINE

## 2022-06-27 PROCEDURE — 86480 TB TEST CELL IMMUN MEASURE: CPT | Performed by: INTERNAL MEDICINE

## 2022-06-27 NOTE — PROGRESS NOTES
Assessment:       1. Type 2 diabetes mellitus without complication, with long-term current use of insulin    2. Hypertension associated with diabetes    3. Encounter for hepatitis C screening test for low risk patient    4. Weight loss    5. Memory loss              Plan:             Noris was seen today for annual exam, establish care, hypertension, memory loss, diabetes and dementia.    Diagnoses and all orders for this visit:    Type 2 diabetes mellitus without complication, with long-term current use of insulin  Chronic  Uncontrolled  Patient is not at goal today  I have reviewed lifestyle modification to achieve/maintain goals  We will continue the current medication regimen as listed below  Patient will follow up in 2 weeks   CHECK LABS ADJSUT ACCORDINGLY  -     Lipid Panel; Future  -     Basic Metabolic Panel; Future  -     Hemoglobin A1C; Future  -     Hepatic Function Panel; Future  -     Microalbumin/Creatinine Ratio, Urine; Future  -     C-Peptide; Future  -     Glutamic Acid Decarboxylase; Future    Hypertension associated with diabetes  Chronic  Uncontrolled  Patient is not at goal today  I have reviewed lifestyle modification to achieve/maintain goals  We will continue the current medication regimen as listed below  Patient will follow up in 2 weeks   CHECK LABS ADJSUT ACCORDINGLY  -     Lipid Panel; Future  -     Basic Metabolic Panel; Future  -     Hemoglobin A1C; Future  -     Hepatic Function Panel; Future  -     Microalbumin/Creatinine Ratio, Urine; Future    Encounter for hepatitis C screening test for low risk patient  -     Hepatitis C Antibody; Future    Weight loss  -     TSH; Future  -     CBC Auto Differential; Future  -     Quantiferon Gold TB; Future    Memory loss  -     Ambulatory referral/consult to Neurology; Future          Subjective:       Patient ID: Noris Demarco is a 76 y.o. female.    Chief Complaint:   Annual Exam, Establish Care, Hypertension, Memory Loss, Diabetes, and  Dementia      HPI    #Last visit/Previous Provider  This patient is new to me  Previously seen by Yan Estrada MD  >3 YR       Link to History       #Interim Hx    No urgent care or ED/hospitalization    #Concerns Today    REPORTS UNINTENTIONAL weight loss.  This is been going on for about a year reports about 10% weight loss.  Her  is the 1 who cooks.  No issues pain for food in the last 12 months no symptoms of ausea, vomiting, dysphaiga, abd pain, diarrhea, melena, hematochezia ,  fever, swea, tchills.  No chest pain or shortness of breath no chronic coughing.        #Chronic Problems      DM assessment  Complication;   Medication:adherent to  - unsure    - hba1c -   Lab Results   Component Value Date    HGBA1C 10.7 (H) 01/13/2020    HGBA1C 9.9 (H) 05/03/2019    HGBA1C 11.1 (H) 11/01/2018      - umacr - No results found for: MICALBCREAT]  Vision/Podiatry: UTD  Low BG; has bg euqipment   Glucose [id number]  Diet/Exercise; no recent   DM teaching;   Preventative: _due_PPS23 on, Statin, ARB          HTN  Complication: no CVA/CKD/CAD  Last labs :   - BMP  Lab Results   Component Value Date     12/08/2021    K 3.8 12/08/2021    CL 99 12/08/2021    CO2 29 12/08/2021    BUN 14 12/08/2021    CREATININE 0.57 12/08/2021    CALCIUM 9.1 12/08/2021    ANIONGAP 8 12/08/2021    ESTGFRAFRICA >60.0 12/08/2021    EGFRNONAA >60.0 12/08/2021     Medication: adherent to   - unclear   Home Bps;   Symptoms: denies CP, SOB, changes in urination, sudden weakness, numbness        BCa  Dx: DCIS s/p lumpectomy (2008  Provider; Dr douglas   Tx: 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 She completed radiation therapy 9/2018  Plan:  Tamoxifen- tamoxifen since October 8, 2018          Health Maintenance Due   Topic Date Due    Hepatitis C Screening  Never done    TETANUS VACCINE  Never done    Shingles Vaccine (1 of 2) Never done    Pneumococcal Vaccines (Age 65+) (2 - PCV) 10/13/2014    Lipid Panel  12/10/2018  "   COVID-19 Vaccine (3 - Booster for Pfizer series) 10/04/2021    DEXA Scan  11/01/2021   FINDINGS:  The T score associated with the lumbar spine is 1.0 on the current examination. It was 0.5 on the prior examination. The T score associated with the left femoral neck is 0 on the current examination. It was 0.5 on the prior examination.. The T score associated with the right femoral neck is 0.1 on the current examination. It was not of examined on the prior examination.    IMPRESSION:     Normal study      Electronically signed by: Benjamin Ba MD  Date: 11/01/2018      The patient has no Health Maintenance topics of status Not Due  BCa screen         Tobacco Use: Medium Risk    Smoking Tobacco Use: Former Smoker    Smokeless Tobacco Use: Never Used             Review of Systems   All other systems reviewed and are negative.          Objective:   BP (!) 150/84 (BP Location: Right arm, Patient Position: Sitting, BP Method: Medium (Manual))   Pulse 66   Ht 5' 5" (1.651 m)   Wt 58.6 kg (129 lb 3 oz)   LMP 08/13/1999 (Approximate)   SpO2 98%   BMI 21.50 kg/m²     Vitals 1/18/2022 12/9/2021 1/21/2021 1/14/2020 8/13/2019   Height - 66 - - 69.000   Weight (lbs) 132.06 131.61 151.24 160.94 163.36   BMI (kg/m2) - 21.3 - - 24.2            Physical Exam  Vitals and nursing note reviewed.   Constitutional:       General: She is not in acute distress.     Appearance: She is well-developed. She is not diaphoretic.      Comments: Thin frail female    HENT:      Head: Normocephalic.      Nose: Nose normal.   Eyes:      General:         Right eye: No discharge.         Left eye: No discharge.      Conjunctiva/sclera: Conjunctivae normal.      Pupils: Pupils are equal, round, and reactive to light.   Cardiovascular:      Rate and Rhythm: Normal rate and regular rhythm.      Heart sounds: Normal heart sounds. No murmur heard.    No friction rub. No gallop.   Pulmonary:      Effort: Pulmonary effort is normal. No " respiratory distress.   Abdominal:      General: Bowel sounds are normal. There is no distension.      Palpations: Abdomen is soft.   Musculoskeletal:         General: No deformity. Normal range of motion.      Cervical back: Normal range of motion.   Skin:     General: Skin is warm.   Neurological:      Mental Status: She is alert and oriented to person, place, and time.      Cranial Nerves: No cranial nerve deficit.             ASCVD  The ASCVD Risk score (Merly SCHMITT Jr., et al., 2013) failed to calculate for the following reasons:    Cannot find a previous HDL lab    Cannot find a previous total cholesterol lab    Basic Labs  BMP  Lab Results   Component Value Date     12/08/2021    K 3.8 12/08/2021    CL 99 12/08/2021    CO2 29 12/08/2021    BUN 14 12/08/2021    CREATININE 0.57 12/08/2021    CALCIUM 9.1 12/08/2021    ANIONGAP 8 12/08/2021    ESTGFRAFRICA >60.0 12/08/2021    EGFRNONAA >60.0 12/08/2021     Lab Results   Component Value Date    ALT 21 12/08/2021    AST 30 12/08/2021    ALKPHOS 82 12/08/2021    BILITOT 0.6 12/08/2021       Lab Results   Component Value Date    TSH 3.997 04/20/2015       Lab Results   Component Value Date    WBC 4.40 12/08/2021    HGB 12.1 12/08/2021    HCT 35.9 (L) 12/08/2021    MCV 96 12/08/2021     12/08/2021           STD  No results found for: HIV1X2, GBQ48TXCV  No results found for: RPR  No results found for: HAV, HEPAIGM, HEPBIGM, HEPBCAB, HBEAG, HEPCAB  No results found for: LABNGO, LABCHLA      Lipids  Lab Results   Component Value Date    CHOL 178 12/10/2013     Lab Results   Component Value Date    HDL 64 12/10/2013     Lab Results   Component Value Date    LDLCALC 99.2 12/10/2013     Lab Results   Component Value Date    TRIG 74 12/10/2013     Lab Results   Component Value Date    CHOLHDL 36.0 12/10/2013       DM  Lab Results   Component Value Date    HGBA1C 10.7 (H) 01/13/2020               Future Appointments   Date Time Provider Department Center   7/12/2022  "10:00 AM [facility] RV[facility] LAB [facility]   2022  1:00 PM Reilly Peralta MD [facility] HEM ONC [place] [place]   2022 10:00 AM Abram Turner III, MD [facility]           Medication List with Changes/Refills   Current Medications    AMLODIPINE (NORVASC) 10 MG TABLET    Take 1 tablet by mouth once daily.    ASPIRIN (ECOTRIN) 81 MG EC TABLET    Take 81 mg by mouth once daily.    BD ULTRA-FINE SHORT PEN NEEDLE 31 GAUGE X 5/16" NDLE    SMARTSI Each SUB-Q Daily    BUSPIRONE (BUSPAR) 15 MG TABLET    Take 15 mg by mouth once daily.    CHOLECALCIFEROL, VITAMIN D3, (VITAMIN D3) 50 MCG (2,000 UNIT) CAP    Take 1 capsule by mouth once daily.    CLONIDINE (CATAPRES) 0.1 MG TABLET    Take 0.1 mg by mouth 2 (two) times daily.    CLONIDINE (CATAPRES) 0.2 MG TABLET    Take 0.2 mg by mouth 2 (two) times daily.    COENZYME Q10 100 MG CAPSULE    Take 100 mg by mouth once daily.    CYANOCOBALAMIN 500 MCG TABLET    Take 500 mcg by mouth once daily.    CYPROHEPTADINE (PERIACTIN) 4 MG TABLET    Take 1 tablet (4 mg total) by mouth 3 (three) times daily.    FISH OIL-OMEGA-3 FATTY ACIDS 300-1,000 MG CAPSULE    Take 1 g by mouth once daily.    FUROSEMIDE (LASIX) 40 MG TABLET    Take 40 mg by mouth once daily.     INSULIN NPH (NOVOLIN N) 100 UNIT/ML INJECTION    Inject 4 Units into the skin every evening. 4 units nightly as needed    LANTUS SOLOSTAR U-100 INSULIN GLARGINE 100 UNITS/ML (3ML) SUBQ PEN    Inject 20 Units into the skin once daily at 6am. 15 units once daily    LISINOPRIL (PRINIVIL,ZESTRIL) 40 MG TABLET    Take 40 mg by mouth 2 (two) times daily.     METFORMIN (GLUCOPHAGE) 500 MG TABLET    Take 500 mg by mouth once daily at 6am.    TAMOXIFEN (NOLVADEX) 20 MG TAB    Take 1 tablet (20 mg total) by mouth once daily.   Discontinued Medications    FLUZONE HIGH-DOSE 2018-, PF, 180 MCG/0.5 ML VACCINE        IBUPROFEN (ADVIL,MOTRIN) 600 MG TABLET    Take 600 mg by mouth 3 (three) times daily.    INSULIN REGULAR 100 " UNIT/ML INJ INJECTION    Inject into the skin 3 (three) times daily before meals.       Disclaimer:  This note has been generated using voice-recognition software. There may be grammatical or spelling errors that have been missed during proof-reading

## 2022-06-27 NOTE — PATIENT INSTRUCTIONS
We were happy to see you today    For your Testing  today  Please have your labs and/or imaging test done at your earliest convience      For your Medication   We will adjust medicaiton after lab results   For more information about side effects please visit medlineplus.gov      For your Referrals  Neurologist     For your Vaccinations    We gave your the following vaccines    Please obtain the following vaccines at the pharmacy        Please return to clinic in             Extra resources

## 2022-06-28 LAB
GAMMA INTERFERON BACKGROUND BLD IA-ACNC: 0.02 IU/ML
M TB IFN-G CD4+ BCKGRND COR BLD-ACNC: 0.01 IU/ML
MITOGEN IGNF BCKGRD COR BLD-ACNC: 9.97 IU/ML
TB GOLD PLUS: NEGATIVE
TB2 - NIL: 0.02 IU/ML

## 2022-06-28 RX ORDER — BLOOD-GLUCOSE,RECEIVER,CONT
1 EACH MISCELLANEOUS DAILY
Qty: 1 EACH | Refills: 1 | Status: SHIPPED | OUTPATIENT
Start: 2022-06-28 | End: 2022-08-09 | Stop reason: SDUPTHER

## 2022-06-28 RX ORDER — BLOOD-GLUCOSE SENSOR
1 EACH MISCELLANEOUS DAILY
Qty: 1 EACH | Refills: 1 | Status: SHIPPED | OUTPATIENT
Start: 2022-06-28 | End: 2022-08-09 | Stop reason: SDUPTHER

## 2022-06-28 RX ORDER — BLOOD-GLUCOSE TRANSMITTER
1 EACH MISCELLANEOUS DAILY
Qty: 1 EACH | Refills: 1 | Status: SHIPPED | OUTPATIENT
Start: 2022-06-28 | End: 2022-08-09 | Stop reason: SDUPTHER

## 2022-06-28 NOTE — TELEPHONE ENCOUNTER
Received call after hours from lab stating critical BG is 467. Attempted to reach patient at mobile number, left vm to return call. Attempted to reach patient at home number, vm box is full. Plan was to request that patient recheck her BG and POC to be determined by repeat reading. Will notify patient's PCP.     Diagnoses and all orders for this visit:    Type 2 diabetes mellitus without complication, with long-term current use of insulin  -     blood-glucose transmitter (DEXCOM G6 TRANSMITTER) Xiao; 1 each by Misc.(Non-Drug; Combo Route) route once daily at 6am.  -     blood-glucose meter,continuous (DEXCOM G6 ) Misc; 1 each by Misc.(Non-Drug; Combo Route) route once daily at 6am.  -     blood-glucose sensor (DEXCOM G6 SENSOR) Xiao; 1 each by Misc.(Non-Drug; Combo Route) route once daily at 6am.  -     Ambulatory referral/consult to Diabetes Education; Future        Called spoke to daughter   Also not aware of the medication dose  Will call back after 2 withi doseages  Adjust based on level

## 2022-06-29 ENCOUNTER — OFFICE VISIT (OUTPATIENT)
Dept: ENDOCRINOLOGY | Facility: CLINIC | Age: 77
End: 2022-06-29
Payer: MEDICARE

## 2022-06-29 VITALS
HEIGHT: 65 IN | WEIGHT: 131.31 LBS | BODY MASS INDEX: 21.88 KG/M2 | SYSTOLIC BLOOD PRESSURE: 110 MMHG | DIASTOLIC BLOOD PRESSURE: 70 MMHG

## 2022-06-29 DIAGNOSIS — E78.5 HYPERLIPIDEMIA, UNSPECIFIED HYPERLIPIDEMIA TYPE: ICD-10-CM

## 2022-06-29 DIAGNOSIS — Z79.4 TYPE 2 DIABETES MELLITUS WITHOUT COMPLICATION, WITH LONG-TERM CURRENT USE OF INSULIN: ICD-10-CM

## 2022-06-29 DIAGNOSIS — R63.4 WEIGHT LOSS: ICD-10-CM

## 2022-06-29 DIAGNOSIS — E11.65 UNCONTROLLED TYPE 2 DIABETES MELLITUS WITH HYPERGLYCEMIA: Primary | ICD-10-CM

## 2022-06-29 DIAGNOSIS — I10 HYPERTENSION, ESSENTIAL: ICD-10-CM

## 2022-06-29 DIAGNOSIS — E11.9 TYPE 2 DIABETES MELLITUS WITHOUT COMPLICATION, WITH LONG-TERM CURRENT USE OF INSULIN: ICD-10-CM

## 2022-06-29 PROCEDURE — 3074F SYST BP LT 130 MM HG: CPT | Mod: CPTII,S$GLB,, | Performed by: GENERAL ACUTE CARE HOSPITAL

## 2022-06-29 PROCEDURE — 1160F RVW MEDS BY RX/DR IN RCRD: CPT | Mod: CPTII,S$GLB,, | Performed by: GENERAL ACUTE CARE HOSPITAL

## 2022-06-29 PROCEDURE — 1159F PR MEDICATION LIST DOCUMENTED IN MEDICAL RECORD: ICD-10-PCS | Mod: CPTII,S$GLB,, | Performed by: GENERAL ACUTE CARE HOSPITAL

## 2022-06-29 PROCEDURE — 3288F FALL RISK ASSESSMENT DOCD: CPT | Mod: CPTII,S$GLB,, | Performed by: GENERAL ACUTE CARE HOSPITAL

## 2022-06-29 PROCEDURE — 99999 PR PBB SHADOW E&M-EST. PATIENT-LVL V: ICD-10-PCS | Mod: PBBFAC,,, | Performed by: GENERAL ACUTE CARE HOSPITAL

## 2022-06-29 PROCEDURE — 3074F PR MOST RECENT SYSTOLIC BLOOD PRESSURE < 130 MM HG: ICD-10-PCS | Mod: CPTII,S$GLB,, | Performed by: GENERAL ACUTE CARE HOSPITAL

## 2022-06-29 PROCEDURE — 1126F PR PAIN SEVERITY QUANTIFIED, NO PAIN PRESENT: ICD-10-PCS | Mod: CPTII,S$GLB,, | Performed by: GENERAL ACUTE CARE HOSPITAL

## 2022-06-29 PROCEDURE — 1160F PR REVIEW ALL MEDS BY PRESCRIBER/CLIN PHARMACIST DOCUMENTED: ICD-10-PCS | Mod: CPTII,S$GLB,, | Performed by: GENERAL ACUTE CARE HOSPITAL

## 2022-06-29 PROCEDURE — 1101F PR PT FALLS ASSESS DOC 0-1 FALLS W/OUT INJ PAST YR: ICD-10-PCS | Mod: CPTII,S$GLB,, | Performed by: GENERAL ACUTE CARE HOSPITAL

## 2022-06-29 PROCEDURE — 1157F PR ADVANCE CARE PLAN OR EQUIV PRESENT IN MEDICAL RECORD: ICD-10-PCS | Mod: CPTII,S$GLB,, | Performed by: GENERAL ACUTE CARE HOSPITAL

## 2022-06-29 PROCEDURE — 1126F AMNT PAIN NOTED NONE PRSNT: CPT | Mod: CPTII,S$GLB,, | Performed by: GENERAL ACUTE CARE HOSPITAL

## 2022-06-29 PROCEDURE — 99204 PR OFFICE/OUTPT VISIT, NEW, LEVL IV, 45-59 MIN: ICD-10-PCS | Mod: S$GLB,,, | Performed by: GENERAL ACUTE CARE HOSPITAL

## 2022-06-29 PROCEDURE — 1159F MED LIST DOCD IN RCRD: CPT | Mod: CPTII,S$GLB,, | Performed by: GENERAL ACUTE CARE HOSPITAL

## 2022-06-29 PROCEDURE — 3078F DIAST BP <80 MM HG: CPT | Mod: CPTII,S$GLB,, | Performed by: GENERAL ACUTE CARE HOSPITAL

## 2022-06-29 PROCEDURE — 99999 PR PBB SHADOW E&M-EST. PATIENT-LVL V: CPT | Mod: PBBFAC,,, | Performed by: GENERAL ACUTE CARE HOSPITAL

## 2022-06-29 PROCEDURE — 3288F PR FALLS RISK ASSESSMENT DOCUMENTED: ICD-10-PCS | Mod: CPTII,S$GLB,, | Performed by: GENERAL ACUTE CARE HOSPITAL

## 2022-06-29 PROCEDURE — 1101F PT FALLS ASSESS-DOCD LE1/YR: CPT | Mod: CPTII,S$GLB,, | Performed by: GENERAL ACUTE CARE HOSPITAL

## 2022-06-29 PROCEDURE — 3078F PR MOST RECENT DIASTOLIC BLOOD PRESSURE < 80 MM HG: ICD-10-PCS | Mod: CPTII,S$GLB,, | Performed by: GENERAL ACUTE CARE HOSPITAL

## 2022-06-29 PROCEDURE — 1157F ADVNC CARE PLAN IN RCRD: CPT | Mod: CPTII,S$GLB,, | Performed by: GENERAL ACUTE CARE HOSPITAL

## 2022-06-29 PROCEDURE — 99204 OFFICE O/P NEW MOD 45 MIN: CPT | Mod: S$GLB,,, | Performed by: GENERAL ACUTE CARE HOSPITAL

## 2022-06-29 RX ORDER — PEN NEEDLE, DIABETIC 31 GX5/16"
NEEDLE, DISPOSABLE MISCELLANEOUS
Qty: 100 EACH | Refills: 3 | Status: SHIPPED | OUTPATIENT
Start: 2022-06-29

## 2022-06-29 RX ORDER — INSULIN ASPART 100 [IU]/ML
5 INJECTION, SOLUTION INTRAVENOUS; SUBCUTANEOUS
Qty: 6 ML | Refills: 11 | Status: SHIPPED | OUTPATIENT
Start: 2022-06-29 | End: 2022-09-19 | Stop reason: SDUPTHER

## 2022-06-29 RX ORDER — ATORVASTATIN CALCIUM 20 MG/1
20 TABLET, FILM COATED ORAL DAILY
Qty: 90 TABLET | Refills: 3 | Status: CANCELLED | OUTPATIENT
Start: 2022-06-29 | End: 2023-06-29

## 2022-06-29 RX ORDER — IBUPROFEN 200 MG
16 TABLET ORAL
Qty: 50 TABLET | Refills: 12 | COMMUNITY
Start: 2022-06-29 | End: 2023-06-29

## 2022-06-29 NOTE — PATIENT INSTRUCTIONS
Due to your diabetes being uncontrolled and concerns for type 1 diabetes. I will recommend the following.     We need to be aggressive up front so we can help improve the symptoms of high blood sugars and prevent further weight loss.     WE WILL STOP NOVOLIN to AVOID LOW BLOOD SUGARS     We will decrease the Lantus to 18 units in the morning.     We will start Novolog 5 units ( 10 - 15 minutes before meals only if eating) If not eating do not use as it will result in a low blood sugar.     Continue with Metformin 500mg once a day for now.     We will check blood work to rule out type 1 diabetes.      Keep your diabetes education referral appointment.     Keep a sugar log to send for review in 2 weeks.     150 grams of carbohydrates daily MAX,  (50 grams or less per meal)     Avoid sugary drinks (Sodas, Sweet Tea, Juices with added sugar, Soft drinks)     Check your sugars 3-4 times daily (Before meals and at bedtime)     Sugar goals before breakfast (70 - 130)  Sugars 2 hours after meals  (less than 180)     Try walking or doing some sort of physical activity at least 30 minutes 3 times a week to increase your sensitivity to insulin, improve sugar levels and hopefully this will help in trying to reduce the doses of your medications in the future.     If having low blood sugar < 70 please correct with 16 grams of carbohydrates or with 4 glucose tablets and re-check your sugars every 15 minutes until symptoms resolve and sugar is above 100.      We will check your A1C and labs prior to next visit.     Ophthalmology appointment once a year.     Diabetes education appointment once a year.     If any questions feel free to contact me.     Have a nice day.     Sincerely,       Yordy Suarez MD   Endocrinology   6/29/2022 3:46 PM                  Eating the Right Number of Calories ([id number] Guidelines)    Calories are a measure of the energy you get from food. If you eat more calories than you use, you will gain  weight. If you eat fewer calories than you use, you will lose weight. Below are tables that give the number of calories needed each day. Look for your gender, age, and activity level. If you stick to this number, you should neither gain nor lose weight. Note that this is an estimated number of calories.* Your exact number may differ.    Women  Age in years Low activity level (calories/day) Moderate activity level (calories/day) High activity level (calories/day)   19 to 30 1,800-2,000 2,000-2,200 2,400   31 to 50 1,800 2,000 2,200   51 and older 1,600 1,800 2,000-2,200      Men  Age in years Low activity level  (calories/day) Moderate activity level (calories/day) High activity level (calories/day)   19 to 30 2,400-2,600 2,600-2,800 3,000   31 to 50 2,200-2,400 2,400-2,600 2,800-3,000   51 and older 2,000-2,200 2,200-2,400 2,400-2,800     Activity levels defined  Low. Only light physical activity such as that done during typical daily life.  Moderate. Light physical activity done during typical daily life AND physical activity equal to walking about 1.5 to 3 miles a day at 3 to 4 miles per hour.  High. Light physical activity done during typical daily life AND physical activity equal to walking more than 3 miles a day at 3 to 4 miles per hour.  *From Dietary Guidelines for Americans, 2205-0698, [place] Department of Health and Human Services.    Eat less fat  A gram of fat has almost 2.5 times the calories of a gram of protein or carbohydrates. Try to balance your food choices so that only 20% to 35% of your calories comes from total fat. This means an average of 2½ to 3½ grams of fat for each 100 calories you eat.    Eat more fiber  High-fiber foods are digested more slowly than low-fiber foods, so you feel full longer. Try to get at least 25 grams of fiber each day for a 2000 calorie diet. Foods high in fiber include:  Vegetables and fruits  Whole-grain or bran breads, pastas, and cereals  Legumes (beans) and  peas  As you begin to eat more fiber, be sure to drink plenty of water to keep your digestive system working smoothly.    Tips  Do's and don'ts include:   Dont skip meals. This often leads to overeating later on. Its best to spread your eating throughout the day.  Eat a variety of foods, not just a few favorites.  If you find yourself eating when youre not hungry, ask yourself why. Many of us eat when were bored, stressed, or just to be polite. Listen to your body. If youre not hungry, get busy doing something else instead of eating.  Eat slower, shooting for 20 to 30 minutes for each meal. It takes 20 minutes for your stomach to tell your brain that its full. Slow eaters tend to eat less and are still satisfied, while fast eaters may tend to be overeaters.   Pay attention to what you eat. Dont read or watch TV during your meal.     ---------------------------------------------------------------------------------------------------------------------------------------------------    Losing Weight for Heart Health  Excess weight is a major risk factor for heart disease. Losing weight has many benefits including lowering your blood pressure, improving your cholesterol level, and decreasing your risk for diseases such as diabetes and heart disease. It may help keep your arteries open so that your heart can get the oxygen-rich blood it needs. All in all, losing weight makes you healthier.       Exercise with a friend. When activity is fun, you're more likely to stick with it.     Calories and weight loss  Calories are the fuel your body burns for energy. You get the calories you need from the food you eat. For healthy weight loss, women should eat at least 1,200 calories a day, men at least 1,500.  When you eat more calories than you need, your body stores the extra calories as fat. One pound of fat equals 3,500 calories.  To lose weight, try to reduce your total calorie intake by 500 calories. To do this, eat 250  calories less each day. Add activity to burn the other 250 calories. Walking 2.5 miles burns about 250 calories. Other more intense activities can burn more calories in the time you spend doing them, such as swimming and running. It is important to understand that reducing calorie intake is much more effective at weight loss than is exercise.  Eat a variety of healthy foods to get the nutrients you need.    Tips for losing weight  Drink 8 to 10 glasses of water a day.  Dont skip meals. Instead, eat smaller portions.  Eat your meals earlier in the day.  Cut out sugary drinks such as soda and fruit juices.  Make your later meals lighter than your earlier meals.     What can exercise help?  Blood sugar. Regular exercise improves blood sugar control by helping your body use insulin.  Mental and emotional health. Physical activity relieves stress and helps you sleep better.  Heart health. With regular exercise, you can reduce your risk of heart disease and high blood pressure. You can also improve your cholesterol and triglyceride levels.  Weight. Exercise helps you lose fat, gain muscle, and control your weight.  Health of blood vessels and nerves. Activity helps lower blood sugar. This helps prevent damage to blood vessels and nerves that can cause problems with your brain, eyes, feet, and legs.  Finances. If you manage your blood sugar, you may spend less on medical care.    2 types of exercise  Two types of exercise help your body use blood sugar. Experts advise both types of exercise for people with diabetes:  Aerobic exercise. This is a rhythmic, repeated, continued movement of large muscle groups for at least 10 minutes at a time. You should do this about 30 minutes a day on most days of the week. Examples include walking, bicycling, jogging, swimming, water aerobics, and many sports.  Resistance exercise (strength training). This type of exercise uses muscles to move weight or work against resistance. You can do  it with free weights, machines, resistance tubing, or your own body weight. Adults with diabetes should aim for 2 to 3 sessions of resistance exercise each week. Its best to skip a day in between.    A goal to shoot for  Your main goal is to become more active. Even a little bit helps. Choose an activity that you like. Walking is one great form of exercise that everyone can do. Talk to your healthcare provider about any limits you may have before starting with an exercise program. Then aim for 150 minutes a week of physical activity. Dont let more than 2 days go by without exercise. When you are sitting for long periods of time, get up for short sessions of light activity every 30 minutes.    Getting activity into your day  Being more active doesnt have to be hard work. Try these to get more activity into your day:  Take the stairs instead of the elevator  Garden, do housework, and yard work  Choose a parking space farther from the store  Walk to talk to a co-worker instead of calling  Take a 10-minute walk around the block at lunch  Walk to a bus stop a little farther from your home or office  Walk the dog after dinner     ---------------------------------------------------------------------------------------------------------------------------------------------------    Reading Food Labels  Look for the Nutrition Facts label on packaged foods. Reading labels is a big step toward eating healthier. The tips below help you know what to look for.    Serving size. Read this closely because the package, jar, or can may contain more than 1 serving. This is how to measure 1 serving of the food in the package. If you eat more than 1 serving, you get more of everything on the label -- including fat, cholesterol, and calories.  Total fat. This tells you how many grams (g) of fat are in 1 serving. Fat is high in calories. A healthy goal is to have less than 25% of your daily calories come from fat.  Saturated fat. This  tells you how much saturated fat is in 1 serving. Saturated fat raises your cholesterol the most. Look for foods that have little or no saturated fat.  Trans fat. This tells you how much trans fat is in 1 serving. Even a small amount of trans fat can harm your health. Choose foods that have no trans fat.  Cholesterol. This tells you how much cholesterol is in 1 serving. For many years, it had been recommended to eat less than 300 milligrams (mg) of cholesterol a day. New guidelines have removed this limitation as cholesterol has been recently shown to not raise blood cholesterol levels as significantly as previously thought. However, many foods high in cholesterol are also high in saturated fat. It is recommended to limit saturated fat in your diet.  Calories from fat. This number tells you how many calories from fat are in 1 serving (there are 9 calories per gram of fat). Look for foods with few calories from fat.  % Daily value. The higher the number, the more 1 serving has of that nutrient. Look for foods that have low numbers for total fat, saturated fat, cholesterol, and sodium.  Sodium. This tells you how much sodium (salt) is in 1 serving. Choose foods with low numbers for sodium.  Dietary fiber. This number tells you how much fiber is in 1 serving. Foods that are high in fiber can help you feel full. They can also be good for your heart and digestion. The recommended daily amount of fiber is 25 grams for women and 38 grams for men. After age 50, your daily fiber needs drop to 21 grams for women and 30 grams for men.       ---------------------------------------------------------------------------------------------------------------------------------------------------    Understanding Carbohydrates, Fats, and Protein  Food is a source of fuel and nourishment for your body. Its also a source of pleasure. Having diabetes doesnt mean you have to eat special foods or give up desserts. Instead, your dietitian  can show you how to plan meals to suit your body. To start, learn how different foods affect blood sugar.    Carbohydrates  Carbohydrates are the main source of fuel for the body. Carbohydrates raise blood sugar. Many people think carbohydrates are only found in pasta or bread. But carbohydrates are actually in many kinds of foods:  Sugars occur naturally in foods such as fruit, milk, honey, and molasses. Sugars can also be added to many foods, from cereals and yogurt to candy and desserts. Sugars raise blood sugar.  Starches are found in bread, cereals, pasta, and dried beans. Theyre also found in corn, peas, potatoes, yam, acorn squash, and butternut squash. Starches also raise blood sugar.   Fiber is found in foods such as vegetables, fruits, beans, and whole grains. Unlike other carbs, fiber isnt digested or absorbed. So it doesnt raise blood sugar. In fact, fiber can help keep blood sugar from rising too fast. It also helps keep blood cholesterol at a healthy level.  Did you know?  Even though carbohydrates raise blood sugar, its best to have some in every meal. They are an important part of a healthy diet.     Fat  Fat is an energy source that can be stored until needed. Fat does not raise blood sugar. However, it can raise blood cholesterol, increasing the risk of heart disease. Fat is also high in calories, which can cause weight gain. Not all types of fat are the same.    More Healthy:  Monounsaturated fats are mostly found in vegetable oils, such as olive, canola, and peanut oils. They are also found in avocados and some nuts. Monounsaturated fats are healthy for your heart. Thats because they lower LDL (unhealthy) cholesterol.  Polyunsaturated fats are mostly found in vegetable oils, such as corn, safflower, and soybean oils. They are also found in some seeds, nuts, and fish. Polyunsaturated fats lower LDL (unhealthy) cholesterol. So, choosing them instead of saturated fats is healthy for your heart.  Certain unsaturated fats can help lower triglycerides.     Less Healthy:  Saturated fats are found in animal products, such as meat, poultry, whole milk, lard, and butter. Saturated fats raise LDL cholesterol and are not healthy for your heart.  Hydrogenated oils and trans fats are formed when vegetable oils are processed into solid fats. They are found in many processed foods. Hydrogenated oils and trans fats raise LDL cholesterol and lower HDL (healthy) cholesterol. They are not healthy for your heart.    Protein  Protein helps the body build and repair muscle and other tissue. Protein has little or no effect on blood sugar. However, many foods that contain protein also contain saturated fat. By choosing low-fat protein sources, you can get the benefits of protein without the extra fat:  Plant protein is found in dry beans and peas, nuts, and soy products, such as tofu and soymilk. These sources tend to be cholesterol-free and low in saturated fat.  Animal protein is found in fish, poultry, meat, cheese, milk, and eggs. These contain cholesterol and can be high in saturated fat. Aim for lean, lower-fat choices.    ---------------------------------------------------------------------------------------------------------------------------------------------------    Understanding Carbohydrates    A car needs the right type of fuel to run. And you need the right kind of food to function. To keep your energy level up, your body needs food that has carbohydrates. But carbohydrates raise blood sugar levels higher and faster than other kinds of food. Your dietitian will work with you to figure out the amount of carbohydrates you need.    Starches  Starches are found in grains, some vegetables, and beans. Grain products include bread, pasta, cereal, and tortillas. Starchy vegetables include potatoes, peas, corn, lima beans, yams, and squash. Kidney beans, bernabe beans, black beans, garbanzo beans, and lentils also contain  starches.    Sugars  Sugars are found naturally in many foods. Or sugar can be added. Foods that contain natural sugar include fruits and fruit juices, dairy products, honey, and molasses. Added sugars are found in most desserts, processed foods, candy, regular soda, and fruit drinks. These are very helpful for treating low blood sugar, or hypoglycemia. They provide sugar quickly. Try to keep at least 15 to 20 grams of these simple sugars with you at all times. Eat this if you begin to have low blood sugar symptoms.    Fiber  Fiber comes from plant foods. Most fiber isnt digested by the body. Instead of raising blood sugar levels like other carbohydrates, it actually stops blood sugar from rising too fast. Fiber is found in fruits, vegetables, whole grains, beans, peas, and many nuts.    Carb counting  Keep track of the amount of carbohydrates you eat. This can help you keep the right balance of physical activity and medicine. The amount of carbohydrates needed will vary for each person. It depends on many things such as your health, the medicines you take, and how active you are. Your healthcare team will help you figure out the right amount of carbohydrates for you. You may start with around 45 to 60 grams of carbohydrate per meal, depending on your situation. Carb counting is a system that helps you keep track of the carbohydrates you eat at each meal.  Carbohydrates come from a variety of foods. These include grains, starchy vegetables, fruit, milk, beans, and snack foods. You can either count carbohydrate grams or carbohydrate servings. When you count carbohydrate servings, 1 carbohydrate serving = 15 grams of carbohydrates.  Here are some examples of foods containing about 15 grams of carbohydrates (1 serving of carbohydrates):  1/2 cup of canned or frozen fruit  A small piece of fresh fruit (4 ounces)  1 slice of bread  1/2 cup of oatmeal  1/3 cup of rice  4 to 6 crackers  1/2 English muffin  1/2 cup of  black beans  1/4 of a large baked potato (3 ounces)  2/3 cup of plain fat-free yogurt  1 cup of soup  1/2 cup of casserole  6 chicken nuggets  2-inch-square brownie or cake without frosting  2 small cookies  1/2 cup of ice cream or sherbet    Carb counting is easier when food labels are available. Look at the label to see how many grams of total carbohydrates the food contains. Then you can figure out how much you should eat.  Two very important lines to look at on the label are the serving size and the total carbohydrate amount. Here are some tips for using food labels to count your carbohydrate intake:  Check the serving size. The information on the label is based on that serving size. If you eat more than the listed serving size, you may have to double or triple the other information on the label.   Check the total grams of carbohydrates. Total carbohydrate from the label includes sugar, starch, and fiber. Be sure to use the total carbohydrate number and not sugar alone.  Know how many grams of carbohydrates you can have.  Be familiar with the matching portion sizes.  Compare labels. Compare the labels of different products, looking at serving sizes and total carbohydrates to find the products that work best for you.   Don't forget protein and fat. With all the focus on carb counting, it might be easy to forget protein and fat in your meals. Don't forget to include sources of protein and healthy fat to balance your meals.  Its also important to be consistent with the amount and time you eat when taking a fixed dose of diabetes medicine. Work with your healthcare provider or dietitian if you need additional help. He or she can help you keep track of your carbohydrate intake. He or she can also help you figure out how many grams of carbohydrates you should  have.      ---------------------------------------------------------------------------------------------------------------------------------------------------    Diabetes: Learning About Serving and Portion Sizes     A good rule of thumb: Devote half your plate to vegetables and green salad. Split the other half between protein and starchy carbohydrates. Fruit makes a good dessert.     Servings and portions. Whats the difference? These terms can be very confusing. But learning to measure serving sizes can help you figure out how many carbohydrates (carbs) and other foods you eat each day. They are also powerful tools for managing your weight.    Servings and portions  Many different words are used to describe amounts of food. If your health care provider uses a term youre not sure of, dont be afraid to ask. It helps to know the difference between servings and portions:  A serving size is a fixed size. Food producers use this term to describe their products. For example, the label on a cereal box could say that 1 cup of dry cereal = 1 serving.  A portion (also called a helping) is how much you eat or how much you put on your plate at a meal. For example, you might eat 2 cups of cereal at breakfast.    Using serving information  The portion you choose to eat (such as 2 cups of cereal) may be more than one serving as listed on the food label (such as 1 cup of cereal). Thats why it helps to measure or weigh the food you eat. Because the food label values are based on servings, youll need to know how many servings you eat at one sitting.     Ounces: 2 to 3 ounces is about the size of your palm.       1 Cup: 1 cup (or a medium-sized piece) is about the size of your fist.       1/2 Cup: 1/2 cup is about the size of your cupped hand.      One tablespoon is about the size of your thumb.  One teaspoon is about the size of the tip of your thumb.    Keeping track of serving sizes  When youre planning for a snack or a  "meal, keep servings in mind. If you dont have measuring cups or a scale handy, there are ways to eyeball serving sizes, such as comparing your food to the size of your hand (see pictures above).    Managing portion sizes  If your weight is a concern, reducing your portions can help. You can eat more than one serving of a food at once. But to keep from eating too much at one meal, learn how to manage your portions. A portion is the amount of each type of food on your plate. See the plate diagram for an example of balanced portions.    ---------------------------------------------------------------------------------------------------------------------------------------------------    Diabetes: Shopping for and Preparing Meals    Having diabetes doesnt mean you have to shop in a special aisle or look for special foods. But you will need to make choices. By comparing items and reading food labels, you can find the healthiest foods for you and your family.  Comparing items  When you shop, compare items to find the best ones for your needs. Keep these facts in mind:  No sugar added does not mean a product is sugar-free.  "Sugar-free" means less than 1/2 gram (g) of sugar per serving.  Fat free means less than 1/2 g of fat per serving. This does not necessarily mean the product is low in calories.  Low fat means 3 g fat or less per serving. Reduced fat or less fat means 25% less fat than the regular version. Some of this fat may be saturated or trans fat. And calories per serving may be similar to the regular version.    Making small changes  Dont try to change all of your eating habits at once. Here are some ideas to start with:  Try fat-free or low-fat cheese, milk, and yogurt. Also try leaner cuts of meat. This will help you cut down on saturated fat.  Try whole-grain breads, brown rice, and whole-wheat pasta.  Load up on fresh or frozen vegetables. If you buy canned, choose low-sodium vegetables.  Avoid " processed foods as much as possible. They tend to be low in fiber and high in trans fats and sodium.  Try tofu, soymilk, or meat substitutes.?They can help you cut cholesterol and saturated fat out of your diet.    Learning to read food labels  To find healthy foods that help you control blood sugar, learn how to read food labels. Look for the Nutrition Facts label on packaged foods. It will tell you how much carbohydrate, sugar, fat, and fiber is in each serving. Then, you can decide whether or not the food fits into your meal plan.    Using the food label  So, once you have the food label, what do you do with it? The food label helps in many ways. Use it to:  Compare items and decide which is the best for your health needs.  Track the number of carbohydrates in your portions.  Figure out how many servings of a food you can have and still stay within the number of carbohydrates for that meal.    Planning meals  For good blood sugar control, plan what and when youll eat. Start by creating a meal plan that includes all the food groups. Then, time your meals to help keep your blood sugar level steady. You may need to adjust your plan for special situations.    Eat from all the food groups  The basis of a healthy meal plan is variety (eating many different types of foods). Look for lean meats, fresh fruits and vegetables, whole grains, and low-fat or non-fat dairy products. Eating a wide variety of foods provides the nutrients your body needs. It can also keep you from getting bored with your meal plan.    Reduce liquid sugars  Extra calories from sodas, sports drinks, and fruit drinks make it hard to keep blood sugar in range. Cut as many liquid sugars from your meal plan as you can. This includes most fruit juices, which are often high in natural or added sugar. Instead, drink plenty of water and other sugar-free beverages.    Eat less fat  If you need to lose some weight, try to reduce the amount of fat in your  diet. This can also help lower your cholesterol level to keep blood vessels healthier. Cut fat by using only small amounts of liquid oil for cooking. Read food labels carefully to avoid foods with unhealthy trans fats.    Timing your meals  When it comes to blood sugar control, when you eat is as important as what you eat. You may need to eat several small meals spaced evenly throughout the day to stay in your target range. So dont skip breakfast or wait until late in the day to get most of your calories. Doing so can cause your blood sugar to rise too high or fall too low.    Cooking wisely  Broil, steam, bake, or grill meats and vegetables, instead of frying.  Instead of cream-based sauces or sugary glazes, flavor foods with vegetable purée, lemon or lime juice, or herb seasonings.  Remove skin from chicken and turkey before serving.  Look in cookbooks for easy, low-fat, low-sugar recipes. When making your usual recipes, cut sugar by 1/2 and fat by 1/3.      ---------------------------------------------------------------------------------------------------------------------------------------------------    Healthy Meals for Diabetes    Ask your healthcare team to help you make a meal plan that fits your needs. Your meal plan tells you when to eat your meals and snacks, what kinds of foods to eat, and how much of each food to eat. You dont have to give up all the foods you like. But you do need to follow some guidelines.  Choose healthy carbohydrates  Starches, sugars, and fiber are all types of carbohydrates. Fiber can help lower your cholesterol and triglycerides. Fiber is also healthy for your heart. You should have 20 to 35 grams of total fiber each day. Fiber-rich foods include:  Whole-grain breads and cereals  Bulgur wheat  Brown rice     Whole-wheat pasta  Fruits and vegetables  Dry beans, and peas   Keep track of the amount of carbohydrates you eat. This can help you keep the right balance of physical  activity and medicine. The amount of carbohydrates needed will vary for each person. It depends on many things such as your health, the medicines you take, and how active you are. Your healthcare team will help you figure out the right amount of carbohydrates for you. You may start with around 45 to 60 grams of carbohydrates per meal, depending on your situation.   Here are some examples of foods containing about 15 grams of carbohydrates (1 serving of carbohydrates):  1/2 cup of canned or frozen fruit  A small piece of fresh fruit (4 ounces)  1 slice of bread  1/2 cup of oatmeal  1/3 cup of rice  4 to 6 crackers  1/2 English muffin  1/2 cup of black beans  1/4 of a large baked potato (3 ounces)  2/3 cup of plain fat-free yogurt  1 cup of soup  1/2 cup of casserole  6 chicken nuggets  2-inch-square brownie or cake without frosting  2 small cookies  1/2 cup of ice cream or sherbet    Choose healthy protein foods  Eating protein that is low in fat can help you control your weight. It also helps keep your heart healthy. Low-fat protein foods include:  Fish  Plant proteins, such as dry beans and peas, nuts, and soy products like tofu and soymilk  Lean meat with all visible fat removed  Poultry with the skin removed  Low-fat or nonfat milk, cheese, and yogurt    Limit unhealthy fats and sugar  Saturated and trans fats are unhealthy for your heart. They raise LDL (bad) cholesterol. Fat is also high in calories, so it can make you gain weight. To cut down on unhealthy fats and sugar, limit these foods:  Butter or margarine  Palm and palm kernel oils and coconut oil  Cream  Cheese  Silvestre  Lunch meats     Ice cream  Sweet bakery goods such as pies, muffins, and donuts  Jams and jellies  Candy bars  Regular sodas     How much to eat  The amount of food you eat affects your blood sugar. It also affects your weight. Your healthcare team will tell you how much of each type of food you should eat.  Use measuring cups and spoons  and a food scale to measure serving sizes.  Learn what a correct serving size looks like on your plate. This will help when you are away from home and cant measure your servings.  Eat only the number of servings given on your meal plan for each food. Dont take seconds.  Learn to read food labels. Be sure to look at serving size, total carbohydrates, fiber, calories, sugar, and saturated and trans fats. Look for healthier alternatives to foods that have added sugar.  Plan ahead for parties so you can still have a good time without going overboard with unhealthy food choices. Set a good example yourself by bringing a healthy dish to pot lucks.     Choose healthy snacks  When it comes to snacks, we usually think about foods with added sugar and fats. But there are many other options for healthier snack choices. Here are a few snack ideas to choose from:  Snacks with less than 5 grams of carbohydrates  1 piece of string cheese  3 celery sticks plus 1 tablespoon of peanut butter  5 cherry tomatoes plus 1 tablespoon of ranch dressing  1 hard-boiled egg  1/4 cup of fresh blueberries   5 baby carrots  1 cup of light popcorn  1/2 cup of sugar-free gelatin  15 almonds  Snacks with about 10 to 20 grams of carbohydrates  1/3 cup of hummus plus 1 cup of fresh cut nonstarchy vegetables (carrots, green peppers, broccoli, celery, or a combination)  1/2 cup of fresh or canned fruit plus 1/4 cup of cottage cheese  1/2 cup of tuna salad with 4 crackers  2 rice cakes and a tablespoon of peanut butter  1 small apple or orange  3 cups light popcorn  1/2 of a turkey sandwich (1 slice of whole-wheat bread, 2 ounces of turkey, and mustard)  Portion sizes are important to controlling your blood sugar and staying at a healthy weight. Stock up on healthy snack items so you always have them on hand.    When to eat  Your meal plan will likely include breakfast, lunch, dinner, and some snacks.  Try to eat your meals and snacks at about the same  times each day.  Eat all your meals and snacks. Skipping a meal or snack can make your blood sugar drop too low. It can also cause you to eat too much at the next meal or snack. Then your blood sugar could get too high.    ---------------------------------------------------------------------------------------------------------------------------------------------------    Eating Out When You Have Diabetes  Eating right is an important part of keeping your blood sugar in your target range. You just need to make healthy choices.    Be creative when eating out. Many places dont make you stick strictly to the menu. Instead of ordering a large entree, choose an appetizer or two and a bowl of soup. A mix of side orders can also make a good meal. Read the descriptions of other entrees and specials. If another entree comes with baby carrots, ask for a side order of them even if they dont come with your entree. Ask how food is prepared or if it can be made differently.  Tips for making the most of your meal out  Ask to have high-fat, high-calorie extras, such as French fries and potato chips, left off your plate so you wont be tempted.   Ask what substitutions are available. Instead of French fries, you may be able to have a side of salad with low-calorie dressing.  Order low-fat milk instead of cream for your coffee.  Ask for vegetables and main courses to be served without sauces, butter, margarine, or oil.  Be aware of portion size. You dont have to clean your plate. Take half your meal home in a doggie bag to eat the next day.  Look for heart-healthy or low-fat entrees that include whole grains, vegetables, or fruits.  Choose foods that are grilled, broiled, or steamed.  Avoid dishes that are described on the menu as fried, breaded, smothered, rich, or creamy.    Tips for restaurant meals  When you eat away from home try these tips:  Try to schedule your dining-out meal at your normal meal time. Make a reservation if  possible, so you don't have to wait to eat. If you can't make a reservation, try to arrive at the restaurant at a less-busy time to cut down your wait time. Eat a small fruit or starch snack at your regular mealtime if your restaurant meal is going to be later than usual.   Call ahead to see if the restaurant can meet your dietary needs if you've never been there before. Or you can go online to see the menu ahead of time.  Carry some crackers with you in case the restaurant needs you to wait until you can be served.  Ask how foods are prepared before you order.  Instead of fried, sautéed, or breaded foods, choose ones that are broiled, steamed, grilled, or baked.  Ask for sauces, gravies, and dressings on the side.  Only eat an amount that fits your meal plan. Remember: You can take home the leftovers.  Save dessert for special occasions. Then choose a small dessert or share one with a friend or family member.    Make healthy choices  Fast food  Garden salad with light dressing on the side  Baked potato with vegetables or herbs  Broiled, roasted, or grilled chicken sandwich  Sliced turkey or lean roast beef sandwich    Mexican  Chicken enchilada, without cheese or sour cream   Small burrito with whole beans and chicken  Whole beans (not refried) and rice  Chicken or fish fajitas    Steakhouse  Grilled or broiled lean cuts of beef  Baked potato with vegetables or herbs  Broiled or baked chicken. Dont eat the skin.  Steamed vegetables    Asian  Steamed dumplings or potstickers  Broiled, boiled, or steamed meats or fish  Sushi or sashimi  Steamed rice or boiled noodles. One serving is equal to 1/3 cup.      © 7213-9028 [employer]. [address] [place] [address], [place] [address]. All rights reserved. This information is not intended as a substitute for professional medical care. Always follow your healthcare professional's instructions.

## 2022-06-29 NOTE — PROGRESS NOTES
Subjective:      Patient ID: Noris Demarco is a 76 y.o. female.    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  Pt today reports significant weight loss around 80 pounds in the past 2 yrs.  Pt reports significant hyperglycemias in the 200 - 400s with hyperglycemic symptoms of polyuria, polydipsia and fatigue.   Pt reports having fasting hypoglycemias when using NPH at bed time.  Denies severe hypoglycemic episodes requiring medical attention recently.        With regards to Diabetes Mellitus:   -Type 2    -Duration:  Dx on 2015 with symptoms.       -FH of DM?  Brother DM2     -Microvascular complications: (Retinopathy, Nephropathy/CKD)    -Macrovascular complications:  DENIES     -DKA?  NO   -HHS?  NO     -DM Medications:  Lantus 20 units qAM, NPH 4 untis qHS, Metformin 500mg daily      -PT HAD RECENT C PEPTIDE LEVELS UNDETECTABLE IN THE SETTING OF HYPERGLYCEMIA, ALONG WITH WEIGHT LOSS WHICH IS CONCERNING FOR WATSON       -Compliance w/ Meds:  YES     -Hyperglycemia symptoms:  Polyuria, polydipsia, fatigue      -Hypoglycemia symptoms:  Yes.  Fasting when doing Novolin at bed time     -Know how to correct hypoglycemias:  Yes     -Glucose monitoring:   3 Times a day (110 - 120),  L (200 - 250s)  D (300 - 400s)    -Diet:   High carb  meals but recently trying to decrease carbohydrate intake.     -Activity:  Sedentary       -Pancreatitis?  NO   -CHF?  NO       Diabetes Management Status    Statin: Not taking  (Muscle pain)   ACE/ARB: Taking    Screening or Prevention Patient's value Goal Complete/Controlled?   HgA1C Testing and Control   Lab Results   Component Value Date    HGBA1C 10.6 (H) 06/27/2022      Annually/Less than 8% No   Lipid profile : 06/27/2022 Annually Yes   LDL control Lab Results   Component Value Date    LDLCALC 77.0 06/27/2022    Annually/Less than 100 mg/dl  Yes   Nephropathy screening Lab Results   Component Value Date    LABMICR 42.0 06/27/2022     Lab Results   Component Value Date    PROTEINUA Negative 08/13/2019    Annually Yes   Blood pressure BP Readings from Last 1 Encounters:   06/27/22 (!) 150/84    Less than 140/90 No   Dilated retinal exam Most Recent Eye Exam Date: Not Found Annually Yes   Foot exam   Most Recent Foot Exam Date: Not Found Annually Yes        ROS:   As above    Objective:     LMP 08/13/1999 (Approximate)   BP Readings from Last 3 Encounters:   06/27/22 (!) 150/84   01/18/22 129/78   12/09/21 (!) 184/88     Wt Readings from Last 1 Encounters:   06/27/22 1536 58.6 kg (129 lb 3 oz)     There is no height or weight on file to calculate BMI.      Physical Exam  Vitals reviewed.   Constitutional:       General: She is not in acute distress.     Appearance: Normal appearance. She is well-developed. She is not ill-appearing.   HENT:      Nose: Nose normal. No rhinorrhea.      Mouth/Throat:      Mouth: Mucous membranes are moist.   Eyes:      Extraocular Movements: Extraocular movements intact.      Pupils: Pupils are equal, round, and reactive to light.      Comments: No proptosis, No lid lag, No conjunctival erythema    Neck:      Thyroid: No thyromegaly.      Trachea: No tracheal deviation.   Cardiovascular:      Rate and Rhythm: Normal rate and regular rhythm.      Pulses: Normal pulses.   Pulmonary:       Effort: Pulmonary effort is normal.      Breath sounds: Normal breath sounds.   Abdominal:      Palpations: Abdomen is soft. There is no mass.      Tenderness: There is no abdominal tenderness.      Hernia: No hernia is present.   Musculoskeletal:         General: No swelling.      Cervical back: Neck supple. No tenderness.      Right lower leg: No edema.      Left lower leg: No edema.   Skin:     General: Skin is warm.      Findings: No rash.   Neurological:      General: No focal deficit present.      Mental Status: She is alert and oriented to person, place, and time.   Psychiatric:         Mood and Affect: Mood normal.         Judgment: Judgment normal.                Lab Review:   Lab Results   Component Value Date    HGBA1C 10.6 (H) 06/27/2022     Lab Results   Component Value Date    CHOL 187 06/27/2022    HDL 88 (H) 06/27/2022    LDLCALC 77.0 06/27/2022    TRIG 110 06/27/2022    CHOLHDL 47.1 06/27/2022     Lab Results   Component Value Date     (L) 06/27/2022    K 4.0 06/27/2022    CL 97 06/27/2022    CO2 25 06/27/2022     (HH) 06/27/2022    BUN 17 06/27/2022    CREATININE 1.2 06/27/2022    CALCIUM 9.4 06/27/2022    PROT 7.1 06/27/2022    ALBUMIN 3.5 06/27/2022    BILITOT 0.2 06/27/2022    ALKPHOS 104 06/27/2022    AST 30 06/27/2022    ALT 29 06/27/2022    ANIONGAP 12 06/27/2022    ESTGFRAFRICA 50.4 (A) 06/27/2022    EGFRNONAA 43.7 (A) 06/27/2022    TSH 1.737 06/27/2022     Vit D, 25-Hydroxy   Date Value Ref Range Status   12/08/2021 56 30 - 96 ng/mL Final     Comment:     Vitamin D deficiency.........<10 ng/mL                              Vitamin D insufficiency......10-29 ng/mL       Vitamin D sufficiency........> or equal to 30 ng/mL  Vitamin D toxicity............>100 ng/mL         Assessment and Plan     Uncontrolled type 2 diabetes mellitus with hyperglycemia    Due to A1C above goal and concerns for WATSON and having catabolic symptoms.  I will recommend the following.     DC NPH due  to hypoglycemias     Decrease Lantus to 18 units qAM     Start Novolog 5 units TID before meals     C/w Metformin 500 mg daily for now until WATSON has been ruled out     Send C peptide and MIKE Ab  (RULE OUT WATSON)     DM education referral given     Send glucose log in 2 weeks     Will consider DEXCOM at next visit     Will monitor     Hyperlipidemia, unspecified hyperlipidemia type    LDL at goal     Not on statin due to MSK pain     Low Fat diet     Will monitor and consider starting low dose statin pending on repeat lipid panel       Hypertension, essential  BP controlled on current BP meds   On ACE for renal protection   Low Na diet

## 2022-06-30 ENCOUNTER — LAB VISIT (OUTPATIENT)
Dept: LAB | Facility: HOSPITAL | Age: 77
End: 2022-06-30
Attending: GENERAL ACUTE CARE HOSPITAL
Payer: MEDICARE

## 2022-06-30 DIAGNOSIS — E11.65 UNCONTROLLED TYPE 2 DIABETES MELLITUS WITH HYPERGLYCEMIA: ICD-10-CM

## 2022-06-30 LAB
ALBUMIN SERPL BCP-MCNC: 3.9 G/DL (ref 3.5–5.2)
ANION GAP SERPL CALC-SCNC: 5 MMOL/L (ref 8–16)
C PEPTIDE SERPL-MCNC: <0.08 NG/ML (ref 0.78–5.19)
CALCIUM SERPL-MCNC: 8.8 MG/DL (ref 8.7–10.5)
CHLORIDE SERPL-SCNC: 104 MMOL/L (ref 95–110)
CO2 SERPL-SCNC: 29 MMOL/L (ref 23–29)
CREAT SERPL-MCNC: 0.57 MG/DL (ref 0.5–1.4)
EST. GFR  (AFRICAN AMERICAN): >60 ML/MIN/1.73 M^2
EST. GFR  (NON AFRICAN AMERICAN): >60 ML/MIN/1.73 M^2
ESTIMATED AVG GLUCOSE: 283 MG/DL (ref 68–131)
GLUCOSE SERPL-MCNC: 233 MG/DL (ref 70–110)
HBA1C MFR BLD: 11.5 % (ref 4–5.6)
PHOSPHATE SERPL-MCNC: 2.7 MG/DL (ref 2.7–4.5)
POTASSIUM SERPL-SCNC: 3.6 MMOL/L (ref 3.5–5.1)
SODIUM SERPL-SCNC: 138 MMOL/L (ref 136–145)
UUN UR-MCNC: 10 MG/DL (ref 7–17)

## 2022-06-30 PROCEDURE — 84681 ASSAY OF C-PEPTIDE: CPT | Mod: PO | Performed by: GENERAL ACUTE CARE HOSPITAL

## 2022-06-30 PROCEDURE — 80069 RENAL FUNCTION PANEL: CPT | Mod: PO | Performed by: GENERAL ACUTE CARE HOSPITAL

## 2022-06-30 PROCEDURE — 86341 ISLET CELL ANTIBODY: CPT | Mod: PO | Performed by: GENERAL ACUTE CARE HOSPITAL

## 2022-06-30 PROCEDURE — 83036 HEMOGLOBIN GLYCOSYLATED A1C: CPT | Performed by: GENERAL ACUTE CARE HOSPITAL

## 2022-07-05 LAB — GAD65 AB SER-SCNC: 28.8 NMOL/L

## 2022-07-12 ENCOUNTER — PATIENT MESSAGE (OUTPATIENT)
Dept: ENDOCRINOLOGY | Facility: CLINIC | Age: 77
End: 2022-07-12
Payer: MEDICARE

## 2022-07-12 ENCOUNTER — PATIENT MESSAGE (OUTPATIENT)
Dept: ENDOCRINOLOGY | Facility: HOSPITAL | Age: 77
End: 2022-07-12
Payer: MEDICARE

## 2022-07-12 ENCOUNTER — LAB VISIT (OUTPATIENT)
Dept: LAB | Facility: HOSPITAL | Age: 77
End: 2022-07-12
Attending: INTERNAL MEDICINE
Payer: MEDICARE

## 2022-07-12 DIAGNOSIS — E13.9 LADA (LATENT AUTOIMMUNE DIABETES IN ADULTS), MANAGED AS TYPE 1: Primary | ICD-10-CM

## 2022-07-12 DIAGNOSIS — C50.912 INFILTRATING DUCTAL CARCINOMA OF BREAST, LEFT: ICD-10-CM

## 2022-07-12 LAB
ALBUMIN SERPL BCP-MCNC: 3.7 G/DL (ref 3.5–5.2)
ALP SERPL-CCNC: 82 U/L (ref 38–126)
ALT SERPL W/O P-5'-P-CCNC: 17 U/L (ref 10–44)
ANION GAP SERPL CALC-SCNC: 3 MMOL/L (ref 8–16)
AST SERPL-CCNC: 31 U/L (ref 15–46)
BASOPHILS # BLD AUTO: 0.03 K/UL (ref 0–0.2)
BASOPHILS NFR BLD: 0.7 % (ref 0–1.9)
BILIRUB SERPL-MCNC: 0.5 MG/DL (ref 0.1–1)
CALCIUM SERPL-MCNC: 9.2 MG/DL (ref 8.7–10.5)
CHLORIDE SERPL-SCNC: 101 MMOL/L (ref 95–110)
CO2 SERPL-SCNC: 30 MMOL/L (ref 23–29)
CREAT SERPL-MCNC: 0.64 MG/DL (ref 0.5–1.4)
DIFFERENTIAL METHOD: ABNORMAL
EOSINOPHIL # BLD AUTO: 0.2 K/UL (ref 0–0.5)
EOSINOPHIL NFR BLD: 5.4 % (ref 0–8)
ERYTHROCYTE [DISTWIDTH] IN BLOOD BY AUTOMATED COUNT: 12.2 % (ref 11.5–14.5)
EST. GFR  (AFRICAN AMERICAN): >60 ML/MIN/1.73 M^2
EST. GFR  (NON AFRICAN AMERICAN): >60 ML/MIN/1.73 M^2
GLUCOSE SERPL-MCNC: 297 MG/DL (ref 70–110)
HCT VFR BLD AUTO: 33.4 % (ref 37–48.5)
HGB BLD-MCNC: 11.4 G/DL (ref 12–16)
IMM GRANULOCYTES # BLD AUTO: 0.01 K/UL (ref 0–0.04)
IMM GRANULOCYTES NFR BLD AUTO: 0.2 % (ref 0–0.5)
LYMPHOCYTES # BLD AUTO: 1.4 K/UL (ref 1–4.8)
LYMPHOCYTES NFR BLD: 34.4 % (ref 18–48)
MCH RBC QN AUTO: 33.7 PG (ref 27–31)
MCHC RBC AUTO-ENTMCNC: 34.1 G/DL (ref 32–36)
MCV RBC AUTO: 99 FL (ref 82–98)
MONOCYTES # BLD AUTO: 0.6 K/UL (ref 0.3–1)
MONOCYTES NFR BLD: 14.6 % (ref 4–15)
NEUTROPHILS # BLD AUTO: 1.8 K/UL (ref 1.8–7.7)
NEUTROPHILS NFR BLD: 44.7 % (ref 38–73)
NRBC BLD-RTO: 0 /100 WBC
PLATELET # BLD AUTO: 300 K/UL (ref 150–450)
PMV BLD AUTO: 9.9 FL (ref 9.2–12.9)
POTASSIUM SERPL-SCNC: 4.2 MMOL/L (ref 3.5–5.1)
PROT SERPL-MCNC: 6.5 G/DL (ref 6–8.4)
RBC # BLD AUTO: 3.38 M/UL (ref 4–5.4)
SODIUM SERPL-SCNC: 134 MMOL/L (ref 136–145)
UUN UR-MCNC: 13 MG/DL (ref 7–17)
WBC # BLD AUTO: 4.04 K/UL (ref 3.9–12.7)

## 2022-07-12 PROCEDURE — 36415 COLL VENOUS BLD VENIPUNCTURE: CPT | Mod: PO | Performed by: INTERNAL MEDICINE

## 2022-07-12 PROCEDURE — 85025 COMPLETE CBC W/AUTO DIFF WBC: CPT | Mod: PO | Performed by: INTERNAL MEDICINE

## 2022-07-12 PROCEDURE — 80053 COMPREHEN METABOLIC PANEL: CPT | Mod: PO | Performed by: INTERNAL MEDICINE

## 2022-07-12 NOTE — TELEPHONE ENCOUNTER
PT HAS WATSON  Treated as Type 1      MIKE +     C peptide Undetectable     Pt is on MDI     Will continue current doses for now     Will stop metformin.      Talked to patients daughter Ms. Singh and discussed treatment plan.  She will send in glucose logs for my review

## 2022-07-18 ENCOUNTER — OFFICE VISIT (OUTPATIENT)
Dept: INTERNAL MEDICINE | Facility: CLINIC | Age: 77
End: 2022-07-18
Payer: MEDICARE

## 2022-07-18 VITALS
HEART RATE: 74 BPM | WEIGHT: 143.06 LBS | DIASTOLIC BLOOD PRESSURE: 78 MMHG | SYSTOLIC BLOOD PRESSURE: 184 MMHG | HEIGHT: 64 IN | BODY MASS INDEX: 24.42 KG/M2 | OXYGEN SATURATION: 96 %

## 2022-07-18 DIAGNOSIS — Z79.4 TYPE 2 DIABETES MELLITUS WITHOUT COMPLICATION, WITH LONG-TERM CURRENT USE OF INSULIN: ICD-10-CM

## 2022-07-18 DIAGNOSIS — I10 HTN (HYPERTENSION), BENIGN: Primary | ICD-10-CM

## 2022-07-18 DIAGNOSIS — E11.9 TYPE 2 DIABETES MELLITUS WITHOUT COMPLICATION, WITH LONG-TERM CURRENT USE OF INSULIN: ICD-10-CM

## 2022-07-18 PROCEDURE — 1126F PR PAIN SEVERITY QUANTIFIED, NO PAIN PRESENT: ICD-10-PCS | Mod: CPTII,S$GLB,, | Performed by: INTERNAL MEDICINE

## 2022-07-18 PROCEDURE — 1159F MED LIST DOCD IN RCRD: CPT | Mod: CPTII,S$GLB,, | Performed by: INTERNAL MEDICINE

## 2022-07-18 PROCEDURE — 99214 PR OFFICE/OUTPT VISIT, EST, LEVL IV, 30-39 MIN: ICD-10-PCS | Mod: S$GLB,,, | Performed by: INTERNAL MEDICINE

## 2022-07-18 PROCEDURE — 1159F PR MEDICATION LIST DOCUMENTED IN MEDICAL RECORD: ICD-10-PCS | Mod: CPTII,S$GLB,, | Performed by: INTERNAL MEDICINE

## 2022-07-18 PROCEDURE — 1160F RVW MEDS BY RX/DR IN RCRD: CPT | Mod: CPTII,S$GLB,, | Performed by: INTERNAL MEDICINE

## 2022-07-18 PROCEDURE — 1160F PR REVIEW ALL MEDS BY PRESCRIBER/CLIN PHARMACIST DOCUMENTED: ICD-10-PCS | Mod: CPTII,S$GLB,, | Performed by: INTERNAL MEDICINE

## 2022-07-18 PROCEDURE — 3288F FALL RISK ASSESSMENT DOCD: CPT | Mod: CPTII,S$GLB,, | Performed by: INTERNAL MEDICINE

## 2022-07-18 PROCEDURE — 99214 OFFICE O/P EST MOD 30 MIN: CPT | Mod: S$GLB,,, | Performed by: INTERNAL MEDICINE

## 2022-07-18 PROCEDURE — 99999 PR PBB SHADOW E&M-EST. PATIENT-LVL V: ICD-10-PCS | Mod: PBBFAC,,, | Performed by: INTERNAL MEDICINE

## 2022-07-18 PROCEDURE — 1101F PT FALLS ASSESS-DOCD LE1/YR: CPT | Mod: CPTII,S$GLB,, | Performed by: INTERNAL MEDICINE

## 2022-07-18 PROCEDURE — 99999 PR PBB SHADOW E&M-EST. PATIENT-LVL V: CPT | Mod: PBBFAC,,, | Performed by: INTERNAL MEDICINE

## 2022-07-18 PROCEDURE — 1126F AMNT PAIN NOTED NONE PRSNT: CPT | Mod: CPTII,S$GLB,, | Performed by: INTERNAL MEDICINE

## 2022-07-18 PROCEDURE — 3077F PR MOST RECENT SYSTOLIC BLOOD PRESSURE >= 140 MM HG: ICD-10-PCS | Mod: CPTII,S$GLB,, | Performed by: INTERNAL MEDICINE

## 2022-07-18 PROCEDURE — 3077F SYST BP >= 140 MM HG: CPT | Mod: CPTII,S$GLB,, | Performed by: INTERNAL MEDICINE

## 2022-07-18 PROCEDURE — 1157F PR ADVANCE CARE PLAN OR EQUIV PRESENT IN MEDICAL RECORD: ICD-10-PCS | Mod: CPTII,S$GLB,, | Performed by: INTERNAL MEDICINE

## 2022-07-18 PROCEDURE — 3078F DIAST BP <80 MM HG: CPT | Mod: CPTII,S$GLB,, | Performed by: INTERNAL MEDICINE

## 2022-07-18 PROCEDURE — 1101F PR PT FALLS ASSESS DOC 0-1 FALLS W/OUT INJ PAST YR: ICD-10-PCS | Mod: CPTII,S$GLB,, | Performed by: INTERNAL MEDICINE

## 2022-07-18 PROCEDURE — 1157F ADVNC CARE PLAN IN RCRD: CPT | Mod: CPTII,S$GLB,, | Performed by: INTERNAL MEDICINE

## 2022-07-18 PROCEDURE — 3288F PR FALLS RISK ASSESSMENT DOCUMENTED: ICD-10-PCS | Mod: CPTII,S$GLB,, | Performed by: INTERNAL MEDICINE

## 2022-07-18 PROCEDURE — 3078F PR MOST RECENT DIASTOLIC BLOOD PRESSURE < 80 MM HG: ICD-10-PCS | Mod: CPTII,S$GLB,, | Performed by: INTERNAL MEDICINE

## 2022-07-18 NOTE — PROGRESS NOTES
"Assessment:       1. HTN (hypertension), benign    2. Type 2 diabetes mellitus without complication, with long-term current use of insulin        Plan:         Noris was seen today for hypertension and diabetes.    Diagnoses and all orders for this visit:    HTN (hypertension), benign  Chronic  Uncontrolled  Patient is not at goal today  I have reviewed lifestyle modification to achieve/maintain goals  We will adjust the current medication regimen to    AS BELOW   Patient will follow up in 2 weeks    Type 2 diabetes mellitus without complication, with long-term current use of insulin  FU ENDO   -     Ambulatory referral/consult to Ophthalmology; Future          Subjective:       Patient ID: Noris Demarco is a 76 y.o. female.    Chief Complaint: Hypertension and Diabetes        Hypertension  This is a chronic problem. The current episode started more than 1 year ago. The problem is unchanged. The problem is uncontrolled. Past treatments include calcium channel blockers, central alpha agonists and ACE inhibitors (DID NOT TAKE 0.1 CLONIDINE THIS AM ). The current treatment provides no improvement.     Review of Systems      Objective:     BP (!) 188/84 (BP Location: Right arm, Patient Position: Sitting, BP Method: Medium (Manual))   Pulse 74   Ht 5' 4" (1.626 m)   Wt 64.9 kg (143 lb 1.3 oz)   LMP 08/13/1999 (Approximate)   SpO2 96%   BMI 24.56 kg/m²       Wt Readings from Last 1 Encounters:   07/18/22 1020 64.9 kg (143 lb 1.3 oz)       BMI Readings from Last 1 Encounters:   07/18/22 24.56 kg/m²            Physical Exam  Vitals and nursing note reviewed.   Constitutional:       General: She is not in acute distress.     Appearance: She is well-developed. She is not diaphoretic.   HENT:      Head: Normocephalic.      Nose: Nose normal.   Eyes:      General:         Right eye: No discharge.         Left eye: No discharge.      Conjunctiva/sclera: Conjunctivae normal.      Pupils: Pupils are equal, round, and " "reactive to light.   Cardiovascular:      Rate and Rhythm: Normal rate and regular rhythm.      Heart sounds: Normal heart sounds. No murmur heard.    No friction rub. No gallop.   Pulmonary:      Effort: Pulmonary effort is normal. No respiratory distress.   Abdominal:      General: Bowel sounds are normal. There is no distension.      Palpations: Abdomen is soft.   Musculoskeletal:         General: No deformity. Normal range of motion.      Cervical back: Normal range of motion.   Skin:     General: Skin is warm.   Neurological:      Mental Status: She is alert and oriented to person, place, and time.      Cranial Nerves: No cranial nerve deficit.             Future Appointments   Date Time Provider Department Center   2022  1:30 PM Lesley Nathan RD, E [facility] [place]   2022  9:15 AM Andrew Song MD [facility] NEURO[address] [place] [address]         Medication List with Changes/Refills   Current Medications    AMLODIPINE (NORVASC) 10 MG TABLET    Take 1 tablet by mouth once daily.    ASPIRIN (ECOTRIN) 81 MG EC TABLET    Take 81 mg by mouth once daily.    BD ULTRA-FINE IRWIN PEN NEEDLE 32 GAUGE X 5/32" NDLE    3 TIMES A DAY    BD ULTRA-FINE SHORT PEN NEEDLE 31 GAUGE X 5/16" NDLE    SMARTSI Each SUB-Q Daily    BLOOD-GLUCOSE METER,CONTINUOUS (DEXCOM G6 ) MISC    1 each by Misc.(Non-Drug; Combo Route) route once daily at 6am.    BLOOD-GLUCOSE SENSOR (DEXCOM G6 SENSOR) LEONOR    1 each by Misc.(Non-Drug; Combo Route) route once daily at 6am.    BLOOD-GLUCOSE TRANSMITTER (DEXCOM G6 TRANSMITTER) LEONOR    1 each by Misc.(Non-Drug; Combo Route) route once daily at 6am.    BUSPIRONE (BUSPAR) 15 MG TABLET    Take 15 mg by mouth once daily.    CHOLECALCIFEROL, VITAMIN D3, (VITAMIN D3) 50 MCG (2,000 UNIT) CAP    Take 1 capsule by mouth once daily.    CLONIDINE (CATAPRES) 0.2 MG TABLET    Take 0.2 mg by mouth 2 (two) times daily.    COENZYME Q10 100 MG CAPSULE    Take 100 mg by mouth once daily.    CYANOCOBALAMIN 500 MCG " TABLET    Take 500 mcg by mouth once daily.    CYPROHEPTADINE (PERIACTIN) 4 MG TABLET    Take 1 tablet (4 mg total) by mouth 3 (three) times daily.    FISH OIL-OMEGA-3 FATTY ACIDS 300-1,000 MG CAPSULE    Take 1 g by mouth once daily.    FUROSEMIDE (LASIX) 40 MG TABLET    Take 40 mg by mouth once daily.     GLUCOSE 4 GM CHEWABLE TABLET    Take 4 tablets (16 g total) by mouth as needed for Low blood sugar (If having symptoms of blurry vision, palpitations, confusion, shakiness.  Please check sugars and if sugar below 70 please take 4 tablets and re-check sugar everry 15 minutes until sugars are above 70 and symptoms resolve.).    INSULIN ASPART U-100 (NOVOLOG FLEXPEN U-100 INSULIN) 100 UNIT/ML (3 ML) INPN PEN    Inject 5 Units into the skin 3 (three) times daily with meals.    INSULIN NPH (NOVOLIN N) 100 UNIT/ML INJECTION    Inject 4 Units into the skin every evening. 4 units nightly as needed    LANTUS SOLOSTAR U-100 INSULIN GLARGINE 100 UNITS/ML (3ML) SUBQ PEN    Inject 20 Units into the skin once daily at 6am. 15 units once daily    LISINOPRIL (PRINIVIL,ZESTRIL) 40 MG TABLET    Take 40 mg by mouth 2 (two) times daily.     METFORMIN (GLUCOPHAGE) 500 MG TABLET    Take 500 mg by mouth once daily at 6am.    TAMOXIFEN (NOLVADEX) 20 MG TAB    Take 1 tablet (20 mg total) by mouth once daily.   Discontinued Medications    CLONIDINE (CATAPRES) 0.1 MG TABLET    Take 0.1 mg by mouth 2 (two) times daily.         Disclaimer:  This note has been generated using voice-recognition software. There may be grammatical or spelling errors that have been missed during proof-reading

## 2022-07-22 ENCOUNTER — PATIENT MESSAGE (OUTPATIENT)
Dept: INTERNAL MEDICINE | Facility: CLINIC | Age: 77
End: 2022-07-22
Payer: MEDICARE

## 2022-07-22 DIAGNOSIS — I10 HTN (HYPERTENSION), BENIGN: Primary | ICD-10-CM

## 2022-07-22 DIAGNOSIS — C50.912 INFILTRATING DUCTAL CARCINOMA OF BREAST, LEFT: ICD-10-CM

## 2022-07-22 RX ORDER — TAMOXIFEN CITRATE 20 MG/1
20 TABLET ORAL DAILY
Qty: 90 TABLET | Refills: 3 | Status: CANCELLED | OUTPATIENT
Start: 2022-07-22

## 2022-07-22 RX ORDER — CLONIDINE HYDROCHLORIDE 0.2 MG/1
0.2 TABLET ORAL 2 TIMES DAILY
Qty: 180 TABLET | Refills: 1 | Status: SHIPPED | OUTPATIENT
Start: 2022-07-22 | End: 2022-08-09 | Stop reason: SDUPTHER

## 2022-07-22 NOTE — TELEPHONE ENCOUNTER
No new care gaps identified.  [facility] Embedded Care Gaps. Reference number: [id number]. 7/22/2022   9:47:19 AM ELVIAT

## 2022-07-27 ENCOUNTER — PATIENT OUTREACH (OUTPATIENT)
Dept: ADMINISTRATIVE | Facility: OTHER | Age: 77
End: 2022-07-27

## 2022-07-27 NOTE — PROGRESS NOTES
Initial Outreach - Due: 8/15/2022     CHW spoke with pts daughter, who agreed to come in-person after appt with provider on 08/15/2022

## 2022-08-01 ENCOUNTER — PATIENT MESSAGE (OUTPATIENT)
Dept: INTERNAL MEDICINE | Facility: CLINIC | Age: 77
End: 2022-08-01
Payer: MEDICARE

## 2022-08-09 DIAGNOSIS — E11.9 TYPE 2 DIABETES MELLITUS WITHOUT COMPLICATION, WITH LONG-TERM CURRENT USE OF INSULIN: ICD-10-CM

## 2022-08-09 DIAGNOSIS — Z79.4 TYPE 2 DIABETES MELLITUS WITHOUT COMPLICATION, WITH LONG-TERM CURRENT USE OF INSULIN: ICD-10-CM

## 2022-08-09 DIAGNOSIS — I10 HTN (HYPERTENSION), BENIGN: ICD-10-CM

## 2022-08-09 RX ORDER — BLOOD-GLUCOSE TRANSMITTER
1 EACH MISCELLANEOUS DAILY
Qty: 1 EACH | Refills: 1 | Status: SHIPPED | OUTPATIENT
Start: 2022-08-09 | End: 2023-08-09

## 2022-08-09 RX ORDER — AMLODIPINE BESYLATE 5 MG/1
TABLET ORAL
COMMUNITY
Start: 2022-03-24 | End: 2022-09-06

## 2022-08-09 RX ORDER — BLOOD-GLUCOSE SENSOR
1 EACH MISCELLANEOUS DAILY
Qty: 1 EACH | Refills: 1 | Status: SHIPPED | OUTPATIENT
Start: 2022-08-09 | End: 2023-08-09

## 2022-08-09 RX ORDER — CLONIDINE HYDROCHLORIDE 0.2 MG/1
0.2 TABLET ORAL 2 TIMES DAILY
Qty: 180 TABLET | Refills: 1 | Status: SHIPPED | OUTPATIENT
Start: 2022-08-09 | End: 2022-09-27 | Stop reason: SDUPTHER

## 2022-08-09 RX ORDER — CALCIUM CITRATE/VITAMIN D3 200MG-6.25
TABLET ORAL
COMMUNITY
Start: 2022-05-08 | End: 2022-08-09 | Stop reason: SDUPTHER

## 2022-08-09 RX ORDER — GENTAMICIN SULFATE 3 MG/ML
SOLUTION/ DROPS OPHTHALMIC
COMMUNITY
Start: 2022-05-24 | End: 2022-09-27

## 2022-08-09 RX ORDER — BLOOD-GLUCOSE,RECEIVER,CONT
1 EACH MISCELLANEOUS DAILY
Qty: 1 EACH | Refills: 1 | Status: SHIPPED | OUTPATIENT
Start: 2022-08-09

## 2022-08-09 RX ORDER — CALCIUM CITRATE/VITAMIN D3 200MG-6.25
1 TABLET ORAL DAILY
Qty: 100 EACH | Refills: 3 | Status: SHIPPED | OUTPATIENT
Start: 2022-08-09 | End: 2023-05-25 | Stop reason: SDUPTHER

## 2022-08-09 NOTE — TELEPHONE ENCOUNTER
No new care gaps identified.  [facility] Embedded Care Gaps. Reference number: [id number]. 8/09/2022   10:55:51 AM ELVIAT

## 2022-08-15 NOTE — PROGRESS NOTES
CHW - Outreach Attempt    Community Health Worker left a voicemail message for first attempt to contact Noris Nilam regarding: SDOH  Community Health Worker to attempt to contact Noris Nilam on: 9/14/22    Daughter and pt did not show for in-person interview, appt was previously cancelled. Awaiting re-schedule, left vm.

## 2022-08-24 ENCOUNTER — PATIENT MESSAGE (OUTPATIENT)
Dept: INTERNAL MEDICINE | Facility: CLINIC | Age: 77
End: 2022-08-24
Payer: MEDICARE

## 2022-08-31 DIAGNOSIS — Z78.0 MENOPAUSE: ICD-10-CM

## 2022-09-02 ENCOUNTER — TELEPHONE (OUTPATIENT)
Dept: INTERNAL MEDICINE | Facility: CLINIC | Age: 77
End: 2022-09-02
Payer: MEDICARE

## 2022-09-02 NOTE — TELEPHONE ENCOUNTER
----- Message from Rajwinder Bee, Patient Care Assistant sent at 9/2/2022 12:20 PM CDT -----  Type:  Needs Medical Advice    Who Called:  pt  Gregory  Symptoms (please be specific):  Gregory would like a call back regarding questions about his wife medication    Would the patient rather a call back or a response via [facility]?  call  Best Call Back Number:  [phone]  Additional Information:

## 2022-09-06 ENCOUNTER — OFFICE VISIT (OUTPATIENT)
Dept: INTERNAL MEDICINE | Facility: CLINIC | Age: 77
End: 2022-09-06
Payer: MEDICARE

## 2022-09-06 DIAGNOSIS — I10 ESSENTIAL HYPERTENSION: Primary | ICD-10-CM

## 2022-09-06 PROCEDURE — 1157F PR ADVANCE CARE PLAN OR EQUIV PRESENT IN MEDICAL RECORD: ICD-10-PCS | Mod: CPTII,95,, | Performed by: INTERNAL MEDICINE

## 2022-09-06 PROCEDURE — 1157F ADVNC CARE PLAN IN RCRD: CPT | Mod: CPTII,95,, | Performed by: INTERNAL MEDICINE

## 2022-09-06 PROCEDURE — 99442 PR PHYSICIAN TELEPHONE EVALUATION 11-20 MIN: ICD-10-PCS | Mod: 95,,, | Performed by: INTERNAL MEDICINE

## 2022-09-06 PROCEDURE — 1160F RVW MEDS BY RX/DR IN RCRD: CPT | Mod: CPTII,95,, | Performed by: INTERNAL MEDICINE

## 2022-09-06 PROCEDURE — 1159F PR MEDICATION LIST DOCUMENTED IN MEDICAL RECORD: ICD-10-PCS | Mod: CPTII,95,, | Performed by: INTERNAL MEDICINE

## 2022-09-06 PROCEDURE — 1159F MED LIST DOCD IN RCRD: CPT | Mod: CPTII,95,, | Performed by: INTERNAL MEDICINE

## 2022-09-06 PROCEDURE — 1160F PR REVIEW ALL MEDS BY PRESCRIBER/CLIN PHARMACIST DOCUMENTED: ICD-10-PCS | Mod: CPTII,95,, | Performed by: INTERNAL MEDICINE

## 2022-09-06 PROCEDURE — 99442 PR PHYSICIAN TELEPHONE EVALUATION 11-20 MIN: CPT | Mod: 95,,, | Performed by: INTERNAL MEDICINE

## 2022-09-06 RX ORDER — AMLODIPINE BESYLATE 10 MG/1
10 TABLET ORAL DAILY
Qty: 90 TABLET | Refills: 1
Start: 2022-09-06 | End: 2023-03-26 | Stop reason: SDUPTHER

## 2022-09-06 NOTE — PROGRESS NOTES
"Established Patient - Audio Only Telehealth Visit     The patient location is: [place]  The chief complaint leading to consultation is: follow up   Visit type: Virtual visit with audio only (telephone)  Total time spent with patient: 15       The reason for the audio only service rather than synchronous audio and video virtual visit was related to technical difficulties or patient preference/necessity.     Each patient to whom I provide medical services by telemedicine is:  (1) informed of the relationship between the physician and patient and the respective role of any other health care provider with respect to management of the patient; and (2) notified that they may decline to receive medical services by telemedicine and may withdraw from such care at any time. Patient verbally consented to receive this service via voice-only telephone call.       HPI:    Hardeep solis showed appointment in august          Assessment and plan:         Diagnoses and all orders for this visit:    Essential hypertension    Other orders  -     amLODIPine (NORVASC) 10 MG tablet; Take 1 tablet (10 mg total) by mouth once daily.  Chronic  Uncontrolled  Patient is not at goal today  I have reviewed lifestyle modification to achieve/maintain goals  We will continue the current medication regimen as listed below  Patient will follow up in 2 weeks          Future Appointments   Date Time Provider Department Center   2022  9:15 AM Andrew Song MD [facility] NEURO8 [facility]         Medication List with Changes/Refills   Current Medications    ASPIRIN (ECOTRIN) 81 MG EC TABLET    Take 81 mg by mouth once daily.    BD ULTRA-FINE IRWIN PEN NEEDLE 32 GAUGE X 5/32" NDLE    3 TIMES A DAY    BD ULTRA-FINE SHORT PEN NEEDLE 31 GAUGE X 5/16" NDLE    SMARTSI Each SUB-Q Daily    BLOOD-GLUCOSE METER,CONTINUOUS (DEXCOM G6 ) MISC    1 each by Misc.(Non-Drug; Combo Route) route once daily at 6am.    BLOOD-GLUCOSE SENSOR (DEXCOM G6 SENSOR) LEONOR    " 1 each by Misc.(Non-Drug; Combo Route) route once daily at 6am.    BLOOD-GLUCOSE TRANSMITTER (DEXCOM G6 TRANSMITTER) LEONOR    1 each by Misc.(Non-Drug; Combo Route) route once daily at 6am.    BUSPIRONE (BUSPAR) 15 MG TABLET    Take 15 mg by mouth once daily.    CHOLECALCIFEROL, VITAMIN D3, (VITAMIN D3) 50 MCG (2,000 UNIT) CAP    Take 1 capsule by mouth once daily.    CLONIDINE (CATAPRES) 0.2 MG TABLET    Take 1 tablet (0.2 mg total) by mouth 2 (two) times daily.    COENZYME Q10 100 MG CAPSULE    Take 100 mg by mouth once daily.    CYANOCOBALAMIN 500 MCG TABLET    Take 500 mcg by mouth once daily.    CYPROHEPTADINE (PERIACTIN) 4 MG TABLET    Take 1 tablet (4 mg total) by mouth 3 (three) times daily.    FISH OIL-OMEGA-3 FATTY ACIDS 300-1,000 MG CAPSULE    Take 1 g by mouth once daily.    FUROSEMIDE (LASIX) 40 MG TABLET    Take 40 mg by mouth once daily.     GENTAMICIN (GARAMYCIN) 0.3 % OPHTHALMIC SOLUTION    PLACE 2 DROPS TO AFFECTED EYE(S) EVERY 4 HOURS    GLUCOSE 4 GM CHEWABLE TABLET    Take 4 tablets (16 g total) by mouth as needed for Low blood sugar (If having symptoms of blurry vision, palpitations, confusion, shakiness.  Please check sugars and if sugar below 70 please take 4 tablets and re-check sugar everry 15 minutes until sugars are above 70 and symptoms resolve.).    INSULIN ASPART U-100 (NOVOLOG FLEXPEN U-100 INSULIN) 100 UNIT/ML (3 ML) INPN PEN    Inject 5 Units into the skin 3 (three) times daily with meals.    INSULIN NPH (NOVOLIN N) 100 UNIT/ML INJECTION    Inject 4 Units into the skin every evening. 4 units nightly as needed    LANTUS SOLOSTAR U-100 INSULIN GLARGINE 100 UNITS/ML (3ML) SUBQ PEN    Inject 20 Units into the skin once daily at 6am. 15 units once daily    LISINOPRIL (PRINIVIL,ZESTRIL) 40 MG TABLET    Take 40 mg by mouth 2 (two) times daily.     METFORMIN (GLUCOPHAGE) 500 MG TABLET    Take 500 mg by mouth once daily at 6am.    TAMOXIFEN (NOLVADEX) 20 MG TAB    Take 1 tablet (20 mg total)  by mouth once daily.    TRUE METRIX GLUCOSE TEST STRIP STRP    Inject 1 each into the skin once daily at 6am.   Changed and/or Refilled Medications    Modified Medication Previous Medication    AMLODIPINE (NORVASC) 10 MG TABLET amlodipine (NORVASC) 10 MG tablet       Take 1 tablet (10 mg total) by mouth once daily.    Take 1 tablet by mouth once daily.   Discontinued Medications    AMLODIPINE (NORVASC) 5 MG TABLET             Disclaimer:  This note has been generated using voice-recognition software. There may be grammatical or spelling errors that have been missed during proof-reading                This service was not originating from a related E/M service provided within the previous 7 days nor will  to an E/M service or procedure within the next 24 hours or my soonest available appointment.  Prevailing standard of care was able to be met in this audio-only visit.

## 2022-09-14 NOTE — PROGRESS NOTES
CHW - Unable to Contact    Community Health Worker to close episode at this time due to three missed attempts for patient contact.

## 2022-09-19 ENCOUNTER — PATIENT MESSAGE (OUTPATIENT)
Dept: INTERNAL MEDICINE | Facility: CLINIC | Age: 77
End: 2022-09-19
Payer: MEDICARE

## 2022-09-19 DIAGNOSIS — E11.65 UNCONTROLLED TYPE 2 DIABETES MELLITUS WITH HYPERGLYCEMIA: ICD-10-CM

## 2022-09-19 RX ORDER — INSULIN GLARGINE 100 [IU]/ML
20 INJECTION, SOLUTION SUBCUTANEOUS NIGHTLY
Qty: 18 ML | Refills: 3 | Status: SHIPPED | OUTPATIENT
Start: 2022-09-19 | End: 2022-09-19

## 2022-09-19 RX ORDER — INSULIN ASPART 100 [IU]/ML
5 INJECTION, SOLUTION INTRAVENOUS; SUBCUTANEOUS
Qty: 6 ML | Refills: 11 | Status: SHIPPED | OUTPATIENT
Start: 2022-09-19 | End: 2022-09-27 | Stop reason: SDUPTHER

## 2022-09-19 NOTE — TELEPHONE ENCOUNTER
No new care gaps identified.  [facility] Embedded Care Gaps. Reference number: [id number]. 9/19/2022   11:13:59 AM ELVIAT

## 2022-09-27 ENCOUNTER — OFFICE VISIT (OUTPATIENT)
Dept: INTERNAL MEDICINE | Facility: CLINIC | Age: 77
End: 2022-09-27
Payer: MEDICARE

## 2022-09-27 ENCOUNTER — TELEPHONE (OUTPATIENT)
Dept: INFUSION THERAPY | Facility: HOSPITAL | Age: 77
End: 2022-09-27
Payer: MEDICARE

## 2022-09-27 VITALS
HEIGHT: 64 IN | DIASTOLIC BLOOD PRESSURE: 70 MMHG | WEIGHT: 142.19 LBS | HEART RATE: 68 BPM | SYSTOLIC BLOOD PRESSURE: 150 MMHG | OXYGEN SATURATION: 98 % | BODY MASS INDEX: 24.28 KG/M2

## 2022-09-27 DIAGNOSIS — E11.65 TYPE 2 DIABETES MELLITUS WITH HYPERGLYCEMIA, WITH LONG-TERM CURRENT USE OF INSULIN: ICD-10-CM

## 2022-09-27 DIAGNOSIS — I10 HTN (HYPERTENSION), BENIGN: ICD-10-CM

## 2022-09-27 DIAGNOSIS — R63.4 WEIGHT LOSS, UNINTENTIONAL: ICD-10-CM

## 2022-09-27 DIAGNOSIS — E11.59 HYPERTENSION ASSOCIATED WITH DIABETES: Primary | ICD-10-CM

## 2022-09-27 DIAGNOSIS — Z79.4 TYPE 2 DIABETES MELLITUS WITH HYPERGLYCEMIA, WITH LONG-TERM CURRENT USE OF INSULIN: ICD-10-CM

## 2022-09-27 DIAGNOSIS — Z23 NEED FOR VACCINATION: ICD-10-CM

## 2022-09-27 DIAGNOSIS — E11.65 UNCONTROLLED TYPE 2 DIABETES MELLITUS WITH HYPERGLYCEMIA: ICD-10-CM

## 2022-09-27 DIAGNOSIS — D05.11 DUCTAL CARCINOMA IN SITU (DCIS) OF RIGHT BREAST: Primary | ICD-10-CM

## 2022-09-27 DIAGNOSIS — I15.2 HYPERTENSION ASSOCIATED WITH DIABETES: Primary | ICD-10-CM

## 2022-09-27 PROBLEM — R60.0 BILATERAL LEG EDEMA: Status: RESOLVED | Noted: 2017-08-15 | Resolved: 2022-09-27

## 2022-09-27 PROBLEM — I51.89 DIASTOLIC DYSFUNCTION: Status: RESOLVED | Noted: 2017-09-15 | Resolved: 2022-09-27

## 2022-09-27 PROBLEM — R01.1 CARDIAC MURMUR: Status: RESOLVED | Noted: 2017-08-15 | Resolved: 2022-09-27

## 2022-09-27 PROBLEM — I87.2 VENOUS INSUFFICIENCY: Status: RESOLVED | Noted: 2018-03-23 | Resolved: 2022-09-27

## 2022-09-27 PROBLEM — E11.9 TYPE 2 DIABETES MELLITUS WITHOUT COMPLICATION, WITH LONG-TERM CURRENT USE OF INSULIN: Status: RESOLVED | Noted: 2018-11-05 | Resolved: 2022-09-27

## 2022-09-27 PROBLEM — E55.9 VITAMIN D DEFICIENCY, UNSPECIFIED: Status: RESOLVED | Noted: 2021-12-09 | Resolved: 2022-09-27

## 2022-09-27 PROCEDURE — 99215 OFFICE O/P EST HI 40 MIN: CPT | Mod: PBBFAC,PO | Performed by: INTERNAL MEDICINE

## 2022-09-27 PROCEDURE — 99999 PR PBB SHADOW E&M-EST. PATIENT-LVL V: CPT | Mod: PBBFAC,,, | Performed by: INTERNAL MEDICINE

## 2022-09-27 PROCEDURE — 1157F ADVNC CARE PLAN IN RCRD: CPT | Mod: ,,, | Performed by: INTERNAL MEDICINE

## 2022-09-27 PROCEDURE — 99999 PR PBB SHADOW E&M-EST. PATIENT-LVL V: ICD-10-PCS | Mod: PBBFAC,,, | Performed by: INTERNAL MEDICINE

## 2022-09-27 PROCEDURE — 1157F PR ADVANCE CARE PLAN OR EQUIV PRESENT IN MEDICAL RECORD: ICD-10-PCS | Mod: ,,, | Performed by: INTERNAL MEDICINE

## 2022-09-27 PROCEDURE — 99215 PR OFFICE/OUTPT VISIT, EST, LEVL V, 40-54 MIN: ICD-10-PCS | Mod: S$GLB,,, | Performed by: INTERNAL MEDICINE

## 2022-09-27 PROCEDURE — 99215 OFFICE O/P EST HI 40 MIN: CPT | Mod: S$GLB,,, | Performed by: INTERNAL MEDICINE

## 2022-09-27 RX ORDER — INSULIN GLARGINE 100 [IU]/ML
20 INJECTION, SOLUTION SUBCUTANEOUS NIGHTLY
Qty: 15 EACH | Refills: 3 | Status: SHIPPED | OUTPATIENT
Start: 2022-09-27 | End: 2022-09-30 | Stop reason: SDUPTHER

## 2022-09-27 RX ORDER — CLONIDINE HYDROCHLORIDE 0.2 MG/1
0.2 TABLET ORAL 2 TIMES DAILY
Qty: 180 TABLET | Refills: 1 | Status: SHIPPED | OUTPATIENT
Start: 2022-09-27 | End: 2023-04-11 | Stop reason: SDUPTHER

## 2022-09-27 RX ORDER — INSULIN ASPART 100 [IU]/ML
5 INJECTION, SOLUTION INTRAVENOUS; SUBCUTANEOUS
Qty: 6 ML | Refills: 11 | Status: SHIPPED | OUTPATIENT
Start: 2022-09-27 | End: 2023-05-19

## 2022-09-27 NOTE — PROGRESS NOTES
SURINDER bowles she just finished with [employer] and will be able to accompany to appointments    Assessment:       1. Essential hypertension    2. Type 2 diabetes mellitus without complication, with long-term current use of insulin    3. Hypertension associated with diabetes    4. Diastolic dysfunction    5. Need for vaccination    6. HTN (hypertension), benign          Plan:         1. Hypertension associated with diabetes  Chronic  Uncontrolled  Patient is not at goal today  I have reviewed lifestyle modification to achieve/maintain goals  We will adjust the current medication regimen to  RESUME CLONIDINE 0.2MG BID, AND CONTINUE AMLODPINE  Patient will follow up in 4 weeks    - cloNIDine (CATAPRES) 0.2 MG tablet; Take 1 tablet (0.2 mg total) by mouth 2 (two) times daily.  Dispense: 180 tablet; Refill: 1      3. Uncontrolled type 2 diabetes mellitus with hyperglycemia  Chronic  Uncontrolled  Patient is not at goal today  I have reviewed lifestyle modification to achieve/maintain goals  We will adjust the current medication regimen to   Patient will follow up in 4 weeks    - Ambulatory referral/consult to Ophthalmology; Future  - Ambulatory referral/consult to Diabetes Education; Future  - Hemoglobin A1C; Standing  - LANTUS SOLOSTAR U-100 INSULIN glargine 100 units/mL SubQ pen; Inject 20 Units into the skin every evening. 15 units once daily  Dispense: 15 each; Refill: 3  - insulin aspart U-100 (NOVOLOG FLEXPEN U-100 INSULIN) 100 unit/mL (3 mL) InPn pen; Inject 5 Units into the skin 3 (three) times daily with meals.  Dispense: 6 mL; Refill: 11      I spent at least 40 mins with preparing to see the patient, obtaining and/or revieweing separately obtained history, preforming a examination and evaluation, counseling, communicating with other health care professional, documenting clinical information in the electronic health record,  and/or coordinating care.                 Subjective:  "      Patient ID: Noris Demarco is a 76 y.o. female.    Chief Complaint: Hypertension        Hypertension  This is a chronic problem. The current episode started more than 1 year ago. The problem has been waxing and waning since onset. The problem is controlled. Treatments tried: WASNT TAKING AMLODIPINE LAST VISIT, ONLY HAS CLONIDINE 0.1 NEVER PICKED UP 0.2MG -WANTS IT SENT TO [facility]. The current treatment provides mild improvement. Compliance problems include psychosocial issues.      Altered mental status- missed nuerology appointment   DD but no documented HF  [facility]I - no venous insufficeny study . Asmyptomtic ?  H/o Bcca - last seen breast surgery 2019, now followed by heme onc, last visit 1/2022 , rec'd to follow up in 7/20222  for RTC 6 months with labs, cbc, cmp.   WATSON - followed now by endocrine       Review of Systems   All other systems reviewed and are negative.          Health Maintenance Due   Topic Date Due    TETANUS VACCINE  Never done    Shingles Vaccine (1 of 2) Never done    Pneumococcal Vaccines (Age 65+) (2 - PCV) 10/13/2014    Eye Exam  08/07/2020    DEXA Scan  11/01/2021    COVID-19 Vaccine (4 - Booster for Pfizer series) 03/18/2022    Influenza Vaccine (1) 09/01/2022    Hemoglobin A1c  09/30/2022   NORMAL DXA         Objective:     BP (!) 150/70   Pulse 68   Ht 5' 4" (1.626 m)   Wt 64.5 kg (142 lb 3.2 oz)   LMP 08/13/1999 (Approximate)   SpO2 98%   BMI 24.41 kg/m²     Vitals 9/27/2022 7/18/2022 6/29/2022 6/27/2022 1/18/2022   Height 64 64 65 65 -   Weight (lbs) 142.2 143.08 131.28 129.19 132.06   BMI (kg/m2) 24.4 24.5 21.8 21.5 -              Physical Exam  Nursing note reviewed.   Constitutional:       General: She is not in acute distress.     Appearance: Normal appearance. She is not ill-appearing, toxic-appearing or diaphoretic.   HENT:      Head: Normocephalic.   Eyes:      Conjunctiva/sclera: Conjunctivae normal.   Cardiovascular:      Rate and Rhythm: Normal rate. " "  Pulmonary:      Effort: Pulmonary effort is normal. No respiratory distress.   Neurological:      General: No focal deficit present.      Mental Status: She is alert and oriented to person, place, and time.   Psychiatric:         Mood and Affect: Mood normal.         BMP  Lab Results   Component Value Date     (L) 2022    K 4.2 2022     2022    CO2 30 (H) 2022    BUN 13 2022    CREATININE 0.64 2022    CALCIUM 9.2 2022    ANIONGAP 3 (L) 2022    ESTGFRAFRICA >60.0 2022    EGFRNONAA >60.0 2022     Lab Results   Component Value Date    HGBA1C 11.5 (H) 2022         Future Appointments   Date Time Provider Department Center   10/5/2022  8:00 AM [facility] DEXA1 LIMIT 350 LBS [facility]   10/5/2022 10:10 AM LAB, [facility] LAB [facility]   10/6/2022  9:30 AM Cecilia Chandra NP [facility] HEM ONC [facility]   2022  8:40 AM Abram Turner III, MD [facility] IM [place]   2022  8:00 AM Andrew Song MD [facility] NEURO[address] [place] [address]   2023  8:00 AM Holly Johnson, OD [facility] [place]         Medication List with Changes/Refills   Current Medications    AMLODIPINE (NORVASC) 10 MG TABLET    Take 1 tablet (10 mg total) by mouth once daily.    ASPIRIN (ECOTRIN) 81 MG EC TABLET    Take 81 mg by mouth once daily.    BD ULTRA-FINE IRWIN PEN NEEDLE 32 GAUGE X 5/32" NDLE    3 TIMES A DAY    BD ULTRA-FINE SHORT PEN NEEDLE 31 GAUGE X 5/16" NDLE    SMARTSI Each SUB-Q Daily    BLOOD-GLUCOSE METER,CONTINUOUS (DEXCOM G6 ) MISC    1 each by Misc.(Non-Drug; Combo Route) route once daily at 6am.    BLOOD-GLUCOSE SENSOR (DEXCOM G6 SENSOR) LEONOR    1 each by Misc.(Non-Drug; Combo Route) route once daily at 6am.    BLOOD-GLUCOSE TRANSMITTER (DEXCOM G6 TRANSMITTER) LEONOR    1 each by Misc.(Non-Drug; Combo Route) route once daily at 6am.    BUSPIRONE (BUSPAR) 15 MG TABLET    Take 15 mg by mouth once daily.    CHOLECALCIFEROL, VITAMIN D3, (VITAMIN " D3) 50 MCG (2,000 UNIT) CAP    Take 1 capsule by mouth once daily.    COENZYME Q10 100 MG CAPSULE    Take 100 mg by mouth once daily.    CYANOCOBALAMIN 500 MCG TABLET    Take 500 mcg by mouth once daily.    CYPROHEPTADINE (PERIACTIN) 4 MG TABLET    Take 1 tablet (4 mg total) by mouth 3 (three) times daily.    FISH OIL-OMEGA-3 FATTY ACIDS 300-1,000 MG CAPSULE    Take 1 g by mouth once daily.    FUROSEMIDE (LASIX) 40 MG TABLET    Take 40 mg by mouth once daily.     GENTAMICIN (GARAMYCIN) 0.3 % OPHTHALMIC SOLUTION    PLACE 2 DROPS TO AFFECTED EYE(S) EVERY 4 HOURS    GLUCOSE 4 GM CHEWABLE TABLET    Take 4 tablets (16 g total) by mouth as needed for Low blood sugar (If having symptoms of blurry vision, palpitations, confusion, shakiness.  Please check sugars and if sugar below 70 please take 4 tablets and re-check sugar everry 15 minutes until sugars are above 70 and symptoms resolve.).    INSULIN ASPART U-100 (NOVOLOG FLEXPEN U-100 INSULIN) 100 UNIT/ML (3 ML) INPN PEN    Inject 5 Units into the skin 3 (three) times daily with meals.    LANTUS SOLOSTAR U-100 INSULIN GLARGINE 100 UNITS/ML SUBQ PEN    INJECT 20 UNITS INTO THE SKIN EVERY EVENING. 15 UNITS ONCE DAILY    LISINOPRIL (PRINIVIL,ZESTRIL) 40 MG TABLET    Take 40 mg by mouth once daily.    METFORMIN (GLUCOPHAGE) 500 MG TABLET    Take 500 mg by mouth once daily at 6am.    TAMOXIFEN (NOLVADEX) 20 MG TAB    Take 1 tablet (20 mg total) by mouth once daily.    TRUE METRIX GLUCOSE TEST STRIP STRP    Inject 1 each into the skin once daily at 6am.   Changed and/or Refilled Medications    Modified Medication Previous Medication    CLONIDINE (CATAPRES) 0.2 MG TABLET cloNIDine (CATAPRES) 0.2 MG tablet       Take 1 tablet (0.2 mg total) by mouth 2 (two) times daily.    Take 1 tablet (0.2 mg total) by mouth 2 (two) times daily.         Disclaimer:  This note has been generated using voice-recognition software. There may be grammatical or spelling errors that have been missed  during proof-reading

## 2022-09-27 NOTE — TELEPHONE ENCOUNTER
----- Message from Abram Turner III, MD sent at 9/27/2022 10:23 AM CDT -----  Regarding: follow up  Greetings  This patient is due for follow up . Can we get  Her  scheduled for follow up with dr douglas or ra . Thanks so much!

## 2022-09-27 NOTE — TELEPHONE ENCOUNTER
Confirmed upcoming appt  labs on 10/5 in [place] and office visit to see CARLOS Brooks on 10/6 at 9:30am

## 2022-09-27 NOTE — PATIENT INSTRUCTIONS
We were happy to see you today    For your Testing  Please have your labs and/or imaging test done  on 10/5/2022        For your Medication   Please toake the clonidine 0.2 mg   For more information about side effects please visit medlineplus.gov        For your Referrals  Eye doctor  Diabetes education      For your Vaccinations  We gave your the following vaccines    Please obtain the following vaccines at the pharmacy          Please return to clinic in    Follow up for 4 week Office Visit.        Extra resources

## 2022-09-27 NOTE — Clinical Note
Hi, just wondering if you have access to her dexcom, I wasn't able to, states the sugars are elevated, unclear exactly how they were doing, and she has a1c coming up next week, thanks in advance!

## 2022-09-30 DIAGNOSIS — E11.65 UNCONTROLLED TYPE 2 DIABETES MELLITUS WITH HYPERGLYCEMIA: ICD-10-CM

## 2022-09-30 RX ORDER — INSULIN GLARGINE 100 [IU]/ML
20 INJECTION, SOLUTION SUBCUTANEOUS NIGHTLY
Qty: 15 EACH | Refills: 3 | Status: SHIPPED | OUTPATIENT
Start: 2022-09-30 | End: 2023-05-19

## 2022-10-05 ENCOUNTER — HOSPITAL ENCOUNTER (OUTPATIENT)
Dept: RADIOLOGY | Facility: HOSPITAL | Age: 77
Discharge: HOME | End: 2022-10-05
Attending: INTERNAL MEDICINE
Payer: MEDICARE

## 2022-10-05 DIAGNOSIS — Z78.0 MENOPAUSE: ICD-10-CM

## 2022-10-05 PROCEDURE — 77080 DXA BONE DENSITY AXIAL: CPT | Mod: TC,PO

## 2022-10-06 ENCOUNTER — OFFICE VISIT (OUTPATIENT)
Dept: HEMATOLOGY/ONCOLOGY | Facility: CLINIC | Age: 77
End: 2022-10-06
Payer: MEDICARE

## 2022-10-06 VITALS
BODY MASS INDEX: 24.37 KG/M2 | WEIGHT: 142 LBS | DIASTOLIC BLOOD PRESSURE: 74 MMHG | SYSTOLIC BLOOD PRESSURE: 158 MMHG | TEMPERATURE: 96 F | OXYGEN SATURATION: 96 % | HEART RATE: 65 BPM

## 2022-10-06 DIAGNOSIS — E11.65 TYPE 2 DIABETES MELLITUS WITH HYPERGLYCEMIA, WITH LONG-TERM CURRENT USE OF INSULIN: ICD-10-CM

## 2022-10-06 DIAGNOSIS — E11.59 HYPERTENSION ASSOCIATED WITH DIABETES: ICD-10-CM

## 2022-10-06 DIAGNOSIS — E55.9 VITAMIN D DEFICIENCY, UNSPECIFIED: ICD-10-CM

## 2022-10-06 DIAGNOSIS — I15.2 HYPERTENSION ASSOCIATED WITH DIABETES: ICD-10-CM

## 2022-10-06 DIAGNOSIS — R63.4 WEIGHT LOSS, UNINTENTIONAL: ICD-10-CM

## 2022-10-06 DIAGNOSIS — N64.89 SEROMA OF BREAST: ICD-10-CM

## 2022-10-06 DIAGNOSIS — D05.11 DUCTAL CARCINOMA IN SITU (DCIS) OF RIGHT BREAST: Primary | ICD-10-CM

## 2022-10-06 DIAGNOSIS — Z79.4 TYPE 2 DIABETES MELLITUS WITH HYPERGLYCEMIA, WITH LONG-TERM CURRENT USE OF INSULIN: ICD-10-CM

## 2022-10-06 DIAGNOSIS — I10 HTN (HYPERTENSION), BENIGN: ICD-10-CM

## 2022-10-06 PROCEDURE — 99215 OFFICE O/P EST HI 40 MIN: CPT | Mod: S$GLB,,, | Performed by: NURSE PRACTITIONER

## 2022-10-06 PROCEDURE — 99499 UNLISTED E&M SERVICE: CPT | Mod: HCNC,S$GLB,, | Performed by: NURSE PRACTITIONER

## 2022-10-06 PROCEDURE — 99999 PR PBB SHADOW E&M-EST. PATIENT-LVL IV: CPT | Mod: PBBFAC,,, | Performed by: NURSE PRACTITIONER

## 2022-10-06 PROCEDURE — 99999 PR PBB SHADOW E&M-EST. PATIENT-LVL IV: ICD-10-PCS | Mod: PBBFAC,,, | Performed by: NURSE PRACTITIONER

## 2022-10-06 PROCEDURE — 3288F FALL RISK ASSESSMENT DOCD: CPT | Mod: CPTII,S$GLB,, | Performed by: NURSE PRACTITIONER

## 2022-10-06 PROCEDURE — 3078F DIAST BP <80 MM HG: CPT | Mod: CPTII,S$GLB,, | Performed by: NURSE PRACTITIONER

## 2022-10-06 PROCEDURE — 1159F MED LIST DOCD IN RCRD: CPT | Mod: CPTII,S$GLB,, | Performed by: NURSE PRACTITIONER

## 2022-10-06 PROCEDURE — 99215 PR OFFICE/OUTPT VISIT, EST, LEVL V, 40-54 MIN: ICD-10-PCS | Mod: S$GLB,,, | Performed by: NURSE PRACTITIONER

## 2022-10-06 PROCEDURE — 3078F PR MOST RECENT DIASTOLIC BLOOD PRESSURE < 80 MM HG: ICD-10-PCS | Mod: CPTII,S$GLB,, | Performed by: NURSE PRACTITIONER

## 2022-10-06 PROCEDURE — 1126F PR PAIN SEVERITY QUANTIFIED, NO PAIN PRESENT: ICD-10-PCS | Mod: CPTII,S$GLB,, | Performed by: NURSE PRACTITIONER

## 2022-10-06 PROCEDURE — 1101F PT FALLS ASSESS-DOCD LE1/YR: CPT | Mod: CPTII,S$GLB,, | Performed by: NURSE PRACTITIONER

## 2022-10-06 PROCEDURE — 3077F PR MOST RECENT SYSTOLIC BLOOD PRESSURE >= 140 MM HG: ICD-10-PCS | Mod: CPTII,S$GLB,, | Performed by: NURSE PRACTITIONER

## 2022-10-06 PROCEDURE — 1157F PR ADVANCE CARE PLAN OR EQUIV PRESENT IN MEDICAL RECORD: ICD-10-PCS | Mod: CPTII,S$GLB,, | Performed by: NURSE PRACTITIONER

## 2022-10-06 PROCEDURE — 99499 RISK ADDL DX/OHS AUDIT: ICD-10-PCS | Mod: HCNC,S$GLB,, | Performed by: NURSE PRACTITIONER

## 2022-10-06 PROCEDURE — 1157F ADVNC CARE PLAN IN RCRD: CPT | Mod: CPTII,S$GLB,, | Performed by: NURSE PRACTITIONER

## 2022-10-06 PROCEDURE — 3288F PR FALLS RISK ASSESSMENT DOCUMENTED: ICD-10-PCS | Mod: CPTII,S$GLB,, | Performed by: NURSE PRACTITIONER

## 2022-10-06 PROCEDURE — 1126F AMNT PAIN NOTED NONE PRSNT: CPT | Mod: CPTII,S$GLB,, | Performed by: NURSE PRACTITIONER

## 2022-10-06 PROCEDURE — 3077F SYST BP >= 140 MM HG: CPT | Mod: CPTII,S$GLB,, | Performed by: NURSE PRACTITIONER

## 2022-10-06 PROCEDURE — 1159F PR MEDICATION LIST DOCUMENTED IN MEDICAL RECORD: ICD-10-PCS | Mod: CPTII,S$GLB,, | Performed by: NURSE PRACTITIONER

## 2022-10-06 PROCEDURE — 1101F PR PT FALLS ASSESS DOC 0-1 FALLS W/OUT INJ PAST YR: ICD-10-PCS | Mod: CPTII,S$GLB,, | Performed by: NURSE PRACTITIONER

## 2022-10-06 NOTE — TELEPHONE ENCOUNTER
----- Message from Jerry Delgado sent at 7/18/2018 11:48 AM CDT -----  Contact: Miri with [facility]  Debra    Caller is asking for you to call her back regarding this pt's genetic testing at [phone]    patient called to reschudle her appointment (10/14) that got canceled by physician, so we rescheduled her for 11/29, patient says she might not have enough medication to last that long tho. If she could just get refills til last til her appointment.

## 2022-10-06 NOTE — PROGRESS NOTES
Subjective:       Patient ID: Noris Demarco is a 76 y.o. female.    Chief Complaint:  Breast cancer    Follow-up  Pertinent negatives include no abdominal pain, chest pain, fatigue, fever, rash or weakness.     HPI as per Dr Peralta's 1/18/22 office visit notes:  She underwent a lumpectomy in Jan 2008 for a 5-mm Right Breast DCIS that was found on a routine screening mammogram, completed adjuvant RT in July 2008. She was started on Arimidex at [facility] following radiation treatment and she was intolerant to it because of debilitating and disabling joint pains, she hence was switched to tamoxifen in 12/2008 and completed it in Dec 2013.    She was then diagnosed with infiltrating ductal carcinoma of the lower outer quadrant of the left breast on routine screening mammogram done in May 2018.  Lumpectomy with SLN biopsy was done in June 2018 and a 1.3 cm grade 2 ER positive 95%, MD positive 5% and HER2 Luther negative, Ki-67 30% was noted. Five lymph nodes were removed from the axilla and they were all negative.  Oncotype score came back at 40, she declined adjuvant chemotherapy.  She completed adjuvant radiation therapy under the care of Dr. Chavez on 09/27/2018.    She has a golf ball-sized seroma in the left breast that is hard to palpation but is not painful.    She was intolerant to Arimidex before.  So she was given a prescription for Aromasin, she did not take it secondary to fear of arthralgias.  Hence tamoxifen was started on 10/08/2018.    INTERVAL HISTORY:   -she is here for follow-up of history of DCIS of the right breast and invasive breast cancer of the left breast, accompanied by   -she has been on tamoxifen since October 2018  -she has a hard palpable seroma in the left breast, nonpainful. Denies breast lumps, lesions, drainage, pain, or lymph node enlargement concerns.  -she has gained weight since last visit, pt and  state appetite has been very good, gained approximately 15 pounds  -no  further confusion episodes  -walks daily in neighborhood, eats only greens and healthy food  -no longer has episodes of confusion  -feels good overall  -working with pcp recently for better diabetes control, recent insulin dosage changes    Review of Systems   Constitutional:  Negative for appetite change, fatigue, fever and unexpected weight change.   Respiratory:  Negative for shortness of breath.    Cardiovascular:  Negative for chest pain.   Gastrointestinal:  Negative for abdominal pain.   Skin:  Negative for color change, rash and wound.   Neurological:  Negative for weakness.   Hematological:  Negative for adenopathy. Does not bruise/bleed easily.       Objective:      Vitals:    10/06/22 0924   BP: (!) 158/74   Pulse: 65   Temp: 96 °F (35.6 °C)   SpO2: 96%   Weight: 64.4 kg (141 lb 15.6 oz)       Body mass index is 24.37 kg/m².    Physical Exam  Constitutional:       General: She is not in acute distress.     Appearance: Normal appearance. She is not toxic-appearing.   HENT:      Head: Normocephalic and atraumatic.      Nose: Nose normal.      Mouth/Throat:      Mouth: Mucous membranes are moist.      Pharynx: Oropharynx is clear.   Eyes:      General: No scleral icterus.     Conjunctiva/sclera: Conjunctivae normal.   Cardiovascular:      Rate and Rhythm: Normal rate.      Heart sounds: Normal heart sounds.   Pulmonary:      Effort: Pulmonary effort is normal. No respiratory distress.      Breath sounds: Normal breath sounds.   Chest:   Breasts:     Right: No swelling, bleeding, inverted nipple, mass, nipple discharge, skin change or tenderness.      Left: No swelling, bleeding, inverted nipple, mass, nipple discharge, skin change or tenderness.      Comments: Bilat breasts nontender, no notable skin changes or nipple drainage, no associated lymphadnopathy  Musculoskeletal:         General: No swelling or tenderness. Normal range of motion.      Cervical back: Normal range of motion and neck supple.    Lymphadenopathy:      Cervical:      Right cervical: No superficial or deep cervical adenopathy.     Left cervical: No superficial or deep cervical adenopathy.      Upper Body:      Right upper body: No supraclavicular, axillary or pectoral adenopathy.      Left upper body: No supraclavicular, axillary or pectoral adenopathy.   Skin:     General: Skin is warm and dry.      Coloration: Skin is not jaundiced or pale.   Neurological:      Mental Status: She is alert and oriented to person, place, and time.      Gait: Gait normal.   Psychiatric:         Mood and Affect: Mood normal.         Behavior: Behavior normal.         Thought Content: Thought content normal.         Judgment: Judgment normal.     ECOG SCORE    0 - Fully active-able to carry on all pre-disease performance without restriction             LABS    Lab Results   Component Value Date    WBC 4.68 10/05/2022    HGB 12.0 10/05/2022    HCT 35.6 (L) 10/05/2022     (H) 10/05/2022     10/05/2022     IMAGING    DXA Bone Density 10/5/22 (reviewed by this provider)  Impression:  -The T score associated with the lumbar spine is 0.2 on the current examination.  It was 1.0 on the prior examination.  The T score associated with the left femoral neck is 0.1 on the current examination.  It was 0 on the prior examination.  The T score associated with the right femoral neck is -0.2 on the current examination.  It was 0.1 on the prior examination.  -Normal study     Assessment:       1. Ductal carcinoma in situ (DCIS) of right breast    2. Seroma of Left breast    3. Vitamin D deficiency, unspecified    4. Weight loss, unintentional    5. Type 2 diabetes mellitus with hyperglycemia, with long-term current use of insulin    6. HTN (hypertension), benign    7. Hypertension associated with diabetes          Plan:     Past records reviewed including clinic visits, imaging, labs, medications.     Unintentional weight loss  -her weight is slowly  stabilizing  - states that her family members have also lost weight unintentionally when they were in their 70s and 80s and feels if this could be hereditary  -reviewed CT chest/abdomen/pelvis done 01/05/2022 as well as reviewed MRI brain done 01/05/2022 which do not reveal any evidence of malignancy  -since the weight has increased and pt is feeling better with included daily walks around neighborhood, will continue to monitor  -took periactin short time, has not been taking and has had weight increase    DCIS right breast  -diagnosed in 2008  -took tamoxifen for 5 years until December 2013    Stage I T1c N0 infiltrating ductal carcinoma of the left breast  -status post lumpectomy and radiation therapy  -on tamoxifen since October 8, 2018  -labs CBC, chemistries and vitamin-D reviewed  -continue tamoxifen until October 2023  -Dexa completed 10/5/92, normal study, due again Oct 2024  -next bilateral screening mammogram due December 2022, scheduled today    Seroma left breast  -has hard to palpate seroma, that is improving  -will continue to monitor    Vitamin-D deficiency  -vitamin-D level reviewed  -cont otc vit D3, 2000 international units daily    HTN  - 158/74 today, no associated symptoms  - management deferred to pcp    Diabetes with hyperglycemia  - A1c 10/5/22 was 9.4%, improved, reports home blood sugar 150s this am. 10/5/22 glucose elevation noted on labs, completed nonfasting.  - recent med changes with pcp  - management deferred to pcp    RTC 6 months with labs, cbc, cmp.             Cecilia Chandra NP-C  [facility]  Hematology/Oncology  [address]  [place]  [id number] [phone]

## 2022-10-10 ENCOUNTER — TELEPHONE (OUTPATIENT)
Dept: INTERNAL MEDICINE | Facility: CLINIC | Age: 77
End: 2022-10-10
Payer: MEDICARE

## 2022-10-10 VITALS — DIASTOLIC BLOOD PRESSURE: 68 MMHG | SYSTOLIC BLOOD PRESSURE: 112 MMHG

## 2022-10-10 NOTE — TELEPHONE ENCOUNTER
Has been taking the clonidine  Check bp     .[email or web address]  BP Readings from Last 3 Encounters:   10/10/22 112/68   10/06/22 (!) 158/74   09/27/22 (!) 150/70

## 2022-11-15 PROBLEM — E13.9 LADA (LATENT AUTOIMMUNE DIABETES IN ADULTS), MANAGED AS TYPE 1: Status: ACTIVE | Noted: 2022-11-15

## 2022-12-02 ENCOUNTER — CLINICAL SUPPORT (OUTPATIENT)
Dept: DIABETES | Facility: CLINIC | Age: 77
End: 2022-12-02
Payer: MEDICARE

## 2022-12-02 ENCOUNTER — TELEPHONE (OUTPATIENT)
Dept: INTERNAL MEDICINE | Facility: CLINIC | Age: 77
End: 2022-12-02
Payer: MEDICARE

## 2022-12-02 ENCOUNTER — TELEPHONE (OUTPATIENT)
Dept: ENDOCRINOLOGY | Facility: CLINIC | Age: 77
End: 2022-12-02
Payer: MEDICARE

## 2022-12-02 DIAGNOSIS — E11.65 UNCONTROLLED TYPE 2 DIABETES MELLITUS WITH HYPERGLYCEMIA: Primary | ICD-10-CM

## 2022-12-02 DIAGNOSIS — E11.65 UNCONTROLLED TYPE 2 DIABETES MELLITUS WITH HYPERGLYCEMIA: ICD-10-CM

## 2022-12-02 PROCEDURE — 99999 PR PBB SHADOW E&M-EST. PATIENT-LVL I: CPT | Mod: PBBFAC,,, | Performed by: DIETITIAN, REGISTERED

## 2022-12-02 PROCEDURE — 99999 PR PBB SHADOW E&M-EST. PATIENT-LVL I: ICD-10-PCS | Mod: PBBFAC,,, | Performed by: DIETITIAN, REGISTERED

## 2022-12-02 RX ORDER — BLOOD-GLUCOSE TRANSMITTER
1 EACH MISCELLANEOUS DAILY
Qty: 1 EACH | Refills: 1 | Status: SHIPPED | OUTPATIENT
Start: 2022-12-02

## 2022-12-02 RX ORDER — BLOOD-GLUCOSE SENSOR
1 EACH MISCELLANEOUS DAILY
Qty: 1 EACH | Refills: 1 | Status: SHIPPED | OUTPATIENT
Start: 2022-12-02 | End: 2023-12-02

## 2022-12-02 RX ORDER — BLOOD-GLUCOSE,RECEIVER,CONT
1 EACH MISCELLANEOUS DAILY
Qty: 1 EACH | Refills: 1 | Status: SHIPPED | OUTPATIENT
Start: 2022-12-02 | End: 2023-12-02

## 2022-12-02 NOTE — PROGRESS NOTES
Diabetes Care Specialist Progress Note  Author: Nia Elena RD  Date: 12/2/2022    Program Intake  Reason for Diabetes Program Visit:: Initial Diabetes Assessment  Current diabetes risk level:: moderate  In the last 12 months, have you:: none  Permission to speak with others about care:: yes (We may speak to daughter Samantha)    Lab Results   Component Value Date    HGBA1C 9.4 (H) 10/05/2022       Clinical    Problem Review  Reviewed Problem List with Patient: yes  Active comorbidities affecting diabetes self-care.: yes  Comorbidities: Hypertension  Reviewed health maintenance: yes    Clinical Assessment  Current Diabetes Treatment: Insulin  Have you ever experienced hypoglycemia (low blood sugar)?: no  Have you ever experienced hyperglycemia (high blood sugar)?: no    Medication Information  How do you obtain your medications?: Family picks up  How many days a week do you miss your medications?: Never  Medication adherence impacting ability to self-manage diabetes?: No    Labs  Do you have regular lab work to monitor your medications?: Yes  Type of Regular Lab Work: A1c  Lab Compliance Barriers: No    Nutritional Status  Diet: Regular  Meal Plan 24 Hour Recall: Breakfast, Lunch, Dinner, Snack  Meal Plan 24 Hour Recall - Breakfast: grits, eggs, sausage, water, 1-2 cups of coffee with sugar free creamer  Meal Plan 24 Hour Recall - Lunch: fruits  Meal Plan 24 Hour Recall - Dinner: baked chicken, green beans, baked sweet potato with cinnamon, sweet-n-low and butter  Meal Plan 24 Hour Recall - Snack: fruit  Change in appetite?: No  Dentation:: Intact  Recent Changes in Weight: No Recent Weight Change  Current nutritional status an area of need that is impacting patient's ability to self-manage diabetes?: Yes    Additional Social History    Support  Does anyone support you with your diabetes care?: yes  Who supports you?: spouse, son/daughter, self  Who takes you to your medical appointments?: son/daughter  Does the  current support meet the patient's needs?: Yes  Is Support an area impacting ability to self-manage diabetes?: No    Access to Mass Media & Technology  Does the patient have access to any of the following devices or technologies?: Smart phone  Media or technology needs impacting ability to self-manage diabetes?: No    Cognitive/Behavioral Health  Alert and Oriented: Yes  Difficulty Thinking: Yes (early stage Alzheimers)  Requires Prompting: No  Requires assistance for routine expression?: No  Cognitive or behavioral barriers impacting ability to self-manage diabetes?: No         Communication  Language preference: English  Hearing Problems: No  Vision Problems: No  Communication needs impacting ability to self-manage diabetes?: No    Health Literacy  Preferred Learning Method: Face to Face, Reading Materials  How often do you need to have someone help you read instructions, pamphlets, or written material from your doctor or pharmacy?: Sometimes  Health literacy needs impacting ability to self-manage diabetes?: No      Diabetes Self-Management Skills Assessment    Diabetes Disease Process/Treatment Options  Patient/caregiver able to state what happens when someone has diabetes.: somewhat  Patient/caregiver knows what type of diabetes they have.: yes  Diabetes Type : Other (WATSON treated as Type 1 DM)  Patient/caregiver able to identify at least three signs and symptoms of diabetes.: no  Identified signs and symptoms:: increased thirst  Patient able to identify at least three risk factors for diabetes.: no  Identified risk factors:: family history  Diabetes Disease Process/Treatment Options: Skills Assessment Completed: Yes  Assessment indicates:: Instruction Needed  Area of need?: Yes    Nutrition/Healthy Eating  Challenges to healthy eating:: portion control  Method of carbohydrate measurement:: no method  Patient can identify foods that impact blood sugar.: yes  Patient-identified foods:: starches (bread, pasta,  rice, cereal), sweets, soda  Nutrition/Healthy Eating Skills Assessment Completed:: Yes  Assessment indicates:: Instruction Needed  Area of need?: Yes    Physical Activity/Exercise  Patient's daily activity level:: lightly active  Patient formally exercises outside of work.: yes  Exercise Type: walking  Intensity: Low  Frequency: four or more times a week  Duration: 30 min  Patient can identify forms of physical activity.: yes  Stated forms of physical activity:: any movement performed by muscles that uses energy  Patient can identify reasons why exercise/physical activity is important in diabetes management.: no  Physical Activity/Exercise Skills Assessment Completed: : Yes  Assessment indicates:: Instruction Needed  Area of need?: Yes    Medications  Patient is able to describe current diabetes management routine.: no (Her daughter was able to describe.)  Patient is able to identify current diabetes medications, dosages, and appropriate timing of medications.: yes  Patient understands the purpose of the medications taken for diabetes.: yes  Patient reports problems or concerns with current medication regimen.: yes  Medication regimen problems/concerns:: other (see comments) (Hates injections)  Medication Skills Assessment Completed:: Yes  Assessment indicates:: Instruction Needed  Area of need?: Yes    Home Blood Glucose Monitoring  Patient states that blood sugar is checked at home daily.: yes  Monitoring Method:: home glucometer  How often do you check your blood sugar?: 4 times a day  When do you check your blood sugar?: Before breakfast, Before lunch, Before dinner, Before bedtime  When you check what is your typical blood sugar range? : 140-500  Blood glucose logs:: no, encouraged to bring logs to provider visits  Blood glucose logs reviewed today?: no  Home Blood Glucose Monitoring Skills Assessment Completed: : Yes  Assessment indicates:: Adequate understanding  Area of need?: No    Acute  Complications  Acute Complications Skills Assessment Completed: : No  Deffered due to:: Time    Chronic Complications  Chronic Complications Skills Assessment Completed: : No  Deferred due to:: Time    Psychosocial/Coping  Psychosocial/Coping Skills Assessment Completed: : No  Deffered due to:: Time    Assessment Summary and Plan    Based on today's diabetes care assessment, the following areas of need were identified:      Social 12/2/2022   Support No   Access to Mass Media/Tech No   Cognitive/Behavioral Health No   Communication No   Health Literacy No        Clinical 12/2/2022   Medication Adherence No   Lab Compliance No   Nutritional Status Yes, see care plan        Diabetes Self-Management Skills 12/2/2022   Diabetes Disease Process/Treatment Options Yes, taught mgmt of DM    Nutrition/Healthy Eating Yes, see care plan   Physical Activity/Exercise Yes, taught benefits of exercise on lower BG   Medication Yes, see care plan   Home Blood Glucose Monitoring No          Today's interventions were provided through individual discussion, instruction, and written materials were provided.      Patient verbalized understanding of instruction and written materials.  Pt was able to return back demonstration of instructions today. Patient understood key points, needs reinforcement and further instruction.     Diabetes Self-Management Care Plan:    Today's Diabetes Self-Management Care Plan was developed with Noris's input. Noris has agreed to work toward the following goal(s) to improve his/her overall diabetes control.      Care Plan: Diabetes Management   Updates made since 11/2/2022 12:00 AM        Problem: Healthy Eating         Goal: Eat 3 meals daily with 30-45g/2-3 servings of Carbohydrate per meal.    Start Date: 12/2/2022   Expected End Date: 5/31/2023   Priority: High   Barriers: No Barriers Identified   Note:    Pt presented with daughter Samantha. Pt is being treated as WATSON. She eats 2 meals/day. Lunch is usually  one to two pieces of fruit. Her daughter said her BG are in the 400-500 range in the middle of the afternoon. When questioned further, it was revealed that pt does not take Novolog when she eats fruit for lunch because it is not a meal, it is more of snack. Will send note to Dr. Garcia to provide guidance on insulin with snacks.        Task: Reviewed the sources and role of Carbohydrate, Protein, and Fat and how each nutrient impacts blood sugar. Completed 12/2/2022     Task: Explained how to count carbohydrates using the food label and the use of dry measuring cups for accurate carb counting. Completed 12/2/2022     Task: Review the importance of balancing carbohydrates with each meal using portion control techniques to count servings of carbohydrate and label reading to identify serving size and amount of total carbs per serving. Completed 12/2/2022        Task: Provided Sample plate method and reviewed the use of the plate to estimate amounts of carbohydrate per meal. Completed 12/2/2022        Problem: Medications         Goal: Patient Agrees to take Diabetes Medication(s) Lantus and Novolog as prescribed.    Start Date: 12/2/2022   Expected End Date: 5/31/2023   Priority: High   Barriers: No Barriers Identified   Note:    Pt is being treated as WATSON Type 1. She does not take Novolog when she eats a small snack but I think she would benefit from Novolog with snacks. She eats 1-2 fruits in place of lunch and her daughter said her BG are in the 400-500 range in mid afternoon. Will send note to Dr. Garcia to provide guidance on how much insulin is needed for snacks.       Task: Discussed guidelines for preventing, detecting and treating hypoglycemia and hyperglycemia and reviewed the importance of meal and medication timing with diabetes mediations for prevention of hyper/hypoglycemia and maximum drug benefit. Completed 12/2/2022          Follow Up Plan     Follow up in about 6 weeks (around 1/13/2023). Review BG,  insulin regimen, carb counting.    Today's care plan and follow up schedule was discussed with patient.  Noris verbalized understanding of the care plan, goals, and agrees to follow up plan.        The patient was encouraged to communicate with his/her health care provider/physician and care team regarding his/her condition(s) and treatment.  I provided the patient with my contact information today and encouraged to contact me via phone or [facility] Portal as needed.     Length of Visit   Total Time: 60 Minutes

## 2022-12-02 NOTE — TELEPHONE ENCOUNTER
----- Message from Nia Elena RD sent at 12/2/2022  3:02 PM CST -----  Hi Dr. Turner,    I saw Ms. Demarco along with her daughter today for diabetes education. She mentioned how much she hates sticking her fingers, so I showed her a sample of the Dexcom G6 and how it works by downloading the esme on her phone. She liked how small it is and would like to try it.    Will you call in a prescription for her?     Thank you!    Nia  [phone]

## 2022-12-02 NOTE — TELEPHONE ENCOUNTER
----- Message from Nia Elena RD sent at 12/2/2022  3:38 PM CST -----  Hi Dr. Garcia,    (I sent the following message to Dr. Turner, but I realized I should have sent it to you since you are her endocrinologist. My apologies to both of you!)    I saw Ms. Demarco along with her daughter today for diabetes education. She mentioned how much she hates sticking her fingers, so I showed her a sample of the Dexcom G6 and how it works by downloading the esme on her phone. She liked how small it is and would like to try it.     Will you call in a prescription for her?     Also Dr. Garcia,    Her daughter said the patient's BG runs in the 400-500 range in the mid afternoon. Apparently the patient eats 1-2 fruit servings in place of a lunch meal, but does not take Novolog because it is not a meal. She is being managed as WATSON Type 1, so I think she would benefit from insulin with snacks. Will you provide guidance on how much insulin pt should take with snacks?    Thank you!     Nia Elena RDN, LDN  Diabetes Care and Education  [facility]  [phone]

## 2022-12-12 ENCOUNTER — OUTPATIENT CASE MANAGEMENT (OUTPATIENT)
Dept: NEUROLOGY | Facility: CLINIC | Age: 77
End: 2022-12-12
Payer: MEDICARE

## 2022-12-12 DIAGNOSIS — R41.89 COGNITIVE AND BEHAVIORAL CHANGES: Primary | ICD-10-CM

## 2022-12-12 DIAGNOSIS — R46.89 COGNITIVE AND BEHAVIORAL CHANGES: Primary | ICD-10-CM

## 2022-12-12 NOTE — PROGRESS NOTES
[facility]  Brain Health and Cognitive Disorders Program    PATIENT: Noris Demarco  DATE: 12/12/2022  MRN: [id number]  PRIMARY PROVIDER: Dawson Turner III, MD    Future Appointments   Date Time Provider Department Center   12/13/2022  9:00 AM [facility] MAMMO1 [facility] MAMMO [facility]   12/15/2022 10:30 AM Yordy Archuleta MD [facility] ENDODIA [address]   12/30/2022  8:00 AM Andrew Song MD [facility] NEURO[address]   1/5/2023  8:00 AM Holly Johnson OD [facility] [place]   1/20/2023  9:30 AM Nia Elena RD [facility] AMANDA [place]   4/6/2023  8:00 AM LAB, [place] RV[facility] LAB [facility]   4/10/2023  9:30 AM Cecilia Chandra NP [facility] HEM ONC [facility]           I reviewed old records and/or communicated with other professionals or the patient's family from 08:20 AM until 08:55 AM on 12/12/2022. This is directly related to a face-to-face visit encounter with the patient (Evaluation and Management service) conducted on 2022-12-30.  I reviewed the following documentation for a total of 35 minutes.    CPT codes for billing for prolonged evaluation and management service (non-face-to-face review of records or communications with patient's family or other medical professionals):  14207  Old [facility] EMR records I reviewed include:     Relevant Background/Context  Known Relevant Family history:  No Known Relevant Family History.  Neurocognitive Disorder:  The family denies a history of early/late onset cognitive impairment.  Movement Disorder:  The family denies a history of movement disorder (PD, PDD, tremor, etc).  Motorneuron Disorder:  The family denies a history of motor neuron disease (ALS).  Developmental Disorder:  The family denies a history of developmental learning disorder (Dyslexia, ADHD, ASD, etc.).  Psychiatric Disorder:  The family denies a history of mood/substance abuse disorder (MDD, MIKE, Schizophrenia, etc.).  Known Relevant Genetics:  There is no known relevant genetic testing available.  Developmental  Milestones:  The patient/family report no known birth complications or early life problems. The patient met all developmental milestones.  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 This is been going on for about a year reports about 10% weight loss. Her  is the 1 who cooks. No issues pain for food in the last 12 months no symptoms of ausea, vomiting, dysphaiga, abd pain, diarrhea, melena, hematochezia , fever, swea, tchills. No chest pain or shortness of breath no chronic coughing. DCIS s/p lumpectomy (2008) 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 She completed radiation therapy 9/2018. Plan: Tamoxifen- tamoxifen since October 8, 2018.  [facility] - Andrew Song MD. Neurologist (09/14/2022)  Type: Chart Review. Cancelled appt last moment.  Primary Care Provider (10/06/2022)  Type: Chart Review. She underwent a lumpectomy in Jan 2008 for a 5-mm Right Breast DCIS that was found on a routine screening mammogram, completed adjuvant RT in July 2008. She was started on Arimidex at [facility] following radiation treatment and she was intolerant to it because of debilitating and disabling joint pains, she hence was switched to  tamoxifen in 12/2008 and completed it in Dec 2013. She was then diagnosed with infiltrating ductal carcinoma of the lower outer quadrant of the left breast on routine screening mammogram done in May 2018. Lumpectomy with SLN biopsy was done in June 2018 and a 1. 3 cm grade 2 ER positive 95%, OH positive 5% and HER2 Luther negative, Ki-67 30% was noted. Five lymph nodes were removed from the axilla and they were all negative. Oncotype score came back at 40, she declined adjuvant chemotherapy. She completed adjuvant radiation therapy under the care of Dr. Chavez on 09/27/2018.     Current Presentation  Recent/Interim History:  Cancelled appt last moment 9/14/22. 2018 :: She was then diagnosed with infiltrating ductal carcinoma of the lower outer quadrant of the left breast on routine screening mammogram done in May 2018. She completed adjuvant radiation therapy under the care of Dr. Chavez on 09/27/2018. 2013 :: completed AI  2008 :: Jan 2008 for a 5-mm Right Breast DCIS that was found on a routine screening mammogram, completed adjuvant RT in July 2008. She was started on Arimidex at [facility]"" following radiation treatment and she was intolerant to it because of debilitating and disabling joint pains, she hence was switched to tamoxifen in 12/2008 and completed it in Dec 2013.  Unresolved Concern(s) reported by patient/family:  Breast cancer 2018 on AI since  Weight loss since 2021  Uncontrolled DM2 until recently 2022          Neuroimaging:    MRI brain/head without contrast on 1/5/2022  Formal interpretation by Radiology:  Tiny focus of hemosiderin deposition within the white matter adjacent to the occipital horn on the left likely relates to a focus of old microhemorrhage relating to chronic microvascular ischemic disease  Independently reviewed radiological imaging by Andrew Dejesus MD. MPH. Behavioral Neurologist  T1: Mild to Moderate generalized atrophy with slight ventriculomegaly that likely relates to central  atrophy due to white matter volume loss. Partially empty sella. widening of the marginal sulci. mild atrophy of hippocampi. generalized thinning of corpus callosum  T2/FLAIR: moderate degree of scattered non-fluent subcortical WMD with periventricular capping consistent with atherosclerotic disease of the corona radiata  DWI/ADC: No Significant DWI hyperintensities/hypointensities. No ADC correlation.  SWI/GRE: Tiny focus of hypointense signal on GRE series involving the white matter adjacent to the temporal horn of the left lateral ventricle  Impression: : moderate degree of subcortical microvascular disease with commiserate cortical volume loss     Working Diagnosis/Differential  LOAD provoked by AI and weight loss     Sincerely,  Andrew Song MD. MPH.    Brain Health and Cognitive Disorders Program  [facility]

## 2022-12-30 ENCOUNTER — PATIENT MESSAGE (OUTPATIENT)
Dept: NEUROLOGY | Facility: CLINIC | Age: 77
End: 2022-12-30

## 2022-12-30 ENCOUNTER — OFFICE VISIT (OUTPATIENT)
Dept: NEUROLOGY | Facility: CLINIC | Age: 77
End: 2022-12-30
Payer: MEDICARE

## 2022-12-30 VITALS
SYSTOLIC BLOOD PRESSURE: 156 MMHG | WEIGHT: 140 LBS | HEIGHT: 64 IN | BODY MASS INDEX: 23.9 KG/M2 | HEART RATE: 75 BPM | DIASTOLIC BLOOD PRESSURE: 76 MMHG

## 2022-12-30 DIAGNOSIS — G30.1 MILD LATE ONSET ALZHEIMER'S DEMENTIA WITH OTHER BEHAVIORAL DISTURBANCE: Primary | ICD-10-CM

## 2022-12-30 DIAGNOSIS — F41.9 ANXIETY: ICD-10-CM

## 2022-12-30 DIAGNOSIS — T88.7XXA MEDICATION SIDE EFFECT: ICD-10-CM

## 2022-12-30 DIAGNOSIS — G31.9 NEURODEGENERATIVE COGNITIVE IMPAIRMENT: ICD-10-CM

## 2022-12-30 DIAGNOSIS — E11.40 TYPE 2 DIABETES MELLITUS WITH DIABETIC NEUROPATHY, WITH LONG-TERM CURRENT USE OF INSULIN: ICD-10-CM

## 2022-12-30 DIAGNOSIS — C50.919: ICD-10-CM

## 2022-12-30 DIAGNOSIS — Z79.4 TYPE 2 DIABETES MELLITUS WITH DIABETIC NEUROPATHY, WITH LONG-TERM CURRENT USE OF INSULIN: ICD-10-CM

## 2022-12-30 DIAGNOSIS — F02.A18 MILD LATE ONSET ALZHEIMER'S DEMENTIA WITH OTHER BEHAVIORAL DISTURBANCE: Primary | ICD-10-CM

## 2022-12-30 DIAGNOSIS — Z81.8 FAMILY HISTORY OF SENILE DEMENTIA: ICD-10-CM

## 2022-12-30 PROCEDURE — 99999 PR PBB SHADOW E&M-EST. PATIENT-LVL III: CPT | Mod: PBBFAC,HCNC,, | Performed by: PSYCHIATRY & NEUROLOGY

## 2022-12-30 PROCEDURE — 3078F PR MOST RECENT DIASTOLIC BLOOD PRESSURE < 80 MM HG: ICD-10-PCS | Mod: HCNC,CPTII,S$GLB, | Performed by: PSYCHIATRY & NEUROLOGY

## 2022-12-30 PROCEDURE — 1159F MED LIST DOCD IN RCRD: CPT | Mod: HCNC,CPTII,S$GLB, | Performed by: PSYCHIATRY & NEUROLOGY

## 2022-12-30 PROCEDURE — 96116 NUBHVL XM PHYS/QHP 1ST HR: CPT | Mod: 59,HCNC,S$GLB, | Performed by: PSYCHIATRY & NEUROLOGY

## 2022-12-30 PROCEDURE — 99417 PROLNG OP E/M EACH 15 MIN: CPT | Mod: HCNC,S$GLB,, | Performed by: PSYCHIATRY & NEUROLOGY

## 2022-12-30 PROCEDURE — 96132 PR NEUROPSYCHOLOGIC TEST EVAL SVCS, 1ST HR: ICD-10-PCS | Mod: 59,HCNC,S$GLB, | Performed by: PSYCHIATRY & NEUROLOGY

## 2022-12-30 PROCEDURE — 1157F PR ADVANCE CARE PLAN OR EQUIV PRESENT IN MEDICAL RECORD: ICD-10-PCS | Mod: HCNC,CPTII,S$GLB, | Performed by: PSYCHIATRY & NEUROLOGY

## 2022-12-30 PROCEDURE — 99999 PR PBB SHADOW E&M-EST. PATIENT-LVL III: ICD-10-PCS | Mod: PBBFAC,HCNC,, | Performed by: PSYCHIATRY & NEUROLOGY

## 2022-12-30 PROCEDURE — 99499 UNLISTED E&M SERVICE: CPT | Mod: S$GLB,,, | Performed by: PSYCHIATRY & NEUROLOGY

## 2022-12-30 PROCEDURE — 1159F PR MEDICATION LIST DOCUMENTED IN MEDICAL RECORD: ICD-10-PCS | Mod: HCNC,CPTII,S$GLB, | Performed by: PSYCHIATRY & NEUROLOGY

## 2022-12-30 PROCEDURE — 99499 RISK ADDL DX/OHS AUDIT: ICD-10-PCS | Mod: S$GLB,,, | Performed by: PSYCHIATRY & NEUROLOGY

## 2022-12-30 PROCEDURE — 3077F PR MOST RECENT SYSTOLIC BLOOD PRESSURE >= 140 MM HG: ICD-10-PCS | Mod: HCNC,CPTII,S$GLB, | Performed by: PSYCHIATRY & NEUROLOGY

## 2022-12-30 PROCEDURE — 96116 PR NEUROBEHAVIORAL STATUS EXAM BY PSYCH/PHYS: ICD-10-PCS | Mod: 59,HCNC,S$GLB, | Performed by: PSYCHIATRY & NEUROLOGY

## 2022-12-30 PROCEDURE — 96132 NRPSYC TST EVAL PHYS/QHP 1ST: CPT | Mod: 59,HCNC,S$GLB, | Performed by: PSYCHIATRY & NEUROLOGY

## 2022-12-30 PROCEDURE — 1126F PR PAIN SEVERITY QUANTIFIED, NO PAIN PRESENT: ICD-10-PCS | Mod: HCNC,CPTII,S$GLB, | Performed by: PSYCHIATRY & NEUROLOGY

## 2022-12-30 PROCEDURE — 1160F RVW MEDS BY RX/DR IN RCRD: CPT | Mod: HCNC,CPTII,S$GLB, | Performed by: PSYCHIATRY & NEUROLOGY

## 2022-12-30 PROCEDURE — 1160F PR REVIEW ALL MEDS BY PRESCRIBER/CLIN PHARMACIST DOCUMENTED: ICD-10-PCS | Mod: HCNC,CPTII,S$GLB, | Performed by: PSYCHIATRY & NEUROLOGY

## 2022-12-30 PROCEDURE — 1157F ADVNC CARE PLAN IN RCRD: CPT | Mod: HCNC,CPTII,S$GLB, | Performed by: PSYCHIATRY & NEUROLOGY

## 2022-12-30 PROCEDURE — 99205 OFFICE O/P NEW HI 60 MIN: CPT | Mod: HCNC,S$GLB,, | Performed by: PSYCHIATRY & NEUROLOGY

## 2022-12-30 PROCEDURE — 3078F DIAST BP <80 MM HG: CPT | Mod: HCNC,CPTII,S$GLB, | Performed by: PSYCHIATRY & NEUROLOGY

## 2022-12-30 PROCEDURE — 3077F SYST BP >= 140 MM HG: CPT | Mod: HCNC,CPTII,S$GLB, | Performed by: PSYCHIATRY & NEUROLOGY

## 2022-12-30 PROCEDURE — 1126F AMNT PAIN NOTED NONE PRSNT: CPT | Mod: HCNC,CPTII,S$GLB, | Performed by: PSYCHIATRY & NEUROLOGY

## 2022-12-30 PROCEDURE — 99205 PR OFFICE/OUTPT VISIT, NEW, LEVL V, 60-74 MIN: ICD-10-PCS | Mod: HCNC,S$GLB,, | Performed by: PSYCHIATRY & NEUROLOGY

## 2022-12-30 PROCEDURE — 99417 PR PROLONGED SVC, OUTPT, W/WO DIRECT PT CONTACT,  EA ADDTL 15 MIN: ICD-10-PCS | Mod: HCNC,S$GLB,, | Performed by: PSYCHIATRY & NEUROLOGY

## 2022-12-30 NOTE — PROGRESS NOTES
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  Ms. Demarco showed evidence of visuospatial constructional dysfunction.  Ms. 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  She has evidence of dysgraphia.  Ms. Demarco showed evidence of apraxia.  She showed no evidence of ideomotor apraxia performing tool-use pantomimes (e.g., use a hammer, use a screwdriver, use a comb, flip a coin, waving goodbye).  She showed no evidence of ideational apraxia (e.g., taking off and putting on shoes, folding paper into an envelope).  She showed evidence of limb-motor apraxia during mimicking complex bimanual hand shapes.  She showed no evidence of buccofacial apraxia (e.g., blow out a candle, puff out cheeks, and whistle).  She showed no dysexecutive behavior.  Sensory:   Her sensation was diminished to light touch, and vibratory sense.   Comment: in the bilateral upper and lower extremities in a length-dependant pattern.; in the bilateral upper and lower extremities in a length-dependant pattern.  Reflexes:   Reflexes were symmetric and 2+ at biceps, 2+ triceps, and 2+  brachioradialis, 2+ at the knees bilaterally, there was no cross-abductor sign, 2+ in the bilateral ankles.  Gait:   She can arise from a chair and sit back down without using their arms.  Her gait was abnormal.  Her arms swing is abnormal.   Comment: B/L, Mild; asymmetric and decreased amplitude.  She takes turns in >4 steps.  When attempting to walk abnormally (heels, tiptoes, tandem), she makes no errors.  Neuropsychological Evaluation Summary:  Prior Neurocognitive/Neurobehavioral Evaluation(s)  No Prior Testing Available  Neurocognitive/Neurobehavioral Evaluation completed on 2022-12-30    Memory    Registration-3 2/3 Impairment: Moderate.   Recall-3 0/3 Impairment: Significant.   Recall-5 0/5 Impairment: Significant.   Registration-5 2/0     T1 2/9 Impairment: -2.5 STDs below the average score based on age and education.   T2 3/9 Impairment: -2.5 STDs below the average score based on age and education.   T3 3/9 Impairment: -3.6 STDs below the average score based on age and education.   T4 3/9 Impairment: -4.4 STDs below the average score based on age and education.   T5 3/9 Impairment: -4.4 STDs below the average score based on age and education.   Executive    Three-step command 2/3 Impairment: Moderate.   Trials-1 0/1 Impairment: Significant.   WORLD Backward 4/5 Impairment: Mild to Normal.   Digit Span - 2 1/2 Impairment: Moderate.   Serial Sevens 1/3 Impairment: Significant.   Fluency 0/1 Impairment: Significant.   Digit Span Backwards 2 Impairment: -2.4 STDs below the average score based on age and education.   Lexical Fluency - D 5 Impairment: -2.2 STDs below the average score based on age and education.   Lexical Fluency - F 4 Impairment: -2.4 STDs below the average score based on age and education.   Lexical Fluency - A 6 Impairment: -2 STDs below the average score based on age and education.   Visuospatial    Intersecting Pentagons 0/1 Impairment: Significant.   3D Cube Copy 0/1 Impairment:  Significant.   Clock Draw 1/3 Impairment: Significant.   Guzman Copy 8/17 Impairment: Significant.   Overlapping Images - Update 12/12 Within Normal Limits.   Picture Synthesis 3/3 Within Normal Limits.   Attention    Orientation-10 4/10 Impairment: Significant.   Orientation-6 3/6 Impairment: Moderate.   Alternating Sequence 1/1 Within Normal Limits.   Digit Span Forwards 5 Within Normal Limits.   Language    Repetition-1 1/1 Within Normal Limits.   Naming-2 2/2 Within Normal Limits.   Following written command 1/1 Within Normal Limits.   Writing a complete sentence 1/1 Within Normal Limits.   Naming-3 2/3 Impairment: Moderate.   Repetition-2 2/2 Within Normal Limits.   Abstraction 2/2 Within Normal Limits.   15-Item BNT 8/15 Impairment: -3.1 STDs below the average score based on age and education.   Repetition of Phrases 5/5 Within Normal Limits.   Verbal Agility 6/6 Within Normal Limits.   Neurocognitive Focused Evaluation Aggregate Score(s)    MMSE 17/30 MMSE Score suggestive of moderate cognitive impairment.   MOCA 13/30 MOCA Score suggestive of moderate cognitive impairment.   Neuropsychiatric/Behavioral Focused Evaluation Assessment    BEHAV5+ 3/6 See ROS section for a full description   Laboratories:     Lab Date Value [Reference]   Metabolic Screening           C-Peptide 06/27/2022  0.27 (L) [0.78 - 5.19 ng/mL]      TSH 2022, Jun-27    1.737 [0.400 - 4.000 uIU/mL]      Cholesterol 2022, Jun-27    187 [120 - 199 mg/dL]      HDL 2022, Jun-27    88 (H) [40 - 75 mg/dL]      Non-HDL Cholesterol 2022, Jun-27    99 [mg/dL]      Triglycerides 2022, Jun-27    110 [30 - 150 mg/dL]      Vit D, 25-Hydroxy 2021, Dec-08  2021, Jan-12 2020, Jan-13    56 [30 - 96 ng/mL]  42 [30 - 96 ng/mL]  39 [30 - 96 ng/mL]           Neuroimaging:    MRI brain/head without contrast on 1/5/2022  Formal interpretation by Radiology:  Tiny focus of hemosiderin deposition within the white matter adjacent to the occipital horn on the left  likely relates to a focus of old microhemorrhage relating to chronic microvascular ischemic disease  Independently reviewed radiological imaging by Andrew Dejesus MD. MPH. Behavioral Neurologist  T1: Mild to Moderate generalized atrophy with slight ventriculomegaly that likely relates to central atrophy due to white matter volume loss. Partially empty sella. widening of the marginal sulci. mild atrophy of hippocampi. generalized thinning of corpus callosum  T2/FLAIR: moderate degree of scattered non-fluent subcortical WMD with periventricular capping consistent with atherosclerotic disease of the corona radiata  DWI/ADC: No Significant DWI hyperintensities/hypointensities. No ADC correlation.  SWI/GRE: Tiny focus of hypointense signal on GRE series involving the white matter adjacent to the temporal horn of the left lateral ventricle  Impression: : moderate degree of subcortical microvascular disease with commiserate cortical volume loss     Procedures:    Electrocardiogram on 3/17/2019  Formal interpretation:  Vent. Rate : 076 BPM     Atrial Rate : 076 BPM    P-R Int : 174 ms          QRS Dur : 084 ms     QT Int : 390 ms       P-R-T Axes : 049 -14 017 degrees    QTc Int : 438 ms Normal sinus rhythm Possible Left atrial enlargement Borderline Abnormal ECG  Independently reviewed Electrocardiogram by Andrew Dejesus MD. MPH. Behavioral Neurologist  Impression: : Received ECG has no evidence of sinus node disease. HR (>=50-60). Prolonged ME interval (>0.22 s). Broad QRS complex (> 0.12 s).     Clinical Summary:     Ms. Demarco is a 77-year-old right-handed female with a relevant past medical history of CHF, DCIS s/p AI/rad, DM with complications, HTN. HLD, who presents reporting a 11-year history of step-wise progressive neurocognitive impairment.       The clinical history is suggestive of:  Memory Impairment: STM encoding impairment, LTM encoding-retrieval impairment, Amnesia of fixation  Attention  Impairment: Attention, Selective attention, Sustained attention, Shifting attention  Executive Impairment: Energization, Working Memory  Language Impairment: Language Dysfunction  Visuospatial Impairment: Allocentric Spatial Processing  Sensory Impairment: Sensory Deprivation, Limbic Dysfunction  Behavior Impairment: Response Inhibition, Emotional Regulation  Psychiatric Impairment: Neurovegetative, Social Coherence, Signal-Noise Dysregulation  iADL Impairment: [place] Instrumental Activities of Daily Living Scale  The neurological examination is significant for:  Cortical Frontal Dysfunction: agrammatic aphasia (syntax comprehension)  Cortical Frontal-Parietal Disconnection: apraxia (limb-motor)  Cortical Temporal Dysfunction: anomic aphasia (spontaneous, confrontational)  Cortical Temporal-Parietal Dysfunction: dysgraphia  Cortical Transcallosal Disconnection: interhemispheric motor control (interhemispheric motor control ), motor efference (motor overflow)  Executive Impairment: serial processing, thought disorder, thought disorder  Movement Disorder (Gait): abnormal features (abnormal swing, difficulty turning)  Movement Disorder (Hypokinetic): parkinsonism (bradykinesia), dyskinesia (hypometria, dysrhythmia)  Movement Disorder (Ocular): abnormal ocular movement (abnormal saccades), eyelid apraxia  Movement Disorder (Speech): abnormal vocal features (prosody), AOS (tone)  Sensory Dysfunction: peripheral (A? fibers)  The neurocognitive battery is significant (based on age and education) for:  Amnestic predominant multidomain major cognitive impairment - poor effort given during the examination though sufficient evidence to suggest major cognitive impairment  Severe Visuospatial Impairment: visuospatial construction.  Moderate Memory Impairment: She scored >3 standard deviations below the norm on at least one measure. She had difficulty with encoding, attention, attention. She had a incomplete learning curve. Her  free recall was significantly impaired by time.  Moderate Attention Impairment: orientation.  Moderate Executive Impairment: She scored >2 standard deviations below the norm on at least one measure. She had difficulty with lexical fluency, working memory.  Moderate Language Impairment: She scored >3 standard deviations below the norm on at least one measure. She had difficulty with naming.  MMSE 17/30: MMSE Score suggestive of moderate cognitive impairment.  MOCA 13/30: MOCA Score suggestive of moderate cognitive impairment.  BEHAV5+ 3/6: See ROS section for a full description  The neurologically relevant imaging is significant for  MRI brain/head without contrast (1/5/2022): moderate degree of subcortical microvascular disease with commiserate cortical volume loss        Assessment:        Ms. 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     Ms. Guzman clinical presentation meets the criteria for Dementia (America, et al. 2011 Alzheimer's & Dementia).     Concern regarding an intraindividual change in cognition diagnosed through a combination of history and objective cognitive assessment  Impairment in two or more cognitive domains  Interference with independence in functional abilities.  Represents a decline from previous levels of functioning.  Not explained by delirium or major psychiatric disorder     Ms. Guzman clinical syndrome is best described as Late-Onset Alzheimer's Dementia (LOAD) (America et al. 2011).     The pathology underlying Ms. Guzman neurocognitive impairment is likely a mixture of pathologies (Alzheimer's Disease Related Pathology, Vascular Contributions to Cognitive Impairment and Dementia) complicated by AI therapy. The neurobehavioral effects of aromatase inhibitors (AI) likely depend upon when during the lifespan such  drugs are administered. Post-menopausal women treated with such drugs are more likely to show memory deficits, increased pain response, and other emotional responses. That said, while great effort has been invested in studying cognitive dysfunction and AI therapy, controlling confounders such as prior chemotherapy received and having breast cancer is necessary to fully determine whether any association exists.     The observations made above, were discussed with the patient and her family. The patient presents with her family reporting a 12 year history of fluctuating stepwise progressive cognitive impairment in the setting of breast cancer and aromatase therapy. The patient 1st began having symptoms of short-term memory loss and train of thought deficits while on aromatase therapy between 2010 to 2012. This otherwise abated after removal of aromatase inhibitors and was stable until restarting them in 2018. Over the last 4 years in setting of uncontrolled diabetes weight loss and aromatase therapy the patient has reported increasing cognitive impairment sufficient interfere with instrumental activities of daily living. MRI brain from 2021 shows mild posterior cortical atrophy. Neurocognitive testing performed today indicates amnestic predominant multi domain major cognitive impairment. We discussed the diagnosis of late onset amnestic dementia being most consistent with Alzheimer's disease however can not rule out what degree patient's anxiety and aromatase therapy are confounding clinical picture. We recommend pursuing amyloid PET scan. her family is not interested at this time. Recommend screening for reversible cause of cognitive impairment and managing medications and symptoms accordingly. We recommend screening for reversible causes of cognitive impairment with serum laboratories. We recommend EKG before initiation of Acetylcholinesterase inhibitor medication. We have discussed the MIND Diet and other lifestyle  behavior that may help maintain brain health. We have discussed opportunities for biomarker testing (CSF Seymour biomarkers, IDEAs Amyloid-PET, Syn-One skin biopsy) - her family not interested.        Care Management Plan:     #Diagnostic Workup:   Laboratories: Hcy, Free T4, TSH, B1, B9, B12, MMA, HIV Ab/Ag, RPR, D  Procedures: Electrocardiogram  #Neurocognitive Disorder Treatment:  Following ECG will consider trial of donepezil  We have discussed opportunities for biomarker testing (CSF Seymour biomarkers, IDEAs Amyloid-PET, Syn-One skin biopsy) - family not interested   We have discussed opportunities for genetic testing (Inv[facility]ae, GeneDx).- family not interested   #Behavioral Disorder Treatment:  Next appt will consider addition for anxiety   Continue Buspar 7.5 mg BID  #Insomnia Treatment:  Continue clonidine 0.2 mg HS  #Behavioral/Environmental Treatment  We recommend engaging in activities that stimulate cognitively and socially while avoiding excessive stimulation and fatigue in overwhelmingly complex situations.  We recommend integrating routine and schedule into your daily life. https://www.alzheimersproject.org/news/the-importance-of-routine-and-familiarity-to-persons-with-dementia/  #Health Maintenance/Lifestyle Advice  We have discussed the value in aggressively controlling vascular risk factors like hypertension, hyperlipidemia, and Diabetes SBP<130, LDL<100, A1C<7.0.  We discussed the need to optimize lifestyle choices including a heart-healthy diet (e.g., Mediterranean or DASH), increased cardiovascular exercise (goal 150 minutes of moderate-intensity per week), and stay cognitively and socially active.  #Support  We all need support sometimes. Get easy access to local resources, community programs, and services. https://www.communityresourcefinder.org/  Learn more about Cognitive Impairment in [place]: https://www.alz.org/professionals/public-health/state-overview/[place]  #Safety  [place] has no  "laws against driving with dementia specifically but obviously has laws about medical conditions which impact a person's ability to drive safely. If you believe your loved one's driving capacity has diminished, please reach out to either your primary care physician or our office to discuss driving restrictions or revocation of their license. To learn more: https://www.dementiacarecentral.com/caregiverinfo/driving-problems/  The Alzheimer's Association administers the nationwide "Safe Return" program with identification bracelets, necklaces, or clothing tags and 24-hour assistance. More information is available online at https://www.alz.org/help-support/caregiving/safety/medicalert-with-24-7-wandering-support  #Follow up:  Follow-up in 4 weeks (Jan 2023).    Thank you for allowing us to participate in the care of your patient. Please do not hesitate to contact us with any questions or concerns.     It was a pleasure seeing Ms. Demarco and we look forward to seeing them at their follow-up visit.     This note is dictated on M*Modal Fluency Direct word recognition program. There are word recognition mistakes that are occasionally missed on review.       Scheduled Follow-up :  Future Appointments   Date Time Provider Department Center   1/5/2023  8:00 AM BLADIMIR Farias [facility] [place]   1/20/2023  9:30 AM Nia Elena RD [facility] [place]   [facility]   4/10/2023  9:30 AM Cecilia Chandra NP [facility] HEM ONC [place]       After Visit Medication List :     Medication List            Accurate as of December 30, 2022  9:29 AM. If you have any questions, ask your nurse or doctor.                CONTINUE taking these medications      amLODIPine 10 MG tablet  Commonly known as: NORVASC  Take 1 tablet (10 mg total) by mouth once daily.     aspirin 81 MG EC tablet  Commonly known as: ECOTRIN     * BD ULTRA-FINE SHORT PEN NEEDLE 31 gauge x 5/16" Ndle  Generic drug: pen " "needle, diabetic     * BD ULTRA-FINE IRWIN PEN NEEDLE 32 gauge x 5/32" Ndle  Generic drug: pen needle, diabetic  3 TIMES A DAY     busPIRone 15 MG tablet  Commonly known as: BUSPAR     cholecalciferol (vitamin D3) 50 mcg (2,000 unit) Cap capsule  Commonly known as: VITAMIN D3     cloNIDine 0.2 MG tablet  Commonly known as: CATAPRES  Take 1 tablet (0.2 mg total) by mouth 2 (two) times daily.     coenzyme Q10 100 mg capsule     cyanocobalamin 500 MCG tablet     * DEXCOM G6  Misc  Generic drug: blood-glucose meter,continuous  1 each by Misc.(Non-Drug; Combo Route) route once daily at 6am.     * DEXCOM G6  Misc  Generic drug: blood-glucose meter,continuous  1 each by Misc.(Non-Drug; Combo Route) route once daily.     * DEXCOM G6 SENSOR Xiao  Generic drug: blood-glucose sensor  1 each by Misc.(Non-Drug; Combo Route) route once daily at 6am.     * DEXCOM G6 SENSOR Xiao  Generic drug: blood-glucose sensor  1 each by Misc.(Non-Drug; Combo Route) route once daily.     * DEXCOM G6 TRANSMITTER Xiao  Generic drug: blood-glucose transmitter  1 each by Misc.(Non-Drug; Combo Route) route once daily at 6am.     * DEXCOM G6 TRANSMITTER Xiao  Generic drug: blood-glucose transmitter  1 each by Misc.(Non-Drug; Combo Route) route once daily.     fish oil-omega-3 fatty acids 300-1,000 mg capsule     furosemide 40 MG tablet  Commonly known as: LASIX     glucose 4 GM chewable tablet  Take 4 tablets (16 g total) by mouth as needed for Low blood sugar (If having symptoms of blurry vision, palpitations, confusion, shakiness.  Please check sugars and if sugar below 70 please take 4 tablets and re-check sugar everry 15 minutes until sugars are above 70 and symptoms resolve.).     insulin aspart U-100 100 unit/mL (3 mL) Inpn pen  Commonly known as: NovoLOG Flexpen U-100 Insulin  Inject 5 Units into the skin 3 (three) times daily with meals.     LANTUS SOLOSTAR U-100 INSULIN glargine 100 units/mL SubQ pen  Generic drug: " insulin  Inject 20 Units into the skin every evening.     lisinopriL 40 MG tablet  Commonly known as: PRINIVIL,ZESTRIL     metFORMIN 500 MG tablet  Commonly known as: GLUCOPHAGE     tamoxifen 20 MG Tab  Commonly known as: NOLVADEX  Take 1 tablet (20 mg total) by mouth once daily.     TRUE METRIX GLUCOSE TEST STRIP Strp  Generic drug: blood sugar diagnostic  Inject 1 each into the skin once daily at 6am.           * This list has 8 medication(s) that are the same as other medications prescribed for you. Read the directions carefully, and ask your doctor or other care provider to review them with you.                  Signing Physician:  Andrew Song MD    Billing:          -----------------------------------------------------------------------------    I spent a total of 105 minutes (time-in: 08:00 AM; time-out: 09:45 AM) on 2022-12-30, in person face-to-face with the patient and caregiver(s), >50% of that time was spent counseling regarding the symptoms, treatment plan, risks, therapeutic options, lifestyle modifications, and/or safety issues for the diagnoses above.    10/14 Review of Systems completed and is negative except as stated above in HPI (Systems reviewed: Const, Eyes, ENT, Resp, CV, GI, , MSK, Skin, Neuro)    I reviewed previous labs for a total of 5 minutes on 2022-12-30. This is directly related to the face-to-face encounter. Review of previous labs was performed all negative except as stated above in HPI    I reviewed previous diagnostic testing for a total of 5 minutes on 2022-12-30. This is directly related to the face-to-face encounter. A review of previous diagnostic testing was performed was noted to be within normal limits except as is stated above in HPI    I performed a neurobehavioral status examination that included a clinical assessment of thinking, reasoning, and judgment. Please see above HPI and ROS for full details. This exam was performed on 2022-12-30 and included 12 minutes spent  on direct face-to-face clinical observation and interview with the patient and 22 minutes spent interpreting test results and preparing the report. The total time of 34 minutes spent on the neurobehavioral status examination is not included in the time spent on evaluation and management coding.    I performed a neuropsychological evaluation that included the application of a series of standardized neurocognitive tests. Please see the informal neuropsychological assessment above for full details. This evaluation was performed on 2022-12-30 and included 13 minutes spent on direct face-to-face clinical standardized test administration with the patient and 20 minutes spent on interpreting standardized test results, integrating patient data into a treatment plan, and providing feedback to the patient and caregiver. The total time of 33 minutes spent on the neuropsychological evaluation is not included in the time spent on evaluation and management coding.    Total Billing time spent on encounter/documentation for this patient's evaluation and management, not including the neurobehavioral status examination and neuropsychological evaluation: 90 minutes.

## 2023-01-03 ENCOUNTER — LAB VISIT (OUTPATIENT)
Dept: LAB | Facility: HOSPITAL | Age: 78
End: 2023-01-03
Attending: PSYCHIATRY & NEUROLOGY
Payer: MEDICARE

## 2023-01-03 DIAGNOSIS — F02.A18 MILD LATE ONSET ALZHEIMER'S DEMENTIA WITH OTHER BEHAVIORAL DISTURBANCE: ICD-10-CM

## 2023-01-03 DIAGNOSIS — G30.1 MILD LATE ONSET ALZHEIMER'S DEMENTIA WITH OTHER BEHAVIORAL DISTURBANCE: ICD-10-CM

## 2023-01-03 LAB
ALBUMIN SERPL BCP-MCNC: 3.9 G/DL (ref 3.5–5.2)
ALP SERPL-CCNC: 71 U/L (ref 38–126)
ALT SERPL W/O P-5'-P-CCNC: 26 U/L (ref 10–44)
ANION GAP SERPL CALC-SCNC: 4 MMOL/L (ref 8–16)
AST SERPL-CCNC: 36 U/L (ref 15–46)
BASOPHILS # BLD AUTO: 0.05 K/UL (ref 0–0.2)
BASOPHILS NFR BLD: 1.3 % (ref 0–1.9)
BILIRUB SERPL-MCNC: 0.5 MG/DL (ref 0.1–1)
CALCIUM SERPL-MCNC: 9 MG/DL (ref 8.7–10.5)
CHLORIDE SERPL-SCNC: 106 MMOL/L (ref 95–110)
CO2 SERPL-SCNC: 30 MMOL/L (ref 23–29)
CREAT SERPL-MCNC: 0.51 MG/DL (ref 0.5–1.4)
DIFFERENTIAL METHOD: ABNORMAL
EOSINOPHIL # BLD AUTO: 0.3 K/UL (ref 0–0.5)
EOSINOPHIL NFR BLD: 8.6 % (ref 0–8)
ERYTHROCYTE [DISTWIDTH] IN BLOOD BY AUTOMATED COUNT: 12.7 % (ref 11.5–14.5)
EST. GFR  (NO RACE VARIABLE): >60 ML/MIN/1.73 M^2
FOLATE SERPL-MCNC: 15.2 NG/ML (ref 4–24)
GLUCOSE SERPL-MCNC: 123 MG/DL (ref 70–110)
HCT VFR BLD AUTO: 36 % (ref 37–48.5)
HGB BLD-MCNC: 12.1 G/DL (ref 12–16)
IGG SERPL-MCNC: 1270 MG/DL (ref 650–1600)
IMM GRANULOCYTES # BLD AUTO: 0 K/UL (ref 0–0.04)
IMM GRANULOCYTES NFR BLD AUTO: 0 % (ref 0–0.5)
LYMPHOCYTES # BLD AUTO: 1.7 K/UL (ref 1–4.8)
LYMPHOCYTES NFR BLD: 44.8 % (ref 18–48)
MAGNESIUM SERPL-MCNC: 1.7 MG/DL (ref 1.6–2.6)
MCH RBC QN AUTO: 33.7 PG (ref 27–31)
MCHC RBC AUTO-ENTMCNC: 33.6 G/DL (ref 32–36)
MCV RBC AUTO: 100 FL (ref 82–98)
MONOCYTES # BLD AUTO: 0.4 K/UL (ref 0.3–1)
MONOCYTES NFR BLD: 9.1 % (ref 4–15)
NEUTROPHILS # BLD AUTO: 1.4 K/UL (ref 1.8–7.7)
NEUTROPHILS NFR BLD: 36.2 % (ref 38–73)
NRBC BLD-RTO: 0 /100 WBC
PLATELET # BLD AUTO: 229 K/UL (ref 150–450)
PMV BLD AUTO: 10 FL (ref 9.2–12.9)
POTASSIUM SERPL-SCNC: 3.4 MMOL/L (ref 3.5–5.1)
PROT SERPL-MCNC: 6.9 G/DL (ref 6–8.4)
RBC # BLD AUTO: 3.59 M/UL (ref 4–5.4)
SODIUM SERPL-SCNC: 140 MMOL/L (ref 136–145)
T4 FREE SERPL-MCNC: 0.95 NG/DL (ref 0.71–1.51)
T4 SERPL-MCNC: 6.3 UG/DL (ref 4.5–11.5)
TSH SERPL DL<=0.005 MIU/L-ACNC: 4.22 UIU/ML (ref 0.4–4)
UUN UR-MCNC: 9 MG/DL (ref 7–17)
WBC # BLD AUTO: 3.84 K/UL (ref 3.9–12.7)

## 2023-01-03 PROCEDURE — 85025 COMPLETE CBC W/AUTO DIFF WBC: CPT | Mod: HCNC,PO | Performed by: PSYCHIATRY & NEUROLOGY

## 2023-01-03 PROCEDURE — 0361U NEURFLMNT LT CHN DIG IA QUAN: CPT | Mod: HCNC,PO | Performed by: PSYCHIATRY & NEUROLOGY

## 2023-01-03 PROCEDURE — 86780 TREPONEMA PALLIDUM: CPT | Mod: HCNC,PO | Performed by: PSYCHIATRY & NEUROLOGY

## 2023-01-03 PROCEDURE — 83921 ORGANIC ACID SINGLE QUANT: CPT | Mod: HCNC,PO | Performed by: PSYCHIATRY & NEUROLOGY

## 2023-01-03 PROCEDURE — 84425 ASSAY OF VITAMIN B-1: CPT | Mod: HCNC,PO | Performed by: PSYCHIATRY & NEUROLOGY

## 2023-01-03 PROCEDURE — 82607 VITAMIN B-12: CPT | Mod: HCNC,PO | Performed by: PSYCHIATRY & NEUROLOGY

## 2023-01-03 PROCEDURE — 82784 ASSAY IGA/IGD/IGG/IGM EACH: CPT | Mod: HCNC,PO | Performed by: PSYCHIATRY & NEUROLOGY

## 2023-01-03 PROCEDURE — 83735 ASSAY OF MAGNESIUM: CPT | Mod: HCNC,PO | Performed by: PSYCHIATRY & NEUROLOGY

## 2023-01-03 PROCEDURE — 86255 FLUORESCENT ANTIBODY SCREEN: CPT | Mod: 59,HCNC,PO | Performed by: PSYCHIATRY & NEUROLOGY

## 2023-01-03 PROCEDURE — 84443 ASSAY THYROID STIM HORMONE: CPT | Mod: HCNC,PO | Performed by: PSYCHIATRY & NEUROLOGY

## 2023-01-03 PROCEDURE — 84436 ASSAY OF TOTAL THYROXINE: CPT | Mod: HCNC | Performed by: PSYCHIATRY & NEUROLOGY

## 2023-01-03 PROCEDURE — 84439 ASSAY OF FREE THYROXINE: CPT | Mod: HCNC | Performed by: PSYCHIATRY & NEUROLOGY

## 2023-01-03 PROCEDURE — 80053 COMPREHEN METABOLIC PANEL: CPT | Mod: HCNC,PO | Performed by: PSYCHIATRY & NEUROLOGY

## 2023-01-03 PROCEDURE — 82746 ASSAY OF FOLIC ACID SERUM: CPT | Mod: HCNC,PO | Performed by: PSYCHIATRY & NEUROLOGY

## 2023-01-04 LAB
TREPONEMA PALLIDUM IGG+IGM AB [PRESENCE] IN SERUM OR PLASMA BY IMMUNOASSAY: NONREACTIVE
VIT B12 SERPL-MCNC: >1400 NG/L (ref 180–914)

## 2023-01-05 ENCOUNTER — OFFICE VISIT (OUTPATIENT)
Dept: OPTOMETRY | Facility: CLINIC | Age: 78
End: 2023-01-05
Payer: MEDICARE

## 2023-01-05 DIAGNOSIS — H52.7 REFRACTIVE ERROR: ICD-10-CM

## 2023-01-05 DIAGNOSIS — E11.3293 TYPE 2 DIABETES MELLITUS WITH MILD NONPROLIFERATIVE RETINOPATHY OF BOTH EYES WITHOUT MACULAR EDEMA, UNSPECIFIED WHETHER LONG TERM INSULIN USE: Primary | ICD-10-CM

## 2023-01-05 DIAGNOSIS — E11.65 UNCONTROLLED TYPE 2 DIABETES MELLITUS WITH HYPERGLYCEMIA: ICD-10-CM

## 2023-01-05 DIAGNOSIS — H25.13 NUCLEAR SCLEROSIS OF BOTH EYES: ICD-10-CM

## 2023-01-05 LAB — NEUROFILAMENT LIGHT CHAIN, PLASMA: 42.1 PG/ML

## 2023-01-05 PROCEDURE — 3288F PR FALLS RISK ASSESSMENT DOCUMENTED: ICD-10-PCS | Mod: HCNC,CPTII,S$GLB, | Performed by: OPTOMETRIST

## 2023-01-05 PROCEDURE — 1101F PR PT FALLS ASSESS DOC 0-1 FALLS W/OUT INJ PAST YR: ICD-10-PCS | Mod: HCNC,CPTII,S$GLB, | Performed by: OPTOMETRIST

## 2023-01-05 PROCEDURE — 99999 PR PBB SHADOW E&M-EST. PATIENT-LVL III: ICD-10-PCS | Mod: PBBFAC,HCNC,, | Performed by: OPTOMETRIST

## 2023-01-05 PROCEDURE — 1101F PT FALLS ASSESS-DOCD LE1/YR: CPT | Mod: HCNC,CPTII,S$GLB, | Performed by: OPTOMETRIST

## 2023-01-05 PROCEDURE — 92004 PR EYE EXAM, NEW PATIENT,COMPREHESV: ICD-10-PCS | Mod: HCNC,S$GLB,, | Performed by: OPTOMETRIST

## 2023-01-05 PROCEDURE — 1126F AMNT PAIN NOTED NONE PRSNT: CPT | Mod: HCNC,CPTII,S$GLB, | Performed by: OPTOMETRIST

## 2023-01-05 PROCEDURE — 1157F ADVNC CARE PLAN IN RCRD: CPT | Mod: HCNC,CPTII,S$GLB, | Performed by: OPTOMETRIST

## 2023-01-05 PROCEDURE — 1159F MED LIST DOCD IN RCRD: CPT | Mod: HCNC,CPTII,S$GLB, | Performed by: OPTOMETRIST

## 2023-01-05 PROCEDURE — 99999 PR PBB SHADOW E&M-EST. PATIENT-LVL III: CPT | Mod: PBBFAC,HCNC,, | Performed by: OPTOMETRIST

## 2023-01-05 PROCEDURE — 92004 COMPRE OPH EXAM NEW PT 1/>: CPT | Mod: HCNC,S$GLB,, | Performed by: OPTOMETRIST

## 2023-01-05 PROCEDURE — 3288F FALL RISK ASSESSMENT DOCD: CPT | Mod: HCNC,CPTII,S$GLB, | Performed by: OPTOMETRIST

## 2023-01-05 PROCEDURE — 1126F PR PAIN SEVERITY QUANTIFIED, NO PAIN PRESENT: ICD-10-PCS | Mod: HCNC,CPTII,S$GLB, | Performed by: OPTOMETRIST

## 2023-01-05 PROCEDURE — 1159F PR MEDICATION LIST DOCUMENTED IN MEDICAL RECORD: ICD-10-PCS | Mod: HCNC,CPTII,S$GLB, | Performed by: OPTOMETRIST

## 2023-01-05 PROCEDURE — 1157F PR ADVANCE CARE PLAN OR EQUIV PRESENT IN MEDICAL RECORD: ICD-10-PCS | Mod: HCNC,CPTII,S$GLB, | Performed by: OPTOMETRIST

## 2023-01-05 NOTE — PROGRESS NOTES
HPI    CC: Pt is here today for a Diabetic eye exam. She states she is currently   only using over the counter glasses to read.    ERICK: 2019 with Dr. Mg     (-) Changes in vision   (-) Pain  (-) Irritation   (-) Itching   (-) Flashes  (-) Floaters  (+) Glasses wearer  (-) CL wearer  (-) Uses eye gtts    Does patient want a refraction today? If needed     (-) Eye injury  (-) Eye surgery   (-)POHx  (-)FOHx    (+)DM  Hemoglobin A1C       Date                     Value               Ref Range             Status                10/05/2022               9.4 (H)             4.0 - 5.6 %           Final                  06/30/2022               11.5 (H)            4.0 - 5.6 %           Final                   06/27/2022               10.6 (H)            4.0 - 5.6 %           Final                Last edited by Holly Johnson, OD on 1/5/2023  8:38 AM.            Assessment /Plan     For exam results, see Encounter Report.    Type 2 diabetes mellitus with mild nonproliferative retinopathy of both eyes without macular edema, unspecified whether long term insulin use  -     OCT, Retina - OU - Both Eyes; Future    Uncontrolled type 2 diabetes mellitus with hyperglycemia  -     Ambulatory referral/consult to Ophthalmology  -     OCT, Retina - OU - Both Eyes; Future    Nuclear sclerosis of both eyes    Refractive error      1-2. Educated pt on findings. Mild NPDR OU. Hemes near fovea OD. Mac OCT showing IRF + VMT OU. High A1C --- discussed risk for LOV/blindness if BS continues to be uncontrolled. Recommend screening eyes separately at home. If distortion or changes in vision are noted, pt is to RTC ASAP. Refer to retina for further eval.     3. Educated pt on findings. Visually significant cataracts OU. Consider cataract eval after retina eval.     4. Will hold updated SRx due to possible cataract surgery. Also discussed high BS can void spec Rx. Okay to continue with OTC readers only for now. Monitor.       Addendum  01/25/2023  [facility] OCT completed. Pockets of fluid OU. VMT OU. Will call patient with results. Refer to retina arely.       RTC with retina, me prn.

## 2023-01-06 ENCOUNTER — HOSPITAL ENCOUNTER (OUTPATIENT)
Dept: CARDIOLOGY | Facility: HOSPITAL | Age: 78
Discharge: HOME | End: 2023-01-06
Attending: PSYCHIATRY & NEUROLOGY
Payer: MEDICARE

## 2023-01-06 DIAGNOSIS — G30.1 MILD LATE ONSET ALZHEIMER'S DEMENTIA WITH OTHER BEHAVIORAL DISTURBANCE: ICD-10-CM

## 2023-01-06 DIAGNOSIS — F02.A18 MILD LATE ONSET ALZHEIMER'S DEMENTIA WITH OTHER BEHAVIORAL DISTURBANCE: ICD-10-CM

## 2023-01-06 PROCEDURE — 93010 EKG 12-LEAD: ICD-10-PCS | Mod: HCNC,,, | Performed by: INTERNAL MEDICINE

## 2023-01-06 PROCEDURE — 93010 ELECTROCARDIOGRAM REPORT: CPT | Mod: HCNC,,, | Performed by: INTERNAL MEDICINE

## 2023-01-06 PROCEDURE — 93005 ELECTROCARDIOGRAM TRACING: CPT | Mod: HCNC,PO

## 2023-01-10 LAB
METHYLMALONATE SERPL-SCNC: 0.1 UMOL/L
VIT B1 BLD-MCNC: 80 UG/L (ref 38–122)

## 2023-01-11 ENCOUNTER — TELEPHONE (OUTPATIENT)
Dept: NEUROLOGY | Facility: CLINIC | Age: 78
End: 2023-01-11
Payer: MEDICARE

## 2023-01-11 LAB
AMPA-R AB CBA, SERUM: NEGATIVE
AMPHIPHYSIN AB TITR SER: NEGATIVE TITER
CASPR2-IGG CBA: NEGATIVE
CV2 IGG TITR SER: NEGATIVE TITER
DPPX IGG SERPL QL IF: NEGATIVE
GABA-B-R AB CBA, SERUM: NEGATIVE
GAD65 AB SER-SCNC: 23.1 NMOL/L
GFAP ALPHA IGG SER QL IF: NEGATIVE
GLIAL NUC TYPE 1 AB TITR SER: NEGATIVE TITER
HU1 AB TITR SER: NEGATIVE TITER
HU2 AB TITR SER IF: NEGATIVE TITER
HU3 AB TITR SER: NEGATIVE TITER
IGLON5 IFA, S: NEGATIVE
IMMUNOLOGIST REVIEW: ABNORMAL
LGI1-IGG CBA: NEGATIVE
MGLUR1 AB IFA, SERUM: NEGATIVE
NIF IFA, S: NEGATIVE
NMDA-R AB CBA, SERUM: NEGATIVE
PAVAL REFLEX TEST ADDED: ABNORMAL
PCA-2 AB TITR SER: NEGATIVE TITER
PCA-TR AB TITR SER: NEGATIVE TITER

## 2023-01-11 NOTE — TELEPHONE ENCOUNTER
----- Message from Andrew Song MD sent at 1/11/2023 12:35 PM CST -----  Hi,  Please schedule the patient for a telephone or video follow-up to discuss test results and care management.  Andrew Renteria

## 2023-01-25 ENCOUNTER — CLINICAL SUPPORT (OUTPATIENT)
Dept: OPHTHALMOLOGY | Facility: CLINIC | Age: 78
End: 2023-01-25
Payer: MEDICARE

## 2023-01-25 DIAGNOSIS — E11.65 UNCONTROLLED TYPE 2 DIABETES MELLITUS WITH HYPERGLYCEMIA: ICD-10-CM

## 2023-01-25 DIAGNOSIS — E11.3293 TYPE 2 DIABETES MELLITUS WITH MILD NONPROLIFERATIVE RETINOPATHY OF BOTH EYES WITHOUT MACULAR EDEMA, UNSPECIFIED WHETHER LONG TERM INSULIN USE: ICD-10-CM

## 2023-01-31 ENCOUNTER — TELEPHONE (OUTPATIENT)
Dept: NEUROLOGY | Facility: CLINIC | Age: 78
End: 2023-01-31

## 2023-02-07 ENCOUNTER — OFFICE VISIT (OUTPATIENT)
Dept: INTERNAL MEDICINE | Facility: CLINIC | Age: 78
End: 2023-02-07
Payer: MEDICARE

## 2023-02-07 VITALS
OXYGEN SATURATION: 98 % | DIASTOLIC BLOOD PRESSURE: 60 MMHG | WEIGHT: 138.88 LBS | HEART RATE: 69 BPM | SYSTOLIC BLOOD PRESSURE: 132 MMHG | BODY MASS INDEX: 23.84 KG/M2

## 2023-02-07 DIAGNOSIS — Z79.4 TYPE 2 DIABETES MELLITUS WITH HYPERGLYCEMIA, WITH LONG-TERM CURRENT USE OF INSULIN: ICD-10-CM

## 2023-02-07 DIAGNOSIS — I15.2 HYPERTENSION ASSOCIATED WITH DIABETES: Primary | ICD-10-CM

## 2023-02-07 DIAGNOSIS — E11.59 HYPERTENSION ASSOCIATED WITH DIABETES: Primary | ICD-10-CM

## 2023-02-07 DIAGNOSIS — F02.A18 MILD LATE ONSET ALZHEIMER'S DEMENTIA WITH OTHER BEHAVIORAL DISTURBANCE: ICD-10-CM

## 2023-02-07 DIAGNOSIS — Z00.00 ENCOUNTER FOR MEDICARE ANNUAL WELLNESS EXAM: ICD-10-CM

## 2023-02-07 DIAGNOSIS — H11.32 SUBCONJUNCTIVAL HEMORRHAGE OF LEFT EYE: ICD-10-CM

## 2023-02-07 DIAGNOSIS — G30.1 MILD LATE ONSET ALZHEIMER'S DEMENTIA WITH OTHER BEHAVIORAL DISTURBANCE: ICD-10-CM

## 2023-02-07 DIAGNOSIS — E11.65 TYPE 2 DIABETES MELLITUS WITH HYPERGLYCEMIA, WITH LONG-TERM CURRENT USE OF INSULIN: ICD-10-CM

## 2023-02-07 DIAGNOSIS — H60.90 OTITIS EXTERNA, UNSPECIFIED CHRONICITY, UNSPECIFIED LATERALITY, UNSPECIFIED TYPE: ICD-10-CM

## 2023-02-07 PROBLEM — C50.219: Status: RESOLVED | Noted: 2018-06-03 | Resolved: 2023-02-07

## 2023-02-07 PROBLEM — C50.912 INFILTRATING DUCTAL CARCINOMA OF BREAST, LEFT: Status: RESOLVED | Noted: 2018-06-15 | Resolved: 2023-02-07

## 2023-02-07 PROCEDURE — 99999 PR PBB SHADOW E&M-EST. PATIENT-LVL V: CPT | Mod: PBBFAC,HCNC,, | Performed by: INTERNAL MEDICINE

## 2023-02-07 PROCEDURE — 3075F PR MOST RECENT SYSTOLIC BLOOD PRESS GE 130-139MM HG: ICD-10-PCS | Mod: HCNC,CPTII,S$GLB, | Performed by: INTERNAL MEDICINE

## 2023-02-07 PROCEDURE — 3078F PR MOST RECENT DIASTOLIC BLOOD PRESSURE < 80 MM HG: ICD-10-PCS | Mod: HCNC,CPTII,S$GLB, | Performed by: INTERNAL MEDICINE

## 2023-02-07 PROCEDURE — 1160F RVW MEDS BY RX/DR IN RCRD: CPT | Mod: HCNC,CPTII,S$GLB, | Performed by: INTERNAL MEDICINE

## 2023-02-07 PROCEDURE — 1157F PR ADVANCE CARE PLAN OR EQUIV PRESENT IN MEDICAL RECORD: ICD-10-PCS | Mod: HCNC,CPTII,S$GLB, | Performed by: INTERNAL MEDICINE

## 2023-02-07 PROCEDURE — 1159F PR MEDICATION LIST DOCUMENTED IN MEDICAL RECORD: ICD-10-PCS | Mod: HCNC,CPTII,S$GLB, | Performed by: INTERNAL MEDICINE

## 2023-02-07 PROCEDURE — 1160F PR REVIEW ALL MEDS BY PRESCRIBER/CLIN PHARMACIST DOCUMENTED: ICD-10-PCS | Mod: HCNC,CPTII,S$GLB, | Performed by: INTERNAL MEDICINE

## 2023-02-07 PROCEDURE — 1157F ADVNC CARE PLAN IN RCRD: CPT | Mod: HCNC,CPTII,S$GLB, | Performed by: INTERNAL MEDICINE

## 2023-02-07 PROCEDURE — 1126F PR PAIN SEVERITY QUANTIFIED, NO PAIN PRESENT: ICD-10-PCS | Mod: HCNC,CPTII,S$GLB, | Performed by: INTERNAL MEDICINE

## 2023-02-07 PROCEDURE — 3078F DIAST BP <80 MM HG: CPT | Mod: HCNC,CPTII,S$GLB, | Performed by: INTERNAL MEDICINE

## 2023-02-07 PROCEDURE — 1101F PT FALLS ASSESS-DOCD LE1/YR: CPT | Mod: HCNC,CPTII,S$GLB, | Performed by: INTERNAL MEDICINE

## 2023-02-07 PROCEDURE — 3288F PR FALLS RISK ASSESSMENT DOCUMENTED: ICD-10-PCS | Mod: HCNC,CPTII,S$GLB, | Performed by: INTERNAL MEDICINE

## 2023-02-07 PROCEDURE — 1126F AMNT PAIN NOTED NONE PRSNT: CPT | Mod: HCNC,CPTII,S$GLB, | Performed by: INTERNAL MEDICINE

## 2023-02-07 PROCEDURE — 99999 PR PBB SHADOW E&M-EST. PATIENT-LVL V: ICD-10-PCS | Mod: PBBFAC,HCNC,, | Performed by: INTERNAL MEDICINE

## 2023-02-07 PROCEDURE — 3288F FALL RISK ASSESSMENT DOCD: CPT | Mod: HCNC,CPTII,S$GLB, | Performed by: INTERNAL MEDICINE

## 2023-02-07 PROCEDURE — 1159F MED LIST DOCD IN RCRD: CPT | Mod: HCNC,CPTII,S$GLB, | Performed by: INTERNAL MEDICINE

## 2023-02-07 PROCEDURE — 99214 PR OFFICE/OUTPT VISIT, EST, LEVL IV, 30-39 MIN: ICD-10-PCS | Mod: HCNC,S$GLB,, | Performed by: INTERNAL MEDICINE

## 2023-02-07 PROCEDURE — 3075F SYST BP GE 130 - 139MM HG: CPT | Mod: HCNC,CPTII,S$GLB, | Performed by: INTERNAL MEDICINE

## 2023-02-07 PROCEDURE — 1101F PR PT FALLS ASSESS DOC 0-1 FALLS W/OUT INJ PAST YR: ICD-10-PCS | Mod: HCNC,CPTII,S$GLB, | Performed by: INTERNAL MEDICINE

## 2023-02-07 PROCEDURE — 99214 OFFICE O/P EST MOD 30 MIN: CPT | Mod: HCNC,S$GLB,, | Performed by: INTERNAL MEDICINE

## 2023-02-07 RX ORDER — CIPROFLOXACIN AND DEXAMETHASONE 3; 1 MG/ML; MG/ML
2 SUSPENSION/ DROPS AURICULAR (OTIC) 4 TIMES DAILY
Qty: 15 ML | Refills: 0 | Status: SHIPPED | OUTPATIENT
Start: 2023-02-07 | End: 2023-02-07

## 2023-02-07 RX ORDER — CIPROFLOXACIN AND DEXAMETHASONE 3; 1 MG/ML; MG/ML
4 SUSPENSION/ DROPS AURICULAR (OTIC) 4 TIMES DAILY
Qty: 10 ML | Refills: 0 | Status: CANCELLED | OUTPATIENT
Start: 2023-02-07 | End: 2023-02-17

## 2023-02-07 RX ORDER — CHLORTHALIDONE 25 MG/1
25 TABLET ORAL DAILY
Qty: 90 TABLET | Refills: 1 | Status: CANCELLED | OUTPATIENT
Start: 2023-02-07 | End: 2024-02-07

## 2023-02-07 NOTE — PROGRESS NOTES
Assessment:       1. Hypertension associated with diabetes    2. Type 2 diabetes mellitus with hyperglycemia, with long-term current use of insulin    3. 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 Subconjunctival hemorrhage of left eye    5. Otitis externa, unspecified chronicity, unspecified laterality, unspecified type          Plan:         Noris was seen today for eye redness.    Diagnoses and all orders for this visit:    Hypertension associated with diabetes  Chronic  Controlled  Patient is at goal today   I have reviewed lifestyle modification to achieve/maintain goals  We will continue the current medication regimen as listed below  Patient will follow up in 3 months   Type 2 diabetes mellitus with hyperglycemia, with long-term current use of insulin  -     Hemoglobin A1C; Future  -     Cancel: Lipid Panel; Future  -     Basic Metabolic Panel; Standing  -     CBC Without Differential; Standing  -     Hemoglobin A1C; Standing  -     Hepatic Function Panel; Standing  -     Lipid Panel; Standing  -     Microalbumin/Creatinine Ratio, Urine; Standing    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 for 10 days          Home monitoring BP  A1c today  Rtc 2 weeks to review and adjust dm meds,  was asking   Ciprodex fu in 2 week maybe ENT  Labs in 3 month      I spent at least 40 mins with preparing to see the patient, obtaining and/or revieweing separately obtained history, preforming a examination and evaluation, counseling, communicating with other health care professional, documenting clinical information in the electronic health record,  and/or coordinating care.             Subjective:       Patient  ID: Noris Demarco is a 77 y.o. female.    Chief Complaint: eye redness      Interim Hx  Seen by neurology report fu in 4 week after labs, ekg, noepezil, Declined, biomarker and genetic testing.   Seen by opthology in 1/5/2023      Concerns today    Red eye     Chronic problems       Otalgia   There is pain in both ears. This is a new problem. The current episode started 1 to 4 weeks ago. The problem occurs every few hours. The problem has been unchanged. There has been no fever. The pain is at a severity of 7/10. The pain is moderate. Pertinent negatives include no abdominal pain, coughing, diarrhea, drainage, ear discharge, headaches, hearing loss, neck pain, rash, rhinorrhea, sore throat or vomiting. She has tried NSAIDs and ear drops for the symptoms. The treatment provided mild relief. There is no history of a chronic ear infection, hearing loss or a tympanostomy tube.   Hypertension  This is a chronic problem. The current episode started more than 1 year ago. The problem has been waxing and waning since onset. The problem is controlled. Pertinent negatives include no headaches or neck pain. Past treatments include central alpha agonists, calcium channel blockers and ACE inhibitors. The current treatment provides significant improvement. There are no compliance problems.      Review of Systems   HENT:  Positive for ear pain. Negative for ear discharge, hearing loss, rhinorrhea and sore throat.    Respiratory:  Negative for cough.    Gastrointestinal:  Negative for abdominal pain, diarrhea and vomiting.   Musculoskeletal:  Negative for neck pain.   Integumentary:  Negative for rash.   Neurological:  Negative for headaches.           Health Maintenance Due   Topic Date Due    TETANUS VACCINE  Never done    Shingles Vaccine (1 of 2) Never done    Pneumococcal Vaccines (Age 65+) (2 - PCV) 10/13/2014    COVID-19 Vaccine (4 - Booster for Pfizer series) 03/18/2022    Influenza Vaccine (1) 09/01/2022    Hemoglobin A1c   01/05/2023         Objective:     /60 (BP Location: Right arm, Patient Position: Sitting, BP Method: Medium (Manual))   Pulse 69   Wt 63 kg (138 lb 14.2 oz)   LMP 08/13/1999 (Approximate)   SpO2 98%   BMI 23.84 kg/m²     Vitals 2/7/2023 12/30/2022 10/6/2022 9/27/2022 7/18/2022   Height - 64 - 64 64   Weight (lbs) 138.89 140 141.98 142.2 143.08   BMI (kg/m2) - 24 24.4 24.4 24.5              Physical Exam  Vitals and nursing note reviewed.   Constitutional:       General: She is not in acute distress.     Appearance: She is well-developed. She is not diaphoretic.   HENT:      Head: Normocephalic.      Right Ear: There is no impacted cerumen.      Ears:      Comments: Right ear white discharge      Nose: Nose normal.   Eyes:      General: No scleral icterus.        Right eye: No discharge.         Left eye: No discharge.      Extraocular Movements: Extraocular movements intact.      Conjunctiva/sclera: Conjunctivae normal.      Pupils: Pupils are equal, round, and reactive to light.   Cardiovascular:      Rate and Rhythm: Normal rate and regular rhythm.      Heart sounds: Normal heart sounds. No murmur heard.    No friction rub. No gallop.   Pulmonary:      Effort: Pulmonary effort is normal. No respiratory distress.   Abdominal:      General: Bowel sounds are normal. There is no distension.      Palpations: Abdomen is soft.   Musculoskeletal:         General: No deformity. Normal range of motion.      Cervical back: Normal range of motion.   Skin:     General: Skin is warm.   Neurological:      Mental Status: She is alert and oriented to person, place, and time.      Cranial Nerves: No cranial nerve deficit.           ASCVD  The 10-year ASCVD risk score (Florence DK, et al., 2019) is: 54.7%    Values used to calculate the score:      Age: 77 years      Sex: Female      Is Non- : Yes      Diabetic: Yes      Tobacco smoker: No      Systolic Blood Pressure: 166 mmHg      Is BP treated:  "Yes      HDL Cholesterol: 88 mg/dL      Total Cholesterol: 187 mg/dL    Basic Labs    BMP  Lab Results   Component Value Date     2023    K 3.4 (L) 2023     2023    CO2 30 (H) 2023    BUN 9 2023    CREATININE 0.51 2023    CALCIUM 9.0 2023    ANIONGAP 4 (L) 2023    ESTGFRAFRICA >60.0 2022    EGFRNONAA >60.0 2022     Lab Results   Component Value Date    EGFRNORACEVR >60.0 2023       Lab Results   Component Value Date    ALT 26 2023    AST 36 2023    ALKPHOS 71 2023    BILITOT 0.5 2023         Lab Results   Component Value Date    TSH 4.220 (H) 2023     Lab Results   Component Value Date    WBC 3.84 (L) 2023    HGB 12.1 2023    HCT 36.0 (L) 2023     (H) 2023     2023             Lipids  Lab Results   Component Value Date    CHOL 187 2022     Lab Results   Component Value Date    HDL 88 (H) 2022     Lab Results   Component Value Date    LDLCALC 77.0 2022     Lab Results   Component Value Date    TRIG 110 2022     Lab Results   Component Value Date    CHOLHDL 47.1 2022       DM  Lab Results   Component Value Date    HGBA1C 9.4 (H) 10/05/2022         Future Appointments   Date Time Provider Department Center   2023  8:00 AM LAB, [facility] LAB [facility]   4/10/2023  9:30 AM Cecilia Chandra NP [facility] HEM ONC [place] [facility]         Medication List with Changes/Refills   Current Medications    AMLODIPINE (NORVASC) 10 MG TABLET    Take 1 tablet (10 mg total) by mouth once daily.    ASPIRIN (ECOTRIN) 81 MG EC TABLET    Take 81 mg by mouth once daily.    BD ULTRA-FINE IRWIN PEN NEEDLE 32 GAUGE X 5/32" NDLE    3 TIMES A DAY    BD ULTRA-FINE SHORT PEN NEEDLE 31 GAUGE X 5/16" NDLE    SMARTSI Each SUB-Q Daily    BLOOD-GLUCOSE METER,CONTINUOUS (DEXCOM G6 ) MISC    1 each by Misc.(Non-Drug; Combo Route) route once daily at 6am.    " BLOOD-GLUCOSE METER,CONTINUOUS (DEXCOM G6 ) MISC    1 each by Misc.(Non-Drug; Combo Route) route once daily.    BLOOD-GLUCOSE SENSOR (DEXCOM G6 SENSOR) LEONOR    1 each by Misc.(Non-Drug; Combo Route) route once daily at 6am.    BLOOD-GLUCOSE SENSOR (DEXCOM G6 SENSOR) LEONOR    1 each by Misc.(Non-Drug; Combo Route) route once daily.    BLOOD-GLUCOSE TRANSMITTER (DEXCOM G6 TRANSMITTER) LEONOR    1 each by Misc.(Non-Drug; Combo Route) route once daily at 6am.    BLOOD-GLUCOSE TRANSMITTER (DEXCOM G6 TRANSMITTER) LEONOR    1 each by Misc.(Non-Drug; Combo Route) route once daily.    BUSPIRONE (BUSPAR) 15 MG TABLET    Take 15 mg by mouth once daily.    CHOLECALCIFEROL, VITAMIN D3, (VITAMIN D3) 50 MCG (2,000 UNIT) CAP    Take 1 capsule by mouth once daily.    CLONIDINE (CATAPRES) 0.2 MG TABLET    Take 1 tablet (0.2 mg total) by mouth 2 (two) times daily.    COENZYME Q10 100 MG CAPSULE    Take 100 mg by mouth once daily.    CYANOCOBALAMIN 500 MCG TABLET    Take 500 mcg by mouth once daily.    FISH OIL-OMEGA-3 FATTY ACIDS 300-1,000 MG CAPSULE    Take 1 g by mouth once daily.    FUROSEMIDE (LASIX) 40 MG TABLET    Take 40 mg by mouth once daily.     GLUCOSE 4 GM CHEWABLE TABLET    Take 4 tablets (16 g total) by mouth as needed for Low blood sugar (If having symptoms of blurry vision, palpitations, confusion, shakiness.  Please check sugars and if sugar below 70 please take 4 tablets and re-check sugar everry 15 minutes until sugars are above 70 and symptoms resolve.).    INSULIN ASPART U-100 (NOVOLOG FLEXPEN U-100 INSULIN) 100 UNIT/ML (3 ML) INPN PEN    Inject 5 Units into the skin 3 (three) times daily with meals.    LANTUS SOLOSTAR U-100 INSULIN GLARGINE 100 UNITS/ML SUBQ PEN    Inject 20 Units into the skin every evening.    LISINOPRIL (PRINIVIL,ZESTRIL) 40 MG TABLET    Take 40 mg by mouth once daily.    METFORMIN (GLUCOPHAGE) 500 MG TABLET    Take 500 mg by mouth once daily at 6am.    TAMOXIFEN (NOLVADEX) 20 MG TAB     Take 1 tablet (20 mg total) by mouth once daily.    TRUE METRIX GLUCOSE TEST STRIP STRP    Inject 1 each into the skin once daily at 6am.         Disclaimer:  This note has been generated using voice-recognition software. There may be grammatical or spelling errors that have been missed during proof-reading

## 2023-02-09 DIAGNOSIS — Z00.00 ENCOUNTER FOR MEDICARE ANNUAL WELLNESS EXAM: ICD-10-CM

## 2023-02-09 DIAGNOSIS — C50.912 INFILTRATING DUCTAL CARCINOMA OF BREAST, LEFT: ICD-10-CM

## 2023-02-09 RX ORDER — TAMOXIFEN CITRATE 20 MG/1
20 TABLET ORAL DAILY
Qty: 90 TABLET | Refills: 3 | Status: SHIPPED | OUTPATIENT
Start: 2023-02-09 | End: 2024-02-19

## 2023-02-22 ENCOUNTER — TELEPHONE (OUTPATIENT)
Dept: OPTOMETRY | Facility: CLINIC | Age: 78
End: 2023-02-22
Payer: MEDICARE

## 2023-02-22 NOTE — TELEPHONE ENCOUNTER
----- Message from Holly Johnson, OD sent at 1/25/2023  4:49 PM CST -----  Regarding: Pt needs retina f/u  Patient we saw just had Mac OCT. Testing shows retina changes. Can you call and let her know and refer her for DM retinopathy? Thank you!

## 2023-02-27 ENCOUNTER — OFFICE VISIT (OUTPATIENT)
Dept: INTERNAL MEDICINE | Facility: CLINIC | Age: 78
End: 2023-02-27
Payer: MEDICARE

## 2023-02-27 ENCOUNTER — LAB VISIT (OUTPATIENT)
Dept: LAB | Facility: HOSPITAL | Age: 78
End: 2023-02-27
Attending: INTERNAL MEDICINE
Payer: MEDICARE

## 2023-02-27 VITALS
HEIGHT: 64 IN | HEART RATE: 64 BPM | OXYGEN SATURATION: 99 % | WEIGHT: 140.88 LBS | DIASTOLIC BLOOD PRESSURE: 74 MMHG | SYSTOLIC BLOOD PRESSURE: 148 MMHG | BODY MASS INDEX: 24.05 KG/M2

## 2023-02-27 DIAGNOSIS — Z79.4 TYPE 2 DIABETES MELLITUS WITH HYPERGLYCEMIA, WITH LONG-TERM CURRENT USE OF INSULIN: ICD-10-CM

## 2023-02-27 DIAGNOSIS — E11.59 HYPERTENSION ASSOCIATED WITH DIABETES: Primary | ICD-10-CM

## 2023-02-27 DIAGNOSIS — I15.2 HYPERTENSION ASSOCIATED WITH DIABETES: Primary | ICD-10-CM

## 2023-02-27 DIAGNOSIS — E11.65 TYPE 2 DIABETES MELLITUS WITH HYPERGLYCEMIA, WITH LONG-TERM CURRENT USE OF INSULIN: ICD-10-CM

## 2023-02-27 DIAGNOSIS — H66.004 RECURRENT ACUTE SUPPURATIVE OTITIS MEDIA OF RIGHT EAR WITHOUT SPONTANEOUS RUPTURE OF TYMPANIC MEMBRANE: ICD-10-CM

## 2023-02-27 LAB
ESTIMATED AVG GLUCOSE: 232 MG/DL (ref 68–131)
HBA1C MFR BLD: 9.7 % (ref 4–5.6)

## 2023-02-27 PROCEDURE — 1157F PR ADVANCE CARE PLAN OR EQUIV PRESENT IN MEDICAL RECORD: ICD-10-PCS | Mod: HCNC,CPTII,S$GLB, | Performed by: INTERNAL MEDICINE

## 2023-02-27 PROCEDURE — 1159F MED LIST DOCD IN RCRD: CPT | Mod: HCNC,CPTII,S$GLB, | Performed by: INTERNAL MEDICINE

## 2023-02-27 PROCEDURE — 1101F PT FALLS ASSESS-DOCD LE1/YR: CPT | Mod: HCNC,CPTII,S$GLB, | Performed by: INTERNAL MEDICINE

## 2023-02-27 PROCEDURE — 99214 OFFICE O/P EST MOD 30 MIN: CPT | Mod: HCNC,S$GLB,, | Performed by: INTERNAL MEDICINE

## 2023-02-27 PROCEDURE — 1160F RVW MEDS BY RX/DR IN RCRD: CPT | Mod: HCNC,CPTII,S$GLB, | Performed by: INTERNAL MEDICINE

## 2023-02-27 PROCEDURE — 1160F PR REVIEW ALL MEDS BY PRESCRIBER/CLIN PHARMACIST DOCUMENTED: ICD-10-PCS | Mod: HCNC,CPTII,S$GLB, | Performed by: INTERNAL MEDICINE

## 2023-02-27 PROCEDURE — 3078F DIAST BP <80 MM HG: CPT | Mod: HCNC,CPTII,S$GLB, | Performed by: INTERNAL MEDICINE

## 2023-02-27 PROCEDURE — 99214 PR OFFICE/OUTPT VISIT, EST, LEVL IV, 30-39 MIN: ICD-10-PCS | Mod: HCNC,S$GLB,, | Performed by: INTERNAL MEDICINE

## 2023-02-27 PROCEDURE — 3077F SYST BP >= 140 MM HG: CPT | Mod: HCNC,CPTII,S$GLB, | Performed by: INTERNAL MEDICINE

## 2023-02-27 PROCEDURE — 3288F FALL RISK ASSESSMENT DOCD: CPT | Mod: HCNC,CPTII,S$GLB, | Performed by: INTERNAL MEDICINE

## 2023-02-27 PROCEDURE — 3078F PR MOST RECENT DIASTOLIC BLOOD PRESSURE < 80 MM HG: ICD-10-PCS | Mod: HCNC,CPTII,S$GLB, | Performed by: INTERNAL MEDICINE

## 2023-02-27 PROCEDURE — 99999 PR PBB SHADOW E&M-EST. PATIENT-LVL V: CPT | Mod: PBBFAC,HCNC,, | Performed by: INTERNAL MEDICINE

## 2023-02-27 PROCEDURE — 3077F PR MOST RECENT SYSTOLIC BLOOD PRESSURE >= 140 MM HG: ICD-10-PCS | Mod: HCNC,CPTII,S$GLB, | Performed by: INTERNAL MEDICINE

## 2023-02-27 PROCEDURE — 1101F PR PT FALLS ASSESS DOC 0-1 FALLS W/OUT INJ PAST YR: ICD-10-PCS | Mod: HCNC,CPTII,S$GLB, | Performed by: INTERNAL MEDICINE

## 2023-02-27 PROCEDURE — 1126F PR PAIN SEVERITY QUANTIFIED, NO PAIN PRESENT: ICD-10-PCS | Mod: HCNC,CPTII,S$GLB, | Performed by: INTERNAL MEDICINE

## 2023-02-27 PROCEDURE — 3288F PR FALLS RISK ASSESSMENT DOCUMENTED: ICD-10-PCS | Mod: HCNC,CPTII,S$GLB, | Performed by: INTERNAL MEDICINE

## 2023-02-27 PROCEDURE — 99999 PR PBB SHADOW E&M-EST. PATIENT-LVL V: ICD-10-PCS | Mod: PBBFAC,HCNC,, | Performed by: INTERNAL MEDICINE

## 2023-02-27 PROCEDURE — 83036 HEMOGLOBIN GLYCOSYLATED A1C: CPT | Mod: HCNC | Performed by: INTERNAL MEDICINE

## 2023-02-27 PROCEDURE — 1126F AMNT PAIN NOTED NONE PRSNT: CPT | Mod: HCNC,CPTII,S$GLB, | Performed by: INTERNAL MEDICINE

## 2023-02-27 PROCEDURE — 36415 COLL VENOUS BLD VENIPUNCTURE: CPT | Mod: HCNC,PO | Performed by: INTERNAL MEDICINE

## 2023-02-27 PROCEDURE — 1159F PR MEDICATION LIST DOCUMENTED IN MEDICAL RECORD: ICD-10-PCS | Mod: HCNC,CPTII,S$GLB, | Performed by: INTERNAL MEDICINE

## 2023-02-27 PROCEDURE — 1157F ADVNC CARE PLAN IN RCRD: CPT | Mod: HCNC,CPTII,S$GLB, | Performed by: INTERNAL MEDICINE

## 2023-02-27 RX ORDER — AMOXICILLIN AND CLAVULANATE POTASSIUM 875; 125 MG/1; MG/1
1 TABLET, FILM COATED ORAL EVERY 12 HOURS
Qty: 14 TABLET | Refills: 0 | Status: SHIPPED | OUTPATIENT
Start: 2023-02-27 | End: 2023-03-06

## 2023-02-27 NOTE — PROGRESS NOTES
Assessment:       1. Hypertension associated with diabetes    2. Type 2 diabetes mellitus with hyperglycemia, with long-term current use of insulin    3. Recurrent acute suppurative otitis media of right ear without spontaneous rupture of tympanic membrane          Plan:         Noris was seen today for hypertension and diabetes.    Diagnoses and all orders for this visit:    Hypertension associated with diabetes    Type 2 diabetes mellitus with hyperglycemia, with long-term current use of insulin  -     Hemoglobin A1C; Future  -     amoxicillin-clavulanate 875-125mg (AUGMENTIN) 875-125 mg per tablet; Take 1 tablet by mouth every 12 (twelve) hours. for 7 days    Recurrent acute suppurative otitis media of right ear without spontaneous rupture of tympanic membrane  -     amoxicillin-clavulanate 875-125mg (AUGMENTIN) 875-125 mg per tablet; Take 1 tablet by mouth every 12 (twelve) hours. for 7 days      Try augmenitn  A1c today   Fu 2 weeks   ENT if no improvement consider      Subjective:       Patient ID: Noris Demarco is a 77 y.o. female.    Chief Complaint: Hypertension and Diabetes      Interim Hx  none    Concerns today        Chronic problems         Otalgia   There is pain in the right ear. This is a chronic problem. The current episode started 1 to 4 weeks ago. The problem occurs every few hours. The problem has been waxing and waning. There has been no fever. The pain is mild. She has tried ear drops for the symptoms. The treatment provided moderate relief.   Hypertension  This is a chronic problem. The current episode started more than 1 year ago. The problem has been waxing and waning since onset. The problem is uncontrolled. Past treatments include central alpha agonists, calcium channel blockers and ACE inhibitors. The current treatment provides significant improvement. There are no compliance problems.      Review of Systems   HENT:  Positive for ear pain.    All other systems reviewed and are  "negative.          Health Maintenance Due   Topic Date Due    TETANUS VACCINE  Never done    Shingles Vaccine (1 of 2) Never done    Pneumococcal Vaccines (Age 65+) (2 - PCV) 10/13/2014    COVID-19 Vaccine (4 - Booster for Pfizer series) 03/18/2022    Influenza Vaccine (1) 09/01/2022    Hemoglobin A1c  01/05/2023         Objective:     BP (!) 148/74 (BP Location: Left arm, Patient Position: Sitting, BP Method: Large (Manual))   Pulse 64   Ht 5' 4" (1.626 m)   Wt 63.9 kg (140 lb 14 oz)   LMP 08/13/1999 (Approximate)   SpO2 99%   BMI 24.18 kg/m²     Vitals 2/27/2023 2/7/2023 12/30/2022 10/6/2022 9/27/2022   Height 64 - 64 - 64   Weight (lbs) 140.87 138.89 140 141.98 142.2   BMI (kg/m2) 24.2 - 24 24.4 24.4              Physical Exam  Nursing note reviewed.   Constitutional:       General: She is not in acute distress.     Appearance: Normal appearance. She is not ill-appearing, toxic-appearing or diaphoretic.   HENT:      Head: Normocephalic.      Ears:      Comments:  R pale purulent discharge with bulinging TM without rupturer   Eyes:      Conjunctiva/sclera: Conjunctivae normal.   Pulmonary:      Effort: Pulmonary effort is normal. No respiratory distress.   Neurological:      General: No focal deficit present.      Mental Status: She is alert and oriented to person, place, and time.   Psychiatric:         Mood and Affect: Mood normal.         Behavior: Behavior normal.         Thought Content: Thought content normal.         Judgment: Judgment normal.             Future Appointments   Date Time Provider Department Center   2/27/2023  2:40 PM Abram Turner III, MD [facility] [place]   4/6/2023  8:00 AM LAB, [facility] LAB [facility]   4/10/2023  9:30 AM Cecilia Chandra NP [facility] HEM ONC [facility]   5/3/2023  7:45 AM [facility] LAB [place]   5/3/2023  8:15 AM LAB, [facility] [place]   5/9/2023  9:40 AM Abram Turner III, MD [facility]         Medication List with Changes/Refills " "  Current Medications    AMLODIPINE (NORVASC) 10 MG TABLET    Take 1 tablet (10 mg total) by mouth once daily.    ASPIRIN (ECOTRIN) 81 MG EC TABLET    Take 81 mg by mouth once daily.    BD ULTRA-FINE IRWIN PEN NEEDLE 32 GAUGE X 5/32" NDLE    3 TIMES A DAY    BD ULTRA-FINE SHORT PEN NEEDLE 31 GAUGE X 5/16" NDLE    SMARTSI Each SUB-Q Daily    BLOOD-GLUCOSE METER,CONTINUOUS (DEXCOM G6 ) MISC    1 each by Misc.(Non-Drug; Combo Route) route once daily at 6am.    BLOOD-GLUCOSE METER,CONTINUOUS (DEXCOM G6 ) MISC    1 each by Misc.(Non-Drug; Combo Route) route once daily.    BLOOD-GLUCOSE SENSOR (DEXCOM G6 SENSOR) LEONOR    1 each by Misc.(Non-Drug; Combo Route) route once daily at 6am.    BLOOD-GLUCOSE SENSOR (DEXCOM G6 SENSOR) LEONOR    1 each by Misc.(Non-Drug; Combo Route) route once daily.    BLOOD-GLUCOSE TRANSMITTER (DEXCOM G6 TRANSMITTER) LEONOR    1 each by Misc.(Non-Drug; Combo Route) route once daily at 6am.    BLOOD-GLUCOSE TRANSMITTER (DEXCOM G6 TRANSMITTER) LEONOR    1 each by Misc.(Non-Drug; Combo Route) route once daily.    BUSPIRONE (BUSPAR) 15 MG TABLET    Take 15 mg by mouth once daily.    CHOLECALCIFEROL, VITAMIN D3, (VITAMIN D3) 50 MCG (2,000 UNIT) CAP    Take 1 capsule by mouth once daily.    CLONIDINE (CATAPRES) 0.2 MG TABLET    Take 1 tablet (0.2 mg total) by mouth 2 (two) times daily.    COENZYME Q10 100 MG CAPSULE    Take 100 mg by mouth once daily.    CYANOCOBALAMIN 500 MCG TABLET    Take 500 mcg by mouth once daily.    FISH OIL-OMEGA-3 FATTY ACIDS 300-1,000 MG CAPSULE    Take 1 g by mouth once daily.    FUROSEMIDE (LASIX) 40 MG TABLET    Take 40 mg by mouth once daily.     GLUCOSE 4 GM CHEWABLE TABLET    Take 4 tablets (16 g total) by mouth as needed for Low blood sugar (If having symptoms of blurry vision, palpitations, confusion, shakiness.  Please check sugars and if sugar below 70 please take 4 tablets and re-check sugar everry 15 minutes until sugars are above 70 and symptoms " resolve.).    INSULIN ASPART U-100 (NOVOLOG FLEXPEN U-100 INSULIN) 100 UNIT/ML (3 ML) INPN PEN    Inject 5 Units into the skin 3 (three) times daily with meals.    LANTUS SOLOSTAR U-100 INSULIN GLARGINE 100 UNITS/ML SUBQ PEN    Inject 20 Units into the skin every evening.    LISINOPRIL (PRINIVIL,ZESTRIL) 40 MG TABLET    Take 40 mg by mouth once daily.    METFORMIN (GLUCOPHAGE) 500 MG TABLET    Take 500 mg by mouth once daily at 6am.    OFLOXACIN (FLOXIN) 0.3 % OTIC SOLUTION    Place 5 drops into the right ear 2 (two) times daily.    TAMOXIFEN (NOLVADEX) 20 MG TAB    Take 1 tablet (20 mg total) by mouth once daily.    TRUE METRIX GLUCOSE TEST STRIP STRP    Inject 1 each into the skin once daily at 6am.         Disclaimer:  This note has been generated using voice-recognition software. There may be grammatical or spelling errors that have been missed during proof-reading

## 2023-03-14 ENCOUNTER — PES CALL (OUTPATIENT)
Dept: ADMINISTRATIVE | Facility: OTHER | Age: 78
End: 2023-03-14
Payer: MEDICARE

## 2023-03-26 ENCOUNTER — PATIENT MESSAGE (OUTPATIENT)
Dept: INTERNAL MEDICINE | Facility: CLINIC | Age: 78
End: 2023-03-26
Payer: MEDICARE

## 2023-04-06 ENCOUNTER — LAB VISIT (OUTPATIENT)
Dept: LAB | Facility: HOSPITAL | Age: 78
End: 2023-04-06
Attending: NURSE PRACTITIONER
Payer: MEDICARE

## 2023-04-06 ENCOUNTER — TELEPHONE (OUTPATIENT)
Dept: HEMATOLOGY/ONCOLOGY | Facility: CLINIC | Age: 78
End: 2023-04-06
Payer: MEDICARE

## 2023-04-06 ENCOUNTER — TELEPHONE (OUTPATIENT)
Dept: FAMILY MEDICINE | Facility: CLINIC | Age: 78
End: 2023-04-06
Payer: MEDICARE

## 2023-04-06 DIAGNOSIS — I15.2 HYPERTENSION ASSOCIATED WITH DIABETES: ICD-10-CM

## 2023-04-06 DIAGNOSIS — D05.11 DUCTAL CARCINOMA IN SITU (DCIS) OF RIGHT BREAST: ICD-10-CM

## 2023-04-06 DIAGNOSIS — Z79.4 TYPE 2 DIABETES MELLITUS WITH HYPERGLYCEMIA, WITH LONG-TERM CURRENT USE OF INSULIN: ICD-10-CM

## 2023-04-06 DIAGNOSIS — E11.59 HYPERTENSION ASSOCIATED WITH DIABETES: ICD-10-CM

## 2023-04-06 DIAGNOSIS — E11.65 TYPE 2 DIABETES MELLITUS WITH HYPERGLYCEMIA, WITH LONG-TERM CURRENT USE OF INSULIN: ICD-10-CM

## 2023-04-06 LAB
ALBUMIN SERPL BCP-MCNC: 3.7 G/DL (ref 3.5–5.2)
ALP SERPL-CCNC: 92 U/L (ref 38–126)
ALT SERPL W/O P-5'-P-CCNC: 20 U/L (ref 10–44)
ANION GAP SERPL CALC-SCNC: 2 MMOL/L (ref 8–16)
AST SERPL-CCNC: 25 U/L (ref 15–46)
BASOPHILS # BLD AUTO: 0.05 K/UL (ref 0–0.2)
BASOPHILS NFR BLD: 1.2 % (ref 0–1.9)
BILIRUB SERPL-MCNC: 0.6 MG/DL (ref 0.1–1)
CALCIUM SERPL-MCNC: 8.8 MG/DL (ref 8.7–10.5)
CHLORIDE SERPL-SCNC: 100 MMOL/L (ref 95–110)
CO2 SERPL-SCNC: 30 MMOL/L (ref 23–29)
CREAT SERPL-MCNC: 0.63 MG/DL (ref 0.5–1.4)
DIFFERENTIAL METHOD: ABNORMAL
EOSINOPHIL # BLD AUTO: 0.3 K/UL (ref 0–0.5)
EOSINOPHIL NFR BLD: 6.2 % (ref 0–8)
ERYTHROCYTE [DISTWIDTH] IN BLOOD BY AUTOMATED COUNT: 11.7 % (ref 11.5–14.5)
EST. GFR  (NO RACE VARIABLE): >60 ML/MIN/1.73 M^2
GLUCOSE SERPL-MCNC: 340 MG/DL (ref 70–110)
HCT VFR BLD AUTO: 37.5 % (ref 37–48.5)
HGB BLD-MCNC: 12.6 G/DL (ref 12–16)
IMM GRANULOCYTES # BLD AUTO: 0.01 K/UL (ref 0–0.04)
IMM GRANULOCYTES NFR BLD AUTO: 0.2 % (ref 0–0.5)
LYMPHOCYTES # BLD AUTO: 1.3 K/UL (ref 1–4.8)
LYMPHOCYTES NFR BLD: 31.2 % (ref 18–48)
MCH RBC QN AUTO: 33.4 PG (ref 27–31)
MCHC RBC AUTO-ENTMCNC: 33.6 G/DL (ref 32–36)
MCV RBC AUTO: 100 FL (ref 82–98)
MONOCYTES # BLD AUTO: 0.5 K/UL (ref 0.3–1)
MONOCYTES NFR BLD: 12.2 % (ref 4–15)
NEUTROPHILS # BLD AUTO: 2 K/UL (ref 1.8–7.7)
NEUTROPHILS NFR BLD: 49 % (ref 38–73)
NRBC BLD-RTO: 0 /100 WBC
PLATELET # BLD AUTO: 228 K/UL (ref 150–450)
PMV BLD AUTO: 10.5 FL (ref 9.2–12.9)
POTASSIUM SERPL-SCNC: 4.4 MMOL/L (ref 3.5–5.1)
PROT SERPL-MCNC: 6.8 G/DL (ref 6–8.4)
RBC # BLD AUTO: 3.77 M/UL (ref 4–5.4)
SODIUM SERPL-SCNC: 132 MMOL/L (ref 136–145)
UUN UR-MCNC: 13 MG/DL (ref 7–17)
WBC # BLD AUTO: 4.01 K/UL (ref 3.9–12.7)

## 2023-04-06 PROCEDURE — 80053 COMPREHEN METABOLIC PANEL: CPT | Mod: HCNC,PO | Performed by: NURSE PRACTITIONER

## 2023-04-06 PROCEDURE — 36415 COLL VENOUS BLD VENIPUNCTURE: CPT | Mod: HCNC,PO | Performed by: NURSE PRACTITIONER

## 2023-04-06 PROCEDURE — 85025 COMPLETE CBC W/AUTO DIFF WBC: CPT | Mod: HCNC,PO | Performed by: NURSE PRACTITIONER

## 2023-04-06 NOTE — TELEPHONE ENCOUNTER
Spoke to daughter, states her mom had labs done today and her bs was 340, she is taking her insulin and checking her blood sugars several times/day, she has her regular follow up appt for may , please advise

## 2023-04-06 NOTE — TELEPHONE ENCOUNTER
Informed pt's daughter that Cecilia Chandra NP stated pt's morning lab showed blood sugar was over 300 and wanted to know what her blood sugar is running now. Pt's daughter stated pt's blood sugar fluctuates and latest A1C has been elevated. Informed pt's daughter to let PCP know blood sugar was high even with pt taking insulin. Pt's daughter verbalized understanding.

## 2023-04-06 NOTE — TELEPHONE ENCOUNTER
----- Message from Lyric Cornelius sent at 4/6/2023  3:55 PM CDT -----  .Type:  Needs Medical Advice    Who Called: pt's daughter  Would the patient rather a call back or a response via [facility]? call  Best Call Back Number: [phone]  Additional Information:     Pt had high blood sugar and A1C results so caller would like to discuss treatment plan as well as getting a sooner appt

## 2023-04-06 NOTE — TELEPHONE ENCOUNTER
Daughter informed per dr keane it is best to use the dexcom because it is more accurate than a finger stick, daughter states she will try to get her mom to use it

## 2023-04-18 ENCOUNTER — OFFICE VISIT (OUTPATIENT)
Dept: HEMATOLOGY/ONCOLOGY | Facility: CLINIC | Age: 78
End: 2023-04-18
Payer: MEDICARE

## 2023-04-18 VITALS
WEIGHT: 141.75 LBS | SYSTOLIC BLOOD PRESSURE: 184 MMHG | OXYGEN SATURATION: 98 % | DIASTOLIC BLOOD PRESSURE: 79 MMHG | HEART RATE: 62 BPM | BODY MASS INDEX: 24.33 KG/M2

## 2023-04-18 DIAGNOSIS — D05.11 DUCTAL CARCINOMA IN SITU (DCIS) OF RIGHT BREAST: Primary | ICD-10-CM

## 2023-04-18 DIAGNOSIS — Z79.4 TYPE 2 DIABETES MELLITUS WITH HYPERGLYCEMIA, WITH LONG-TERM CURRENT USE OF INSULIN: ICD-10-CM

## 2023-04-18 DIAGNOSIS — E11.59 HYPERTENSION ASSOCIATED WITH DIABETES: ICD-10-CM

## 2023-04-18 DIAGNOSIS — N64.89 SEROMA OF BREAST: ICD-10-CM

## 2023-04-18 DIAGNOSIS — R63.4 WEIGHT LOSS, UNINTENTIONAL: ICD-10-CM

## 2023-04-18 DIAGNOSIS — E11.65 TYPE 2 DIABETES MELLITUS WITH HYPERGLYCEMIA, WITH LONG-TERM CURRENT USE OF INSULIN: ICD-10-CM

## 2023-04-18 DIAGNOSIS — E55.9 VITAMIN D DEFICIENCY, UNSPECIFIED: ICD-10-CM

## 2023-04-18 DIAGNOSIS — I15.2 HYPERTENSION ASSOCIATED WITH DIABETES: ICD-10-CM

## 2023-04-18 PROCEDURE — 99999 PR PBB SHADOW E&M-EST. PATIENT-LVL IV: CPT | Mod: PBBFAC,HCNC,, | Performed by: NURSE PRACTITIONER

## 2023-04-18 PROCEDURE — 1160F RVW MEDS BY RX/DR IN RCRD: CPT | Mod: HCNC,CPTII,S$GLB, | Performed by: NURSE PRACTITIONER

## 2023-04-18 PROCEDURE — 3078F DIAST BP <80 MM HG: CPT | Mod: HCNC,CPTII,S$GLB, | Performed by: NURSE PRACTITIONER

## 2023-04-18 PROCEDURE — 3078F PR MOST RECENT DIASTOLIC BLOOD PRESSURE < 80 MM HG: ICD-10-PCS | Mod: HCNC,CPTII,S$GLB, | Performed by: NURSE PRACTITIONER

## 2023-04-18 PROCEDURE — 1159F PR MEDICATION LIST DOCUMENTED IN MEDICAL RECORD: ICD-10-PCS | Mod: HCNC,CPTII,S$GLB, | Performed by: NURSE PRACTITIONER

## 2023-04-18 PROCEDURE — 3077F PR MOST RECENT SYSTOLIC BLOOD PRESSURE >= 140 MM HG: ICD-10-PCS | Mod: HCNC,CPTII,S$GLB, | Performed by: NURSE PRACTITIONER

## 2023-04-18 PROCEDURE — 3077F SYST BP >= 140 MM HG: CPT | Mod: HCNC,CPTII,S$GLB, | Performed by: NURSE PRACTITIONER

## 2023-04-18 PROCEDURE — 1101F PT FALLS ASSESS-DOCD LE1/YR: CPT | Mod: HCNC,CPTII,S$GLB, | Performed by: NURSE PRACTITIONER

## 2023-04-18 PROCEDURE — 1101F PR PT FALLS ASSESS DOC 0-1 FALLS W/OUT INJ PAST YR: ICD-10-PCS | Mod: HCNC,CPTII,S$GLB, | Performed by: NURSE PRACTITIONER

## 2023-04-18 PROCEDURE — 99215 PR OFFICE/OUTPT VISIT, EST, LEVL V, 40-54 MIN: ICD-10-PCS | Mod: HCNC,S$GLB,, | Performed by: NURSE PRACTITIONER

## 2023-04-18 PROCEDURE — 3288F PR FALLS RISK ASSESSMENT DOCUMENTED: ICD-10-PCS | Mod: HCNC,CPTII,S$GLB, | Performed by: NURSE PRACTITIONER

## 2023-04-18 PROCEDURE — 1159F MED LIST DOCD IN RCRD: CPT | Mod: HCNC,CPTII,S$GLB, | Performed by: NURSE PRACTITIONER

## 2023-04-18 PROCEDURE — 99215 OFFICE O/P EST HI 40 MIN: CPT | Mod: HCNC,S$GLB,, | Performed by: NURSE PRACTITIONER

## 2023-04-18 PROCEDURE — 99999 PR PBB SHADOW E&M-EST. PATIENT-LVL IV: ICD-10-PCS | Mod: PBBFAC,HCNC,, | Performed by: NURSE PRACTITIONER

## 2023-04-18 PROCEDURE — 1160F PR REVIEW ALL MEDS BY PRESCRIBER/CLIN PHARMACIST DOCUMENTED: ICD-10-PCS | Mod: HCNC,CPTII,S$GLB, | Performed by: NURSE PRACTITIONER

## 2023-04-18 PROCEDURE — 1157F PR ADVANCE CARE PLAN OR EQUIV PRESENT IN MEDICAL RECORD: ICD-10-PCS | Mod: HCNC,CPTII,S$GLB, | Performed by: NURSE PRACTITIONER

## 2023-04-18 PROCEDURE — 1157F ADVNC CARE PLAN IN RCRD: CPT | Mod: HCNC,CPTII,S$GLB, | Performed by: NURSE PRACTITIONER

## 2023-04-18 PROCEDURE — 3288F FALL RISK ASSESSMENT DOCD: CPT | Mod: HCNC,CPTII,S$GLB, | Performed by: NURSE PRACTITIONER

## 2023-04-18 NOTE — PROGRESS NOTES
Subjective:       Patient ID: Noris Demarco is a 77 y.o. female.    Chief Complaint:  Breast cancer    Follow-up  Pertinent negatives include no abdominal pain, chest pain, fatigue, fever, rash or weakness.     HPI as per Dr Peralta's 1/18/22 office visit notes:  She underwent a lumpectomy in Jan 2008 for a 5-mm Right Breast DCIS that was found on a routine screening mammogram, completed adjuvant RT in July 2008. She was started on Arimidex at \[facility]\"" following radiation treatment and she was intolerant to it because of debilitating and disabling joint pains, she hence was switched to tamoxifen in 12/2008 and completed it in Dec 2013.    She was then diagnosed with infiltrating ductal carcinoma of the lower outer quadrant of the left breast on routine screening mammogram done in May 2018.  Lumpectomy with SLN biopsy was done in June 2018 and a 1.3 cm grade 2 ER positive 95%, MN positive 5% and HER2 Luther negative, Ki-67 30% was noted. Five lymph nodes were removed from the axilla and they were all negative.  Oncotype score came back at 40, she declined adjuvant chemotherapy.  She completed adjuvant radiation therapy under the care of Dr. Chavez on 09/27/2018.    She has a golf ball-sized seroma in the left breast that is hard to palpation but is not painful.    She was intolerant to Arimidex before.  So she was given a prescription for Aromasin, she did not take it secondary to fear of arthralgias.  Hence tamoxifen was started on 10/08/2018.    INTERVAL HISTORY:   -she is here for follow-up of history of DCIS of the right breast and invasive breast cancer of the left breast, accompanied by family friend as  here but at an appt for himself  -she has been on tamoxifen since October 2018  -she has a hard palpable seroma in the left breast, nonpainful. Denies any further concerns of  breast lumps, lesions, drainage, pain, or lymph node enlargement.  -weight stable last 6 months, 141# last visit and again today  -no  further confusion episodes  -walks daily in neighborhood still  -has home  she is running  -feels good overall, she denies chest pain, sob, abdominal pain, n/v/d, constipation, hematemesis, melena, hematuria.  -working with pcp for better diabetes control    Review of Systems   Constitutional:  Negative for appetite change, fatigue, fever and unexpected weight change.   Respiratory:  Negative for shortness of breath.    Cardiovascular:  Negative for chest pain.   Gastrointestinal:  Negative for abdominal pain.   Skin:  Negative for color change, rash and wound.   Neurological:  Negative for weakness.   Hematological:  Negative for adenopathy. Does not bruise/bleed easily.       Objective:      Vitals:    04/18/23 0919   BP: (!) 184/79   Pulse: 62   SpO2: 98%   Weight: 64.3 kg (141 lb 12.1 oz)       Body mass index is 24.33 kg/m².    Physical Exam  Constitutional:       General: She is not in acute distress.     Appearance: Normal appearance. She is not toxic-appearing.   HENT:      Head: Normocephalic and atraumatic.      Nose: Nose normal.      Mouth/Throat:      Mouth: Mucous membranes are moist.      Pharynx: Oropharynx is clear.   Eyes:      General: No scleral icterus.     Conjunctiva/sclera: Conjunctivae normal.   Cardiovascular:      Rate and Rhythm: Normal rate.      Heart sounds: Normal heart sounds.   Pulmonary:      Effort: Pulmonary effort is normal. No respiratory distress.      Breath sounds: Normal breath sounds.   Chest:   Breasts:     Right: No swelling, bleeding, inverted nipple, mass, nipple discharge, skin change or tenderness.      Left: No swelling, bleeding, inverted nipple, mass, nipple discharge, skin change or tenderness.      Comments: Bilat breasts nontender, no notable skin changes or nipple drainage, no associated lymphadnopathy  Musculoskeletal:         General: No swelling or tenderness. Normal range of motion.      Cervical back: Normal range of motion and neck supple.    Lymphadenopathy:      Cervical:      Right cervical: No superficial or deep cervical adenopathy.     Left cervical: No superficial or deep cervical adenopathy.      Upper Body:      Right upper body: No supraclavicular, axillary or pectoral adenopathy.      Left upper body: No supraclavicular, axillary or pectoral adenopathy.   Skin:     General: Skin is warm and dry.      Coloration: Skin is not jaundiced or pale.   Neurological:      Mental Status: She is alert and oriented to person, place, and time.      Gait: Gait normal.   Psychiatric:         Mood and Affect: Mood normal.         Behavior: Behavior normal.         Thought Content: Thought content normal.         Judgment: Judgment normal.     ECOG SCORE    1 - Restricted in strenuous activity-ambulatory and able to carry out work of a light nature             LABS    Lab Results   Component Value Date    WBC 4.01 04/06/2023    HGB 12.6 04/06/2023    HCT 37.5 04/06/2023     (H) 04/06/2023     04/06/2023     IMAGING    DXA Bone Density 10/5/22 (reviewed by this provider)  Impression:  -The T score associated with the lumbar spine is 0.2 on the current examination.  It was 1.0 on the prior examination.  The T score associated with the left femoral neck is 0.1 on the current examination.  It was 0 on the prior examination.  The T score associated with the right femoral neck is -0.2 on the current examination.  It was 0.1 on the prior examination.  -Normal study     Assessment:       1. Ductal carcinoma in situ (DCIS) of right breast    2. Seroma of Left breast    3. Vitamin D deficiency, unspecified    4. Weight loss, unintentional    5. Type 2 diabetes mellitus with hyperglycemia, with long-term current use of insulin    6. Hypertension associated with diabetes            Plan:     Past records reviewed including clinic visits, imaging, labs, medications.     Unintentional weight loss  -her weight has remained at 141# over past 6  months  -reviewed CT chest/abdomen/pelvis done 01/05/2022 as well as reviewed MRI brain done 01/05/2022 which do not reveal any evidence of malignancy  -since the weight has increased and pt is feeling better with included daily walks around neighborhood, will continue to monitor  -took periactin short time in past  -continue to monitor    DCIS right breast  -diagnosed in 2008  -took tamoxifen for 5 years until December 2013    Stage I T1c N0 infiltrating ductal carcinoma of the left breast  -status post lumpectomy and radiation therapy  -on tamoxifen since October 8, 2018  -labs CBC, chemistries and vitamin-D reviewed  -continue tamoxifen until October 2023  -Dexa completed 10/5/22, normal study, due again Oct 2024  -next bilateral screening mammogram was due December 2022, missed 12/13/22 appt, will reschedule for next available    Seroma left breast  -has hard to palpate seroma, that is improving  -will continue to monitor    Vitamin-D deficiency  -vitamin-D level reviewed  -cont otc vit D3, 2000 international units daily    HTN  - 184/79 today, no associated symptoms  - management deferred to pcp    Diabetes with hyperglycemia  - A1c 2/27/23 at 9.7%, 4/6/23 labs with elevated glucose  -  checks blood sugars for pt at home  - management deferred to pcp    RTC 6 months with labs, cbc, cmp.             Cecilia Chandra, CARLOS-C  [facility]  Hematology/Oncology  [address]  [place]  [address] [phone]

## 2023-05-03 ENCOUNTER — LAB VISIT (OUTPATIENT)
Dept: LAB | Facility: HOSPITAL | Age: 78
End: 2023-05-03
Attending: INTERNAL MEDICINE
Payer: MEDICARE

## 2023-05-03 DIAGNOSIS — Z79.4 TYPE 2 DIABETES MELLITUS WITH HYPERGLYCEMIA, WITH LONG-TERM CURRENT USE OF INSULIN: ICD-10-CM

## 2023-05-03 DIAGNOSIS — E11.65 TYPE 2 DIABETES MELLITUS WITH HYPERGLYCEMIA, WITH LONG-TERM CURRENT USE OF INSULIN: ICD-10-CM

## 2023-05-03 LAB
ALBUMIN/CREAT UR: 65.9 UG/MG (ref 0–30)
CREAT UR-MCNC: 91 MG/DL (ref 15–325)
MICROALBUMIN UR DL<=1MG/L-MCNC: 60 UG/ML

## 2023-05-03 PROCEDURE — 82570 ASSAY OF URINE CREATININE: CPT | Mod: HCNC,PO | Performed by: INTERNAL MEDICINE

## 2023-05-17 ENCOUNTER — PATIENT MESSAGE (OUTPATIENT)
Dept: FAMILY MEDICINE | Facility: CLINIC | Age: 78
End: 2023-05-17
Payer: MEDICARE

## 2023-05-17 ENCOUNTER — TELEPHONE (OUTPATIENT)
Dept: FAMILY MEDICINE | Facility: CLINIC | Age: 78
End: 2023-05-17
Payer: MEDICARE

## 2023-05-18 ENCOUNTER — TELEPHONE (OUTPATIENT)
Dept: FAMILY MEDICINE | Facility: CLINIC | Age: 78
End: 2023-05-18
Payer: MEDICARE

## 2023-05-18 PROBLEM — R41.82 ALTERED MENTAL STATUS, UNSPECIFIED: Status: RESOLVED | Noted: 2021-12-09 | Resolved: 2023-05-18

## 2023-05-18 NOTE — PROGRESS NOTES
Assessment:       1. Hypertension associated with diabetes    2. Ductal carcinoma in situ (DCIS) of right breast    3. 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 Uncontrolled type 2 diabetes mellitus with hyperglycemia          Plan:         Noris was seen today for follow-up and hypertension.    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  -     LANTUS SOLOSTAR U-100 INSULIN glargine 100 units/mL SubQ pen; Inject 24 Units into the skin every evening.        Lets have arleen help with the DEXCOM application   We should increase the insulin  Short acting 6 units at meals  Long acting 24 units daily   We should follow up on the labs in 3 months   I will ask the eye doctor about the follow up for the eye this could be contributing to the Headaches  No problems it looks like in the ear  I  would try to do timed voids , the medication for urination can worsen memory   We will put in the external referral for home health [phone] (f)         I spent at least 40 mins with preparing to see the patient, obtaining and/or revieweing separately obtained history, preforming a examination and evaluation, counseling, communicating with other health care professional, documenting clinical information in the electronic health record,  and/or coordinating care.             Subjective:       Patient ID: Noris Demarco is a 77 y.o. female.    Chief Complaint: No chief complaint on file.        Interim Hx  Last seen by me in February  Since then seen by nancy/onc fu breast cancer    Concerns today    Chronic problems     Hypertension  This is a chronic problem. The current episode started more than 1 year ago. The problem has been waxing and waning since onset. The problem is controlled. Past treatments include alpha 1 blockers and ACE inhibitors. The current treatment provides significant improvement. There are no compliance problems.    Diabetes  She presents for her follow-up diabetic visit. She has type 2 diabetes mellitus. Her disease course has been worsening. There are no hypoglycemic associated symptoms. There are no diabetic associated symptoms. Current diabetic treatment includes insulin injections. She has not had a previous visit with a dietitian. Home blood sugar record trend: AFRIAD OF USING DEXCOM. An ACE inhibitor/angiotensin II receptor blocker is being taken. She sees a podiatrist.Eye exam is current.   Patient Active Problem List   Diagnosis    Ductal carcinoma in situ (DCIS) of right breast - followed by nancy/onc        Seroma of Left breast                Mild late onset Alzheimer's dementia with other behavioral disturbance- seen by neurology in 12/2022          Review of Systems   All other systems reviewed and are negative.          Health Maintenance Due   Topic Date Due     Never done      "Never done    Pneumococcal Vaccines (Age 65+) (2 - PCV) 10/13/2014    COVID-19 Vaccine (4 - Booster for Pfizer series) 03/18/2022         Objective:     /72 (BP Location: Right arm, Patient Position: Sitting, BP Method: Large (Manual))   Pulse 71   Ht 5' 4" (1.626 m)   Wt 65 kg (143 lb 4.8 oz)   LMP 08/13/1999 (Approximate)   SpO2 98%   BMI 24.60 kg/m²     Vitals 4/18/2023 2/27/2023 2/7/2023 12/30/2022 10/6/2022   Height - 64 - 64 -   Weight (lbs) 141.76 140.87 138.89 140 141.98   BMI (kg/m2) - 24.2 - 24 24.4                Physical Exam  Vitals and nursing note reviewed.   Constitutional:       General: She is not in acute distress.     Appearance: She is well-developed. She is not diaphoretic.   HENT:      Head: Normocephalic.      Nose: Nose normal.   Eyes:      General:         Right eye: No discharge.         Left eye: No discharge.      Conjunctiva/sclera: Conjunctivae normal.      Pupils: Pupils are equal, round, and reactive to light.   Cardiovascular:      Rate and Rhythm: Normal rate and regular rhythm.      Heart sounds: Normal heart sounds. No murmur heard.    No friction rub. No gallop.   Pulmonary:      Effort: Pulmonary effort is normal. No respiratory distress.   Abdominal:      General: Bowel sounds are normal. There is no distension.      Palpations: Abdomen is soft.   Musculoskeletal:         General: No deformity. Normal range of motion.      Cervical back: Normal range of motion.   Skin:     General: Skin is warm.   Neurological:      Mental Status: She is alert and oriented to person, place, and time.      Cranial Nerves: No cranial nerve deficit.               Basic Labs    BMP  Lab Results   Component Value Date     05/03/2023    K 4.2 05/03/2023     05/03/2023    CO2 29 05/03/2023    BUN 14 05/03/2023    CREATININE 0.56 05/03/2023    CALCIUM 9.4 05/03/2023    ANIONGAP 4 (L) 05/03/2023    ESTGFRAFRICA >60.0 07/12/2022    EGFRNONAA >60.0 07/12/2022     Lab Results " "  Component Value Date    EGFRNORACEVR >60.0 2023       Lab Results   Component Value Date    ALT 19 2023    AST 30 2023    ALKPHOS 78 2023    BILITOT 0.5 2023         Lab Results   Component Value Date    TSH 4.220 (H) 2023     Lab Results   Component Value Date    WBC 4.25 2023    HGB 12.8 2023    HCT 37.6 2023    MCV 98 2023     2023           Lipids  Lab Results   Component Value Date    CHOL 203 (H) 2023     Lab Results   Component Value Date    HDL 98 (H) 2023     Lab Results   Component Value Date    LDLCALC 96.4 2023     Lab Results   Component Value Date    TRIG 43 2023     Lab Results   Component Value Date    CHOLHDL 48.3 2023       DM  Lab Results   Component Value Date    HGBA1C 9.7 (H) 2023    HGBA1C 9.7 (H) 2023           Future Appointments   Date Time Provider Department Center   2023  8:00 AM Laquita Alvarez NP [facility] C3HV [facility]   2023  9:30 AM LAB, [facility] LAB [facility]   2023  9:40 AM [facility]   10/19/2023  9:30 AM LAB, [facility] LAB [facility]   10/20/2023  9:30 AM Cecilia Chandra NP [facility] HEM ONC [facility]         Medication List with Changes/Refills   Current Medications    ASPIRIN (ECOTRIN) 81 MG EC TABLET    Take 81 mg by mouth once daily.    BD ULTRA-FINE IRWIN PEN NEEDLE 32 GAUGE X 5/32" NDLE    3 TIMES A DAY    BD ULTRA-FINE SHORT PEN NEEDLE 31 GAUGE X 5/16" NDLE    SMARTSI Each SUB-Q Daily    BLOOD-GLUCOSE METER,CONTINUOUS (DEXCOM G6 ) MISC    1 each by Misc.(Non-Drug; Combo Route) route once daily at 6am.    BLOOD-GLUCOSE METER,CONTINUOUS (DEXCOM G6 ) MISC    1 each by Misc.(Non-Drug; Combo Route) route once daily.    BLOOD-GLUCOSE SENSOR (DEXCOM G6 SENSOR) LEONOR    1 each by Misc.(Non-Drug; Combo Route) route once daily at 6am.    BLOOD-GLUCOSE SENSOR (DEXCOM G6 SENSOR) LEONOR    1 each " by Misc.(Non-Drug; Combo Route) route once daily.    BLOOD-GLUCOSE TRANSMITTER (DEXCOM G6 TRANSMITTER) LEONOR    1 each by Misc.(Non-Drug; Combo Route) route once daily at 6am.    BLOOD-GLUCOSE TRANSMITTER (DEXCOM G6 TRANSMITTER) LEONOR    1 each by Misc.(Non-Drug; Combo Route) route once daily.    BUSPIRONE (BUSPAR) 15 MG TABLET    Take 15 mg by mouth once daily.    CHOLECALCIFEROL, VITAMIN D3, (VITAMIN D3) 50 MCG (2,000 UNIT) CAP    Take 1 capsule by mouth once daily.    CLONIDINE (CATAPRES) 0.2 MG TABLET    TAKE 1 TABLET TWICE DAILY    COENZYME Q10 100 MG CAPSULE    Take 100 mg by mouth once daily.    CYANOCOBALAMIN 500 MCG TABLET    Take 500 mcg by mouth once daily.    FISH OIL-OMEGA-3 FATTY ACIDS 300-1,000 MG CAPSULE    Take 1 g by mouth once daily.    FUROSEMIDE (LASIX) 40 MG TABLET    Take 40 mg by mouth once daily.     GLUCOSE 4 GM CHEWABLE TABLET    Take 4 tablets (16 g total) by mouth as needed for Low blood sugar (If having symptoms of blurry vision, palpitations, confusion, shakiness.  Please check sugars and if sugar below 70 please take 4 tablets and re-check sugar everry 15 minutes until sugars are above 70 and symptoms resolve.).    INSULIN ASPART U-100 (NOVOLOG FLEXPEN U-100 INSULIN) 100 UNIT/ML (3 ML) INPN PEN    Inject 5 Units into the skin 3 (three) times daily with meals.    LANTUS SOLOSTAR U-100 INSULIN GLARGINE 100 UNITS/ML SUBQ PEN    Inject 20 Units into the skin every evening.    LISINOPRIL (PRINIVIL,ZESTRIL) 40 MG TABLET    Take 1 tablet (40 mg total) by mouth once daily.    METFORMIN (GLUCOPHAGE) 500 MG TABLET    Take 500 mg by mouth once daily at 6am.    OFLOXACIN (FLOXIN) 0.3 % OTIC SOLUTION    Place 5 drops into the right ear 2 (two) times daily.    TAMOXIFEN (NOLVADEX) 20 MG TAB    Take 1 tablet (20 mg total) by mouth once daily.    TRUE METRIX GLUCOSE TEST STRIP STRP    Inject 1 each into the skin once daily at 6am.         Disclaimer:  This note has been generated using  voice-recognition software. There may be grammatical or spelling errors that have been missed during proof-reading

## 2023-05-19 ENCOUNTER — TELEPHONE (OUTPATIENT)
Dept: OPHTHALMOLOGY | Facility: CLINIC | Age: 78
End: 2023-05-19
Payer: MEDICARE

## 2023-05-19 ENCOUNTER — OFFICE VISIT (OUTPATIENT)
Dept: INTERNAL MEDICINE | Facility: CLINIC | Age: 78
End: 2023-05-19
Payer: MEDICARE

## 2023-05-19 VITALS
OXYGEN SATURATION: 98 % | SYSTOLIC BLOOD PRESSURE: 138 MMHG | HEART RATE: 71 BPM | BODY MASS INDEX: 24.46 KG/M2 | HEIGHT: 64 IN | DIASTOLIC BLOOD PRESSURE: 72 MMHG | WEIGHT: 143.31 LBS

## 2023-05-19 DIAGNOSIS — E11.59 HYPERTENSION ASSOCIATED WITH DIABETES: Primary | ICD-10-CM

## 2023-05-19 DIAGNOSIS — F02.A18 MILD LATE ONSET ALZHEIMER'S DEMENTIA WITH OTHER BEHAVIORAL DISTURBANCE: ICD-10-CM

## 2023-05-19 DIAGNOSIS — G30.1 MILD LATE ONSET ALZHEIMER'S DEMENTIA WITH OTHER BEHAVIORAL DISTURBANCE: ICD-10-CM

## 2023-05-19 DIAGNOSIS — E11.65 UNCONTROLLED TYPE 2 DIABETES MELLITUS WITH HYPERGLYCEMIA: ICD-10-CM

## 2023-05-19 DIAGNOSIS — D05.11 DUCTAL CARCINOMA IN SITU (DCIS) OF RIGHT BREAST: ICD-10-CM

## 2023-05-19 DIAGNOSIS — E13.9 LADA (LATENT AUTOIMMUNE DIABETES IN ADULTS), MANAGED AS TYPE 1: ICD-10-CM

## 2023-05-19 DIAGNOSIS — I15.2 HYPERTENSION ASSOCIATED WITH DIABETES: Primary | ICD-10-CM

## 2023-05-19 PROCEDURE — 3288F FALL RISK ASSESSMENT DOCD: CPT | Mod: HCNC,CPTII,, | Performed by: INTERNAL MEDICINE

## 2023-05-19 PROCEDURE — 1126F AMNT PAIN NOTED NONE PRSNT: CPT | Mod: HCNC,CPTII,, | Performed by: INTERNAL MEDICINE

## 2023-05-19 PROCEDURE — 99999 PR PBB SHADOW E&M-EST. PATIENT-LVL V: CPT | Mod: PBBFAC,HCNC,, | Performed by: INTERNAL MEDICINE

## 2023-05-19 PROCEDURE — 1101F PR PT FALLS ASSESS DOC 0-1 FALLS W/OUT INJ PAST YR: ICD-10-PCS | Mod: HCNC,CPTII,, | Performed by: INTERNAL MEDICINE

## 2023-05-19 PROCEDURE — 99999 PR PBB SHADOW E&M-EST. PATIENT-LVL V: ICD-10-PCS | Mod: PBBFAC,HCNC,, | Performed by: INTERNAL MEDICINE

## 2023-05-19 PROCEDURE — 3078F PR MOST RECENT DIASTOLIC BLOOD PRESSURE < 80 MM HG: ICD-10-PCS | Mod: HCNC,CPTII,, | Performed by: INTERNAL MEDICINE

## 2023-05-19 PROCEDURE — 1157F PR ADVANCE CARE PLAN OR EQUIV PRESENT IN MEDICAL RECORD: ICD-10-PCS | Mod: HCNC,CPTII,, | Performed by: INTERNAL MEDICINE

## 2023-05-19 PROCEDURE — 1159F MED LIST DOCD IN RCRD: CPT | Mod: HCNC,CPTII,, | Performed by: INTERNAL MEDICINE

## 2023-05-19 PROCEDURE — 3288F PR FALLS RISK ASSESSMENT DOCUMENTED: ICD-10-PCS | Mod: HCNC,CPTII,, | Performed by: INTERNAL MEDICINE

## 2023-05-19 PROCEDURE — 99215 OFFICE O/P EST HI 40 MIN: CPT | Mod: HCNC,PO | Performed by: INTERNAL MEDICINE

## 2023-05-19 PROCEDURE — 1126F PR PAIN SEVERITY QUANTIFIED, NO PAIN PRESENT: ICD-10-PCS | Mod: HCNC,CPTII,, | Performed by: INTERNAL MEDICINE

## 2023-05-19 PROCEDURE — 3078F DIAST BP <80 MM HG: CPT | Mod: HCNC,CPTII,, | Performed by: INTERNAL MEDICINE

## 2023-05-19 PROCEDURE — 99215 OFFICE O/P EST HI 40 MIN: CPT | Mod: HCNC,,, | Performed by: INTERNAL MEDICINE

## 2023-05-19 PROCEDURE — 3075F SYST BP GE 130 - 139MM HG: CPT | Mod: HCNC,CPTII,, | Performed by: INTERNAL MEDICINE

## 2023-05-19 PROCEDURE — 99215 PR OFFICE/OUTPT VISIT, EST, LEVL V, 40-54 MIN: ICD-10-PCS | Mod: HCNC,,, | Performed by: INTERNAL MEDICINE

## 2023-05-19 PROCEDURE — 3075F PR MOST RECENT SYSTOLIC BLOOD PRESS GE 130-139MM HG: ICD-10-PCS | Mod: HCNC,CPTII,, | Performed by: INTERNAL MEDICINE

## 2023-05-19 PROCEDURE — 1159F PR MEDICATION LIST DOCUMENTED IN MEDICAL RECORD: ICD-10-PCS | Mod: HCNC,CPTII,, | Performed by: INTERNAL MEDICINE

## 2023-05-19 PROCEDURE — 1157F ADVNC CARE PLAN IN RCRD: CPT | Mod: HCNC,CPTII,, | Performed by: INTERNAL MEDICINE

## 2023-05-19 PROCEDURE — 1101F PT FALLS ASSESS-DOCD LE1/YR: CPT | Mod: HCNC,CPTII,, | Performed by: INTERNAL MEDICINE

## 2023-05-19 RX ORDER — INSULIN GLARGINE 100 [IU]/ML
24 INJECTION, SOLUTION SUBCUTANEOUS NIGHTLY
Qty: 15 EACH | Refills: 3 | Status: SHIPPED | OUTPATIENT
Start: 2023-05-19 | End: 2023-05-19 | Stop reason: SDUPTHER

## 2023-05-19 RX ORDER — INSULIN GLARGINE 100 [IU]/ML
24 INJECTION, SOLUTION SUBCUTANEOUS NIGHTLY
Qty: 15 EACH | Refills: 3 | Status: SHIPPED | OUTPATIENT
Start: 2023-05-19 | End: 2023-08-23

## 2023-05-19 RX ORDER — AMLODIPINE BESYLATE 10 MG/1
10 TABLET ORAL DAILY
Qty: 90 TABLET | Status: CANCELLED | OUTPATIENT
Start: 2023-05-19 | End: 2024-05-18

## 2023-05-19 RX ORDER — INSULIN ASPART 100 [IU]/ML
6 INJECTION, SOLUTION INTRAVENOUS; SUBCUTANEOUS
Qty: 23 ML | Refills: 1 | Status: SHIPPED | OUTPATIENT
Start: 2023-05-19 | End: 2023-08-23

## 2023-05-19 RX ORDER — INSULIN ASPART 100 [IU]/ML
6 INJECTION, SOLUTION INTRAVENOUS; SUBCUTANEOUS
Qty: 23 ML | Refills: 1 | Status: SHIPPED | OUTPATIENT
Start: 2023-05-19 | End: 2023-05-19 | Stop reason: SDUPTHER

## 2023-05-19 NOTE — PATIENT INSTRUCTIONS
Tyrone have arleen help with the DEXCOM application   We should increase the insulin  Short acting 6 units at meals  Long acting 24 units daily   We should follow up on the labs in 3 months   I will ask the eye doctor about the follow up for the eye this could be contributing to the Headaches  No problems it looks like in the ear  I would try to do timed voids , the medication for urination can worsen memory   We will put in the external referral for home health [phone] (f)

## 2023-05-19 NOTE — TELEPHONE ENCOUNTER
----- Message from Abram Turner III, MD sent at 5/19/2023  9:10 AM CDT -----  Regarding: Eye Exam follow up  Greetings  Is This patient is due for fu with retina?  .if so family was wondering  Can we get  Her  scheduled for follow up . Thanks so much!

## 2023-05-23 ENCOUNTER — TELEPHONE (OUTPATIENT)
Dept: OPTOMETRY | Facility: CLINIC | Age: 78
End: 2023-05-23
Payer: MEDICARE

## 2023-05-25 DIAGNOSIS — Z79.4 TYPE 2 DIABETES MELLITUS WITH HYPERGLYCEMIA, WITH LONG-TERM CURRENT USE OF INSULIN: Primary | ICD-10-CM

## 2023-05-25 DIAGNOSIS — E11.65 TYPE 2 DIABETES MELLITUS WITH HYPERGLYCEMIA, WITH LONG-TERM CURRENT USE OF INSULIN: Primary | ICD-10-CM

## 2023-05-25 RX ORDER — CALCIUM CITRATE/VITAMIN D3 200MG-6.25
1 TABLET ORAL EVERY MORNING
Qty: 100 EACH | Refills: 3 | Status: SHIPPED | OUTPATIENT
Start: 2023-05-25

## 2023-05-25 NOTE — TELEPHONE ENCOUNTER
Refill Decision Note   Noris Demarco  is requesting a refill authorization.  Brief Assessment and Rationale for Refill:  Approve     Medication Therapy Plan:         Comments:     Note composed:12:47 PM 05/25/2023

## 2023-05-25 NOTE — TELEPHONE ENCOUNTER
No care due was identified.  [facility] Embedded Care Due Messages. Reference number: [id number].   5/25/2023 12:13:56 PM CDT

## 2023-06-08 ENCOUNTER — TELEPHONE (OUTPATIENT)
Dept: HEMATOLOGY/ONCOLOGY | Facility: CLINIC | Age: 78
End: 2023-06-08
Payer: MEDICARE

## 2023-06-08 DIAGNOSIS — H60.90 OTITIS EXTERNA, UNSPECIFIED CHRONICITY, UNSPECIFIED LATERALITY, UNSPECIFIED TYPE: ICD-10-CM

## 2023-06-08 DIAGNOSIS — R63.4 WEIGHT LOSS, UNINTENTIONAL: Primary | ICD-10-CM

## 2023-06-08 RX ORDER — CYPROHEPTADINE HYDROCHLORIDE 4 MG/1
4 TABLET ORAL 2 TIMES DAILY
Qty: 180 TABLET | Refills: 3 | Status: SHIPPED | OUTPATIENT
Start: 2023-06-08 | End: 2024-06-07

## 2023-06-14 RX ORDER — OFLOXACIN 3 MG/ML
SOLUTION AURICULAR (OTIC)
Qty: 10 ML | Refills: 0 | Status: SHIPPED | OUTPATIENT
Start: 2023-06-14 | End: 2023-08-23 | Stop reason: SDUPTHER

## 2023-06-21 ENCOUNTER — TELEPHONE (OUTPATIENT)
Dept: PHARMACY | Facility: CLINIC | Age: 78
End: 2023-06-21
Payer: MEDICARE

## 2023-06-21 ENCOUNTER — OFFICE VISIT (OUTPATIENT)
Dept: HOME HEALTH SERVICES | Facility: CLINIC | Age: 78
End: 2023-06-21
Payer: MEDICARE

## 2023-06-21 VITALS
OXYGEN SATURATION: 96 % | WEIGHT: 145 LBS | SYSTOLIC BLOOD PRESSURE: 140 MMHG | DIASTOLIC BLOOD PRESSURE: 78 MMHG | BODY MASS INDEX: 24.89 KG/M2 | HEART RATE: 69 BPM

## 2023-06-21 DIAGNOSIS — Z79.4 TYPE 2 DIABETES MELLITUS WITH HYPERGLYCEMIA, WITH LONG-TERM CURRENT USE OF INSULIN: ICD-10-CM

## 2023-06-21 DIAGNOSIS — G30.1 MILD LATE ONSET ALZHEIMER'S DEMENTIA WITH OTHER BEHAVIORAL DISTURBANCE: ICD-10-CM

## 2023-06-21 DIAGNOSIS — F41.9 ANXIETY: ICD-10-CM

## 2023-06-21 DIAGNOSIS — I15.2 HYPERTENSION ASSOCIATED WITH DIABETES: ICD-10-CM

## 2023-06-21 DIAGNOSIS — Z00.00 ENCOUNTER FOR MEDICARE ANNUAL WELLNESS EXAM: Primary | ICD-10-CM

## 2023-06-21 DIAGNOSIS — D05.11 DUCTAL CARCINOMA IN SITU (DCIS) OF RIGHT BREAST: ICD-10-CM

## 2023-06-21 DIAGNOSIS — F02.A18 MILD LATE ONSET ALZHEIMER'S DEMENTIA WITH OTHER BEHAVIORAL DISTURBANCE: ICD-10-CM

## 2023-06-21 DIAGNOSIS — E11.59 HYPERTENSION ASSOCIATED WITH DIABETES: ICD-10-CM

## 2023-06-21 DIAGNOSIS — E11.65 TYPE 2 DIABETES MELLITUS WITH HYPERGLYCEMIA, WITH LONG-TERM CURRENT USE OF INSULIN: ICD-10-CM

## 2023-06-21 DIAGNOSIS — Z00.00 ENCOUNTER FOR PREVENTIVE HEALTH EXAMINATION: ICD-10-CM

## 2023-06-21 PROCEDURE — 3078F PR MOST RECENT DIASTOLIC BLOOD PRESSURE < 80 MM HG: ICD-10-PCS | Mod: CPTII,S$GLB,, | Performed by: NURSE PRACTITIONER

## 2023-06-21 PROCEDURE — 1170F FXNL STATUS ASSESSED: CPT | Mod: CPTII,S$GLB,, | Performed by: NURSE PRACTITIONER

## 2023-06-21 PROCEDURE — G0439 PR MEDICARE ANNUAL WELLNESS SUBSEQUENT VISIT: ICD-10-PCS | Mod: S$GLB,,, | Performed by: NURSE PRACTITIONER

## 2023-06-21 PROCEDURE — 1170F PR FUNCTIONAL STATUS ASSESSED: ICD-10-PCS | Mod: CPTII,S$GLB,, | Performed by: NURSE PRACTITIONER

## 2023-06-21 PROCEDURE — 3077F SYST BP >= 140 MM HG: CPT | Mod: CPTII,S$GLB,, | Performed by: NURSE PRACTITIONER

## 2023-06-21 PROCEDURE — 1160F PR REVIEW ALL MEDS BY PRESCRIBER/CLIN PHARMACIST DOCUMENTED: ICD-10-PCS | Mod: CPTII,S$GLB,, | Performed by: NURSE PRACTITIONER

## 2023-06-21 PROCEDURE — 1101F PR PT FALLS ASSESS DOC 0-1 FALLS W/OUT INJ PAST YR: ICD-10-PCS | Mod: CPTII,S$GLB,, | Performed by: NURSE PRACTITIONER

## 2023-06-21 PROCEDURE — 3078F DIAST BP <80 MM HG: CPT | Mod: CPTII,S$GLB,, | Performed by: NURSE PRACTITIONER

## 2023-06-21 PROCEDURE — 1101F PT FALLS ASSESS-DOCD LE1/YR: CPT | Mod: CPTII,S$GLB,, | Performed by: NURSE PRACTITIONER

## 2023-06-21 PROCEDURE — 1126F PR PAIN SEVERITY QUANTIFIED, NO PAIN PRESENT: ICD-10-PCS | Mod: CPTII,S$GLB,, | Performed by: NURSE PRACTITIONER

## 2023-06-21 PROCEDURE — 3077F PR MOST RECENT SYSTOLIC BLOOD PRESSURE >= 140 MM HG: ICD-10-PCS | Mod: CPTII,S$GLB,, | Performed by: NURSE PRACTITIONER

## 2023-06-21 PROCEDURE — 1126F AMNT PAIN NOTED NONE PRSNT: CPT | Mod: CPTII,S$GLB,, | Performed by: NURSE PRACTITIONER

## 2023-06-21 PROCEDURE — 3288F FALL RISK ASSESSMENT DOCD: CPT | Mod: CPTII,S$GLB,, | Performed by: NURSE PRACTITIONER

## 2023-06-21 PROCEDURE — 1157F ADVNC CARE PLAN IN RCRD: CPT | Mod: CPTII,S$GLB,, | Performed by: NURSE PRACTITIONER

## 2023-06-21 PROCEDURE — 3288F PR FALLS RISK ASSESSMENT DOCUMENTED: ICD-10-PCS | Mod: CPTII,S$GLB,, | Performed by: NURSE PRACTITIONER

## 2023-06-21 PROCEDURE — G0439 PPPS, SUBSEQ VISIT: HCPCS | Mod: S$GLB,,, | Performed by: NURSE PRACTITIONER

## 2023-06-21 PROCEDURE — 1159F MED LIST DOCD IN RCRD: CPT | Mod: CPTII,S$GLB,, | Performed by: NURSE PRACTITIONER

## 2023-06-21 PROCEDURE — 1159F PR MEDICATION LIST DOCUMENTED IN MEDICAL RECORD: ICD-10-PCS | Mod: CPTII,S$GLB,, | Performed by: NURSE PRACTITIONER

## 2023-06-21 PROCEDURE — 1160F RVW MEDS BY RX/DR IN RCRD: CPT | Mod: CPTII,S$GLB,, | Performed by: NURSE PRACTITIONER

## 2023-06-21 PROCEDURE — 1157F PR ADVANCE CARE PLAN OR EQUIV PRESENT IN MEDICAL RECORD: ICD-10-PCS | Mod: CPTII,S$GLB,, | Performed by: NURSE PRACTITIONER

## 2023-06-21 RX ORDER — AMLODIPINE BESYLATE 10 MG/1
10 TABLET ORAL DAILY
COMMUNITY
End: 2023-09-18 | Stop reason: SDUPTHER

## 2023-06-21 NOTE — LETTER
July 13, 2023    Noris Demarco  [address] [address]             [facility] - Pharmacy Assistance  [address]  [address]  [place] [address]  Phone: [phone]  Fax: [phone] Dear Ms. Demarco    My Name is Tone Ledezma. I am a Pharmacy Technician reaching out on behalf of [facility] [facility]acy Patient Assistance Team. We last spoke on 07/06/23 about assistance with your medication. A letter was sent to your [facility] Portal requesting documentation required to begin the Pharmacy Assistance Process. Unfortunately, we have not heard back from you. Please reach out to my phone number below if you are still in need of assistance with your medications. We look forward to hearing from you soon!     Thank you for choosing [facility] for your healthcare needs.      Tone Ledezma  Pharmacy Patient Assistance

## 2023-06-21 NOTE — PROGRESS NOTES
Noris Demarco presented for a  Medicare AWV and comprehensive Health Risk Assessment today. The following components were reviewed and updated:    Medical history  Family History  Social history  Allergies and Current Medications  Health Risk Assessment  Health Maintenance  Care Team         ** See Completed Assessments for Annual Wellness Visit within the encounter summary.**         The following assessments were completed:  Living Situation  CAGE  Depression Screening  Timed Get Up and Go  Whisper Test  Cognitive Function Screening - not performed  Nutrition Screening  ADL Screening  PAQ Screening    Review for Opioid Screening: Patient does not have rx for Opioids.  Review for Substance Use Disorders: Patient does not use substance.      Vitals:    06/21/23 0948   BP: (!) 140/78   Pulse: 69   SpO2: 96%   Weight: 65.8 kg (145 lb)     Body mass index is 24.89 kg/m².  Physical Exam  Vitals reviewed.   HENT:      Head: Normocephalic.   Eyes:      Pupils: Pupils are equal, round, and reactive to light.   Cardiovascular:      Rate and Rhythm: Normal rate and regular rhythm.      Heart sounds: Normal heart sounds.   Pulmonary:      Effort: Pulmonary effort is normal.      Breath sounds: Normal breath sounds.   Abdominal:      General: Bowel sounds are normal.      Palpations: Abdomen is soft.   Musculoskeletal:         General: Normal range of motion.      Cervical back: Normal range of motion.      Right lower leg: No edema.      Left lower leg: No edema.   Skin:     General: Skin is warm and dry.   Neurological:      Mental Status: She is alert and oriented to person, place, and time.   Psychiatric:         Behavior: Behavior normal.             Diagnoses and health risks identified today and associated recommendations/orders:    1. Encounter for Medicare annual wellness exam  - Ambulatory Referral/Consult to Enhanced Annual Wellness Visit (eAWV)    2. Encounter for preventive health examination  - Above  assessments completed. Preventive measures and health maintenance reviewed with patient and  Gregory.  -discussed overdue vaccines, tetanus and shingles    3. Type 2 diabetes mellitus with hyperglycemia, with long-term current use of insulin  Not at goal, followed by PCP  -A1C 9.7, on novolog and lantus  -pt will be getting CBG to help with blood glucose control  - Ambulatory referral/consult to Pharmacy Assistance; Future     4. Mild late onset Alzheimer's dementia with other behavioral disturbance  Stable, followed by Neurology    5. Ductal carcinoma in situ (DCIS) of right breast  Stable, followed by Hem/Onc  -on tamoxifen    6. Hypertension associated with diabetes  Stable, followed by PCP  -on amlodipine, lisinopril, clonidine and furosemide    7. Anxiety  Stable, followed by PCP  -on buspirone      Provided Noris with a 5-10 year written screening schedule and personal prevention plan. Recommendations were developed using the USPSTF age appropriate recommendations. Education, counseling, and referrals were provided as needed. After Visit Summary printed and given to patient which includes a list of additional screenings\tests needed.    Follow up in about 1 year (around 6/21/2024) for your next annual wellness visit.    Laquita Alvarez, CARLOS      I offered to discuss advanced care planning, including how to pick a person who would make decisions for you if you were unable to make them for yourself, called a health care power of , and what kind of decisions you might make such as use of life sustaining treatments such as ventilators and tube feeding when faced with a life limiting illness recorded on a living will that they will need to know. (How you want to be cared for as you near the end of your natural life)     X  Patient is unable to engage in a discussion regarding advanced directives due to severe physical and/or cognitive impairment.

## 2023-06-21 NOTE — TELEPHONE ENCOUNTER
I have spoken with Noris Demarco and informed her of the [employer] and [employer] application process for Lantus and Novolog and what's required to apply.  Noris Demarco will provide the following documents: Proof of household Income( such as social security statement, 1099 form, pension statement or 3 consecutive pay stubs, Copy of all Insurance cards( front and back), Printout from your Insurance or Pharmacy that shows how much you have spent on prescriptions this year, and Signed & dated OCCP/ Patient Authorization Forms      I will follow up with the patient in 5 business days.

## 2023-06-21 NOTE — LETTER
June 21, 2023    Noris Demarco  [address] [facility] [address]             [facility] - Pharmacy Assistance  [address]  [place] [address]  Phone: [phone]  Fax: [phone] Dear Ms. Noris Demarco     It was a pleasure speaking with you. To follow up on our conversation on 6/21/2023, the Pharmacy Patient Assistance Program needs more information from you before we can submit your Lantus and Novolog application to the [employer] Program. Please return the following documents to the Pharmacy Patient Assistance office (address, email and fax listed below) asap:      Proof of household Income( such as social security statement, 1099 form, pension statement or 3 consecutive pay stubs, Copy of all Insurance cards( front and back), Printout from your Insurance or Pharmacy that shows how much you have spent on prescriptions this year and Signed & dated OCCP/ Patient Authorization Forms            Whats Next:     Once I receive your documentation and authorization from your Provider, your application will be submitted to the [employer] Assistance Program for review. Please be advised it will take 2 to 4 weeks for your application to be processed so you may have to purchase a month's supply of medication from your pharmacy to hold you over during the waiting period. You will be notified of approval or denial by The Program(mail) or myself.      If you have any questions or concerns, please give me a call         Sincerely   Tone Ledezma   [address], [address], [place] [address]

## 2023-06-23 ENCOUNTER — PATIENT MESSAGE (OUTPATIENT)
Dept: FAMILY MEDICINE | Facility: CLINIC | Age: 78
End: 2023-06-23
Payer: MEDICARE

## 2023-07-06 NOTE — TELEPHONE ENCOUNTER
A 3rd attempt has been made to retrieve requested documents from Noris Demarco  via PHONE. The final contact attempt will be made in 5 business days

## 2023-07-13 NOTE — TELEPHONE ENCOUNTER
Noris Demarco was scheduled to provide necessary documents to start the application process by 06/28/23. As of today, the following documents have not been received.        Proof of household Income( such as social security statement, 1099 form, pension statement or 3 consecutive pay stubs, Copy of all Insurance cards( front and back), and Signed & dated OCCP/ Patient Authorization Forms    Please instruct the patient to reach out to Tone Ledezma [phone] or  [email or web address] if (he/she) is still in need of assistance.

## 2023-08-18 ENCOUNTER — PATIENT MESSAGE (OUTPATIENT)
Dept: INTERNAL MEDICINE | Facility: CLINIC | Age: 78
End: 2023-08-18
Payer: MEDICARE

## 2023-08-18 NOTE — LETTER
[facility] Internal Medicine/Pediatrics  [address] [facility] [place] [address]  Phone: [phone]  Fax: [phone] August 21, 2023    Noris Demarco  [address] [place] [id number]      To Whom It May Concern:    Noris Demarco is unable to participate in jury duty due to   Mild late onset Alzhimer dementia.    If you have any questions or concerns, please feel free to call my office.    Sincerely,            Abram Turner III, MD

## 2023-08-21 ENCOUNTER — LAB VISIT (OUTPATIENT)
Dept: LAB | Facility: HOSPITAL | Age: 78
End: 2023-08-21
Attending: INTERNAL MEDICINE
Payer: MEDICARE

## 2023-08-21 DIAGNOSIS — E13.9 LADA (LATENT AUTOIMMUNE DIABETES IN ADULTS), MANAGED AS TYPE 1: ICD-10-CM

## 2023-08-21 LAB
ESTIMATED AVG GLUCOSE: 212 MG/DL (ref 68–131)
HBA1C MFR BLD: 9 % (ref 4–5.6)
TSH SERPL DL<=0.005 MIU/L-ACNC: 3.12 UIU/ML (ref 0.4–4)

## 2023-08-21 PROCEDURE — 84443 ASSAY THYROID STIM HORMONE: CPT | Mod: HCNC,PO | Performed by: INTERNAL MEDICINE

## 2023-08-21 PROCEDURE — 83036 HEMOGLOBIN GLYCOSYLATED A1C: CPT | Mod: HCNC | Performed by: INTERNAL MEDICINE

## 2023-08-21 PROCEDURE — 36415 COLL VENOUS BLD VENIPUNCTURE: CPT | Mod: HCNC,PO | Performed by: INTERNAL MEDICINE

## 2023-08-21 NOTE — TELEPHONE ENCOUNTER
Future Appointments   Date Time Provider Department Center   8/23/2023  9:40 AM Abram Turner III, MD [facility]   10/19/2023  9:30 [facility]   10/20/2023  9:30 AM Cecilia Chandra NP [facility] HEM ONC [place]

## 2023-08-22 NOTE — PROGRESS NOTES
Assessment:       1. 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 Hypertension associated with diabetes    3. Type 2 diabetes mellitus with hyperglycemia, with long-term current use of insulin    4. 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 Microalbuminuria    6. Hyperlipidemia associated with type 2 diabetes mellitus    7. Chronic cough    8. Otitis externa, unspecified chronicity, unspecified laterality, unspecified type    9. Uncontrolled type 2 diabetes mellitus with hyperglycemia        Plan:         Noris was seen today for follow-up, otalgia and diabetes.    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), managed as type 1  -     Ambulatory referral/consult to Home Health; Future  -     Ambulatory referral/consult to Endocrinology; Future  -     Hemoglobin A1C; Standing  -     Basic Metabolic Panel; Standing    Microalbuminuria  -     Microalbumin/Creatinine Ratio, Urine; Standing    Hyperlipidemia associated with type 2 diabetes mellitus  -     Lipid Panel; Standing    Chronic cough    New   Uncontrolled  Signs, symptoms consistent with unclear etiology     history or pyhsical exam do not suggest pna, pnx, adhf  Cough could be from copd, uacs, pna, lower suspiciion, Lisionpril cough, gerd  Start with cxr , covid swab and switch lisinopril   I provided instruction on supportive care measures   Prior tesing reviewed and new testing was was given   reviewed signs and symptoms that should prompt return  to provider or evaluation in the ED  -     X-Ray Chest PA And Lateral; Future  -     POCT COVID-19 Rapid Screening    Otitis externa, unspecified chronicity, unspecified laterality, unspecified type  -     ofloxacin (FLOXIN) 0.3 % otic solution; Place 5 drops into the right ear once daily.    Uncontrolled type 2 diabetes mellitus with hyperglycemia  -     LANTUS SOLOSTAR U-100 INSULIN glargine 100 units/mL SubQ pen; Inject 28 Units into the skin every evening.  -     insulin aspart U-100 (NOVOLOG FLEXPEN U-100 INSULIN) 100 unit/mL (3 mL) InPn pen; Inject 8 Units into the skin 3 (three) times daily with meals.        I spent at least 40 mins with preparing to see the patient, obtaining and/or revieweing separately obtained history, preforming a examination and evaluation, counseling, communicating with other health care professional, documenting clinical information in the electronic health record,  and/or coordinating care.             Subjective:       Patient ID: Noris Demarco is a 77 y.o. female.    Chief Complaint: No chief complaint on file.        Interim Hx  Did awv     Concerns today  Needs faxed home health orders  Occasional R ear pain when chewing  DIDN'T TAKE clonidine this am    Chronic problems     Hypertension  This is a chronic problem. The current episode started more than 1 year ago. The problem has been waxing and waning since onset. Past treatments include ACE inhibitors, alpha 1 blockers and calcium channel blockers.   Diabetes  She presents for her follow-up diabetic visit. She has type 2 diabetes mellitus. Current diabetic treatment includes insulin injections. She is compliant with treatment none of the time. An ACE inhibitor/angiotensin II receptor blocker is being taken. She sees a podiatrist.Eye exam is current.       Review of Systems   All other systems reviewed and are negative.            Health Maintenance Due   Topic Date Due    TETANUS VACCINE  Never done    Shingles Vaccine (1 of  "2) Never done    COVID-19 Vaccine (4 - Pfizer series) 03/18/2022         Objective:     BP (!) 164/82 (BP Location: Left arm, Patient Position: Sitting, BP Method: Small (Manual))   Pulse 76   Ht 5' 4" (1.626 m)   Wt 68 kg (149 lb 14.6 oz)   LMP 08/13/1999 (Approximate)   SpO2 98%   BMI 25.73 kg/m²     Vitals 6/21/2023 5/19/2023 4/18/2023 2/27/2023 2/7/2023   Height - 64 - 64 -   Weight (lbs) 145 143.3 141.76 140.87 138.89   BMI (kg/m2) - 24.6 - 24.2 -              Physical Exam  Vitals and nursing note reviewed.   Constitutional:       General: She is not in acute distress.     Appearance: She is well-developed. She is not diaphoretic.   HENT:      Head: Normocephalic.      Nose: Nose normal.   Eyes:      General:         Right eye: No discharge.         Left eye: No discharge.      Conjunctiva/sclera: Conjunctivae normal.      Pupils: Pupils are equal, round, and reactive to light.   Cardiovascular:      Rate and Rhythm: Normal rate and regular rhythm.      Heart sounds: Normal heart sounds. No murmur heard.     No friction rub. No gallop.   Pulmonary:      Effort: Pulmonary effort is normal. No respiratory distress.   Abdominal:      General: Bowel sounds are normal. There is no distension.      Palpations: Abdomen is soft.   Musculoskeletal:         General: No deformity. Normal range of motion.      Cervical back: Normal range of motion.   Skin:     General: Skin is warm.   Neurological:      Mental Status: She is alert and oriented to person, place, and time.      Cranial Nerves: No cranial nerve deficit.           Recent Results (from the past 336 hour(s))   Hemoglobin A1C    Collection Time: 08/21/23  8:59 AM   Result Value Ref Range    Hemoglobin A1C 9.0 (H) 4.0 - 5.6 %    Estimated Avg Glucose 212 (H) 68 - 131 mg/dL   TSH    Collection Time: 08/21/23  8:59 AM   Result Value Ref Range    TSH 3.120 0.400 - 4.000 uIU/mL       Future Appointments   Date Time Provider Department Center   8/23/2023 11:00 " "AM KEN XR1 500 LB LIMIT [facility]\"" XRAY [place]   2023  9:20 AM INJECTION, [facility] [place]   2023  3:00 PM Abram Turner III, MD K[facility] IM [place]   10/19/2023  9:30 AM [facility]   10/20/2023  9:30 AM Cecilia Chandra NP [facility] HEM ONC [facility]   12/15/2023  9:20 AM [facility], [facility]   12/15/2023  9:40 AM LAB, SPECIMEN [facility] SPECLAB [facility]   2023  9:40 AM Abram Turner III, MD [facility]  [place]         Medication List with Changes/Refills   Current Medications    AMLODIPINE (NORVASC) 10 MG TABLET    Take 10 mg by mouth once daily.    ASPIRIN (ECOTRIN) 81 MG EC TABLET    Take 81 mg by mouth once daily.    BD ULTRA-FINE IRWIN PEN NEEDLE 32 GAUGE X 5/32" NDLE    3 TIMES A DAY    BD ULTRA-FINE SHORT PEN NEEDLE 31 GAUGE X 5/16" NDLE    SMARTSI Each SUB-Q Daily    BLOOD-GLUCOSE METER,CONTINUOUS (DEXCOM G6 ) MISC    1 each by Misc.(Non-Drug; Combo Route) route once daily at 6am.    BLOOD-GLUCOSE METER,CONTINUOUS (DEXCOM G6 ) MISC    1 each by Misc.(Non-Drug; Combo Route) route once daily.    BLOOD-GLUCOSE SENSOR (DEXCOM G6 SENSOR) LEONOR    1 each by Misc.(Non-Drug; Combo Route) route once daily at 6am.    BLOOD-GLUCOSE SENSOR (DEXCOM G6 SENSOR) LEONOR    1 each by Misc.(Non-Drug; Combo Route) route once daily.    BLOOD-GLUCOSE TRANSMITTER (DEXCOM G6 TRANSMITTER) LEONOR    1 each by Misc.(Non-Drug; Combo Route) route once daily at 6am.    BLOOD-GLUCOSE TRANSMITTER (DEXCOM G6 TRANSMITTER) LEONOR    1 each by Misc.(Non-Drug; Combo Route) route once daily.    BUSPIRONE (BUSPAR) 15 MG TABLET    Take 15 mg by mouth once daily.    CHOLECALCIFEROL, VITAMIN D3, (VITAMIN D3) 50 MCG (2,000 UNIT) CAP    Take 1 capsule by mouth once daily.    CLONIDINE (CATAPRES) 0.2 MG TABLET    TAKE 1 TABLET TWICE DAILY    COENZYME Q10 100 MG CAPSULE    Take 100 mg by mouth once daily.    CYANOCOBALAMIN 500 MCG TABLET    Take 500 mcg by mouth once " daily.    CYPROHEPTADINE (PERIACTIN) 4 MG TABLET    Take 1 tablet (4 mg total) by mouth 2 (two) times a day.    FISH OIL-OMEGA-3 FATTY ACIDS 300-1,000 MG CAPSULE    Take 1 g by mouth once daily.    FUROSEMIDE (LASIX) 40 MG TABLET    Take 40 mg by mouth once daily.     GLUCOSE 4 GM CHEWABLE TABLET    Take 4 tablets (16 g total) by mouth as needed for Low blood sugar (If having symptoms of blurry vision, palpitations, confusion, shakiness.  Please check sugars and if sugar below 70 please take 4 tablets and re-check sugar everry 15 minutes until sugars are above 70 and symptoms resolve.).    INSULIN ASPART U-100 (NOVOLOG FLEXPEN U-100 INSULIN) 100 UNIT/ML (3 ML) INPN PEN    Inject 6 Units into the skin 3 (three) times daily with meals.    LANTUS SOLOSTAR U-100 INSULIN GLARGINE 100 UNITS/ML SUBQ PEN    Inject 24 Units into the skin every evening.    LISINOPRIL (PRINIVIL,ZESTRIL) 40 MG TABLET    Take 1 tablet (40 mg total) by mouth once daily.    OFLOXACIN (FLOXIN) 0.3 % OTIC SOLUTION    PLACE 5 DROPS INTO THE RIGHT EAR 2 TIMES DAILY.    TAMOXIFEN (NOLVADEX) 20 MG TAB    Take 1 tablet (20 mg total) by mouth once daily.    TRUE METRIX GLUCOSE TEST STRIP STRP    1 strip by In Vitro route every morning.         Disclaimer:  This note has been generated using voice-recognition software. There may be grammatical or spelling errors that have been missed during proof-reading

## 2023-08-23 ENCOUNTER — OFFICE VISIT (OUTPATIENT)
Dept: INTERNAL MEDICINE | Facility: CLINIC | Age: 78
End: 2023-08-23
Payer: MEDICARE

## 2023-08-23 ENCOUNTER — TELEPHONE (OUTPATIENT)
Dept: OPTOMETRY | Facility: CLINIC | Age: 78
End: 2023-08-23
Payer: MEDICARE

## 2023-08-23 ENCOUNTER — HOSPITAL ENCOUNTER (OUTPATIENT)
Dept: RADIOLOGY | Facility: HOSPITAL | Age: 78
Discharge: HOME | End: 2023-08-23
Attending: INTERNAL MEDICINE
Payer: MEDICARE

## 2023-08-23 VITALS
SYSTOLIC BLOOD PRESSURE: 164 MMHG | OXYGEN SATURATION: 98 % | HEIGHT: 64 IN | WEIGHT: 149.94 LBS | DIASTOLIC BLOOD PRESSURE: 82 MMHG | HEART RATE: 76 BPM | BODY MASS INDEX: 25.6 KG/M2

## 2023-08-23 DIAGNOSIS — F02.A18 MILD LATE ONSET ALZHEIMER'S DEMENTIA WITH OTHER BEHAVIORAL DISTURBANCE: Primary | ICD-10-CM

## 2023-08-23 DIAGNOSIS — E11.69 HYPERLIPIDEMIA ASSOCIATED WITH TYPE 2 DIABETES MELLITUS: ICD-10-CM

## 2023-08-23 DIAGNOSIS — R80.9 MICROALBUMINURIA: ICD-10-CM

## 2023-08-23 DIAGNOSIS — R05.3 CHRONIC COUGH: ICD-10-CM

## 2023-08-23 DIAGNOSIS — G30.1 MILD LATE ONSET ALZHEIMER'S DEMENTIA WITH OTHER BEHAVIORAL DISTURBANCE: Primary | ICD-10-CM

## 2023-08-23 DIAGNOSIS — E11.65 UNCONTROLLED TYPE 2 DIABETES MELLITUS WITH HYPERGLYCEMIA: ICD-10-CM

## 2023-08-23 DIAGNOSIS — Z79.4 TYPE 2 DIABETES MELLITUS WITH HYPERGLYCEMIA, WITH LONG-TERM CURRENT USE OF INSULIN: ICD-10-CM

## 2023-08-23 DIAGNOSIS — E11.59 HYPERTENSION ASSOCIATED WITH DIABETES: ICD-10-CM

## 2023-08-23 DIAGNOSIS — E13.9 LADA (LATENT AUTOIMMUNE DIABETES IN ADULTS), MANAGED AS TYPE 1: ICD-10-CM

## 2023-08-23 DIAGNOSIS — H60.90 OTITIS EXTERNA, UNSPECIFIED CHRONICITY, UNSPECIFIED LATERALITY, UNSPECIFIED TYPE: ICD-10-CM

## 2023-08-23 DIAGNOSIS — E78.5 HYPERLIPIDEMIA ASSOCIATED WITH TYPE 2 DIABETES MELLITUS: ICD-10-CM

## 2023-08-23 DIAGNOSIS — I15.2 HYPERTENSION ASSOCIATED WITH DIABETES: ICD-10-CM

## 2023-08-23 DIAGNOSIS — E11.65 TYPE 2 DIABETES MELLITUS WITH HYPERGLYCEMIA, WITH LONG-TERM CURRENT USE OF INSULIN: ICD-10-CM

## 2023-08-23 LAB
CTP QC/QA: YES
SARS-COV-2 RDRP RESP QL NAA+PROBE: NEGATIVE

## 2023-08-23 PROCEDURE — 1126F AMNT PAIN NOTED NONE PRSNT: CPT | Mod: HCNC,CPTII,S$GLB, | Performed by: INTERNAL MEDICINE

## 2023-08-23 PROCEDURE — 87635 SARS-COV-2 COVID-19 AMP PRB: CPT | Mod: QW,HCNC,S$GLB, | Performed by: INTERNAL MEDICINE

## 2023-08-23 PROCEDURE — 99215 PR OFFICE/OUTPT VISIT, EST, LEVL V, 40-54 MIN: ICD-10-PCS | Mod: HCNC,S$GLB,, | Performed by: INTERNAL MEDICINE

## 2023-08-23 PROCEDURE — 1157F PR ADVANCE CARE PLAN OR EQUIV PRESENT IN MEDICAL RECORD: ICD-10-PCS | Mod: HCNC,CPTII,S$GLB, | Performed by: INTERNAL MEDICINE

## 2023-08-23 PROCEDURE — 1160F RVW MEDS BY RX/DR IN RCRD: CPT | Mod: HCNC,CPTII,S$GLB, | Performed by: INTERNAL MEDICINE

## 2023-08-23 PROCEDURE — 71046 X-RAY EXAM CHEST 2 VIEWS: CPT | Mod: TC,HCNC,FY,PO

## 2023-08-23 PROCEDURE — 3288F PR FALLS RISK ASSESSMENT DOCUMENTED: ICD-10-PCS | Mod: HCNC,CPTII,S$GLB, | Performed by: INTERNAL MEDICINE

## 2023-08-23 PROCEDURE — 87635: ICD-10-PCS | Mod: QW,HCNC,S$GLB, | Performed by: INTERNAL MEDICINE

## 2023-08-23 PROCEDURE — 99999 PR PBB SHADOW E&M-EST. PATIENT-LVL V: ICD-10-PCS | Mod: PBBFAC,HCNC,, | Performed by: INTERNAL MEDICINE

## 2023-08-23 PROCEDURE — 1157F ADVNC CARE PLAN IN RCRD: CPT | Mod: HCNC,CPTII,S$GLB, | Performed by: INTERNAL MEDICINE

## 2023-08-23 PROCEDURE — 99215 OFFICE O/P EST HI 40 MIN: CPT | Mod: HCNC,S$GLB,, | Performed by: INTERNAL MEDICINE

## 2023-08-23 PROCEDURE — 99999 PR PBB SHADOW E&M-EST. PATIENT-LVL V: CPT | Mod: PBBFAC,HCNC,, | Performed by: INTERNAL MEDICINE

## 2023-08-23 PROCEDURE — 1101F PT FALLS ASSESS-DOCD LE1/YR: CPT | Mod: HCNC,CPTII,S$GLB, | Performed by: INTERNAL MEDICINE

## 2023-08-23 PROCEDURE — 1101F PR PT FALLS ASSESS DOC 0-1 FALLS W/OUT INJ PAST YR: ICD-10-PCS | Mod: HCNC,CPTII,S$GLB, | Performed by: INTERNAL MEDICINE

## 2023-08-23 PROCEDURE — 71046 X-RAY EXAM CHEST 2 VIEWS: CPT | Mod: 26,HCNC,, | Performed by: RADIOLOGY

## 2023-08-23 PROCEDURE — 71046 XR CHEST PA AND LATERAL: ICD-10-PCS | Mod: 26,HCNC,, | Performed by: RADIOLOGY

## 2023-08-23 PROCEDURE — 3079F PR MOST RECENT DIASTOLIC BLOOD PRESSURE 80-89 MM HG: ICD-10-PCS | Mod: HCNC,CPTII,S$GLB, | Performed by: INTERNAL MEDICINE

## 2023-08-23 PROCEDURE — 1160F PR REVIEW ALL MEDS BY PRESCRIBER/CLIN PHARMACIST DOCUMENTED: ICD-10-PCS | Mod: HCNC,CPTII,S$GLB, | Performed by: INTERNAL MEDICINE

## 2023-08-23 PROCEDURE — 3288F FALL RISK ASSESSMENT DOCD: CPT | Mod: HCNC,CPTII,S$GLB, | Performed by: INTERNAL MEDICINE

## 2023-08-23 PROCEDURE — 1159F PR MEDICATION LIST DOCUMENTED IN MEDICAL RECORD: ICD-10-PCS | Mod: HCNC,CPTII,S$GLB, | Performed by: INTERNAL MEDICINE

## 2023-08-23 PROCEDURE — 1126F PR PAIN SEVERITY QUANTIFIED, NO PAIN PRESENT: ICD-10-PCS | Mod: HCNC,CPTII,S$GLB, | Performed by: INTERNAL MEDICINE

## 2023-08-23 PROCEDURE — 3077F SYST BP >= 140 MM HG: CPT | Mod: HCNC,CPTII,S$GLB, | Performed by: INTERNAL MEDICINE

## 2023-08-23 PROCEDURE — 3079F DIAST BP 80-89 MM HG: CPT | Mod: HCNC,CPTII,S$GLB, | Performed by: INTERNAL MEDICINE

## 2023-08-23 PROCEDURE — 3077F PR MOST RECENT SYSTOLIC BLOOD PRESSURE >= 140 MM HG: ICD-10-PCS | Mod: HCNC,CPTII,S$GLB, | Performed by: INTERNAL MEDICINE

## 2023-08-23 PROCEDURE — 1159F MED LIST DOCD IN RCRD: CPT | Mod: HCNC,CPTII,S$GLB, | Performed by: INTERNAL MEDICINE

## 2023-08-23 PROCEDURE — 99499 UNLISTED E&M SERVICE: CPT | Mod: HCNC,S$GLB,, | Performed by: INTERNAL MEDICINE

## 2023-08-23 RX ORDER — LOSARTAN POTASSIUM 100 MG/1
100 TABLET ORAL DAILY
Qty: 90 TABLET | Refills: 3 | Status: SHIPPED | OUTPATIENT
Start: 2023-08-23 | End: 2023-09-27 | Stop reason: SDUPTHER

## 2023-08-23 RX ORDER — INSULIN ASPART 100 [IU]/ML
8 INJECTION, SOLUTION INTRAVENOUS; SUBCUTANEOUS
Qty: 23 ML | Refills: 1 | Status: SHIPPED | OUTPATIENT
Start: 2023-08-23 | End: 2024-02-28

## 2023-08-23 RX ORDER — OFLOXACIN 3 MG/ML
5 SOLUTION AURICULAR (OTIC) DAILY
Qty: 10 ML | Refills: 0 | Status: SHIPPED | OUTPATIENT
Start: 2023-08-23 | End: 2023-09-27 | Stop reason: SDUPTHER

## 2023-08-23 RX ORDER — INSULIN GLARGINE 100 [IU]/ML
28 INJECTION, SOLUTION SUBCUTANEOUS NIGHTLY
Qty: 15 EACH | Refills: 3 | Status: SHIPPED | OUTPATIENT
Start: 2023-08-23 | End: 2023-09-22

## 2023-08-23 NOTE — TELEPHONE ENCOUNTER
----- Message from Holly Johnson, OD sent at 8/23/2023  9:56 AM CDT -----  Regarding: FW: Eye Exam follow up  Can you f/u with this patient? She needs to continue to follow with retina due to the significant diabetic changes in her eyes. She needs to see retina instead of me. Is she seeing an outside retina specialist? I don't see an appointment with any of our docs in her chart. If she wants to get established with an [facility] doc then we can refer her to one of our retina providers? Thank you!    ----- Message -----  From: Abram Turner III, MD  Sent: 8/23/2023   9:50 AM CDT  To: Holly Johnson, OD; [facility] Staff  Subject: Eye Exam follow up                               Greetings  This patient did her eye exam in January   She wanted to know about follow up   1. She wanted to know what the follow was going to be she said she was referred to retina specialist and saw them but I dont see it in the system  Can you follow up with her   . Thanks so much!

## 2023-08-23 NOTE — TELEPHONE ENCOUNTER
----- Message from Abram Turner III, MD sent at 8/23/2023  9:48 AM CDT -----  Regarding: Eye Exam follow up  Greetings  This patient did her eye exam in January   She wanted to know about follow up   1. She wanted to know what the follow was going to be she said she was referred to retina specialist and saw them but I dont see it in the system  Can you follow up with her   . Thanks so much!

## 2023-08-23 NOTE — PATIENT INSTRUCTIONS
We were happy to see you today    For your Testing  Please have your labs and/or imaging test done    In 3 months   X-ray today   Covid swab       For your Medication   Lets stop lisinopril for losartan for the cough  We refill the ear drops   Lets change the increase   28 of the Lantus  8 Of the novolog  For more information about side effects please visit medlineplus.gov        For your Referrals  Get my the information for what she needs for the home health and well send everything over  You could have TMJ so I would have you see the dentist as well  - if symptoms persistent we might need you to see the ear nose and throat   We'll set you up with a new endocrine doctor   I sent a message over to the eye doctor to have them reach out with results       For your Vaccinations  We gave your the following vaccines  Please obtain the following vaccines at the pharmacy  Tetanus  shingles        Please return to clinic in    Follow up for 2 week nurse BP check, 3 months Office Visit with prelabs.        Extra resources     Lets try to what ever the home health nurse needs so that we can get it sent over and get us started on the DEXCOM

## 2023-08-24 DIAGNOSIS — E11.59 HYPERTENSION ASSOCIATED WITH DIABETES: ICD-10-CM

## 2023-08-24 DIAGNOSIS — I15.2 HYPERTENSION ASSOCIATED WITH DIABETES: ICD-10-CM

## 2023-08-24 RX ORDER — LANCETS 33 GAUGE
EACH MISCELLANEOUS
Qty: 200 EACH | Refills: 0 | OUTPATIENT
Start: 2023-08-24

## 2023-08-24 RX ORDER — LOSARTAN POTASSIUM 100 MG/1
100 TABLET ORAL DAILY
Qty: 90 TABLET | Refills: 0 | OUTPATIENT
Start: 2023-08-24 | End: 2024-08-23

## 2023-08-24 NOTE — TELEPHONE ENCOUNTER
No care due was identified.  [facility] Embedded Care Due Messages. Reference number: [id number].   8/24/2023 9:27:56 AM CDT

## 2023-08-24 NOTE — TELEPHONE ENCOUNTER
Refill Decision Note   Noris Demarco  is requesting a refill authorization.  Brief Assessment and Rationale for Refill:  Quick Discontinue     Medication Therapy Plan: Pharmacy is requesting new scripts for the following medications without required information, (sig/ frequency/qty/etc)     Medication Reconciliation Completed: No   Comments:     No Care Gaps recommended.     Note composed:9:33 AM 08/24/2023

## 2023-08-30 DIAGNOSIS — I15.2 HYPERTENSION ASSOCIATED WITH DIABETES: ICD-10-CM

## 2023-08-30 DIAGNOSIS — E11.59 HYPERTENSION ASSOCIATED WITH DIABETES: ICD-10-CM

## 2023-08-30 RX ORDER — LOSARTAN POTASSIUM 100 MG/1
TABLET ORAL
Qty: 90 TABLET | Refills: 0 | OUTPATIENT
Start: 2023-08-30

## 2023-08-30 NOTE — TELEPHONE ENCOUNTER
No care due was identified.  [facility] Embedded Care Due Messages. Reference number: [id number].   8/30/2023 1:50:41 PM CDT

## 2023-08-30 NOTE — TELEPHONE ENCOUNTER
Refill Decision Note   Noris Demarco  is requesting a refill authorization.  Brief Assessment and Rationale for Refill:  Quick Discontinue     Medication Therapy Plan:   Pharmacy is requesting new scripts for the following medications without required information, (sig/ frequency/qty/etc)         Comments: Pharmacies have been requesting medications for patients without required information, (sig, frequency, qty, etc.). In addition, requests are sent for medication(s) pt. are currently not taking, and medications patients have never taken.    We have spoken to the pharmacies about these request types and advised their teams previously that we are unable to assess these New Script requests and require all details for these requests. This is a known issue and has been reported.      Note composed:3:02 PM 08/30/2023

## 2023-09-06 ENCOUNTER — CLINICAL SUPPORT (OUTPATIENT)
Dept: FAMILY MEDICINE | Facility: CLINIC | Age: 78
End: 2023-09-06
Payer: MEDICARE

## 2023-09-06 VITALS — HEART RATE: 52 BPM | DIASTOLIC BLOOD PRESSURE: 84 MMHG | SYSTOLIC BLOOD PRESSURE: 134 MMHG

## 2023-09-06 DIAGNOSIS — I15.2 HYPERTENSION ASSOCIATED WITH DIABETES: Primary | ICD-10-CM

## 2023-09-06 DIAGNOSIS — E11.59 HYPERTENSION ASSOCIATED WITH DIABETES: Primary | ICD-10-CM

## 2023-09-06 PROCEDURE — 99999 PR PBB SHADOW E&M-EST. PATIENT-LVL II: CPT | Mod: PBBFAC,HCNC,,

## 2023-09-06 PROCEDURE — 99999 PR PBB SHADOW E&M-EST. PATIENT-LVL II: ICD-10-PCS | Mod: PBBFAC,HCNC,,

## 2023-09-06 NOTE — PROGRESS NOTES
Pt was seen in clinic today for a blood pressure check. Pt has been checking blood pressure at home before taking medication. Advised pt to wait at 30 minutes after taking medication then check blood pressure reading. Will provide another blood pressure log today to keep monitoring blood pressure.    Pt did take blood pressure medication this morning.    Right arm - 140/70   Left arm - 134/84

## 2023-09-14 ENCOUNTER — OFFICE VISIT (OUTPATIENT)
Dept: OPHTHALMOLOGY | Facility: CLINIC | Age: 78
End: 2023-09-14
Payer: MEDICARE

## 2023-09-14 DIAGNOSIS — Z79.4 TYPE 2 DIABETES MELLITUS WITH BOTH EYES AFFECTED BY MODERATE NONPROLIFERATIVE RETINOPATHY AND MACULAR EDEMA, WITH LONG-TERM CURRENT USE OF INSULIN: Primary | ICD-10-CM

## 2023-09-14 DIAGNOSIS — H35.033 HYPERTENSIVE RETINOPATHY, BILATERAL: ICD-10-CM

## 2023-09-14 DIAGNOSIS — H25.13 NS (NUCLEAR SCLEROSIS), BILATERAL: ICD-10-CM

## 2023-09-14 DIAGNOSIS — E11.3313 TYPE 2 DIABETES MELLITUS WITH BOTH EYES AFFECTED BY MODERATE NONPROLIFERATIVE RETINOPATHY AND MACULAR EDEMA, WITH LONG-TERM CURRENT USE OF INSULIN: Primary | ICD-10-CM

## 2023-09-14 PROCEDURE — 1101F PT FALLS ASSESS-DOCD LE1/YR: CPT | Mod: CPTII,S$GLB,, | Performed by: OPHTHALMOLOGY

## 2023-09-14 PROCEDURE — 99999 PR PBB SHADOW E&M-EST. PATIENT-LVL III: ICD-10-PCS | Mod: PBBFAC,,, | Performed by: OPHTHALMOLOGY

## 2023-09-14 PROCEDURE — 3288F PR FALLS RISK ASSESSMENT DOCUMENTED: ICD-10-PCS | Mod: CPTII,S$GLB,, | Performed by: OPHTHALMOLOGY

## 2023-09-14 PROCEDURE — 1160F PR REVIEW ALL MEDS BY PRESCRIBER/CLIN PHARMACIST DOCUMENTED: ICD-10-PCS | Mod: CPTII,S$GLB,, | Performed by: OPHTHALMOLOGY

## 2023-09-14 PROCEDURE — 3288F FALL RISK ASSESSMENT DOCD: CPT | Mod: CPTII,S$GLB,, | Performed by: OPHTHALMOLOGY

## 2023-09-14 PROCEDURE — 1101F PR PT FALLS ASSESS DOC 0-1 FALLS W/OUT INJ PAST YR: ICD-10-PCS | Mod: CPTII,S$GLB,, | Performed by: OPHTHALMOLOGY

## 2023-09-14 PROCEDURE — 1159F MED LIST DOCD IN RCRD: CPT | Mod: CPTII,S$GLB,, | Performed by: OPHTHALMOLOGY

## 2023-09-14 PROCEDURE — 1159F PR MEDICATION LIST DOCUMENTED IN MEDICAL RECORD: ICD-10-PCS | Mod: CPTII,S$GLB,, | Performed by: OPHTHALMOLOGY

## 2023-09-14 PROCEDURE — 92134 POSTERIOR SEGMENT OCT RETINA (OCULAR COHERENCE TOMOGRAPHY)-BOTH EYES: ICD-10-PCS | Mod: S$GLB,,, | Performed by: OPHTHALMOLOGY

## 2023-09-14 PROCEDURE — 67028 PR INJECT INTRAVITREAL PHARMCOLOGIC: ICD-10-PCS | Mod: 50,S$GLB,, | Performed by: OPHTHALMOLOGY

## 2023-09-14 PROCEDURE — 1126F PR PAIN SEVERITY QUANTIFIED, NO PAIN PRESENT: ICD-10-PCS | Mod: CPTII,S$GLB,, | Performed by: OPHTHALMOLOGY

## 2023-09-14 PROCEDURE — 99999 PR PBB SHADOW E&M-EST. PATIENT-LVL III: CPT | Mod: PBBFAC,,, | Performed by: OPHTHALMOLOGY

## 2023-09-14 PROCEDURE — 1157F PR ADVANCE CARE PLAN OR EQUIV PRESENT IN MEDICAL RECORD: ICD-10-PCS | Mod: CPTII,S$GLB,, | Performed by: OPHTHALMOLOGY

## 2023-09-14 PROCEDURE — 92004 PR EYE EXAM, NEW PATIENT,COMPREHESV: ICD-10-PCS | Mod: 25,S$GLB,, | Performed by: OPHTHALMOLOGY

## 2023-09-14 PROCEDURE — 1157F ADVNC CARE PLAN IN RCRD: CPT | Mod: CPTII,S$GLB,, | Performed by: OPHTHALMOLOGY

## 2023-09-14 PROCEDURE — 92004 COMPRE OPH EXAM NEW PT 1/>: CPT | Mod: 25,S$GLB,, | Performed by: OPHTHALMOLOGY

## 2023-09-14 PROCEDURE — 92134 CPTRZ OPH DX IMG PST SGM RTA: CPT | Mod: S$GLB,,, | Performed by: OPHTHALMOLOGY

## 2023-09-14 PROCEDURE — 1160F RVW MEDS BY RX/DR IN RCRD: CPT | Mod: CPTII,S$GLB,, | Performed by: OPHTHALMOLOGY

## 2023-09-14 PROCEDURE — 67028 INJECTION EYE DRUG: CPT | Mod: 50,S$GLB,, | Performed by: OPHTHALMOLOGY

## 2023-09-14 PROCEDURE — 1126F AMNT PAIN NOTED NONE PRSNT: CPT | Mod: CPTII,S$GLB,, | Performed by: OPHTHALMOLOGY

## 2023-09-14 RX ORDER — FLUORESCEIN 500 MG/ML
5 INJECTION INTRAVENOUS ONCE
Status: SHIPPED | OUTPATIENT
Start: 2023-09-14

## 2023-09-14 RX ADMIN — Medication 1.25 MG: at 11:09

## 2023-09-14 NOTE — PROGRESS NOTES
HPI    Referred: Dr. Johnson    76 y/o female present to clinic  with daughter for Retinal evaluation. H/o   of NPDR OU. Pt has dementia. History obtained by daughter and pt. Daughter   states BSL was 397 this morning.  Pt has c/o of blurry vision, eye   allergies, and dry eyes. Denies floaters and flashes of lights.     Eyemeds  No gtts    Hemoglobin A1C       Date                     Value               Ref Range             Status                08/21/2023               9.0 (H)             4.0 - 5.6 %           Final                Last edited by Tiffanie Maravilla on 9/14/2023  9:55 AM.          OCT - OD VMT with central DME  OS VMT released with central DME      A/P    Mod NPDR OU  T2 uncontrolled on insulin    2. DME OU  With 3. VMT component OD (released OS)    Avastin OU today    4. HTN Ret OU  BS/BP/chol control    5. NS OU  Increasing, will need CE eval in near future      1 month OCT/FA OD    Risks, benefits, and alternatives to treatment discussed in detail with the patient.  The patient voiced understanding and wished to proceed with the procedure    Injection Procedure Note:  Diagnosis: DME OU    Patient Identified and Time Out complete  Pt marked  Topical Proparacaine and Betadine.  Inject Avastin OU at 6:00 @ 3.5-4mm posterior to limbus  Post Operative Dx: Same  Complications: None  Follow up as above.

## 2023-09-16 NOTE — TELEPHONE ENCOUNTER
No care due was identified.  [facility] Embedded Care Due Messages. Reference number: [id number].   9/16/2023 12:54:19 PM CDT

## 2023-09-18 RX ORDER — AMLODIPINE BESYLATE 10 MG/1
10 TABLET ORAL
Qty: 90 TABLET | Refills: 1 | Status: SHIPPED | OUTPATIENT
Start: 2023-09-18

## 2023-09-18 NOTE — TELEPHONE ENCOUNTER
Refill Routing Note   Medication(s) are not appropriate for processing by [facility] for the following reason(s):      No active prescription written by provider    [facility] action(s):  Defer Care Due:  None identified            Appointments  past 12m or future 3m with PCP    Date Provider   Last Visit   8/23/2023 Abram Turner III, MD   Next Visit   9/22/2023 Abram Turner III, MD   ED visits in past 90 days: 0        Note composed:4:25 AM 09/18/2023           Room  Book    Security   $67

## 2023-09-22 ENCOUNTER — OFFICE VISIT (OUTPATIENT)
Dept: INTERNAL MEDICINE | Facility: CLINIC | Age: 78
End: 2023-09-22
Payer: MEDICARE

## 2023-09-22 VITALS
DIASTOLIC BLOOD PRESSURE: 80 MMHG | HEART RATE: 60 BPM | OXYGEN SATURATION: 99 % | BODY MASS INDEX: 26.05 KG/M2 | WEIGHT: 152.56 LBS | SYSTOLIC BLOOD PRESSURE: 132 MMHG | HEIGHT: 64 IN

## 2023-09-22 DIAGNOSIS — G30.1 MILD LATE ONSET ALZHEIMER'S DEMENTIA WITH OTHER BEHAVIORAL DISTURBANCE: ICD-10-CM

## 2023-09-22 DIAGNOSIS — Z23 NEED FOR VACCINATION: ICD-10-CM

## 2023-09-22 DIAGNOSIS — H91.90 HEARING LOSS, UNSPECIFIED HEARING LOSS TYPE, UNSPECIFIED LATERALITY: ICD-10-CM

## 2023-09-22 DIAGNOSIS — G89.29 CHRONIC RIGHT EAR PAIN: ICD-10-CM

## 2023-09-22 DIAGNOSIS — E11.65 UNCONTROLLED TYPE 2 DIABETES MELLITUS WITH HYPERGLYCEMIA: Primary | ICD-10-CM

## 2023-09-22 DIAGNOSIS — E11.69 HYPERLIPIDEMIA ASSOCIATED WITH TYPE 2 DIABETES MELLITUS: ICD-10-CM

## 2023-09-22 DIAGNOSIS — F02.A18 MILD LATE ONSET ALZHEIMER'S DEMENTIA WITH OTHER BEHAVIORAL DISTURBANCE: ICD-10-CM

## 2023-09-22 DIAGNOSIS — E78.5 HYPERLIPIDEMIA ASSOCIATED WITH TYPE 2 DIABETES MELLITUS: ICD-10-CM

## 2023-09-22 DIAGNOSIS — H92.01 CHRONIC RIGHT EAR PAIN: ICD-10-CM

## 2023-09-22 DIAGNOSIS — E11.59 HYPERTENSION ASSOCIATED WITH DIABETES: ICD-10-CM

## 2023-09-22 DIAGNOSIS — I15.2 HYPERTENSION ASSOCIATED WITH DIABETES: ICD-10-CM

## 2023-09-22 DIAGNOSIS — E13.9 LADA (LATENT AUTOIMMUNE DIABETES IN ADULTS), MANAGED AS TYPE 1: ICD-10-CM

## 2023-09-22 PROBLEM — F41.9 ANXIETY: Status: RESOLVED | Noted: 2023-06-21 | Resolved: 2023-09-22

## 2023-09-22 PROCEDURE — 3288F PR FALLS RISK ASSESSMENT DOCUMENTED: ICD-10-PCS | Mod: HCNC,CPTII,S$GLB, | Performed by: INTERNAL MEDICINE

## 2023-09-22 PROCEDURE — 99999 PR PBB SHADOW E&M-EST. PATIENT-LVL V: ICD-10-PCS | Mod: PBBFAC,HCNC,, | Performed by: INTERNAL MEDICINE

## 2023-09-22 PROCEDURE — 3079F PR MOST RECENT DIASTOLIC BLOOD PRESSURE 80-89 MM HG: ICD-10-PCS | Mod: HCNC,CPTII,S$GLB, | Performed by: INTERNAL MEDICINE

## 2023-09-22 PROCEDURE — 1157F PR ADVANCE CARE PLAN OR EQUIV PRESENT IN MEDICAL RECORD: ICD-10-PCS | Mod: HCNC,CPTII,S$GLB, | Performed by: INTERNAL MEDICINE

## 2023-09-22 PROCEDURE — 99214 PR OFFICE/OUTPT VISIT, EST, LEVL IV, 30-39 MIN: ICD-10-PCS | Mod: HCNC,S$GLB,, | Performed by: INTERNAL MEDICINE

## 2023-09-22 PROCEDURE — G0008 ADMIN INFLUENZA VIRUS VAC: HCPCS | Mod: HCNC,S$GLB,, | Performed by: INTERNAL MEDICINE

## 2023-09-22 PROCEDURE — 1126F PR PAIN SEVERITY QUANTIFIED, NO PAIN PRESENT: ICD-10-PCS | Mod: HCNC,CPTII,S$GLB, | Performed by: INTERNAL MEDICINE

## 2023-09-22 PROCEDURE — 3075F SYST BP GE 130 - 139MM HG: CPT | Mod: HCNC,CPTII,S$GLB, | Performed by: INTERNAL MEDICINE

## 2023-09-22 PROCEDURE — 3288F FALL RISK ASSESSMENT DOCD: CPT | Mod: HCNC,CPTII,S$GLB, | Performed by: INTERNAL MEDICINE

## 2023-09-22 PROCEDURE — 3079F DIAST BP 80-89 MM HG: CPT | Mod: HCNC,CPTII,S$GLB, | Performed by: INTERNAL MEDICINE

## 2023-09-22 PROCEDURE — 90694 FLU VACCINE - QUADRIVALENT - ADJUVANTED: ICD-10-PCS | Mod: HCNC,S$GLB,, | Performed by: INTERNAL MEDICINE

## 2023-09-22 PROCEDURE — 90694 VACC AIIV4 NO PRSRV 0.5ML IM: CPT | Mod: HCNC,S$GLB,, | Performed by: INTERNAL MEDICINE

## 2023-09-22 PROCEDURE — 1159F PR MEDICATION LIST DOCUMENTED IN MEDICAL RECORD: ICD-10-PCS | Mod: HCNC,CPTII,S$GLB, | Performed by: INTERNAL MEDICINE

## 2023-09-22 PROCEDURE — 1159F MED LIST DOCD IN RCRD: CPT | Mod: HCNC,CPTII,S$GLB, | Performed by: INTERNAL MEDICINE

## 2023-09-22 PROCEDURE — 99999 PR PBB SHADOW E&M-EST. PATIENT-LVL V: CPT | Mod: PBBFAC,HCNC,, | Performed by: INTERNAL MEDICINE

## 2023-09-22 PROCEDURE — 1160F PR REVIEW ALL MEDS BY PRESCRIBER/CLIN PHARMACIST DOCUMENTED: ICD-10-PCS | Mod: HCNC,CPTII,S$GLB, | Performed by: INTERNAL MEDICINE

## 2023-09-22 PROCEDURE — 1101F PT FALLS ASSESS-DOCD LE1/YR: CPT | Mod: HCNC,CPTII,S$GLB, | Performed by: INTERNAL MEDICINE

## 2023-09-22 PROCEDURE — 1126F AMNT PAIN NOTED NONE PRSNT: CPT | Mod: HCNC,CPTII,S$GLB, | Performed by: INTERNAL MEDICINE

## 2023-09-22 PROCEDURE — 3075F PR MOST RECENT SYSTOLIC BLOOD PRESS GE 130-139MM HG: ICD-10-PCS | Mod: HCNC,CPTII,S$GLB, | Performed by: INTERNAL MEDICINE

## 2023-09-22 PROCEDURE — 1101F PR PT FALLS ASSESS DOC 0-1 FALLS W/OUT INJ PAST YR: ICD-10-PCS | Mod: HCNC,CPTII,S$GLB, | Performed by: INTERNAL MEDICINE

## 2023-09-22 PROCEDURE — 99214 OFFICE O/P EST MOD 30 MIN: CPT | Mod: HCNC,S$GLB,, | Performed by: INTERNAL MEDICINE

## 2023-09-22 PROCEDURE — G0008 FLU VACCINE - QUADRIVALENT - ADJUVANTED: ICD-10-PCS | Mod: HCNC,S$GLB,, | Performed by: INTERNAL MEDICINE

## 2023-09-22 PROCEDURE — 1160F RVW MEDS BY RX/DR IN RCRD: CPT | Mod: HCNC,CPTII,S$GLB, | Performed by: INTERNAL MEDICINE

## 2023-09-22 PROCEDURE — 1157F ADVNC CARE PLAN IN RCRD: CPT | Mod: HCNC,CPTII,S$GLB, | Performed by: INTERNAL MEDICINE

## 2023-09-22 RX ORDER — BUSPIRONE HYDROCHLORIDE 15 MG/1
15 TABLET ORAL 2 TIMES DAILY
Qty: 90 TABLET | Refills: 1 | Status: SHIPPED | OUTPATIENT
Start: 2023-09-22

## 2023-09-22 RX ORDER — INSULIN GLARGINE 100 [IU]/ML
20 INJECTION, SOLUTION SUBCUTANEOUS NIGHTLY
Qty: 15 EACH | Refills: 3 | Status: SHIPPED | OUTPATIENT
Start: 2023-09-22 | End: 2024-09-21

## 2023-09-22 RX ORDER — BUSPIRONE HYDROCHLORIDE 15 MG/1
TABLET ORAL
COMMUNITY
Start: 2023-05-17 | End: 2023-09-22 | Stop reason: SDUPTHER

## 2023-09-22 NOTE — PATIENT INSTRUCTIONS
We were happy to see you today    For your Testing  Please have your labs and/or imaging test done  as previously schedule        For your Medication   Glad the lisinopril stopage fixed the cough  We can do the lantus 15-20 U and we will see the a1c in a couple of months   We can resume the buspar- maybe that will help with the pressure   For more information about side effects please visit medlineplus.gov        For your Referrals  ent      For your Vaccinations  We gave your the following vaccines  High dose flu shot   Please obtain the following vaccines at the pharmacy          Please return to clinic in    Follow up for AS PREVIOUSLY scheduled, HIGH dose flu, Referral to.        Extra resources

## 2023-09-22 NOTE — PROGRESS NOTES
Assessment:         1. Uncontrolled type 2 diabetes mellitus with hyperglycemia    2. 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 Hypertension associated with diabetes    4. Hyperlipidemia associated with type 2 diabetes mellitus    5. 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 Need for vaccination    7. Hearing loss, unspecified hearing loss type, unspecified laterality    8. Chronic right ear pain          Plan:           Noris was seen today for follow-up.    Diagnoses and all orders for this visit:    Uncontrolled type 2 diabetes mellitus with hyperglycemia  Chronic  Uncontrolled  Patient is not at goal today  I have reviewed lifestyle modification to achieve/maintain goals  We will adjust the current medication regimen to  as below  Patient will follow up in 4 weeks  -     LANTUS SOLOSTAR U-100 INSULIN glargine 100 units/mL SubQ pen; Inject 20 Units into the skin every evening.    Mild late onset Alzheimer's dementia with other behavioral disturbance  -     busPIRone (BUSPAR) 15 MG tablet; Take 1 tablet (15 mg total) by mouth 2 (two) times daily.    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   -- I provided the patient a VIS sheet on the vaccine.   -     Influenza - Quadrivalent (Adjuvanted)    Hearing loss, unspecified hearing loss type, unspecified laterality  -     Ambulatory referral/consult to Audiology; Future  -     Ambulatory referral/consult to ENT; Future    Chronic right ear pain  -     Ambulatory referral/consult to Audiology; Future  -     Ambulatory referral/consult to ENT; Future              Subjective:       Patient ID: Noris Demarco is a 77 y.o. female.    Chief Complaint:  "Follow-up        Interim Hx  Seen by retinal specialist    Concerns today    Chronic problems     Diabetes  She presents for her follow-up diabetic visit. She has type 2 diabetes mellitus. Hypoglycemia complications include nocturnal hypoglycemia. Current diabetic treatment includes insulin injections. An ACE inhibitor/angiotensin II receptor blocker is being taken. She sees a podiatrist.Eye exam is current.       Review of Systems          Health Maintenance Due   Topic Date Due    TETANUS VACCINE  Never done    Shingles Vaccine (1 of 2) Never done    COVID-19 Vaccine (4 - Pfizer series) 03/18/2022         Objective:     /80 (BP Location: Right arm, Patient Position: Sitting, BP Method: Small (Manual))   Pulse 60   Ht 5' 4" (1.626 m)   Wt 69.2 kg (152 lb 8.9 oz)   LMP 08/13/1999 (Approximate)   SpO2 99%   BMI 26.19 kg/m²         9/22/2023     3:06 PM 8/23/2023     9:07 AM 6/21/2023     9:48 AM 5/19/2023     8:45 AM 4/18/2023     9:19 AM   Vitals   Height 5' 4" (1.626 m) 5' 4" (1.626 m)  5' 4" (1.626 m)    Weight (lbs) 152.56 149.91 145 143.3 141.76   BMI (kg/m2) 26.2 25.7  24.6                 Physical Exam  Vitals and nursing note reviewed.   Constitutional:       General: She is not in acute distress.     Appearance: She is well-developed. She is not diaphoretic.   HENT:      Head: Normocephalic.      Nose: Nose normal.   Eyes:      General:         Right eye: No discharge.         Left eye: No discharge.      Conjunctiva/sclera: Conjunctivae normal.      Pupils: Pupils are equal, round, and reactive to light.   Cardiovascular:      Rate and Rhythm: Normal rate and regular rhythm.      Heart sounds: Normal heart sounds. No murmur heard.     No friction rub. No gallop.   Pulmonary:      Effort: Pulmonary effort is normal. No respiratory distress.   Abdominal:      General: Bowel sounds are normal. There is no distension.      Palpations: Abdomen is soft.   Musculoskeletal:         General: No " deformity. Normal range of motion.      Cervical back: Normal range of motion.   Skin:     General: Skin is warm.   Neurological:      Mental Status: She is alert and oriented to person, place, and time.      Cranial Nerves: No cranial nerve deficit.           No results found for this or any previous visit (from the past 336 hour(s)).      ASCVD  The 10-year ASCVD risk score (Florence SANCHEZ, et al., 2019) is: 49.4%    Values used to calculate the score:      Age: 77 years      Sex: Female      Is Non- : Yes      Diabetic: Yes      Tobacco smoker: No      Systolic Blood Pressure: 132 mmHg      Is BP treated: Yes      HDL Cholesterol: 98 mg/dL      Total Cholesterol: 203 mg/dL    Basic Labs    BMP  Lab Results   Component Value Date     05/03/2023    K 4.2 05/03/2023     05/03/2023    CO2 29 05/03/2023    BUN 14 05/03/2023    CREATININE 0.56 05/03/2023    CALCIUM 9.4 05/03/2023    ANIONGAP 4 (L) 05/03/2023    ESTGFRAFRICA >60.0 07/12/2022    EGFRNONAA >60.0 07/12/2022     Lab Results   Component Value Date    EGFRNORACEVR >60.0 05/03/2023       Lab Results   Component Value Date    ALT 19 05/03/2023    AST 30 05/03/2023    ALKPHOS 78 05/03/2023    BILITOT 0.5 05/03/2023         Lab Results   Component Value Date    TSH 3.120 08/21/2023     Lab Results   Component Value Date    WBC 4.25 05/03/2023    HGB 12.8 05/03/2023    HCT 37.6 05/03/2023    MCV 98 05/03/2023     05/03/2023             Lipids  Lab Results   Component Value Date    CHOL 203 (H) 05/03/2023     Lab Results   Component Value Date    HDL 98 (H) 05/03/2023     Lab Results   Component Value Date    LDLCALC 96.4 05/03/2023     Lab Results   Component Value Date    TRIG 43 05/03/2023     Lab Results   Component Value Date    CHOLHDL 48.3 05/03/2023       DM  Lab Results   Component Value Date    HGBA1C 9.0 (H) 08/21/2023         Future Appointments   Date Time Provider Department Center   10/19/2023  9:30 AM LAB, RIVER  "[place] [facility]   10/20/2023  9:30 AM Cecilia Chandra NP [facility] HEM ONC [facility]   10/26/2023 10:20 AM ETHAN Remy MD [facility] [place]   12/15/2023  9:20 AM LAB, RIVER [place] [facility]   12/15/2023  9:40 AM LAB, SPECIMEN [facility] SPECLAB [facility]   2023  2:00 PM Abram Turner III, MD [facility]         Medication List with Changes/Refills   Current Medications    AMLODIPINE (NORVASC) 10 MG TABLET    TAKE 1 TABLET ONE TIME DAILY    ASPIRIN (ECOTRIN) 81 MG EC TABLET    Take 81 mg by mouth once daily.    BD ULTRA-FINE IRWIN PEN NEEDLE 32 GAUGE X 5/32" NDLE    3 TIMES A DAY    BD ULTRA-FINE SHORT PEN NEEDLE 31 GAUGE X 5/16" NDLE    SMARTSI Each SUB-Q Daily    BLOOD-GLUCOSE METER,CONTINUOUS (DEXCOM G6 ) MISC    1 each by Misc.(Non-Drug; Combo Route) route once daily at 6am.    BLOOD-GLUCOSE METER,CONTINUOUS (DEXCOM G6 ) MISC    1 each by Misc.(Non-Drug; Combo Route) route once daily.    BLOOD-GLUCOSE SENSOR (DEXCOM G6 SENSOR) LEONOR    1 each by Misc.(Non-Drug; Combo Route) route once daily.    BLOOD-GLUCOSE TRANSMITTER (DEXCOM G6 TRANSMITTER) LEONOR    1 each by Misc.(Non-Drug; Combo Route) route once daily.    BUSPIRONE (BUSPAR) 15 MG TABLET    Take 15 mg by mouth once daily.    CHOLECALCIFEROL, VITAMIN D3, (VITAMIN D3) 50 MCG (2,000 UNIT) CAP    Take 1 capsule by mouth once daily.    CLONIDINE (CATAPRES) 0.2 MG TABLET    TAKE 1 TABLET TWICE DAILY    COENZYME Q10 100 MG CAPSULE    Take 100 mg by mouth once daily.    CYANOCOBALAMIN 500 MCG TABLET    Take 500 mcg by mouth once daily.    CYPROHEPTADINE (PERIACTIN) 4 MG TABLET    Take 1 tablet (4 mg total) by mouth 2 (two) times a day.    FISH OIL-OMEGA-3 FATTY ACIDS 300-1,000 MG CAPSULE    Take 1 g by mouth once daily.    FUROSEMIDE (LASIX) 40 MG TABLET    Take 40 mg by mouth once daily.     GLUCOSE 4 GM CHEWABLE TABLET    Take 4 tablets (16 g total) by mouth as needed for Low blood sugar (If " having symptoms of blurry vision, palpitations, confusion, shakiness.  Please check sugars and if sugar below 70 please take 4 tablets and re-check sugar everry 15 minutes until sugars are above 70 and symptoms resolve.).    INSULIN ASPART U-100 (NOVOLOG FLEXPEN U-100 INSULIN) 100 UNIT/ML (3 ML) INPN PEN    Inject 8 Units into the skin 3 (three) times daily with meals.    LOSARTAN (COZAAR) 100 MG TABLET    Take 1 tablet (100 mg total) by mouth once daily.    OFLOXACIN (FLOXIN) 0.3 % OTIC SOLUTION    Place 5 drops into the right ear once daily.    TAMOXIFEN (NOLVADEX) 20 MG TAB    Take 1 tablet (20 mg total) by mouth once daily.    TRUE METRIX GLUCOSE TEST STRIP STRP    1 strip by In Vitro route every morning.   Changed and/or Refilled Medications    Modified Medication Previous Medication    BUSPIRONE (BUSPAR) 15 MG TABLET busPIRone (BUSPAR) 15 MG tablet       Take 1 tablet (15 mg total) by mouth 2 (two) times daily.        LANTUS SOLOSTAR U-100 INSULIN GLARGINE 100 UNITS/ML SUBQ PEN LANTUS SOLOSTAR U-100 INSULIN glargine 100 units/mL SubQ pen       Inject 20 Units into the skin every evening.    Inject 28 Units into the skin every evening.         Disclaimer:  This note has been generated using voice-recognition software. There may be grammatical or spelling errors that have been missed during proof-reading

## 2023-09-27 DIAGNOSIS — E11.59 HYPERTENSION ASSOCIATED WITH DIABETES: ICD-10-CM

## 2023-09-27 DIAGNOSIS — H60.90 OTITIS EXTERNA, UNSPECIFIED CHRONICITY, UNSPECIFIED LATERALITY, UNSPECIFIED TYPE: ICD-10-CM

## 2023-09-27 DIAGNOSIS — I15.2 HYPERTENSION ASSOCIATED WITH DIABETES: ICD-10-CM

## 2023-09-27 RX ORDER — LOSARTAN POTASSIUM 100 MG/1
100 TABLET ORAL DAILY
Qty: 90 TABLET | Refills: 3 | Status: SHIPPED | OUTPATIENT
Start: 2023-09-27 | End: 2024-09-26

## 2023-09-27 RX ORDER — OFLOXACIN 3 MG/ML
5 SOLUTION AURICULAR (OTIC) DAILY
Qty: 10 ML | Refills: 0 | Status: SHIPPED | OUTPATIENT
Start: 2023-09-27 | End: 2023-10-25

## 2023-09-27 NOTE — TELEPHONE ENCOUNTER
Refill Routing Note   Medication(s) are not appropriate for processing by [facility] for the following reason(s):      Medication outside of protocol  New or recently adjusted medication    [facility] action(s):  Defer  Route Care Due:  None identified            Appointments  past 12m or future 3m with PCP    Date Provider   Last Visit   9/22/2023 Abram Turner III, MD   Next Visit   12/19/2023 Abram Turner III, MD   ED visits in past 90 days: 0        Note composed:11:02 AM 09/27/2023

## 2023-09-27 NOTE — TELEPHONE ENCOUNTER
No care due was identified.  [facility] Embedded Care Due Messages. Reference number: [id number].   9/27/2023 9:49:14 AM CDT

## 2023-09-29 ENCOUNTER — TELEPHONE (OUTPATIENT)
Dept: FAMILY MEDICINE | Facility: CLINIC | Age: 78
End: 2023-09-29
Payer: MEDICARE

## 2023-09-29 RX ORDER — PEN NEEDLE, DIABETIC 30 GX3/16"
NEEDLE, DISPOSABLE MISCELLANEOUS
Qty: 300 EACH | Refills: 0 | OUTPATIENT
Start: 2023-09-29

## 2023-09-29 NOTE — TELEPHONE ENCOUNTER
No care due was identified.  [facility] Embedded Care Due Messages. Reference number: [id number].   9/29/2023 1:44:28 PM CDT

## 2023-09-29 NOTE — TELEPHONE ENCOUNTER
Refill Decision Note   Noris Demarco  is requesting a refill authorization.  Brief Assessment and Rationale for Refill:  Quick Discontinue     Medication Therapy Plan:         Comments:     Note composed:2:01 PM 09/29/2023

## 2023-10-19 ENCOUNTER — LAB VISIT (OUTPATIENT)
Dept: LAB | Facility: HOSPITAL | Age: 78
End: 2023-10-19
Attending: NURSE PRACTITIONER
Payer: MEDICARE

## 2023-10-19 DIAGNOSIS — Z79.4 TYPE 2 DIABETES MELLITUS WITH HYPERGLYCEMIA, WITH LONG-TERM CURRENT USE OF INSULIN: ICD-10-CM

## 2023-10-19 DIAGNOSIS — D05.11 DUCTAL CARCINOMA IN SITU (DCIS) OF RIGHT BREAST: ICD-10-CM

## 2023-10-19 DIAGNOSIS — E11.59 HYPERTENSION ASSOCIATED WITH DIABETES: ICD-10-CM

## 2023-10-19 DIAGNOSIS — E11.65 TYPE 2 DIABETES MELLITUS WITH HYPERGLYCEMIA, WITH LONG-TERM CURRENT USE OF INSULIN: ICD-10-CM

## 2023-10-19 DIAGNOSIS — I15.2 HYPERTENSION ASSOCIATED WITH DIABETES: ICD-10-CM

## 2023-10-19 LAB
ALBUMIN SERPL BCP-MCNC: 3.7 G/DL (ref 3.5–5.2)
ALP SERPL-CCNC: 80 U/L (ref 38–126)
ALT SERPL W/O P-5'-P-CCNC: 15 U/L (ref 10–44)
ANION GAP SERPL CALC-SCNC: 9 MMOL/L (ref 8–16)
AST SERPL-CCNC: 28 U/L (ref 15–46)
BASOPHILS # BLD AUTO: 0.06 K/UL (ref 0–0.2)
BASOPHILS NFR BLD: 1.3 % (ref 0–1.9)
BILIRUB SERPL-MCNC: 0.5 MG/DL (ref 0.1–1)
CALCIUM SERPL-MCNC: 9.7 MG/DL (ref 8.7–10.5)
CHLORIDE SERPL-SCNC: 111 MMOL/L (ref 95–110)
CO2 SERPL-SCNC: 24 MMOL/L (ref 23–29)
CREAT SERPL-MCNC: 0.79 MG/DL (ref 0.5–1.4)
DIFFERENTIAL METHOD: ABNORMAL
EOSINOPHIL # BLD AUTO: 0.3 K/UL (ref 0–0.5)
EOSINOPHIL NFR BLD: 5.7 % (ref 0–8)
ERYTHROCYTE [DISTWIDTH] IN BLOOD BY AUTOMATED COUNT: 12.8 % (ref 11.5–14.5)
EST. GFR  (NO RACE VARIABLE): >60 ML/MIN/1.73 M^2
GLUCOSE SERPL-MCNC: 266 MG/DL (ref 70–110)
HCT VFR BLD AUTO: 38 % (ref 37–48.5)
HGB BLD-MCNC: 12.5 G/DL (ref 12–16)
IMM GRANULOCYTES # BLD AUTO: 0.01 K/UL (ref 0–0.04)
IMM GRANULOCYTES NFR BLD AUTO: 0.2 % (ref 0–0.5)
LYMPHOCYTES # BLD AUTO: 1.2 K/UL (ref 1–4.8)
LYMPHOCYTES NFR BLD: 25.7 % (ref 18–48)
MCH RBC QN AUTO: 33.5 PG (ref 27–31)
MCHC RBC AUTO-ENTMCNC: 32.9 G/DL (ref 32–36)
MCV RBC AUTO: 102 FL (ref 82–98)
MONOCYTES # BLD AUTO: 0.4 K/UL (ref 0.3–1)
MONOCYTES NFR BLD: 7.8 % (ref 4–15)
NEUTROPHILS # BLD AUTO: 2.7 K/UL (ref 1.8–7.7)
NEUTROPHILS NFR BLD: 59.3 % (ref 38–73)
NRBC BLD-RTO: 0 /100 WBC
PLATELET # BLD AUTO: 273 K/UL (ref 150–450)
PMV BLD AUTO: 10.3 FL (ref 9.2–12.9)
POTASSIUM SERPL-SCNC: 4.3 MMOL/L (ref 3.5–5.1)
PROT SERPL-MCNC: 7 G/DL (ref 6–8.4)
RBC # BLD AUTO: 3.73 M/UL (ref 4–5.4)
SODIUM SERPL-SCNC: 144 MMOL/L (ref 136–145)
UUN UR-MCNC: 19 MG/DL (ref 7–17)
WBC # BLD AUTO: 4.59 K/UL (ref 3.9–12.7)

## 2023-10-19 PROCEDURE — 36415 COLL VENOUS BLD VENIPUNCTURE: CPT | Mod: HCNC,PN | Performed by: NURSE PRACTITIONER

## 2023-10-19 PROCEDURE — 85025 COMPLETE CBC W/AUTO DIFF WBC: CPT | Mod: HCNC,PN | Performed by: NURSE PRACTITIONER

## 2023-10-19 PROCEDURE — 80053 COMPREHEN METABOLIC PANEL: CPT | Mod: HCNC,PN | Performed by: NURSE PRACTITIONER

## 2023-10-20 ENCOUNTER — OFFICE VISIT (OUTPATIENT)
Dept: HEMATOLOGY/ONCOLOGY | Facility: CLINIC | Age: 78
End: 2023-10-20
Payer: MEDICARE

## 2023-10-20 VITALS
DIASTOLIC BLOOD PRESSURE: 84 MMHG | RESPIRATION RATE: 20 BRPM | BODY MASS INDEX: 25.9 KG/M2 | OXYGEN SATURATION: 98 % | TEMPERATURE: 98 F | HEIGHT: 64 IN | WEIGHT: 151.69 LBS | HEART RATE: 73 BPM | SYSTOLIC BLOOD PRESSURE: 188 MMHG

## 2023-10-20 DIAGNOSIS — D05.11 DUCTAL CARCINOMA IN SITU (DCIS) OF RIGHT BREAST: Primary | ICD-10-CM

## 2023-10-20 DIAGNOSIS — C50.912 INFILTRATING DUCTAL CARCINOMA OF BREAST, LEFT: ICD-10-CM

## 2023-10-20 DIAGNOSIS — N64.89 SEROMA OF BREAST: ICD-10-CM

## 2023-10-20 PROCEDURE — 99214 PR OFFICE/OUTPT VISIT, EST, LEVL IV, 30-39 MIN: ICD-10-PCS | Mod: HCNC,S$GLB,, | Performed by: NURSE PRACTITIONER

## 2023-10-20 PROCEDURE — 99214 OFFICE O/P EST MOD 30 MIN: CPT | Mod: HCNC,S$GLB,, | Performed by: NURSE PRACTITIONER

## 2023-10-20 PROCEDURE — 3079F PR MOST RECENT DIASTOLIC BLOOD PRESSURE 80-89 MM HG: ICD-10-PCS | Mod: HCNC,CPTII,S$GLB, | Performed by: NURSE PRACTITIONER

## 2023-10-20 PROCEDURE — 99999 PR PBB SHADOW E&M-EST. PATIENT-LVL V: ICD-10-PCS | Mod: PBBFAC,HCNC,, | Performed by: NURSE PRACTITIONER

## 2023-10-20 PROCEDURE — 3077F SYST BP >= 140 MM HG: CPT | Mod: HCNC,CPTII,S$GLB, | Performed by: NURSE PRACTITIONER

## 2023-10-20 PROCEDURE — 1101F PR PT FALLS ASSESS DOC 0-1 FALLS W/OUT INJ PAST YR: ICD-10-PCS | Mod: HCNC,CPTII,S$GLB, | Performed by: NURSE PRACTITIONER

## 2023-10-20 PROCEDURE — 1160F RVW MEDS BY RX/DR IN RCRD: CPT | Mod: HCNC,CPTII,S$GLB, | Performed by: NURSE PRACTITIONER

## 2023-10-20 PROCEDURE — 3077F PR MOST RECENT SYSTOLIC BLOOD PRESSURE >= 140 MM HG: ICD-10-PCS | Mod: HCNC,CPTII,S$GLB, | Performed by: NURSE PRACTITIONER

## 2023-10-20 PROCEDURE — 1159F PR MEDICATION LIST DOCUMENTED IN MEDICAL RECORD: ICD-10-PCS | Mod: HCNC,CPTII,S$GLB, | Performed by: NURSE PRACTITIONER

## 2023-10-20 PROCEDURE — 1157F ADVNC CARE PLAN IN RCRD: CPT | Mod: HCNC,CPTII,S$GLB, | Performed by: NURSE PRACTITIONER

## 2023-10-20 PROCEDURE — 1160F PR REVIEW ALL MEDS BY PRESCRIBER/CLIN PHARMACIST DOCUMENTED: ICD-10-PCS | Mod: HCNC,CPTII,S$GLB, | Performed by: NURSE PRACTITIONER

## 2023-10-20 PROCEDURE — 3288F PR FALLS RISK ASSESSMENT DOCUMENTED: ICD-10-PCS | Mod: HCNC,CPTII,S$GLB, | Performed by: NURSE PRACTITIONER

## 2023-10-20 PROCEDURE — 3288F FALL RISK ASSESSMENT DOCD: CPT | Mod: HCNC,CPTII,S$GLB, | Performed by: NURSE PRACTITIONER

## 2023-10-20 PROCEDURE — 3079F DIAST BP 80-89 MM HG: CPT | Mod: HCNC,CPTII,S$GLB, | Performed by: NURSE PRACTITIONER

## 2023-10-20 PROCEDURE — 99999 PR PBB SHADOW E&M-EST. PATIENT-LVL V: CPT | Mod: PBBFAC,HCNC,, | Performed by: NURSE PRACTITIONER

## 2023-10-20 PROCEDURE — 1126F AMNT PAIN NOTED NONE PRSNT: CPT | Mod: HCNC,CPTII,S$GLB, | Performed by: NURSE PRACTITIONER

## 2023-10-20 PROCEDURE — 1126F PR PAIN SEVERITY QUANTIFIED, NO PAIN PRESENT: ICD-10-PCS | Mod: HCNC,CPTII,S$GLB, | Performed by: NURSE PRACTITIONER

## 2023-10-20 PROCEDURE — 1101F PT FALLS ASSESS-DOCD LE1/YR: CPT | Mod: HCNC,CPTII,S$GLB, | Performed by: NURSE PRACTITIONER

## 2023-10-20 PROCEDURE — 1157F PR ADVANCE CARE PLAN OR EQUIV PRESENT IN MEDICAL RECORD: ICD-10-PCS | Mod: HCNC,CPTII,S$GLB, | Performed by: NURSE PRACTITIONER

## 2023-10-20 PROCEDURE — 1159F MED LIST DOCD IN RCRD: CPT | Mod: HCNC,CPTII,S$GLB, | Performed by: NURSE PRACTITIONER

## 2023-10-20 NOTE — PROGRESS NOTES
Subjective:       Patient ID: Noris Demarco is a 77 y.o. female.    Chief Complaint:  Breast cancer    Follow-up  Pertinent negatives include no abdominal pain, chest pain, fatigue, fever, rash or weakness.       HPI as per Dr Peralta's 1/18/22 office visit notes:  She underwent a lumpectomy in Jan 2008 for a 5-mm Right Breast DCIS that was found on a routine screening mammogram, completed adjuvant RT in July 2008. She was started on Arimidex at [facility] following radiation treatment and she was intolerant to it because of debilitating and disabling joint pains, she hence was switched to tamoxifen in 12/2008 and completed it in Dec 2013.    She was then diagnosed with infiltrating ductal carcinoma of the lower outer quadrant of the left breast on routine screening mammogram done in May 2018.  Lumpectomy with SLN biopsy was done in June 2018 and a 1.3 cm grade 2 ER positive 95%, FL positive 5% and HER2 Luther negative, Ki-67 30% was noted. Five lymph nodes were removed from the axilla and they were all negative.  Oncotype score came back at 40, she declined adjuvant chemotherapy.  She completed adjuvant radiation therapy under the care of Dr. Chavez on 09/27/2018.    She has a golf ball-sized seroma in the left breast that is hard to palpation but is not painful.    She was intolerant to Arimidex before.  So she was given a prescription for Aromasin, she did not take it secondary to fear of arthralgias.  Hence tamoxifen was started on 10/08/2018.    INTERVAL HISTORY:   -she is here for follow-up of history of DCIS of the right breast and invasive breast cancer of the left breast, accompanied by family friend as  here but at an appt for himself  -she has been on tamoxifen since October 2018  -she has a hard palpable seroma in the left breast, nonpainful, states she really does not feel it anymore at this time. Denies any further concerns of  breast lumps, lesions, drainage, pain, or lymph node enlargement.  -weight  "gain now from 141# to 151# since 6 month ago visit, daughter does not think she is still taking periactin but will verify and hold for now   -no further confusion episodes  -walks daily in neighborhood still  -still doing a little of home , daughter states it has dwindled down and pt stays busy helping take care of granddaughter  -feels good overall, she denies chest pain, sob, abdominal pain, n/v/d, constipation, hematemesis, melena, hematuria.  -working with pcp for better diabetes control  -pt has missed her last 2 scheduled mammograms    Review of Systems   Constitutional:  Negative for appetite change, fatigue, fever and unexpected weight change.   Respiratory:  Negative for shortness of breath.    Cardiovascular:  Negative for chest pain.   Gastrointestinal:  Negative for abdominal pain.   Skin:  Negative for color change, rash and wound.   Neurological:  Negative for weakness.   Hematological:  Negative for adenopathy. Does not bruise/bleed easily.         Objective:      Vitals:    10/20/23 0935   BP: (!) 188/84   BP Location: Left arm   Patient Position: Sitting   BP Method: Medium (Automatic)   Pulse: 73   Resp: 20   Temp: 97.7 °F (36.5 °C)   TempSrc: Oral   SpO2: 98%   Weight: 68.8 kg (151 lb 10.8 oz)   Height: 5' 4" (1.626 m)         Body mass index is 26.04 kg/m².    Physical Exam  Constitutional:       General: She is not in acute distress.     Appearance: Normal appearance. She is not toxic-appearing.   HENT:      Head: Normocephalic and atraumatic.      Nose: Nose normal.      Mouth/Throat:      Mouth: Mucous membranes are moist.      Pharynx: Oropharynx is clear.   Eyes:      General: No scleral icterus.     Conjunctiva/sclera: Conjunctivae normal.   Cardiovascular:      Rate and Rhythm: Normal rate.      Heart sounds: Normal heart sounds.   Pulmonary:      Effort: Pulmonary effort is normal. No respiratory distress.      Breath sounds: Normal breath sounds.   Chest:   Breasts:     Right: No " swelling, bleeding, inverted nipple, mass, nipple discharge, skin change or tenderness.      Left: No swelling, bleeding, inverted nipple, mass, nipple discharge, skin change or tenderness.      Comments: Bilat breasts nontender, no notable skin changes or nipple drainage, no associated lymphadnopathy  Musculoskeletal:         General: No swelling or tenderness. Normal range of motion.      Cervical back: Normal range of motion and neck supple.   Lymphadenopathy:      Cervical:      Right cervical: No superficial or deep cervical adenopathy.     Left cervical: No superficial or deep cervical adenopathy.      Upper Body:      Right upper body: No supraclavicular, axillary or pectoral adenopathy.      Left upper body: No supraclavicular, axillary or pectoral adenopathy.   Skin:     General: Skin is warm and dry.      Coloration: Skin is not jaundiced or pale.   Neurological:      Mental Status: She is alert and oriented to person, place, and time.      Gait: Gait normal.   Psychiatric:         Mood and Affect: Mood normal.         Behavior: Behavior normal.         Thought Content: Thought content normal.         Judgment: Judgment normal.       ECOG SCORE    0 - Fully active-able to carry on all pre-disease performance without restriction             LABS    Lab Results   Component Value Date    WBC 4.59 10/19/2023    HGB 12.5 10/19/2023    HCT 38.0 10/19/2023     (H) 10/19/2023     10/19/2023     IMAGING    DXA Bone Density 10/5/22 (reviewed by this provider)  Impression:  -The T score associated with the lumbar spine is 0.2 on the current examination.  It was 1.0 on the prior examination.  The T score associated with the left femoral neck is 0.1 on the current examination.  It was 0 on the prior examination.  The T score associated with the right femoral neck is -0.2 on the current examination.  It was 0.1 on the prior examination.  -Normal study     Assessment:       1. Ductal carcinoma in situ  (DCIS) of right breast    2. Seroma of breast    3. Infiltrating ductal carcinoma of breast, left              Plan:     Past records reviewed including clinic visits, imaging, labs, medications.     Unintentional weight loss  -her weight has now increased from 141# to 151# over past 6 months  -reviewed CT chest/abdomen/pelvis done 01/05/2022 as well as reviewed MRI brain done 01/05/2022 which do not reveal any evidence of malignancy  -since the weight has increased and pt is feeling better with included daily walks around neighborhood, will continue to monitor  -daughter will check home meds to see if pt has been taking periactin, and if taking, will hold as pt working on diabetes management  -continue to monitor    DCIS right breast  -diagnosed in 2008  -took tamoxifen for 5 years until December 2013    Stage I T1c N0 infiltrating ductal carcinoma of the left breast  -status post lumpectomy and radiation therapy  -on tamoxifen since October 8, 2018  -labs CBC, chemistries and vitamin-D reviewed, updated 10/19/23  -continue tamoxifen until October 2023  -Dexa completed 10/5/22, normal study, due again Oct 2024  -next bilateral screening mammogram was due December 2022, missed  appts 12/13/22 and 4/25/23, will reschedule for next available  - reschedule MMG   - currently will continue tamoxifen until MMG updated    Seroma left breast  -has hard to palpate seroma, she denies any new concerns at this time  -will continue to monitor    Vitamin-D deficiency  -vitamin-D level reviewed  -cont otc vit D3, 2000 international units daily    HTN  - 188/84 today, no associated symptoms  - clonidine will be given daughter states when gets back home, didn't give before appt due to sleepiness as SE of med  - management deferred to pcp    Diabetes with hyperglycemia  - A1c 2/27/23 at 9.7%, improved to 9% on 8/21/23, elevated gluc on 10/19/23 labs 266 mg/dL  -  and daughter check blood sugars for pt at home  - management  deferred to pcp    RTC 6 months with labs, cbc, cmp and MMG completed asap             Cecilia Chandra, NP-C  [facility]  Hematology/Oncology  [address] [address]  [place]  [id number] [phone]

## 2023-10-25 DIAGNOSIS — H60.90 OTITIS EXTERNA, UNSPECIFIED CHRONICITY, UNSPECIFIED LATERALITY, UNSPECIFIED TYPE: ICD-10-CM

## 2023-10-25 RX ORDER — OFLOXACIN 3 MG/ML
SOLUTION AURICULAR (OTIC)
Qty: 10 ML | Refills: 10 | Status: SHIPPED | OUTPATIENT
Start: 2023-10-25

## 2023-10-25 NOTE — TELEPHONE ENCOUNTER
Refill Routing Note   Medication(s) are not appropriate for processing by [facility] for the following reason(s):      Medication outside of protocol    [facility] action(s):  Route Care Due:  None identified            Appointments  past 12m or future 3m with PCP    Date Provider   Last Visit   9/22/2023 Abram Turner III, MD   Next Visit   12/19/2023 Abram Turner III, MD   ED visits in past 90 days: 0        Note composed:3:03 PM 10/25/2023

## 2023-11-17 ENCOUNTER — HOSPITAL ENCOUNTER (OUTPATIENT)
Dept: RADIOLOGY | Facility: HOSPITAL | Age: 78
Discharge: HOME | End: 2023-11-17
Attending: NURSE PRACTITIONER
Payer: MEDICARE

## 2023-11-17 DIAGNOSIS — D05.11 DUCTAL CARCINOMA IN SITU (DCIS) OF RIGHT BREAST: ICD-10-CM

## 2023-11-17 DIAGNOSIS — N64.89 SEROMA OF BREAST: ICD-10-CM

## 2023-11-17 DIAGNOSIS — C50.912 INFILTRATING DUCTAL CARCINOMA OF BREAST, LEFT: ICD-10-CM

## 2023-11-17 PROCEDURE — 77062 BREAST TOMOSYNTHESIS BI: CPT | Mod: 26,HCNC,, | Performed by: RADIOLOGY

## 2023-11-17 PROCEDURE — 77066 MAMMO DIGITAL DIAGNOSTIC BILAT WITH TOMO: ICD-10-PCS | Mod: 26,HCNC,, | Performed by: RADIOLOGY

## 2023-11-17 PROCEDURE — 77066 DX MAMMO INCL CAD BI: CPT | Mod: 26,HCNC,, | Performed by: RADIOLOGY

## 2023-11-17 PROCEDURE — 77062 MAMMO DIGITAL DIAGNOSTIC BILAT WITH TOMO: ICD-10-PCS | Mod: 26,HCNC,, | Performed by: RADIOLOGY

## 2023-11-17 PROCEDURE — 77066 DX MAMMO INCL CAD BI: CPT | Mod: TC,HCNC,PN

## 2023-11-21 ENCOUNTER — OFFICE VISIT (OUTPATIENT)
Dept: OTOLARYNGOLOGY | Facility: CLINIC | Age: 78
End: 2023-11-21
Payer: MEDICARE

## 2023-11-21 VITALS — HEIGHT: 64 IN | BODY MASS INDEX: 25.56 KG/M2 | WEIGHT: 149.69 LBS

## 2023-11-21 DIAGNOSIS — M26.629 TMJPDS (TEMPOROMANDIBULAR JOINT PAIN DYSFUNCTION SYNDROME): Primary | ICD-10-CM

## 2023-11-21 DIAGNOSIS — H91.90 HEARING LOSS, UNSPECIFIED HEARING LOSS TYPE, UNSPECIFIED LATERALITY: ICD-10-CM

## 2023-11-21 DIAGNOSIS — G89.29 CHRONIC RIGHT EAR PAIN: ICD-10-CM

## 2023-11-21 DIAGNOSIS — H92.01 CHRONIC RIGHT EAR PAIN: ICD-10-CM

## 2023-11-21 PROCEDURE — 99203 OFFICE O/P NEW LOW 30 MIN: CPT | Mod: HCNC,S$GLB,, | Performed by: OTOLARYNGOLOGY

## 2023-11-21 PROCEDURE — 1159F PR MEDICATION LIST DOCUMENTED IN MEDICAL RECORD: ICD-10-PCS | Mod: HCNC,CPTII,S$GLB, | Performed by: OTOLARYNGOLOGY

## 2023-11-21 PROCEDURE — 99203 PR OFFICE/OUTPT VISIT, NEW, LEVL III, 30-44 MIN: ICD-10-PCS | Mod: HCNC,S$GLB,, | Performed by: OTOLARYNGOLOGY

## 2023-11-21 PROCEDURE — 1101F PR PT FALLS ASSESS DOC 0-1 FALLS W/OUT INJ PAST YR: ICD-10-PCS | Mod: HCNC,CPTII,S$GLB, | Performed by: OTOLARYNGOLOGY

## 2023-11-21 PROCEDURE — 1157F PR ADVANCE CARE PLAN OR EQUIV PRESENT IN MEDICAL RECORD: ICD-10-PCS | Mod: HCNC,CPTII,S$GLB, | Performed by: OTOLARYNGOLOGY

## 2023-11-21 PROCEDURE — 3288F FALL RISK ASSESSMENT DOCD: CPT | Mod: HCNC,CPTII,S$GLB, | Performed by: OTOLARYNGOLOGY

## 2023-11-21 PROCEDURE — 1101F PT FALLS ASSESS-DOCD LE1/YR: CPT | Mod: HCNC,CPTII,S$GLB, | Performed by: OTOLARYNGOLOGY

## 2023-11-21 PROCEDURE — 99999 PR PBB SHADOW E&M-EST. PATIENT-LVL IV: CPT | Mod: PBBFAC,HCNC,, | Performed by: OTOLARYNGOLOGY

## 2023-11-21 PROCEDURE — 1157F ADVNC CARE PLAN IN RCRD: CPT | Mod: HCNC,CPTII,S$GLB, | Performed by: OTOLARYNGOLOGY

## 2023-11-21 PROCEDURE — 99999 PR PBB SHADOW E&M-EST. PATIENT-LVL IV: ICD-10-PCS | Mod: PBBFAC,HCNC,, | Performed by: OTOLARYNGOLOGY

## 2023-11-21 PROCEDURE — 3288F PR FALLS RISK ASSESSMENT DOCUMENTED: ICD-10-PCS | Mod: HCNC,CPTII,S$GLB, | Performed by: OTOLARYNGOLOGY

## 2023-11-21 PROCEDURE — 1159F MED LIST DOCD IN RCRD: CPT | Mod: HCNC,CPTII,S$GLB, | Performed by: OTOLARYNGOLOGY

## 2023-11-21 NOTE — PROGRESS NOTES
"Referring Provider:    Abram Turner Iii, Md  [address]  [place] [address]  Subjective:   Patient: Noris Demarco [id number], :1945   Visit date:2023 3:59 PM    Chief Complaint:  Ear Fullness (Pt is having problems hears out of both ears x 2-3 months )    HPI:    Prior notes reviewed by myself.  Clinical documentation obtained by nursing staff reviewed.     77-year-old female presents for evaluation of otalgia.  Her daughter reports that she complains of otalgia primarily in the morning when she 1st wakes up.  The otalgia is exacerbated with chewing or moving her jaw around.  She has not been seen by a dental professional but she has an upcoming appointment.  She does have some hearing loss but no acute changes recently.      Objective:     Physical Exam:  Vitals:  Ht 5' 4" (1.626 m)   Wt 67.9 kg (149 lb 11.1 oz)   LMP 1999 (Approximate)   BMI 25.69 kg/m²   General appearance:  Well developed, well nourished    Ears:  Otoscopy of external auditory canals and tympanic membranes was normal, clinical speech reception thresholds grossly intact, no mass/lesion of auricle.    Nose:  No masses/lesions of external nose, nasal mucosa, septum, and turbinates were within normal limits.    Mouth:  No mass/lesion of lips, teeth, gums, hard/soft palate, tongue, tonsils, or oropharynx.    Neck & Lymphatics:  No cervical lymphadenopathy, no neck mass/crepitus/ asymmetry, trachea is midline, no thyroid enlargement/tenderness/mass. TTP over TMJ R>L        [x]  Data Reviewed:    Lab Results   Component Value Date    WBC 4.59 10/19/2023    HGB 12.5 10/19/2023    HCT 38.0 10/19/2023     (H) 10/19/2023    EOSINOPHIL 5.7 10/19/2023             Assessment & Plan:   TMJPDS (temporomandibular joint pain dysfunction syndrome)    Hearing loss, unspecified hearing loss type, unspecified laterality  -     Ambulatory referral/consult to ENT    Chronic right ear pain  -     Ambulatory referral/consult to " ENT        Her ear exam is unremarkable, but she does have significant tenderness over her TMJ, right greater than left.  She does have an upcoming appointment with her dental professional.  I recommended to her daughter that they discussed the possibility of a nightguard.  We also discussed conservative treatment measures as noted below.    We had a long discussion regarding the underlying pathology of temporomandibular joint dysfunction (TMD) as the cause of ear pain.  We further discussed conservative measures to treat TMD including avoiding gum and other foods that require lots of chewing, warm compresses, and scheduled antinflammatories. We also discussed bruxism (grinding of the teeth) and the role that often plays in TMJ related pain.  I explained that for patients with evidence of wear consistent with bruxism, a mouth guard to be worn while sleeping is often necessary.  If the pain persists with conservative treatment, the patient will then schedule an appointment with a dentist for further evaluation.

## 2023-12-15 ENCOUNTER — LAB VISIT (OUTPATIENT)
Dept: LAB | Facility: HOSPITAL | Age: 78
End: 2023-12-15
Attending: INTERNAL MEDICINE
Payer: MEDICARE

## 2023-12-15 DIAGNOSIS — E13.9 LADA (LATENT AUTOIMMUNE DIABETES IN ADULTS), MANAGED AS TYPE 1: ICD-10-CM

## 2023-12-15 DIAGNOSIS — Z79.4 TYPE 2 DIABETES MELLITUS WITH HYPERGLYCEMIA, WITH LONG-TERM CURRENT USE OF INSULIN: ICD-10-CM

## 2023-12-15 DIAGNOSIS — E78.5 HYPERLIPIDEMIA ASSOCIATED WITH TYPE 2 DIABETES MELLITUS: ICD-10-CM

## 2023-12-15 DIAGNOSIS — E11.69 HYPERLIPIDEMIA ASSOCIATED WITH TYPE 2 DIABETES MELLITUS: ICD-10-CM

## 2023-12-15 DIAGNOSIS — E11.65 TYPE 2 DIABETES MELLITUS WITH HYPERGLYCEMIA, WITH LONG-TERM CURRENT USE OF INSULIN: ICD-10-CM

## 2023-12-15 LAB
ANION GAP SERPL CALC-SCNC: 8 MMOL/L (ref 8–16)
CALCIUM SERPL-MCNC: 9.7 MG/DL (ref 8.7–10.5)
CHLORIDE SERPL-SCNC: 106 MMOL/L (ref 95–110)
CHOLEST SERPL-MCNC: 178 MG/DL (ref 120–199)
CHOLEST/HDLC SERPL: 2 {RATIO} (ref 2–5)
CO2 SERPL-SCNC: 26 MMOL/L (ref 23–29)
CREAT SERPL-MCNC: 0.57 MG/DL (ref 0.5–1.4)
EST. GFR  (NO RACE VARIABLE): >60 ML/MIN/1.73 M^2
ESTIMATED AVG GLUCOSE: 174 MG/DL (ref 68–131)
GLUCOSE SERPL-MCNC: 109 MG/DL (ref 70–110)
HBA1C MFR BLD: 7.7 % (ref 4–5.6)
HDLC SERPL-MCNC: 90 MG/DL (ref 40–75)
HDLC SERPL: 50.6 % (ref 20–50)
LDLC SERPL CALC-MCNC: 78 MG/DL (ref 63–159)
NONHDLC SERPL-MCNC: 88 MG/DL
POTASSIUM SERPL-SCNC: 4.2 MMOL/L (ref 3.5–5.1)
SODIUM SERPL-SCNC: 140 MMOL/L (ref 136–145)
TRIGL SERPL-MCNC: 50 MG/DL (ref 30–150)
UUN UR-MCNC: 13 MG/DL (ref 7–17)

## 2023-12-15 PROCEDURE — 80061 LIPID PANEL: CPT | Mod: HCNC | Performed by: INTERNAL MEDICINE

## 2023-12-15 PROCEDURE — 83036 HEMOGLOBIN GLYCOSYLATED A1C: CPT | Mod: HCNC | Performed by: INTERNAL MEDICINE

## 2023-12-15 PROCEDURE — 36415 COLL VENOUS BLD VENIPUNCTURE: CPT | Mod: HCNC,PN | Performed by: INTERNAL MEDICINE

## 2023-12-15 PROCEDURE — 80048 BASIC METABOLIC PNL TOTAL CA: CPT | Mod: HCNC,PN | Performed by: INTERNAL MEDICINE

## 2023-12-19 ENCOUNTER — OFFICE VISIT (OUTPATIENT)
Dept: INTERNAL MEDICINE | Facility: CLINIC | Age: 78
End: 2023-12-19
Payer: MEDICARE

## 2023-12-19 ENCOUNTER — TELEPHONE (OUTPATIENT)
Dept: FAMILY MEDICINE | Facility: CLINIC | Age: 78
End: 2023-12-19
Payer: MEDICARE

## 2023-12-19 VITALS
BODY MASS INDEX: 25.85 KG/M2 | HEIGHT: 64 IN | DIASTOLIC BLOOD PRESSURE: 82 MMHG | HEART RATE: 59 BPM | WEIGHT: 151.44 LBS | OXYGEN SATURATION: 100 % | SYSTOLIC BLOOD PRESSURE: 138 MMHG

## 2023-12-19 DIAGNOSIS — H25.13 NS (NUCLEAR SCLEROSIS), BILATERAL: ICD-10-CM

## 2023-12-19 DIAGNOSIS — G72.0 STATIN MYOPATHY: ICD-10-CM

## 2023-12-19 DIAGNOSIS — G47.00 INSOMNIA, UNSPECIFIED TYPE: ICD-10-CM

## 2023-12-19 DIAGNOSIS — E13.9 LADA (LATENT AUTOIMMUNE DIABETES IN ADULTS), MANAGED AS TYPE 1: Primary | ICD-10-CM

## 2023-12-19 DIAGNOSIS — E11.59 HYPERTENSION ASSOCIATED WITH DIABETES: ICD-10-CM

## 2023-12-19 DIAGNOSIS — T46.6X5A STATIN MYOPATHY: ICD-10-CM

## 2023-12-19 DIAGNOSIS — I15.2 HYPERTENSION ASSOCIATED WITH DIABETES: ICD-10-CM

## 2023-12-19 PROCEDURE — 1159F PR MEDICATION LIST DOCUMENTED IN MEDICAL RECORD: ICD-10-PCS | Mod: HCNC,CPTII,S$GLB, | Performed by: INTERNAL MEDICINE

## 2023-12-19 PROCEDURE — 1101F PT FALLS ASSESS-DOCD LE1/YR: CPT | Mod: HCNC,CPTII,S$GLB, | Performed by: INTERNAL MEDICINE

## 2023-12-19 PROCEDURE — 3075F SYST BP GE 130 - 139MM HG: CPT | Mod: HCNC,CPTII,S$GLB, | Performed by: INTERNAL MEDICINE

## 2023-12-19 PROCEDURE — 3075F PR MOST RECENT SYSTOLIC BLOOD PRESS GE 130-139MM HG: ICD-10-PCS | Mod: HCNC,CPTII,S$GLB, | Performed by: INTERNAL MEDICINE

## 2023-12-19 PROCEDURE — 1160F RVW MEDS BY RX/DR IN RCRD: CPT | Mod: HCNC,CPTII,S$GLB, | Performed by: INTERNAL MEDICINE

## 2023-12-19 PROCEDURE — 1157F PR ADVANCE CARE PLAN OR EQUIV PRESENT IN MEDICAL RECORD: ICD-10-PCS | Mod: HCNC,CPTII,S$GLB, | Performed by: INTERNAL MEDICINE

## 2023-12-19 PROCEDURE — 1101F PR PT FALLS ASSESS DOC 0-1 FALLS W/OUT INJ PAST YR: ICD-10-PCS | Mod: HCNC,CPTII,S$GLB, | Performed by: INTERNAL MEDICINE

## 2023-12-19 PROCEDURE — 1160F PR REVIEW ALL MEDS BY PRESCRIBER/CLIN PHARMACIST DOCUMENTED: ICD-10-PCS | Mod: HCNC,CPTII,S$GLB, | Performed by: INTERNAL MEDICINE

## 2023-12-19 PROCEDURE — 99214 PR OFFICE/OUTPT VISIT, EST, LEVL IV, 30-39 MIN: ICD-10-PCS | Mod: HCNC,S$GLB,, | Performed by: INTERNAL MEDICINE

## 2023-12-19 PROCEDURE — 3079F DIAST BP 80-89 MM HG: CPT | Mod: HCNC,CPTII,S$GLB, | Performed by: INTERNAL MEDICINE

## 2023-12-19 PROCEDURE — 1126F AMNT PAIN NOTED NONE PRSNT: CPT | Mod: HCNC,CPTII,S$GLB, | Performed by: INTERNAL MEDICINE

## 2023-12-19 PROCEDURE — 1157F ADVNC CARE PLAN IN RCRD: CPT | Mod: HCNC,CPTII,S$GLB, | Performed by: INTERNAL MEDICINE

## 2023-12-19 PROCEDURE — 99999 PR PBB SHADOW E&M-EST. PATIENT-LVL V: CPT | Mod: PBBFAC,HCNC,, | Performed by: INTERNAL MEDICINE

## 2023-12-19 PROCEDURE — 3288F FALL RISK ASSESSMENT DOCD: CPT | Mod: HCNC,CPTII,S$GLB, | Performed by: INTERNAL MEDICINE

## 2023-12-19 PROCEDURE — 99999 PR PBB SHADOW E&M-EST. PATIENT-LVL V: ICD-10-PCS | Mod: PBBFAC,HCNC,, | Performed by: INTERNAL MEDICINE

## 2023-12-19 PROCEDURE — 99214 OFFICE O/P EST MOD 30 MIN: CPT | Mod: HCNC,S$GLB,, | Performed by: INTERNAL MEDICINE

## 2023-12-19 PROCEDURE — 3079F PR MOST RECENT DIASTOLIC BLOOD PRESSURE 80-89 MM HG: ICD-10-PCS | Mod: HCNC,CPTII,S$GLB, | Performed by: INTERNAL MEDICINE

## 2023-12-19 PROCEDURE — 1159F MED LIST DOCD IN RCRD: CPT | Mod: HCNC,CPTII,S$GLB, | Performed by: INTERNAL MEDICINE

## 2023-12-19 PROCEDURE — 3288F PR FALLS RISK ASSESSMENT DOCUMENTED: ICD-10-PCS | Mod: HCNC,CPTII,S$GLB, | Performed by: INTERNAL MEDICINE

## 2023-12-19 PROCEDURE — 1126F PR PAIN SEVERITY QUANTIFIED, NO PAIN PRESENT: ICD-10-PCS | Mod: HCNC,CPTII,S$GLB, | Performed by: INTERNAL MEDICINE

## 2023-12-19 RX ORDER — MIRTAZAPINE 7.5 MG/1
7.5 TABLET, FILM COATED ORAL NIGHTLY
Qty: 30 TABLET | Refills: 0 | Status: SHIPPED | OUTPATIENT
Start: 2023-12-19 | End: 2024-01-19

## 2023-12-19 RX ORDER — CLONIDINE HYDROCHLORIDE 0.2 MG/1
0.2 TABLET ORAL 2 TIMES DAILY
Qty: 180 TABLET | Refills: 1 | Status: SHIPPED | OUTPATIENT
Start: 2023-12-19

## 2023-12-19 NOTE — PATIENT INSTRUCTIONS
We were happy to see you today    For your Testing  Please have your labs and/or imaging test done  in 3 months         For your Medication   Lets try the mirtazapine   For more information about side effects please visit medlineplus.gov        For your Referrals  Eye doctor  Endocrine doctor       For your Vaccinations  We gave your the following vaccines    Please obtain the following vaccines at the pharmacy          Please return to clinic in    Follow up for Referral to optho/endocrine, 3 months Office Visit with prelabs, 4 week Office Visit.        Extra resources

## 2023-12-19 NOTE — PROGRESS NOTES
Assessment:         1. 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 Hypertension associated with diabetes    3. Insomnia, unspecified type    4. NS (nuclear sclerosis), bilateral          Plan:           Noris was seen today for follow-up.    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    Insomnia, unspecified type  -     mirtazapine (REMERON) 7.5 MG Tab; Take 1 tablet (7.5 mg total) by mouth every evening.    NS (nuclear sclerosis), bilateral  -     Ambulatory referral/consult to Ophthalmology; Future          No more fruit bowl - I thinkit helped lower sugar  Cough stopped after d/c lisinopril  Needs to see opthomology  Wants to try remeron - for sleep   Consider trazodone vs seroquel if no improvement   Weaned buspar with no problems  Labs 3 month OV     Subjective:       Patient ID: Noris Demarco is a 78 y.o. female.    Chief Complaint: Follow-up        Interim Hx  Last seen by me in August  Seen by ENT - suspected TMJ     Concerns today  Reports problems with nighttime behavioral issues particularly hallucinations getting up out of bed insomnia she is able to be reoriented.  She gets very little sleep throughout the night.    Chronic problems     Hypertension  This is a chronic problem. The current episode started more than 1 year ago. The problem has been waxing and waning since onset. The problem is controlled. Past  "treatments include calcium channel blockers, angiotensin blockers and alpha 1 blockers. The current treatment provides significant improvement. There are no compliance problems.    Diabetes  She presents for her follow-up diabetic visit. She has type 2 diabetes mellitus. Her disease course has been stable. Current diabetic treatment includes insulin injections. She is following a diabetic diet. Meal planning includes avoidance of concentrated sweets. She has not had a previous visit with a dietitian. She participates in exercise intermittently. An ACE inhibitor/angiotensin II receptor blocker is being taken. She sees a podiatrist.Eye exam is current.   Hyperlipidemia  This is a chronic problem. The current episode started more than 1 year ago. Recent lipid tests were reviewed and are normal. Treatments tried: myopathy with statin therapy.       Review of Systems   All other systems reviewed and are negative.            Health Maintenance Due   Topic Date Due    TETANUS VACCINE  Never done    Shingles Vaccine (1 of 2) Never done    RSV Vaccine (Age 60+ and Pregnant patients) (1 - 1-dose 60+ series) Never done    COVID-19 Vaccine (4 - 2023-24 season) 09/01/2023         Objective:     /82 (BP Location: Right arm, Patient Position: Sitting)   Pulse (!) 59   Ht 5' 4" (1.626 m)   Wt 68.7 kg (151 lb 7.3 oz)   LMP 08/13/1999 (Approximate)   SpO2 100%   BMI 26.00 kg/m²         12/19/2023     1:44 PM 11/21/2023     3:42 PM 10/20/2023     9:35 AM 9/22/2023     3:06 PM 8/23/2023     9:07 AM   Vitals   Height 5' 4" (1.626 m) 5' 4" (1.626 m) 5' 4" (1.626 m) 5' 4" (1.626 m) 5' 4" (1.626 m)   Weight (lbs) 151.46 149.69 151.68 152.56 149.91   BMI (kg/m2) 26 25.7 26 26.2 25.7                Physical Exam  Vitals and nursing note reviewed.   Constitutional:       General: She is not in acute distress.     Appearance: She is well-developed. She is not diaphoretic.   HENT:      Head: Normocephalic.      Nose: Nose normal. "   Eyes:      General:         Right eye: No discharge.         Left eye: No discharge.      Conjunctiva/sclera: Conjunctivae normal.      Pupils: Pupils are equal, round, and reactive to light.   Cardiovascular:      Rate and Rhythm: Normal rate and regular rhythm.      Heart sounds: Normal heart sounds. No murmur heard.     No friction rub. No gallop.   Pulmonary:      Effort: Pulmonary effort is normal. No respiratory distress.   Abdominal:      General: Bowel sounds are normal. There is no distension.      Palpations: Abdomen is soft.   Musculoskeletal:         General: No deformity. Normal range of motion.      Cervical back: Normal range of motion.   Skin:     General: Skin is warm.   Neurological:      Mental Status: She is alert and oriented to person, place, and time.      Cranial Nerves: No cranial nerve deficit.         Recent Results (from the past 336 hour(s))   Microalbumin/Creatinine Ratio, Urine    Collection Time: 12/15/23  8:48 AM   Result Value Ref Range    Microalbumin, Urine 157.0 ug/mL    Creatinine, Urine 63.0 15.0 - 325.0 mg/dL    Microalb/Creat Ratio 249.2 (H) 0.0 - 30.0 ug/mg   Hemoglobin A1C    Collection Time: 12/15/23  8:55 AM   Result Value Ref Range    Hemoglobin A1C 7.7 (H) 4.0 - 5.6 %    Estimated Avg Glucose 174 (H) 68 - 131 mg/dL   Basic Metabolic Panel    Collection Time: 12/15/23  8:55 AM   Result Value Ref Range    Sodium 140 136 - 145 mmol/L    Potassium 4.2 3.5 - 5.1 mmol/L    Chloride 106 95 - 110 mmol/L    CO2 26 23 - 29 mmol/L    Glucose 109 70 - 110 mg/dL    BUN 13 7 - 17 mg/dL    Creatinine 0.57 0.50 - 1.40 mg/dL    Calcium 9.7 8.7 - 10.5 mg/dL    Anion Gap 8 8 - 16 mmol/L    eGFR >60.0 >60 mL/min/1.73 m^2   Lipid Panel    Collection Time: 12/15/23  8:55 AM   Result Value Ref Range    Cholesterol 178 120 - 199 mg/dL    Triglycerides 50 30 - 150 mg/dL    HDL 90 (H) 40 - 75 mg/dL    LDL Cholesterol 78.0 63.0 - 159.0 mg/dL    HDL/Cholesterol Ratio 50.6 (H) 20.0 - 50.0 %     "Total Cholesterol/HDL Ratio 2.0 2.0 - 5.0    Non-HDL Cholesterol 88 mg/dL         Future Appointments   Date Time Provider Department Center   2024  3:20 PM Abram Turner III, MD [facility] [place]   2024  3:20 PM ETHAN Remy MD [facility] OPHTHA [place]   2024 10:00 AM Janet Hannah MD [facility] ENDOCR [place]   3/22/2024  8:00 AM SPECIMEN, [place] [facility] SPECL[facility] [place]   3/22/2024  9:30 AM LAB, GINNY [facility] LAB [place]   3/26/2024  3:00 PM Abram Turner III, MD [facility]   2024  1:00 PM LAB, SPECIMEN [facility] SPECL[facility]   2024  9:00 AM Cecilia Chandra NP [facility] HEM ONC [facility]         Medication List with Changes/Refills   New Medications    MIRTAZAPINE (REMERON) 7.5 MG TAB    Take 1 tablet (7.5 mg total) by mouth every evening.   Current Medications    AMLODIPINE (NORVASC) 10 MG TABLET    TAKE 1 TABLET ONE TIME DAILY    ASPIRIN (ECOTRIN) 81 MG EC TABLET    Take 81 mg by mouth once daily.    BD ULTRA-FINE IRWIN PEN NEEDLE 32 GAUGE X 5/32" NDLE    3 TIMES A DAY    BD ULTRA-FINE SHORT PEN NEEDLE 31 GAUGE X 5/16" NDLE    SMARTSI Each SUB-Q Daily    BLOOD-GLUCOSE METER,CONTINUOUS (DEXCOM G6 ) MISC    1 each by Misc.(Non-Drug; Combo Route) route once daily at 6am.    BLOOD-GLUCOSE METER,CONTINUOUS (DEXCOM G6 ) MISC    1 each by Misc.(Non-Drug; Combo Route) route once daily.    BLOOD-GLUCOSE SENSOR (DEXCOM G6 SENSOR) LEONOR    1 each by Misc.(Non-Drug; Combo Route) route once daily.    BLOOD-GLUCOSE TRANSMITTER (DEXCOM G6 TRANSMITTER) LEONOR    1 each by Misc.(Non-Drug; Combo Route) route once daily.    BUSPIRONE (BUSPAR) 15 MG TABLET    Take 1 tablet (15 mg total) by mouth 2 (two) times daily.    CHOLECALCIFEROL, VITAMIN D3, (VITAMIN D3) 50 MCG (2,000 UNIT) CAP    Take 1 capsule by mouth once daily.    COENZYME Q10 100 MG CAPSULE    Take 100 mg by mouth once daily.    CYANOCOBALAMIN 500 MCG TABLET    Take 500 mcg by mouth once daily.    " CYPROHEPTADINE (PERIACTIN) 4 MG TABLET    Take 1 tablet (4 mg total) by mouth 2 (two) times a day.    FISH OIL-OMEGA-3 FATTY ACIDS 300-1,000 MG CAPSULE    Take 1 g by mouth once daily.    FUROSEMIDE (LASIX) 40 MG TABLET    Take 40 mg by mouth once daily.     GLUCOSE 4 GM CHEWABLE TABLET    Take 4 tablets (16 g total) by mouth as needed for Low blood sugar (If having symptoms of blurry vision, palpitations, confusion, shakiness.  Please check sugars and if sugar below 70 please take 4 tablets and re-check sugar everry 15 minutes until sugars are above 70 and symptoms resolve.).    INSULIN ASPART U-100 (NOVOLOG FLEXPEN U-100 INSULIN) 100 UNIT/ML (3 ML) INPN PEN    Inject 8 Units into the skin 3 (three) times daily with meals.    LANTUS SOLOSTAR U-100 INSULIN GLARGINE 100 UNITS/ML SUBQ PEN    Inject 20 Units into the skin every evening.    LOSARTAN (COZAAR) 100 MG TABLET    Take 1 tablet (100 mg total) by mouth once daily.    OFLOXACIN (FLOXIN) 0.3 % OTIC SOLUTION    INSTILL 5 DROPS INTO THE RIGHT EAR ONCE DAILY    TAMOXIFEN (NOLVADEX) 20 MG TAB    Take 1 tablet (20 mg total) by mouth once daily.    TRUE METRIX GLUCOSE TEST STRIP STRP    1 strip by In Vitro route every morning.   Changed and/or Refilled Medications    Modified Medication Previous Medication    CLONIDINE (CATAPRES) 0.2 MG TABLET cloNIDine (CATAPRES) 0.2 MG tablet       Take 1 tablet (0.2 mg total) by mouth 2 (two) times daily.    TAKE 1 TABLET TWICE DAILY   Discontinued Medications    BUSPIRONE (BUSPAR) 15 MG TABLET    Take 15 mg by mouth once daily.         Disclaimer:  This note has been generated using voice-recognition software. There may be grammatical or spelling errors that have been missed during proof-reading

## 2023-12-19 NOTE — TELEPHONE ENCOUNTER
----- Message from Conchita Mcgowan sent at 12/19/2023  3:33 PM CST -----  Good afternoon,    The patient was referred to see Dr. Remy but the doctor do not see NS (nuclear sclerosis). Please ask the doctor if he would like to refer the patient to another doctor.    Thank You

## 2024-01-19 DIAGNOSIS — G47.00 INSOMNIA, UNSPECIFIED TYPE: ICD-10-CM

## 2024-01-19 RX ORDER — MIRTAZAPINE 7.5 MG/1
7.5 TABLET, FILM COATED ORAL NIGHTLY
Qty: 90 TABLET | Refills: 3 | Status: SHIPPED | OUTPATIENT
Start: 2024-01-19 | End: 2024-01-19 | Stop reason: SDUPTHER

## 2024-01-19 NOTE — TELEPHONE ENCOUNTER
Refill Routing Note   Medication(s) are not appropriate for processing by [facility] for the following reason(s):        New or recently adjusted medication    [facility] action(s):  Defer               Appointments  past 12m or future 3m with PCP    Date Provider   Last Visit   12/19/2023 Abram Turner III, MD   Next Visit   2/20/2024 Abrma Turner III, MD   ED visits in past 90 days: 0        Note composed:7:47 AM 01/19/2024

## 2024-01-19 NOTE — TELEPHONE ENCOUNTER
No care due was identified.  [facility] Embedded Care Due Messages. Reference number: [id number].   1/19/2024 3:00:13 PM CST

## 2024-01-19 NOTE — TELEPHONE ENCOUNTER
No care due was identified.  [facility] Embedded Care Due Messages. Reference number: [id number].   1/19/2024 7:32:41 AM CST

## 2024-01-21 RX ORDER — MIRTAZAPINE 7.5 MG/1
7.5 TABLET, FILM COATED ORAL NIGHTLY
Qty: 90 TABLET | Refills: 3 | Status: SHIPPED | OUTPATIENT
Start: 2024-01-21

## 2024-01-21 NOTE — TELEPHONE ENCOUNTER
Refill Routing Note   Medication(s) are not appropriate for processing by [facility] for the following reason(s):        Drug-disease interaction: mirtazapine and Statin myopathy     [facility] action(s):  Defer      Medication Therapy Plan: change in pharmacy      Appointments  past 12m or future 3m with PCP    Date Provider   Last Visit   12/19/2023 Abram Turner III, MD   Next Visit   2/20/2024 Abram Turner III, MD   ED visits in past 90 days: 0        Note composed:10:50 PM 01/20/2024

## 2024-01-25 ENCOUNTER — OFFICE VISIT (OUTPATIENT)
Dept: OPHTHALMOLOGY | Facility: CLINIC | Age: 79
End: 2024-01-25
Payer: MEDICARE

## 2024-01-25 DIAGNOSIS — H35.033 HYPERTENSIVE RETINOPATHY, BILATERAL: ICD-10-CM

## 2024-01-25 DIAGNOSIS — E11.3313 TYPE 2 DIABETES MELLITUS WITH BOTH EYES AFFECTED BY MODERATE NONPROLIFERATIVE RETINOPATHY AND MACULAR EDEMA, WITH LONG-TERM CURRENT USE OF INSULIN: Primary | ICD-10-CM

## 2024-01-25 DIAGNOSIS — H25.13 NS (NUCLEAR SCLEROSIS), BILATERAL: ICD-10-CM

## 2024-01-25 DIAGNOSIS — Z79.4 TYPE 2 DIABETES MELLITUS WITH BOTH EYES AFFECTED BY MODERATE NONPROLIFERATIVE RETINOPATHY AND MACULAR EDEMA, WITH LONG-TERM CURRENT USE OF INSULIN: Primary | ICD-10-CM

## 2024-01-25 PROCEDURE — 1159F MED LIST DOCD IN RCRD: CPT | Mod: CPTII,S$GLB,, | Performed by: OPHTHALMOLOGY

## 2024-01-25 PROCEDURE — 1157F ADVNC CARE PLAN IN RCRD: CPT | Mod: CPTII,S$GLB,, | Performed by: OPHTHALMOLOGY

## 2024-01-25 PROCEDURE — 3288F FALL RISK ASSESSMENT DOCD: CPT | Mod: CPTII,S$GLB,, | Performed by: OPHTHALMOLOGY

## 2024-01-25 PROCEDURE — 1101F PT FALLS ASSESS-DOCD LE1/YR: CPT | Mod: CPTII,S$GLB,, | Performed by: OPHTHALMOLOGY

## 2024-01-25 PROCEDURE — 92014 COMPRE OPH EXAM EST PT 1/>: CPT | Mod: 25,S$GLB,, | Performed by: OPHTHALMOLOGY

## 2024-01-25 PROCEDURE — 67028 INJECTION EYE DRUG: CPT | Mod: 50,S$GLB,, | Performed by: OPHTHALMOLOGY

## 2024-01-25 PROCEDURE — 92134 CPTRZ OPH DX IMG PST SGM RTA: CPT | Mod: S$GLB,,, | Performed by: OPHTHALMOLOGY

## 2024-01-25 PROCEDURE — 1160F RVW MEDS BY RX/DR IN RCRD: CPT | Mod: CPTII,S$GLB,, | Performed by: OPHTHALMOLOGY

## 2024-01-25 PROCEDURE — 1126F AMNT PAIN NOTED NONE PRSNT: CPT | Mod: CPTII,S$GLB,, | Performed by: OPHTHALMOLOGY

## 2024-01-25 PROCEDURE — 99999 PR PBB SHADOW E&M-EST. PATIENT-LVL IV: CPT | Mod: PBBFAC,,, | Performed by: OPHTHALMOLOGY

## 2024-01-25 RX ORDER — INFLUENZA A VIRUS A/VICTORIA/4897/2022 IVR-238 (H1N1) ANTIGEN (FORMALDEHYDE INACTIVATED), INFLUENZA A VIRUS A/DARWIN/6/2021 IVR-227 (H3N2) ANTIGEN (FORMALDEHYDE INACTIVATED), INFLUENZA B VIRUS B/AUSTRIA/1359417/2021 BVR-26 ANTIGEN (FORMALDEHYDE INACTIVATED), INFLUENZA B VIRUS B/PHUKET/3073/2013 BVR-1B ANTIGEN (FORMALDEHYDE INACTIVATED) 15; 15; 15; 15 UG/.5ML; UG/.5ML; UG/.5ML; UG/.5ML
INJECTION, SUSPENSION INTRAMUSCULAR
COMMUNITY
Start: 2023-09-22

## 2024-01-25 RX ADMIN — Medication 1.25 MG: at 04:01

## 2024-01-25 NOTE — PROGRESS NOTES
HPI     Diabetic Eye Exam     Additional comments: F/u DFE/FA OD           Comments    VA stable ou, no pain ou and denies floaters ou without flashes ou.    No gtts          Last edited by Shivani Cristina on 1/25/2024  3:04 PM.            OCT - OD VMT with central DME  OS VMT released with central DME    WFFA - late macular leakage OU  No NV OU      A/P    Mod NPDR OU  T2 uncontrolled on insulin    2. DME OU  With 3. VMT component OD (released OS)  S/p Avastin OU x 1    Avastin OU today    Get approval for Ozurdex    4. HTN Ret OU  BS/BP/chol control    5. NS OU  Increasing, will need CE eval      6 weeks OCT No dilate    Risks, benefits, and alternatives to treatment discussed in detail with the patient.  The patient voiced understanding and wished to proceed with the procedure    Injection Procedure Note:  Diagnosis: DME OU    Patient Identified and Time Out complete  Pt marked  Topical Proparacaine and Betadine.  Inject Avastin OU at 6:00 @ 3.5-4mm posterior to limbus  Post Operative Dx: Same  Complications: None  Follow up as above.

## 2024-02-19 DIAGNOSIS — C50.912 INFILTRATING DUCTAL CARCINOMA OF BREAST, LEFT: ICD-10-CM

## 2024-02-19 RX ORDER — TAMOXIFEN CITRATE 20 MG/1
20 TABLET ORAL
Qty: 90 TABLET | Refills: 3 | Status: SHIPPED | OUTPATIENT
Start: 2024-02-19

## 2024-02-23 ENCOUNTER — PATIENT MESSAGE (OUTPATIENT)
Dept: ENDOCRINOLOGY | Facility: CLINIC | Age: 79
End: 2024-02-23
Payer: MEDICARE

## 2024-02-26 NOTE — PROGRESS NOTES
ENDOCRINOLOGY CLINIC    Subjective:      Patient ID: Noris Demarco is referred by Abram Turner III, MD     Chief Complaint:  WATSON, managed as type 1     HPI:   Noris Demarco is a 78 y.o. female who presents for management of WATSON, managed as type 1. Seen Dr. Archuleta on 6/29/2022.  Patient has mild  AD.  She is accompanied by her daughter.    Diabetes Hx:  Diagnosed w/ DM: 2015 with symptoms    Complications:   Retinopathy:  Moderate NPDR, on injections   Last eye exam : 01/25/2024  Neuropathy: Denies, no foot ulcers.   Nephropathy: Yes, has albuminuria.   Cardiovascular: mild stroke in 2023  DKA/HHS: Denies    Severe Hypoglycemia: Denies  Hypoglycemia unawareness: Sometimes unable to tell low blood sugars.   Hypoglycemic episodes: 3 times in the last month. Has glucose tablets.     Patient has a home health nurse who assists her with her blood sugars and insulin.     Current meds:   Lantus 15-20 units at bedtime  Novolog 8 units with meals, ranges depending on sugars 6-8 units.     Compliance with meds: Yes     Previous meds:  Metformin 500mg daily     NPH insulin     Home glucose checks: Dexcom G6 - costly  Check blood sugars 4-5 times a day, on recall around 150-250, mostly <200.    Compliant: Yes      Diet/Exercise:   3 meals a day, snacks rarely on sugar free popsicles.   Drinks enough water  Walks around the neighborhood 3 days a week, 30 mins a day.   Uses stationary bike.     Diabetes education:  1-2 years back.     Last A1c:   Lab Results   Component Value Date    HGBA1C 7.7 (H) 12/15/2023    HGBA1C 9.0 (H) 08/21/2023    HGBA1C 9.7 (H) 05/03/2023    HGBA1C 9.7 (H) 05/03/2023     Lab Results   Component Value Date    GLUTAMICACID 23.1 (H) 01/03/2023    CPEPTIDE <0.08 (L) 06/30/2022    CPEPTIDE 0.27 (L) 06/27/2022     Microalbumin:   Lab Results   Component Value Date    LABMICR 157.0 12/15/2023    CREATRANDUR 63.0 12/15/2023    MICALBCREAT 249.2 (H) 12/15/2023     Lab Results   Component Value Date     EGFRNORACEVR >60.0 12/15/2023    CREATININE 0.57 12/15/2023     Lipids:   Lab Results   Component Value Date    CHOL 178 12/15/2023    TRIG 50 12/15/2023    HDL 90 (H) 12/15/2023    LDLCALC 78.0 12/15/2023    CHOLHDL 50.6 (H) 12/15/2023     TSH:  Lab Results   Component Value Date    TSH 3.120 08/21/2023     Lab Results   Component Value Date    TRCJYAAI48 >1400 (H) 01/03/2023     Lab Results   Component Value Date    HGB 12.5 10/19/2023        Aspirin: Yes  Statins: No. Fish oil. Previously on Lipitor, had intolerance.   ACEI/ARB: Yes    HTN: On losartan, lasix, amlodipine, clonidine    Fatty liver: Denies    Denies history of pancreatitis or personal/FH medullary thyroid cancer/MEN syndrome.  History recurrent UTI/yeast infections: Denies    Polyuria: Denies  Polydipsia: Denies    FHx of DM: 2 Brothers  Heart disease: Mother - CHF, brother had MI and stroke.       ROS: see HPI     Objective:     Physical Exam     BP (!) 158/81 (BP Location: Right arm, Patient Position: Sitting, BP Method: Large (Automatic))   Pulse 75   Wt 68.2 kg (150 lb 5.7 oz)   LMP 08/13/1999 (Approximate)   SpO2 98%   BMI 25.81 kg/m²     Wt Readings from Last 3 Encounters:   02/27/24 68.2 kg (150 lb 5.7 oz)   12/19/23 68.7 kg (151 lb 7.3 oz)   11/21/23 67.9 kg (149 lb 11.1 oz)       Constitutional:  Pleasant,  in no acute distress.   HENT:   Head:    Normocephalic and atraumatic.   Eyes:    EOMI. No scleral icterus.   Cardiovascular:  Normal rate  Respiratory:   Effort normal   Neurological:  No tremor  Skin:    Skin is warm, dry  Extremity:  No edema    DIABETIC FOOT EXAM: 2/27/2024 - normal sensation to microfilament testing bilaterally.  No fissures, wounds, or ulcers.    Injection sites normal w/o lipohypertrophy      LABORATORY REVIEW:  See HPI for other labs reviewed today      Chemistry        Component Value Date/Time     12/15/2023 0855    K 4.2 12/15/2023 0855     12/15/2023 0855    CO2 26 12/15/2023 0855    BUN 13  12/15/2023 0855    BUN 13 10/19/2015 1439    CREATININE 0.57 12/15/2023 0855     12/15/2023 0855        Component Value Date/Time    CALCIUM 9.7 12/15/2023 0855    ALKPHOS 80 10/19/2023 0901    ALKPHOS 93 10/19/2015 1439    AST 28 10/19/2023 0901    AST 32 10/19/2015 1439    ALT 15 10/19/2023 0901    BILITOT 0.5 10/19/2023 0901    ESTGFRAFRICA >60.0 07/12/2022 0835    EGFRNONAA >60.0 07/12/2022 0835          Lab Results   Component Value Date    HGBA1C 7.7 (H) 12/15/2023    HGBA1C 9.0 (H) 08/21/2023    HGBA1C 9.7 (H) 05/03/2023    HGBA1C 9.7 (H) 05/03/2023     Other labs reviewed today in HPI    Assessment/Plan:     Problem List Items Addressed This Visit          Cardiac/Vascular    Hyperlipidemia associated with type 2 diabetes mellitus - Primary       Last LDL of 78.  Reports intolerance to statin.  Continue diet and lifestyle modification.  Takes fish oil.            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    Continue current diabetes regimen.  Patient has a home health nurse who assists her with blood sugars and insulin.    Discussed goal A1c of 7-7.5 %.  Patient has hypoglycemia unawareness.  Check blood sugars before meals and bedtime.  Call if blood sugars are persistently less than 70 or greater than 200.     Reports Dexcom CGM was expensive and wants to continue fingersticks blood sugar check.    Discussed importance of diet and lifestyle modifications for diabetes management  Hypoglycemia management discussed. Carry glucose tablets or snacks at all times. Glucagon sent to pharmacy    Discussed risk of complications with uncontrolled diabetes.    Complications:  Follow up for regular diabetes eye exam.  Daily self examination of feet.  Microalbumin: Monitor. On ARB    Last A1c:   Lab Results   Component Value Date    HGBA1C 7.7 (H) 12/15/2023             Relevant Medications    glucagon (BAQSIMI) 3 mg/actuation Spry       Follow-up in 4-5 months.      Janet Hannah MD

## 2024-02-27 ENCOUNTER — OFFICE VISIT (OUTPATIENT)
Dept: INTERNAL MEDICINE | Facility: CLINIC | Age: 79
End: 2024-02-27
Payer: MEDICARE

## 2024-02-27 ENCOUNTER — OFFICE VISIT (OUTPATIENT)
Dept: ENDOCRINOLOGY | Facility: CLINIC | Age: 79
End: 2024-02-27
Payer: MEDICARE

## 2024-02-27 VITALS
DIASTOLIC BLOOD PRESSURE: 68 MMHG | BODY MASS INDEX: 25.75 KG/M2 | WEIGHT: 150.81 LBS | HEART RATE: 67 BPM | SYSTOLIC BLOOD PRESSURE: 132 MMHG | OXYGEN SATURATION: 99 % | HEIGHT: 64 IN

## 2024-02-27 VITALS
HEART RATE: 75 BPM | BODY MASS INDEX: 25.81 KG/M2 | DIASTOLIC BLOOD PRESSURE: 81 MMHG | WEIGHT: 150.38 LBS | SYSTOLIC BLOOD PRESSURE: 158 MMHG | OXYGEN SATURATION: 98 %

## 2024-02-27 DIAGNOSIS — G30.1 MILD LATE ONSET ALZHEIMER'S DEMENTIA WITH OTHER BEHAVIORAL DISTURBANCE: ICD-10-CM

## 2024-02-27 DIAGNOSIS — T46.6X5A STATIN MYOPATHY: ICD-10-CM

## 2024-02-27 DIAGNOSIS — E11.69 HYPERLIPIDEMIA ASSOCIATED WITH TYPE 2 DIABETES MELLITUS: Primary | ICD-10-CM

## 2024-02-27 DIAGNOSIS — E78.5 HYPERLIPIDEMIA ASSOCIATED WITH TYPE 2 DIABETES MELLITUS: Primary | ICD-10-CM

## 2024-02-27 DIAGNOSIS — G72.0 STATIN MYOPATHY: ICD-10-CM

## 2024-02-27 DIAGNOSIS — E13.9 LADA (LATENT AUTOIMMUNE DIABETES IN ADULTS), MANAGED AS TYPE 1: ICD-10-CM

## 2024-02-27 DIAGNOSIS — F02.A18 MILD LATE ONSET ALZHEIMER'S DEMENTIA WITH OTHER BEHAVIORAL DISTURBANCE: ICD-10-CM

## 2024-02-27 PROCEDURE — 3078F DIAST BP <80 MM HG: CPT | Mod: HCNC,CPTII,S$GLB, | Performed by: INTERNAL MEDICINE

## 2024-02-27 PROCEDURE — 1126F AMNT PAIN NOTED NONE PRSNT: CPT | Mod: CPTII,S$GLB,, | Performed by: INTERNAL MEDICINE

## 2024-02-27 PROCEDURE — 1157F ADVNC CARE PLAN IN RCRD: CPT | Mod: CPTII,S$GLB,, | Performed by: INTERNAL MEDICINE

## 2024-02-27 PROCEDURE — 1101F PT FALLS ASSESS-DOCD LE1/YR: CPT | Mod: CPTII,S$GLB,, | Performed by: INTERNAL MEDICINE

## 2024-02-27 PROCEDURE — 1160F RVW MEDS BY RX/DR IN RCRD: CPT | Mod: CPTII,S$GLB,, | Performed by: INTERNAL MEDICINE

## 2024-02-27 PROCEDURE — 3075F SYST BP GE 130 - 139MM HG: CPT | Mod: HCNC,CPTII,S$GLB, | Performed by: INTERNAL MEDICINE

## 2024-02-27 PROCEDURE — 3079F DIAST BP 80-89 MM HG: CPT | Mod: CPTII,S$GLB,, | Performed by: INTERNAL MEDICINE

## 2024-02-27 PROCEDURE — 1157F ADVNC CARE PLAN IN RCRD: CPT | Mod: HCNC,CPTII,S$GLB, | Performed by: INTERNAL MEDICINE

## 2024-02-27 PROCEDURE — 1160F RVW MEDS BY RX/DR IN RCRD: CPT | Mod: HCNC,CPTII,S$GLB, | Performed by: INTERNAL MEDICINE

## 2024-02-27 PROCEDURE — 3077F SYST BP >= 140 MM HG: CPT | Mod: CPTII,S$GLB,, | Performed by: INTERNAL MEDICINE

## 2024-02-27 PROCEDURE — 1126F AMNT PAIN NOTED NONE PRSNT: CPT | Mod: HCNC,CPTII,S$GLB, | Performed by: INTERNAL MEDICINE

## 2024-02-27 PROCEDURE — 99214 OFFICE O/P EST MOD 30 MIN: CPT | Mod: S$GLB,,, | Performed by: INTERNAL MEDICINE

## 2024-02-27 PROCEDURE — 3288F FALL RISK ASSESSMENT DOCD: CPT | Mod: CPTII,S$GLB,, | Performed by: INTERNAL MEDICINE

## 2024-02-27 PROCEDURE — 99214 OFFICE O/P EST MOD 30 MIN: CPT | Mod: HCNC,S$GLB,, | Performed by: INTERNAL MEDICINE

## 2024-02-27 PROCEDURE — 1159F MED LIST DOCD IN RCRD: CPT | Mod: CPTII,S$GLB,, | Performed by: INTERNAL MEDICINE

## 2024-02-27 PROCEDURE — 99999 PR PBB SHADOW E&M-EST. PATIENT-LVL V: CPT | Mod: PBBFAC,HCNC,, | Performed by: INTERNAL MEDICINE

## 2024-02-27 PROCEDURE — 3288F FALL RISK ASSESSMENT DOCD: CPT | Mod: HCNC,CPTII,S$GLB, | Performed by: INTERNAL MEDICINE

## 2024-02-27 PROCEDURE — 99999 PR PBB SHADOW E&M-EST. PATIENT-LVL V: CPT | Mod: PBBFAC,,, | Performed by: INTERNAL MEDICINE

## 2024-02-27 PROCEDURE — 1101F PT FALLS ASSESS-DOCD LE1/YR: CPT | Mod: HCNC,CPTII,S$GLB, | Performed by: INTERNAL MEDICINE

## 2024-02-27 PROCEDURE — 1159F MED LIST DOCD IN RCRD: CPT | Mod: HCNC,CPTII,S$GLB, | Performed by: INTERNAL MEDICINE

## 2024-02-27 NOTE — PROGRESS NOTES
"  Assessment:         1. 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 Statin myopathy          Plan:           Noris was seen today for follow-up.    Diagnoses and all orders for this visit:    Mild late onset Alzheimer's dementia with other behavioral disturbance  Chronic  Controlled  Patient is at goal today   I have reviewed lifestyle modification to achieve/maintain goals  We will continue the current medication regimen as listed below  Patient will follow up in 3 months     Statin myopathy  Chronic  Controlled  Patient is at goal today   I have reviewed lifestyle modification to achieve/maintain goals  We will continue the current medication regimen as listed below  Patient will follow up in 3 months               Subjective:       Patient ID: Noris Demarco is a 78 y.o. female.    Chief Complaint: Follow-up              Interim Hx    Concerns today    Chronic problems       Hypertension  This is a chronic problem. The current episode started more than 1 year ago. The problem has been waxing and waning since onset. The current treatment provides significant improvement. There are no compliance problems.        Review of Systems   All other systems reviewed and are negative.            Health Maintenance Due   Topic Date Due    TETANUS VACCINE  Never done    Shingles Vaccine (1 of 2) Never done    RSV Vaccine (Age 60+ and Pregnant patients) (1 - 1-dose 60+ series) Never done    COVID-19 Vaccine (4 - 2023-24 season) 09/01/2023         Objective:     /68   Pulse 67   Ht 5' 4" (1.626 m)   Wt 68.4 kg (150 lb 12.7 oz)   LMP 08/13/1999 (Approximate)   SpO2 99%   BMI 25.88 kg/m²         2/27/2024     2:07 PM 2/27/2024     1:14 PM 12/19/2023     1:44 PM 11/21/2023     3:42 PM 10/20/2023     9:35 AM   Vitals   Height 5' 4" (1.626 m)  5' 4" (1.626 m) 5' 4" (1.626 m) 5' 4" (1.626 m)   Weight (lbs) 150.79 150.35 151.46 149.69 151.68   BMI (kg/m2) 25.9  26 25.7 26              "   Physical Exam  Vitals and nursing note reviewed.   Constitutional:       General: She is not in acute distress.     Appearance: She is well-developed. She is not diaphoretic.   HENT:      Head: Normocephalic.      Nose: Nose normal.   Eyes:      General:         Right eye: No discharge.         Left eye: No discharge.      Conjunctiva/sclera: Conjunctivae normal.      Pupils: Pupils are equal, round, and reactive to light.   Cardiovascular:      Rate and Rhythm: Normal rate and regular rhythm.      Heart sounds: Normal heart sounds. No murmur heard.     No friction rub. No gallop.   Pulmonary:      Effort: Pulmonary effort is normal. No respiratory distress.   Abdominal:      General: Bowel sounds are normal. There is no distension.      Palpations: Abdomen is soft.   Musculoskeletal:         General: No deformity. Normal range of motion.      Cervical back: Normal range of motion.   Skin:     General: Skin is warm.   Neurological:      General: No focal deficit present.      Mental Status: She is alert.           ASCVD  The 10-year ASCVD risk score (Florence SANCHEZ, et al., 2019) is: 46.1%    Values used to calculate the score:      Age: 78 years      Sex: Female      Is Non- : Yes      Diabetic: Yes      Tobacco smoker: No      Systolic Blood Pressure: 132 mmHg      Is BP treated: Yes      HDL Cholesterol: 90 mg/dL      Total Cholesterol: 178 mg/dL    Basic Labs    BMP  Lab Results   Component Value Date     12/15/2023    K 4.2 12/15/2023     12/15/2023    CO2 26 12/15/2023    BUN 13 12/15/2023    CREATININE 0.57 12/15/2023    CALCIUM 9.7 12/15/2023    ANIONGAP 8 12/15/2023    ESTGFRAFRICA >60.0 07/12/2022    EGFRNONAA >60.0 07/12/2022     Lab Results   Component Value Date    EGFRNORACEVR >60.0 12/15/2023       Lab Results   Component Value Date    ALT 15 10/19/2023    AST 28 10/19/2023    ALKPHOS 80 10/19/2023    BILITOT 0.5 10/19/2023         Lab Results   Component Value Date     "TSH 3.120 2023     Lab Results   Component Value Date    WBC 4.59 10/19/2023    HGB 12.5 10/19/2023    HCT 38.0 10/19/2023     (H) 10/19/2023     10/19/2023               Lipids  Lab Results   Component Value Date    CHOL 178 12/15/2023     Lab Results   Component Value Date    HDL 90 (H) 12/15/2023     Lab Results   Component Value Date    LDLCALC 78.0 12/15/2023     Lab Results   Component Value Date    TRIG 50 12/15/2023     Lab Results   Component Value Date    CHOLHDL 50.6 (H) 12/15/2023       DM  Lab Results   Component Value Date    HGBA1C 7.7 (H) 12/15/2023       Future Appointments   Date Time Provider Department Center   3/7/2024  2:40 PM ETHAN Remy MD OC OPHTHA [place]   3/22/2024  8:00 AM SPECIMEN, [place] KEN SPECLAB [place]   3[facility]22[facility]2024  [facility] [place]   3/26/2024  3:00 PM Abram Turner III, MD [facility]   2024  2:30 PM Lisandra Collier MD OC OPHTHA [place]   2024  1:00 PM LAB, SPECIMEN RVPH RVPH SPECLAB [facility]   2024  9:00 AM Cecilia Chandra NP [facility] HEM ONC [place] [facility]   2024  2:30 PM Janet Hannah MD OC ENDOCR [place]         Medication List with Changes/Refills   Current Medications    AMLODIPINE (NORVASC) 10 MG TABLET    TAKE 1 TABLET ONE TIME DAILY    ASPIRIN (ECOTRIN) 81 MG EC TABLET    Take 81 mg by mouth once daily.    BD ULTRA-FINE IRWIN PEN NEEDLE 32 GAUGE X 5/32" NDLE    3 TIMES A DAY    BD ULTRA-FINE SHORT PEN NEEDLE 31 GAUGE X 5/16" NDLE    SMARTSI Each SUB-Q Daily    BLOOD-GLUCOSE METER,CONTINUOUS (DEXCOM G6 ) MISC    1 each by Misc.(Non-Drug; Combo Route) route once daily at 6am.    BLOOD-GLUCOSE SENSOR (DEXCOM G6 SENSOR) LEONOR    1 each by Misc.(Non-Drug; Combo Route) route once daily.    BLOOD-GLUCOSE TRANSMITTER (DEXCOM G6 TRANSMITTER) LEONOR    1 each by Misc.(Non-Drug; Combo Route) route once daily.    BUSPIRONE (BUSPAR) 15 MG TABLET    Take 1 tablet (15 mg total) by mouth " 2 (two) times daily.    CHOLECALCIFEROL, VITAMIN D3, (VITAMIN D3) 50 MCG (2,000 UNIT) CAP    Take 1 capsule by mouth once daily.    CLONIDINE (CATAPRES) 0.2 MG TABLET    Take 1 tablet (0.2 mg total) by mouth 2 (two) times daily.    COENZYME Q10 100 MG CAPSULE    Take 100 mg by mouth once daily.    CYANOCOBALAMIN 500 MCG TABLET    Take 500 mcg by mouth once daily.    CYPROHEPTADINE (PERIACTIN) 4 MG TABLET    Take 1 tablet (4 mg total) by mouth 2 (two) times a day.    FISH OIL-OMEGA-3 FATTY ACIDS 300-1,000 MG CAPSULE    Take 1 g by mouth once daily.    FLUAD QUAD 2023-24,65Y UP,,PF, 60 MCG (15 MCG X 4)/0.5 ML SYRG        FUROSEMIDE (LASIX) 40 MG TABLET    Take 40 mg by mouth once daily.     GLUCOSE 4 GM CHEWABLE TABLET    Take 4 tablets (16 g total) by mouth as needed for Low blood sugar (If having symptoms of blurry vision, palpitations, confusion, shakiness.  Please check sugars and if sugar below 70 please take 4 tablets and re-check sugar everry 15 minutes until sugars are above 70 and symptoms resolve.).    INSULIN ASPART U-100 (NOVOLOG FLEXPEN U-100 INSULIN) 100 UNIT/ML (3 ML) INPN PEN    Inject 8 Units into the skin 3 (three) times daily with meals.    LANTUS SOLOSTAR U-100 INSULIN GLARGINE 100 UNITS/ML SUBQ PEN    Inject 20 Units into the skin every evening.    LOSARTAN (COZAAR) 100 MG TABLET    Take 1 tablet (100 mg total) by mouth once daily.    MIRTAZAPINE (REMERON) 7.5 MG TAB    Take 1 tablet (7.5 mg total) by mouth every evening.    OFLOXACIN (FLOXIN) 0.3 % OTIC SOLUTION    INSTILL 5 DROPS INTO THE RIGHT EAR ONCE DAILY    TAMOXIFEN (NOLVADEX) 20 MG TAB    TAKE 1 TABLET ONE TIME DAILY    TRUE METRIX GLUCOSE TEST STRIP STRP    1 strip by In Vitro route every morning.         Disclaimer:  This note has been generated using voice-recognition software. There may be grammatical or spelling errors that have been missed during proof-reading

## 2024-03-04 PROBLEM — Z79.4 TYPE 2 DIABETES MELLITUS WITH BOTH EYES AFFECTED BY MODERATE NONPROLIFERATIVE RETINOPATHY AND MACULAR EDEMA, WITH LONG-TERM CURRENT USE OF INSULIN: Status: RESOLVED | Noted: 2023-09-14 | Resolved: 2024-03-04

## 2024-03-04 PROBLEM — E11.3313 TYPE 2 DIABETES MELLITUS WITH BOTH EYES AFFECTED BY MODERATE NONPROLIFERATIVE RETINOPATHY AND MACULAR EDEMA, WITH LONG-TERM CURRENT USE OF INSULIN: Status: RESOLVED | Noted: 2023-09-14 | Resolved: 2024-03-04

## 2024-03-04 PROBLEM — Z79.4 TYPE 2 DIABETES MELLITUS WITH HYPERGLYCEMIA, WITH LONG-TERM CURRENT USE OF INSULIN: Status: RESOLVED | Noted: 2022-09-27 | Resolved: 2024-03-04

## 2024-03-04 PROBLEM — E11.65 TYPE 2 DIABETES MELLITUS WITH HYPERGLYCEMIA, WITH LONG-TERM CURRENT USE OF INSULIN: Status: RESOLVED | Noted: 2022-09-27 | Resolved: 2024-03-04

## 2024-03-04 RX ORDER — GLUCAGON 3 MG/1
3 POWDER NASAL ONCE AS NEEDED
Qty: 2 EACH | Refills: 3 | Status: SHIPPED | OUTPATIENT
Start: 2024-03-04 | End: 2024-03-04

## 2024-03-04 NOTE — ASSESSMENT & PLAN NOTE
Last A1c was 7.7 and has improved.    Continue current diabetes regimen.  Patient has a home health nurse who assists her with blood sugars and insulin.    Discussed goal A1c of 7-7.5 %.  Patient has hypoglycemia unawareness.  Check blood sugars before meals and bedtime.  Call if blood sugars are persistently less than 70 or greater than 200.     Reports Dexcom CGM was expensive and wants to continue fingersticks blood sugar check.    Discussed importance of diet and lifestyle modifications for diabetes management  Hypoglycemia management discussed. Carry glucose tablets or snacks at all times. Glucagon sent to pharmacy    Discussed risk of complications with uncontrolled diabetes.    Complications:  Follow up for regular diabetes eye exam.  Daily self examination of feet.  Microalbumin: Monitor. On ARB    Last A1c:   Lab Results   Component Value Date    HGBA1C 7.7 (H) 12/15/2023

## 2024-03-04 NOTE — ASSESSMENT & PLAN NOTE
Last LDL of 78.  Reports intolerance to statin.  Continue diet and lifestyle modification.  Takes fish oil.

## 2024-03-07 ENCOUNTER — PROCEDURE VISIT (OUTPATIENT)
Dept: OPHTHALMOLOGY | Facility: CLINIC | Age: 79
End: 2024-03-07
Payer: MEDICARE

## 2024-03-07 DIAGNOSIS — Z79.4 TYPE 2 DIABETES MELLITUS WITH BOTH EYES AFFECTED BY MODERATE NONPROLIFERATIVE RETINOPATHY AND MACULAR EDEMA, WITH LONG-TERM CURRENT USE OF INSULIN: Primary | ICD-10-CM

## 2024-03-07 DIAGNOSIS — E11.3313 TYPE 2 DIABETES MELLITUS WITH BOTH EYES AFFECTED BY MODERATE NONPROLIFERATIVE RETINOPATHY AND MACULAR EDEMA, WITH LONG-TERM CURRENT USE OF INSULIN: Primary | ICD-10-CM

## 2024-03-07 PROCEDURE — 92012 INTRM OPH EXAM EST PATIENT: CPT | Mod: 25,S$GLB,, | Performed by: OPHTHALMOLOGY

## 2024-03-07 PROCEDURE — 92134 CPTRZ OPH DX IMG PST SGM RTA: CPT | Mod: S$GLB,,, | Performed by: OPHTHALMOLOGY

## 2024-03-07 PROCEDURE — 67028 INJECTION EYE DRUG: CPT | Mod: 50,S$GLB,, | Performed by: OPHTHALMOLOGY

## 2024-03-07 NOTE — PROGRESS NOTES
HPI     2  MONTH  DM  AND AVASTIN  INJECTION  OU      Additional comments: DM   LAST BS   150 to 200  LAST A1C       7.7  AVASTIN  INJECTION OU   DME            Comments    VA stable ou, no pain ou and denies floaters ou without flashes ou.  DLS 01/25/24  No gtts      OCT - OD VMT with central DME  OS VMT released with central DME    Prior WFFA - late macular leakage OU  No NV OU      A/P    Mod NPDR OU  T2 uncontrolled on insulin    2. DME OU  With 3. VMT component OD (released OS)  S/p Avastin OU x 2    Ozurdex OU today    4. HTN Ret OU  BS/BP/chol control    5. NS OU  Increasing, will need CE eval      8 weeks OCT No dilate    Risks, benefits, and alternatives to treatment discussed in detail with the patient.  The patient voiced understanding and wished to proceed with the procedure    Injection Procedure Note:  Diagnosis: DME OU    Patient Identified and Time Out complete  Pt marked  Topical Proparacaine and Betadine. Subconj lido  Inject Ozurdex  OU at 6:00 @ 3.5-4mm posterior to limbus  Post Operative Dx: Same  Complications: None  Follow up as above.

## 2024-03-22 ENCOUNTER — LAB VISIT (OUTPATIENT)
Dept: LAB | Facility: HOSPITAL | Age: 79
End: 2024-03-22
Attending: INTERNAL MEDICINE
Payer: MEDICARE

## 2024-03-22 DIAGNOSIS — E13.9 LADA (LATENT AUTOIMMUNE DIABETES IN ADULTS), MANAGED AS TYPE 1: ICD-10-CM

## 2024-03-22 DIAGNOSIS — E13.9 LADA (LATENT AUTOIMMUNE DIABETES IN ADULTS), MANAGED AS TYPE 1: Primary | ICD-10-CM

## 2024-03-22 LAB
ALBUMIN/CREAT UR: 191.1 UG/MG (ref 0–30)
CREAT UR-MCNC: 45 MG/DL (ref 15–325)
MICROALBUMIN UR DL<=1MG/L-MCNC: 86 UG/ML

## 2024-03-22 PROCEDURE — 82043 UR ALBUMIN QUANTITATIVE: CPT | Mod: HCNC,PN | Performed by: INTERNAL MEDICINE

## 2024-04-02 ENCOUNTER — OFFICE VISIT (OUTPATIENT)
Dept: INTERNAL MEDICINE | Facility: CLINIC | Age: 79
End: 2024-04-02
Payer: MEDICARE

## 2024-04-02 VITALS
HEIGHT: 64 IN | SYSTOLIC BLOOD PRESSURE: 162 MMHG | WEIGHT: 151 LBS | OXYGEN SATURATION: 98 % | DIASTOLIC BLOOD PRESSURE: 80 MMHG | HEART RATE: 61 BPM | BODY MASS INDEX: 25.78 KG/M2

## 2024-04-02 DIAGNOSIS — E11.65 UNCONTROLLED TYPE 2 DIABETES MELLITUS WITH HYPERGLYCEMIA: Primary | ICD-10-CM

## 2024-04-02 DIAGNOSIS — E11.59 HYPERTENSION ASSOCIATED WITH DIABETES: ICD-10-CM

## 2024-04-02 DIAGNOSIS — I15.2 HYPERTENSION ASSOCIATED WITH DIABETES: ICD-10-CM

## 2024-04-02 DIAGNOSIS — G30.1 MILD LATE ONSET ALZHEIMER'S DEMENTIA WITH OTHER BEHAVIORAL DISTURBANCE: ICD-10-CM

## 2024-04-02 DIAGNOSIS — F02.A18 MILD LATE ONSET ALZHEIMER'S DEMENTIA WITH OTHER BEHAVIORAL DISTURBANCE: ICD-10-CM

## 2024-04-02 PROCEDURE — 1157F ADVNC CARE PLAN IN RCRD: CPT | Mod: HCNC,CPTII,S$GLB, | Performed by: INTERNAL MEDICINE

## 2024-04-02 PROCEDURE — 3288F FALL RISK ASSESSMENT DOCD: CPT | Mod: HCNC,CPTII,S$GLB, | Performed by: INTERNAL MEDICINE

## 2024-04-02 PROCEDURE — G2211 COMPLEX E/M VISIT ADD ON: HCPCS | Mod: HCNC,S$GLB,, | Performed by: INTERNAL MEDICINE

## 2024-04-02 PROCEDURE — 3077F SYST BP >= 140 MM HG: CPT | Mod: HCNC,CPTII,S$GLB, | Performed by: INTERNAL MEDICINE

## 2024-04-02 PROCEDURE — 3079F DIAST BP 80-89 MM HG: CPT | Mod: HCNC,CPTII,S$GLB, | Performed by: INTERNAL MEDICINE

## 2024-04-02 PROCEDURE — 99999 PR PBB SHADOW E&M-EST. PATIENT-LVL V: CPT | Mod: PBBFAC,HCNC,, | Performed by: INTERNAL MEDICINE

## 2024-04-02 PROCEDURE — 1159F MED LIST DOCD IN RCRD: CPT | Mod: HCNC,CPTII,S$GLB, | Performed by: INTERNAL MEDICINE

## 2024-04-02 PROCEDURE — 1101F PT FALLS ASSESS-DOCD LE1/YR: CPT | Mod: HCNC,CPTII,S$GLB, | Performed by: INTERNAL MEDICINE

## 2024-04-02 PROCEDURE — 99214 OFFICE O/P EST MOD 30 MIN: CPT | Mod: HCNC,S$GLB,, | Performed by: INTERNAL MEDICINE

## 2024-04-02 PROCEDURE — 1126F AMNT PAIN NOTED NONE PRSNT: CPT | Mod: HCNC,CPTII,S$GLB, | Performed by: INTERNAL MEDICINE

## 2024-04-02 PROCEDURE — 1160F RVW MEDS BY RX/DR IN RCRD: CPT | Mod: HCNC,CPTII,S$GLB, | Performed by: INTERNAL MEDICINE

## 2024-04-02 RX ORDER — INSULIN ASPART 100 [IU]/ML
8 INJECTION, SOLUTION INTRAVENOUS; SUBCUTANEOUS
Qty: 24 ML | Refills: 1 | Status: SHIPPED | OUTPATIENT
Start: 2024-04-02

## 2024-04-02 RX ORDER — DONEPEZIL HYDROCHLORIDE 5 MG/1
5 TABLET, FILM COATED ORAL NIGHTLY
Qty: 90 TABLET | Refills: 1 | Status: SHIPPED | OUTPATIENT
Start: 2024-04-02 | End: 2025-04-02

## 2024-04-02 NOTE — PROGRESS NOTES
Hi, they were wanting to know if they can switch to the 4/12 , daughter who takes her to appointment is off that day    Assessment:         1. Uncontrolled type 2 diabetes mellitus with hyperglycemia    2. 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 Hypertension associated with diabetes          Plan:           Noris was seen today for follow-up.    Diagnoses and all orders for this visit:    Uncontrolled type 2 diabetes mellitus with hyperglycemia\F  Chronic  Uncontrolled  Patient is not at goal today  I have reviewed lifestyle modification to achieve/maintain goals  We will adjust the current medication regimen to   Patient will follow up in 8 weeks  -     insulin aspart U-100 (NOVOLOG FLEXPEN U-100 INSULIN) 100 unit/mL (3 mL) InPn pen; Inject 8 Units into the skin 3 (three) times daily with meals.    Mild late onset Alzheimer's dementia with other behavioral disturbance  Chronic  Uncontrolled  Patient is not at goal today  I have reviewed lifestyle modification to achieve/maintain goals  We will adjust the current medication regimen to   Patient will follow up in 8 weeks  -     donepeziL (ARICEPT) 5 MG tablet; Take 1 tablet (5 mg total) by mouth every evening.    Hypertension associated with diabetes  Chronic  Uncontrolled  Patient is not at goal today  I have reviewed lifestyle modification to achieve/maintain goals  We will adjust the current medication regimen to  RESUME AMLODIPINE  Patient will follow up in 2 weeks          Uncontrolled Diabetes  Dexcom she will meet with DM edu - switch to 12th   Lantus   20 U    Novolog  8 U    4/10/2024 10:00 AM Lesley Nathan, RD, CDE [facility]       Resume amldoipine   2 weeks nurse bpc with bp log  Follow up in 3 month with A1c, umacr , bmp    Subjective:       Patient ID: Noris Demarco is a 78 y.o. female.    Chief Complaint: Follow-up        Interim Hx  Last seen by 2/2024    Concerns today  Ate a whole box of ice cream sandwiches  "  Memory is declines wanting to try a different mediicein     Chronic problems     Hypertension  This is a chronic problem. The current episode started more than 1 year ago. The problem has been waxing and waning since onset. Past treatments include calcium channel blockers, alpha 1 blockers and angiotensin blockers.   Hyperlipidemia  This is a chronic problem. The current episode started more than 1 year ago. The problem is controlled. Compliance problems include medication side effects (STATIN MYOPATH).    Diabetes  She presents for her follow-up diabetic visit. She has type 2 diabetes mellitus. Her disease course has been worsening. Symptoms are worsening. Current diabetic treatment includes insulin injections (20 U lantus , 6 U TID novolog). She has not had a previous visit with a dietitian. An ACE inhibitor/angiotensin II receptor blocker is being taken. She sees a podiatrist.Eye exam is current.       Review of Systems   All other systems reviewed and are negative.            Health Maintenance Due   Topic Date Due    TETANUS VACCINE  Never done    Shingles Vaccine (1 of 2) Never done    RSV Vaccine (Age 60+ and Pregnant patients) (1 - 1-dose 60+ series) Never done    COVID-19 Vaccine (4 - 2023-24 season) 09/01/2023         Objective:     BP (!) 162/80 (BP Location: Right arm, Patient Position: Sitting, BP Method: Small (Manual))   Pulse 61   Ht 5' 4" (1.626 m)   Wt 68.5 kg (151 lb 0.2 oz)   LMP 08/13/1999 (Approximate)   SpO2 98%   BMI 25.92 kg/m²         4/2/2024     1:39 PM 2/27/2024     2:07 PM 2/27/2024     1:14 PM 12/19/2023     1:44 PM 11/21/2023     3:42 PM   Vitals   Height 5' 4" (1.626 m) 5' 4" (1.626 m)  5' 4" (1.626 m) 5' 4" (1.626 m)   Weight (lbs) 151.02 150.79 150.35 151.46 149.69   BMI (kg/m2) 25.9 25.9  26 25.7                Physical Exam  Vitals and nursing note reviewed.   Constitutional:       General: She is not in acute distress.     Appearance: She is well-developed. She is not " diaphoretic.   HENT:      Head: Normocephalic.      Nose: Nose normal.   Eyes:      General:         Right eye: No discharge.         Left eye: No discharge.      Conjunctiva/sclera: Conjunctivae normal.      Pupils: Pupils are equal, round, and reactive to light.   Cardiovascular:      Rate and Rhythm: Normal rate and regular rhythm.      Heart sounds: Normal heart sounds. No murmur heard.     No friction rub. No gallop.   Pulmonary:      Effort: Pulmonary effort is normal. No respiratory distress.   Abdominal:      General: Bowel sounds are normal. There is no distension.      Palpations: Abdomen is soft.   Musculoskeletal:         General: No deformity. Normal range of motion.      Cervical back: Normal range of motion.   Skin:     General: Skin is warm.   Neurological:      Mental Status: She is alert and oriented to person, place, and time.      Cranial Nerves: No cranial nerve deficit.           Recent Results (from the past 336 hour(s))   Microalbumin/Creatinine Ratio, Urine    Collection Time: 03/22/24  7:54 AM   Result Value Ref Range    Microalbumin, Urine 86.0 ug/mL    Creatinine, Urine 45.0 15.0 - 325.0 mg/dL    Microalb/Creat Ratio 191.1 (H) 0.0 - 30.0 ug/mg   Hemoglobin A1C    Collection Time: 03/22/24  8:04 AM   Result Value Ref Range    Hemoglobin A1C 9.0 (H) 4.0 - 5.6 %    Estimated Avg Glucose 212 (H) 68 - 131 mg/dL     Future Appointments   Date Time Provider Department Center   4/10/2024 10:00 AM Lesley Nathan RD, CDE OC DIAEDU [place]   4/12/2024  2:00 PM Lisandra Collier MD OC OPHTHA [place]   4/19/2024  1:00 PM LAB, SPECIMEN RVPH RVPH SPECLAB [facility]   4/22/2024  9:00 AM Cecilia Chandra NP [facility] HEM ONC [place] [facility]   5/2/2024  1:20 PM ETHAN Remy MD OC OPHTHA [place]   5/8/2024  8:00 AM Laquita Alvarez NP [address]   6/28/2024  9:15 AM LAB, GINNY KENH LAB [place]   7/2/2024 10:20 Abr[facility] III, [facility] [place]   8/7/2024  2:30 PM Janet Hannah  [facility] [place]         Medication List with Changes/Refills   New Medications    DONEPEZIL (ARICEPT) 5 MG TABLET    Take 1 tablet (5 mg total) by mouth every evening.   Current Medications    AMLODIPINE (NORVASC) 10 MG TABLET    TAKE 1 TABLET ONE TIME DAILY    ASPIRIN (ECOTRIN) 81 MG EC TABLET    Take 81 mg by mouth once daily.    BD ULTRA-FINE IRWIN PEN NEEDLE 32 GAUGE X 5/32" NDLE    3 TIMES A DAY    BD ULTRA-FINE SHORT PEN NEEDLE 31 GAUGE X 5/16" NDLE    SMARTSI Each SUB-Q Daily    BLOOD-GLUCOSE METER,CONTINUOUS (DEXCOM G6 ) MISC    1 each by Misc.(Non-Drug; Combo Route) route once daily at 6am.    BLOOD-GLUCOSE SENSOR (DEXCOM G6 SENSOR) LEONOR    1 each by Misc.(Non-Drug; Combo Route) route once daily.    BLOOD-GLUCOSE TRANSMITTER (DEXCOM G6 TRANSMITTER) LEONOR    1 each by Misc.(Non-Drug; Combo Route) route once daily.    BUSPIRONE (BUSPAR) 15 MG TABLET    Take 1 tablet (15 mg total) by mouth 2 (two) times daily.    CHOLECALCIFEROL, VITAMIN D3, (VITAMIN D3) 50 MCG (2,000 UNIT) CAP    Take 1 capsule by mouth once daily.    CLONIDINE (CATAPRES) 0.2 MG TABLET    Take 1 tablet (0.2 mg total) by mouth 2 (two) times daily.    COENZYME Q10 100 MG CAPSULE    Take 100 mg by mouth once daily.    CYANOCOBALAMIN 500 MCG TABLET    Take 500 mcg by mouth once daily.    CYPROHEPTADINE (PERIACTIN) 4 MG TABLET    Take 1 tablet (4 mg total) by mouth 2 (two) times a day.    FISH OIL-OMEGA-3 FATTY ACIDS 300-1,000 MG CAPSULE    Take 1 g by mouth once daily.    FLUAD QUAD -24,65Y UP,,PF, 60 MCG (15 MCG X 4)/0.5 ML SYRG        FUROSEMIDE (LASIX) 40 MG TABLET    Take 40 mg by mouth once daily.     GLUCOSE 4 GM CHEWABLE TABLET    Take 4 tablets (16 g total) by mouth as needed for Low blood sugar (If having symptoms of blurry vision, palpitations, confusion, shakiness.  Please check sugars and if sugar below 70 please take 4 tablets and re-check sugar everry 15 minutes until sugars are above 70 and symptoms " resolve.).    LANTUS SOLOSTAR U-100 INSULIN GLARGINE 100 UNITS/ML SUBQ PEN    Inject 20 Units into the skin every evening.    LOSARTAN (COZAAR) 100 MG TABLET    Take 1 tablet (100 mg total) by mouth once daily.    MIRTAZAPINE (REMERON) 7.5 MG TAB    Take 1 tablet (7.5 mg total) by mouth every evening.    OFLOXACIN (FLOXIN) 0.3 % OTIC SOLUTION    INSTILL 5 DROPS INTO THE RIGHT EAR ONCE DAILY    TAMOXIFEN (NOLVADEX) 20 MG TAB    TAKE 1 TABLET ONE TIME DAILY    TRUE METRIX GLUCOSE TEST STRIP STRP    1 strip by In Vitro route every morning.   Changed and/or Refilled Medications    Modified Medication Previous Medication    INSULIN ASPART U-100 (NOVOLOG FLEXPEN U-100 INSULIN) 100 UNIT/ML (3 ML) INPN PEN NOVOLOG FLEXPEN U-100 INSULIN 100 unit/mL (3 mL) InPn pen       Inject 8 Units into the skin 3 (three) times daily with meals.    INJECT 6 UNITS UNDER THE SKIN THREE TIMES DAILY WITH MEALS.         Disclaimer:  This note has been generated using voice-recognition software. There may be grammatical or spelling errors that have been missed during proof-reading Visit complexity inherent to evaluation and management associated with medical care services that serve as the continuing focal point for all needed health care services and/or with medical care services that are part of ongoing care related to a patient's single, serious condition or a complex condition

## 2024-04-02 NOTE — Clinical Note
Hi, they were wanting to know if they can switch to the 4/12 , daughter who takes her to appointment is off that day

## 2024-04-09 ENCOUNTER — PATIENT MESSAGE (OUTPATIENT)
Dept: DIABETES | Facility: CLINIC | Age: 79
End: 2024-04-09
Payer: MEDICARE

## 2024-04-12 ENCOUNTER — PATIENT MESSAGE (OUTPATIENT)
Dept: DIABETES | Facility: CLINIC | Age: 79
End: 2024-04-12

## 2024-04-12 ENCOUNTER — OFFICE VISIT (OUTPATIENT)
Dept: OPHTHALMOLOGY | Facility: CLINIC | Age: 79
End: 2024-04-12
Payer: MEDICARE

## 2024-04-12 ENCOUNTER — CLINICAL SUPPORT (OUTPATIENT)
Dept: DIABETES | Facility: CLINIC | Age: 79
End: 2024-04-12
Payer: MEDICARE

## 2024-04-12 DIAGNOSIS — E13.9 LADA (LATENT AUTOIMMUNE DIABETES IN ADULTS), MANAGED AS TYPE 1: ICD-10-CM

## 2024-04-12 DIAGNOSIS — H25.13 NUCLEAR SCLEROSIS, BILATERAL: Primary | ICD-10-CM

## 2024-04-12 PROCEDURE — 1157F ADVNC CARE PLAN IN RCRD: CPT | Mod: CPTII,S$GLB,, | Performed by: OPHTHALMOLOGY

## 2024-04-12 PROCEDURE — 1101F PT FALLS ASSESS-DOCD LE1/YR: CPT | Mod: CPTII,S$GLB,, | Performed by: OPHTHALMOLOGY

## 2024-04-12 PROCEDURE — 99213 OFFICE O/P EST LOW 20 MIN: CPT | Mod: S$GLB,,, | Performed by: OPHTHALMOLOGY

## 2024-04-12 PROCEDURE — G0108 DIAB MANAGE TRN  PER INDIV: HCPCS | Mod: 95,,, | Performed by: DIETITIAN, REGISTERED

## 2024-04-12 PROCEDURE — 1160F RVW MEDS BY RX/DR IN RCRD: CPT | Mod: CPTII,S$GLB,, | Performed by: OPHTHALMOLOGY

## 2024-04-12 PROCEDURE — 3288F FALL RISK ASSESSMENT DOCD: CPT | Mod: CPTII,S$GLB,, | Performed by: OPHTHALMOLOGY

## 2024-04-12 PROCEDURE — 99999 PR PBB SHADOW E&M-EST. PATIENT-LVL III: CPT | Mod: PBBFAC,,, | Performed by: OPHTHALMOLOGY

## 2024-04-12 PROCEDURE — 1159F MED LIST DOCD IN RCRD: CPT | Mod: CPTII,S$GLB,, | Performed by: OPHTHALMOLOGY

## 2024-04-12 PROCEDURE — 92136 OPHTHALMIC BIOMETRY: CPT | Mod: RT,S$GLB,, | Performed by: OPHTHALMOLOGY

## 2024-04-12 RX ORDER — PHENYLEPHRINE HYDROCHLORIDE 100 MG/ML
1 SOLUTION/ DROPS OPHTHALMIC
OUTPATIENT
Start: 2024-04-12

## 2024-04-12 RX ORDER — MOXIFLOXACIN 5 MG/ML
1 SOLUTION/ DROPS OPHTHALMIC
OUTPATIENT
Start: 2024-04-12

## 2024-04-12 RX ORDER — CYCLOP/TROP/PROPA/PHEN/KET/WAT 1-1-0.1%
1 DROPS (EA) OPHTHALMIC (EYE)
OUTPATIENT
Start: 2024-04-12

## 2024-04-12 RX ORDER — PREDNISOLONE ACETATE-GATIFLOXACIN-BROMFENAC .75; 5; 1 MG/ML; MG/ML; MG/ML
1 SUSPENSION/ DROPS OPHTHALMIC 3 TIMES DAILY
Qty: 5 ML | Refills: 3 | Status: SHIPPED | OUTPATIENT
Start: 2024-04-12

## 2024-04-12 RX ORDER — SODIUM CHLORIDE 0.9 % (FLUSH) 0.9 %
10 SYRINGE (ML) INJECTION
Status: SHIPPED | OUTPATIENT
Start: 2024-04-12

## 2024-04-12 NOTE — PROGRESS NOTES
HPI     DM      DFE    CATARACT  EVALUATION OU            Comments: CATARACT EVALUATION OU   DILATE OU   PRIOR WFFA   LEAKAGE     DM   LAST BS unsure  LAST A1C  unsure  3 MONTH FOLLOW UP  NS     WOULD LIKE TO DISCUSS CATARACT SURGERY OPTIONS   REFERRED BY DR CAZARES TO HAVE CATARACT SURGERY SCHEDULED OU           Comments    VA stable ou, no pain ou and denies floaters ou without flashes ou.  DLS 01/25/24  No gtts          Last edited by Kath Villar on 4/12/2024  3:07 PM.            Assessment /Plan     For exam results, see Encounter Report.    Nuclear sclerosis, bilateral      Visually Significant Cataract: Patient reports decreased vision consistent with the clinical amount of lenticular opacity, which reaches the level of visual significance and affects activities of daily living.     Specifically, this patient describes difficulty with:  - driving safely at night  - reading road signs  - reading small print  - deciphering medicine bottles  - reading the newspaper  - using the phone  - reading texts     Risks, benefits, and alternatives to cataract surgery were discussed and the consent reviewed. IOL options were discussed, including ATIOLs and the associated side effects and additional patient cost associated with them.   IOL Selections:   Right eye  IOL: CNA0T0 21.5     Left eye  IOL: CNA0T0 22.0    Pt wishes to have RIGHT eye done first.  OZURDEX OU

## 2024-04-15 NOTE — PROGRESS NOTES
Diabetes Care Specialist Virtual Visit Note       The patient location is: home in [place]  The chief complaint leading to consultation is: Diabetes  Visit type: audiovisual  Total time spent with patient: 60 min   Each patient to whom he or she provides medical services by telemedicine is:  (1) informed of the relationship between the physician and patient and the respective role of any other health care provider with respect to management of the patient; and (2) notified that he or she may decline to receive medical services by telemedicine and may withdraw from such care at any time.     Diabetes Care Specialist Progress Note  Author: Donna Obando RD, CDE  Date: 4/15/2024    Program Intake  Reason for Diabetes Program Visit:: Initial Diabetes Assessment  Current diabetes risk level:: moderate  In the last 12 months, have you:: none  Permission to speak with others about care:: yes ( Gregory and daughter Samantha)  Continuous Glucose Monitoring  Patient has CGM: No    Lab Results   Component Value Date    HGBA1C 9.0 (H) 03/22/2024       Clinical    Problem Review  Reviewed Problem List with Patient: yes  Active comorbidities affecting diabetes self-care.: yes  Comorbidities: Hypertension, Other (comment) (alzheimer's)    Clinical Assessment  Current Diabetes Treatment: Insulin  Have you ever experienced hypoglycemia (low blood sugar)?: yes  In the last month, how often have you experienced low blood sugar?: other (see comments) (rarely)  Are you able to tell when your blood sugar is low?: Yes  What symptoms do you experience?: Other ( notices when she looks tired)  Have you ever been hospitalized because your blood sugar was too low?: no  How do you treat hypoglycemia (low blood sugar)?: 1/2 can soda/fruit juice  Have you ever experienced hyperglycemia (high blood sugar)?: no    Medication Information  How do you obtain your medications?: Family picks up  How many days a week do you miss your  medications?: Never  Do you sometimes have difficulty refilling your medications?: No  Medication adherence impacting ability to self-manage diabetes?: No    Current DM meds:  Lantus 20 units HS  Novolog 8 units AC    Labs  Do you have regular lab work to monitor your medications?: Yes  Type of Regular Lab Work: A1c, Cholesterol, CBC, Microalbumin  Where do you get your labs drawn?: [facility]  Lab Compliance Barriers: No    Nutritional Status  Meal Plan 24 Hour Recall: Breakfast, Lunch, Dinner, Snack  Meal Plan 24 Hour Recall - Breakfast: eggs, ~1/2 cup grits, 1 toast, coffee with SF creamer  Meal Plan 24 Hour Recall - Lunch: okra, 1 piece fried chicken, baked sweet potato with butter - water  Meal Plan 24 Hour Recall - Dinner: same as lunch - uses portion plate for meals  Meal Plan 24 Hour Recall - Snack: mostly SF popsicles, sometimes has a ~1in piece of SF cake (homemade)  Recent Changes in Weight: No Recent Weight Change    Additional Social History    Support  Does anyone support you with your diabetes care?: yes  Who supports you?: spouse, son/daughter  Who takes you to your medical appointments?: son/daughter  Does the current support meet the patient's needs?: Yes  Is Support an area impacting ability to self-manage diabetes?: No    Access to Mass Media & Technology  Does the patient have access to any of the following devices or technologies?: Smart phone, Internet Access  Media or technology needs impacting ability to self-manage diabetes?: No (daughter sets up virtual visits)    Cognitive/Behavioral Health  Alert and Oriented: Yes  Difficulty Thinking: Yes (alzheimer's)  Requires Prompting: No  Requires assistance for routine expression?: No  Cognitive or behavioral barriers impacting ability to self-manage diabetes?: Yes (Pt has alzheimer's; however,  taking very good care of her and has additional support from daughter)    Culture/Mu-ism  Culture or Mormonism beliefs that may impact ability to  access healthcare: No    Communication  Language preference: English  Hearing Problems: No  Vision Problems: No  Communication needs impacting ability to self-manage diabetes?: No    Health Literacy  Preferred Learning Method: Face to Face, Reading Materials  How often do you need to have someone help you read instructions, pamphlets, or written material from your doctor or pharmacy?: Sometimes      Diabetes Self-Management Skills Assessment    Diabetes Disease Process/Treatment Options  Patient/caregiver able to state what happens when someone has diabetes.: somewhat  Patient/caregiver knows what type of diabetes they have.: yes  Diabetes Type : Type I (WATSON treated as type 1)  Patient/caregiver able to identify at least three signs and symptoms of diabetes.: no  Patient able to identify at least three risk factors for diabetes.: no  Diabetes Disease Process/Treatment Options: Skills Assessment Completed: Yes  Assessment indicates:: Instruction Needed  Area of need?: Yes    Nutrition/Healthy Eating  Challenges to healthy eating:: snacking between meals and at night  Method of carbohydrate measurement:: portion plate  Patient can identify foods that impact blood sugar.: yes  Patient-identified foods:: starches (bread, pasta, rice, cereal), sweets, soda, starchy vegetables (corn, peas, beans), fruit/fruit juice  Nutrition/Healthy Eating Skills Assessment Completed:: Yes  Assessment indicates:: Instruction Needed  Area of need?: Yes    Physical Activity/Exercise  Patient's daily activity level:: lightly active  Patient formally exercises outside of work.: yes  Exercise Type: walking  Intensity: Low  Frequency: four or more times a week  Patient can identify forms of physical activity.: yes  Stated forms of physical activity:: any movement performed by muscles that uses energy  Patient can identify reasons why exercise/physical activity is important in diabetes management.: yes  Identified reasons:: lowers blood  glucose, blood pressure, and cholesterol, keeps body and joints flexible, improves blood circulation, lowers risk of heart disease and stroke, tones muscles, strengthens heart, muscles, and bones, helps insulin work better  Physical Activity/Exercise Skills Assessment Completed: : Yes  Assessment indicates:: Adequate understanding  Area of need?: No    Medications  Patient is able to describe current diabetes management routine.: yes (Pt does not recall doses but  is assisting with all injections)  Diabetes management routine:: insulin, diet, exercise  Patient is able to identify current diabetes medications, dosages, and appropriate timing of medications.: yes  Patient understands the purpose of the medications taken for diabetes.: yes  Patient reports problems or concerns with current medication regimen.: no  Medication Skills Assessment Completed:: Yes  Assessment indicates:: Adequate understanding  Area of need?: No    Home Blood Glucose Monitoring  Patient states that blood sugar is checked at home daily.: yes  Monitoring Method:: home glucometer  How often do you check your blood sugar?: 3 times a day  When do you check your blood sugar?: Before breakfast, Before lunch, Before dinner  When you check what is your typical blood sugar range? :  verbally reports -130, before lunch 200-300, before dinner 200-300  Blood glucose logs:: encouraged to keep logs, encouraged to bring logs to provider visits, no  Blood glucose logs reviewed today?: no  Home Blood Glucose Monitoring Skills Assessment Completed: : Yes  Assessment indicates:: Instruction Needed  Area of need?: Yes    Acute Complications  Acute Complications Skills Assessment Completed: : No  Deffered due to:: Time    Chronic Complications  Chronic Complications Skills Assessment Completed: : No  Deferred due to:: Time    Psychosocial/Coping  Psychosocial/Coping Skills Assessment Completed: : No  Deffered due to:: Time      Assessment  Summary and Plan    Based on today's diabetes care assessment, the following areas of need were identified:          4/12/2024    12:03 AM   Social   Support No   Access to Mass Media/Tech No   Cognitive/Behavioral Health Yes   Culture/Restorationism No   Communication No            4/12/2024    12:03 AM   Clinical   Medication Adherence No   Lab Compliance No            4/12/2024    12:03 AM   Diabetes Self-Management Skills   Diabetes Disease Process/Treatment Options Yes - reviewed pathophysiology of WATSON managed as type 1 and importance of insulin injections.   Nutrition/Healthy Eating Yes - see care planning   Physical Activity/Exercise No   Medication No   Home Blood Glucose Monitoring Yes - see care planning          Today's interventions were provided through individual discussion, instruction, and written materials were provided.      Patient verbalized understanding of instruction and written materials.  Pt was able to return back demonstration of instructions today. Patient understood key points, needs reinforcement and further instruction.     Diabetes Self-Management Care Plan:    Today's Diabetes Self-Management Care Plan was developed with Noris's input. Noris has agreed to work toward the following goal(s) to improve his/her overall diabetes control.      Care Plan: Diabetes Management   Updates made since 3/16/2024 12:00 AM        Problem: Healthy Eating         Goal: Will limit snack to no more than 15g CHO.    Start Date: 4/12/2024   Expected End Date: 5/15/2024   Priority: Medium   Barriers: No Barriers Identified   Note:    4/12/24 -  is taking very good care of patient - loves cooking and is preparing meals at home. Goes heavier on protein and non-starchy vegetables and light on carb. He shows good understanding of sources of carbohydrate and is using portion plate. Reviewed portion sizes of carb using dry measuring cups and food models for visual aid. Pt does eat sweets between meals at times.   bakes sugar-free cakes, and she will get a small slice from time to time. Reviewed sugar-free does not mean carb free and encouraged label reading. Reviewed label reading exercise with example labels. Encouraged limiting snack to 15g carb. Also reviewed estimating portion sizes of foods that don't have labels such as fruits.        Task: Reviewed the sources and role of Carbohydrate, Protein, and Fat and how each nutrient impacts blood sugar. Completed 4/15/2024        Task: Provided visual examples using dry measuring cups, food models, and other familiar objects such as computer mouse, deck or cards, tennis ball etc. to help with visualization of portions. Completed 4/15/2024        Task: Explained how to count carbohydrates using the food label and the use of dry measuring cups for accurate carb counting.         Task: Discussed strategies for choosing healthier menu options when dining out.         Task: Recommended replacing beverages containing high sugar content with noncaloric/sugar free options and/or water.         Task: Review the importance of balancing carbohydrates with each meal using portion control techniques to count servings of carbohydrate and label reading to identify serving size and amount of total carbs per serving. Completed 4/15/2024        Task: Provided Sample plate method and reviewed the use of the plate to estimate amounts of carbohydrate per meal. Completed 4/15/2024        Problem: Blood Glucose Self-Monitoring         Goal: Will price check coverage of Libre3.    Start Date: 4/12/2024   Expected End Date: 5/15/2024   Priority: High   Barriers: No Barriers Identified   Note:    4/12/24 - Had attempted to get Dexcom in the past but was too expensive. Discussed possibility of trying to order Meet through DME and check if out of pocket is more affordable.  willing to try this. He does glucose monitoring for her. Reports she doesn't like fingersticks but most of the time  lets him. As alzheimer's progresses, may become more difficult to monitor. Pt already does not verbalize when feeling s/s of low -  just notices when she looks fatigued - thankfully not a frequent occurrence. Would greatly benefit from personal CGM.        Task: Reviewed the importance of self-monitoring blood glucose and keeping logs.         Task: Instructed on how to self-monitor blood glucose using a home glucometer, how to properly dispose of used strips and lancets after use, and how to appropriately store meter and supplies.         Task: Provided patient with blood glucose logs, reviewed appropriate timing and frequency to SMBG, education on parameters on when to notify provider and advised patient to bring logs to all appts with PCP/Endocrinologist/Diabetes Care Specialist.         Task: Discussed ways to minimize pain when monitoring blood glucose.           Follow Up Plan     Follow up in about 27 days (around 5/9/2024) for 1 month follow-up.    Today's care plan and follow up schedule was discussed with patient.  Noris verbalized understanding of the care plan, goals, and agrees to follow up plan.        The patient was encouraged to communicate with his/her health care provider/physician and care team regarding his/her condition(s) and treatment.  I provided the patient with my contact information today and encouraged to contact me via phone or [facility] Patient Portal as needed.     Length of Visit   Total Time: 60 Minutes

## 2024-04-19 ENCOUNTER — TELEPHONE (OUTPATIENT)
Dept: HEMATOLOGY/ONCOLOGY | Facility: CLINIC | Age: 79
End: 2024-04-19
Payer: MEDICARE

## 2024-04-19 ENCOUNTER — LAB VISIT (OUTPATIENT)
Dept: LAB | Facility: HOSPITAL | Age: 79
End: 2024-04-19
Attending: NURSE PRACTITIONER
Payer: MEDICARE

## 2024-04-19 DIAGNOSIS — D05.11 DUCTAL CARCINOMA IN SITU (DCIS) OF RIGHT BREAST: ICD-10-CM

## 2024-04-19 DIAGNOSIS — C50.912 INFILTRATING DUCTAL CARCINOMA OF BREAST, LEFT: ICD-10-CM

## 2024-04-19 LAB
ALBUMIN SERPL BCP-MCNC: 3.7 G/DL (ref 3.5–5.2)
ALP SERPL-CCNC: 102 U/L (ref 38–126)
ALT SERPL W/O P-5'-P-CCNC: 16 U/L (ref 10–44)
ANION GAP SERPL CALC-SCNC: 8 MMOL/L (ref 8–16)
AST SERPL-CCNC: 23 U/L (ref 15–46)
BASOPHILS # BLD AUTO: 0.05 K/UL (ref 0–0.2)
BASOPHILS NFR BLD: 1.2 % (ref 0–1.9)
BILIRUB SERPL-MCNC: 0.4 MG/DL (ref 0.1–1)
CALCIUM SERPL-MCNC: 9.4 MG/DL (ref 8.7–10.5)
CHLORIDE SERPL-SCNC: 106 MMOL/L (ref 95–110)
CO2 SERPL-SCNC: 26 MMOL/L (ref 23–29)
CREAT SERPL-MCNC: 0.61 MG/DL (ref 0.5–1.4)
DIFFERENTIAL METHOD BLD: ABNORMAL
EOSINOPHIL # BLD AUTO: 0.2 K/UL (ref 0–0.5)
EOSINOPHIL NFR BLD: 5.6 % (ref 0–8)
ERYTHROCYTE [DISTWIDTH] IN BLOOD BY AUTOMATED COUNT: 11.3 % (ref 11.5–14.5)
EST. GFR  (NO RACE VARIABLE): >60 ML/MIN/1.73 M^2
GLUCOSE SERPL-MCNC: 276 MG/DL (ref 70–110)
HCT VFR BLD AUTO: 36.4 % (ref 37–48.5)
HGB BLD-MCNC: 12 G/DL (ref 12–16)
IMM GRANULOCYTES # BLD AUTO: 0.01 K/UL (ref 0–0.04)
IMM GRANULOCYTES NFR BLD AUTO: 0.2 % (ref 0–0.5)
LYMPHOCYTES # BLD AUTO: 1.7 K/UL (ref 1–4.8)
LYMPHOCYTES NFR BLD: 39.9 % (ref 18–48)
MCH RBC QN AUTO: 32.5 PG (ref 27–31)
MCHC RBC AUTO-ENTMCNC: 33 G/DL (ref 32–36)
MCV RBC AUTO: 99 FL (ref 82–98)
MONOCYTES # BLD AUTO: 0.5 K/UL (ref 0.3–1)
MONOCYTES NFR BLD: 12.3 % (ref 4–15)
NEUTROPHILS # BLD AUTO: 1.7 K/UL (ref 1.8–7.7)
NEUTROPHILS NFR BLD: 40.8 % (ref 38–73)
NRBC BLD-RTO: 0 /100 WBC
PLATELET # BLD AUTO: 224 K/UL (ref 150–450)
PMV BLD AUTO: 10.4 FL (ref 9.2–12.9)
POTASSIUM SERPL-SCNC: 4.3 MMOL/L (ref 3.5–5.1)
PROT SERPL-MCNC: 6.7 G/DL (ref 6–8.4)
RBC # BLD AUTO: 3.69 M/UL (ref 4–5.4)
SODIUM SERPL-SCNC: 140 MMOL/L (ref 136–145)
UUN UR-MCNC: 15 MG/DL (ref 7–17)
WBC # BLD AUTO: 4.14 K/UL (ref 3.9–12.7)

## 2024-04-19 PROCEDURE — 36415 COLL VENOUS BLD VENIPUNCTURE: CPT | Mod: HCNC,PN | Performed by: NURSE PRACTITIONER

## 2024-04-19 PROCEDURE — 85025 COMPLETE CBC W/AUTO DIFF WBC: CPT | Mod: HCNC,PN | Performed by: NURSE PRACTITIONER

## 2024-04-19 PROCEDURE — 80053 COMPREHEN METABOLIC PANEL: CPT | Mod: HCNC,PN | Performed by: NURSE PRACTITIONER

## 2024-04-23 ENCOUNTER — OFFICE VISIT (OUTPATIENT)
Dept: HEMATOLOGY/ONCOLOGY | Facility: CLINIC | Age: 79
End: 2024-04-23
Payer: MEDICARE

## 2024-04-23 VITALS
BODY MASS INDEX: 24.83 KG/M2 | RESPIRATION RATE: 18 BRPM | TEMPERATURE: 99 F | WEIGHT: 149.06 LBS | SYSTOLIC BLOOD PRESSURE: 207 MMHG | HEART RATE: 79 BPM | OXYGEN SATURATION: 99 % | DIASTOLIC BLOOD PRESSURE: 91 MMHG | HEIGHT: 65 IN

## 2024-04-23 DIAGNOSIS — I15.2 HYPERTENSION ASSOCIATED WITH DIABETES: ICD-10-CM

## 2024-04-23 DIAGNOSIS — Z79.4 TYPE 2 DIABETES MELLITUS WITH HYPERGLYCEMIA, WITH LONG-TERM CURRENT USE OF INSULIN: ICD-10-CM

## 2024-04-23 DIAGNOSIS — D05.11 DUCTAL CARCINOMA IN SITU (DCIS) OF RIGHT BREAST: Primary | ICD-10-CM

## 2024-04-23 DIAGNOSIS — N64.89 SEROMA OF BREAST: ICD-10-CM

## 2024-04-23 DIAGNOSIS — E11.65 TYPE 2 DIABETES MELLITUS WITH HYPERGLYCEMIA, WITH LONG-TERM CURRENT USE OF INSULIN: ICD-10-CM

## 2024-04-23 DIAGNOSIS — C50.912 INFILTRATING DUCTAL CARCINOMA OF BREAST, LEFT: ICD-10-CM

## 2024-04-23 DIAGNOSIS — R63.4 WEIGHT LOSS, UNINTENTIONAL: ICD-10-CM

## 2024-04-23 DIAGNOSIS — E11.59 HYPERTENSION ASSOCIATED WITH DIABETES: ICD-10-CM

## 2024-04-23 DIAGNOSIS — E55.9 VITAMIN D DEFICIENCY, UNSPECIFIED: ICD-10-CM

## 2024-04-23 PROCEDURE — 1160F RVW MEDS BY RX/DR IN RCRD: CPT | Mod: HCNC,CPTII,S$GLB, | Performed by: NURSE PRACTITIONER

## 2024-04-23 PROCEDURE — 99214 OFFICE O/P EST MOD 30 MIN: CPT | Mod: HCNC,S$GLB,, | Performed by: NURSE PRACTITIONER

## 2024-04-23 PROCEDURE — 3077F SYST BP >= 140 MM HG: CPT | Mod: HCNC,CPTII,S$GLB, | Performed by: NURSE PRACTITIONER

## 2024-04-23 PROCEDURE — 1159F MED LIST DOCD IN RCRD: CPT | Mod: HCNC,CPTII,S$GLB, | Performed by: NURSE PRACTITIONER

## 2024-04-23 PROCEDURE — 1101F PT FALLS ASSESS-DOCD LE1/YR: CPT | Mod: HCNC,CPTII,S$GLB, | Performed by: NURSE PRACTITIONER

## 2024-04-23 PROCEDURE — 99999 PR PBB SHADOW E&M-EST. PATIENT-LVL V: CPT | Mod: PBBFAC,HCNC,, | Performed by: NURSE PRACTITIONER

## 2024-04-23 PROCEDURE — 3080F DIAST BP >= 90 MM HG: CPT | Mod: HCNC,CPTII,S$GLB, | Performed by: NURSE PRACTITIONER

## 2024-04-23 PROCEDURE — 1157F ADVNC CARE PLAN IN RCRD: CPT | Mod: HCNC,CPTII,S$GLB, | Performed by: NURSE PRACTITIONER

## 2024-04-23 PROCEDURE — 3288F FALL RISK ASSESSMENT DOCD: CPT | Mod: HCNC,CPTII,S$GLB, | Performed by: NURSE PRACTITIONER

## 2024-04-23 PROCEDURE — 1126F AMNT PAIN NOTED NONE PRSNT: CPT | Mod: HCNC,CPTII,S$GLB, | Performed by: NURSE PRACTITIONER

## 2024-04-23 RX ORDER — GLUCAGON 3 MG/1
POWDER NASAL
COMMUNITY
Start: 2024-03-04

## 2024-04-23 RX ORDER — BEVACIZUMAB 2MG/0.08ML
SYRINGE (ML) INTRAOCULAR
COMMUNITY
Start: 2024-01-25

## 2024-04-23 NOTE — PROGRESS NOTES
Subjective:       Patient ID: Noris Demarco is a 78 y.o. female.    Chief Complaint:  Breast cancer    Follow-up  Pertinent negatives include no abdominal pain, arthralgias, chest pain, diaphoresis, fatigue, fever, headaches, nausea, rash, vomiting or weakness.       HPI as per Dr Peralta's 1/18/22 office visit notes:  She underwent a lumpectomy in Jan 2008 for a 5-mm Right Breast DCIS that was found on a routine screening mammogram, completed adjuvant RT in July 2008. She was started on Arimidex at [facility] following radiation treatment and she was intolerant to it because of debilitating and disabling joint pains, she hence was switched to tamoxifen in 12/2008 and completed it in Dec 2013.    She was then diagnosed with infiltrating ductal carcinoma of the lower outer quadrant of the left breast on routine screening mammogram done in May 2018.  Lumpectomy with SLN biopsy was done in June 2018 and a 1.3 cm grade 2 ER positive 95%, MA positive 5% and HER2 Luther negative, Ki-67 30% was noted. Five lymph nodes were removed from the axilla and they were all negative.  Oncotype score came back at 40, she declined adjuvant chemotherapy.  She completed adjuvant radiation therapy under the care of Dr. Chavez on 09/27/2018.    She has a golf ball-sized seroma in the left breast that is hard to palpation but is not painful.    She was intolerant to Arimidex before.  So she was given a prescription for Aromasin, she did not take it secondary to fear of arthralgias.  Hence tamoxifen was started on 10/08/2018.    INTERVAL HISTORY:   -she is here for follow-up of history of DCIS of the right breast and invasive breast cancer of the left breast, accompanied by family friend as  here but at an appt for himself  -she has been on tamoxifen since October 2018  -has HH nurse once weekly, blood pressure dropped last week as doubled diabetic medication accidentally  -she has a hard palpable seroma in the left breast, nonpainful,  "states she really does not feel it anymore at this time. Denies any further concerns of  breast lumps, lesions, drainage, pain, or lymph node enlargement.  -weight stable at 149#,  151# since 6 month ago visit, no longer needing periactin for appetite  -walks daily in neighborhood still  -has stopped doing  at this time given health issues  -feels good overall, she denies chest pain, sob, abdominal pain, n/v/d, constipation, hematemesis, melena, hematuria.  -working with pcp for better diabetes control  -MMG completed 11/2023, no malignancy evidence, repeat 1 yr  - did not give clonidine am dose yet today as did not want pt sleepy for appt    Review of Systems   Constitutional:  Negative for appetite change, diaphoresis, fatigue, fever and unexpected weight change.   HENT:  Negative for nosebleeds.    Eyes:  Negative for photophobia and visual disturbance.   Respiratory:  Negative for shortness of breath.    Cardiovascular:  Negative for chest pain.   Gastrointestinal:  Negative for abdominal pain, nausea and vomiting.   Genitourinary:  Negative for hematuria.   Musculoskeletal:  Negative for arthralgias.   Skin:  Negative for color change, rash and wound.   Neurological:  Negative for dizziness, speech difficulty, weakness, light-headedness and headaches.   Hematological:  Negative for adenopathy. Does not bruise/bleed easily.   Psychiatric/Behavioral:  The patient is not nervous/anxious.          Objective:      Vitals:    04/23/24 0942   BP: (!) 207/91   BP Location: Left arm   Patient Position: Sitting   BP Method: Small (Automatic)   Pulse: 79   Resp: 18   Temp: 98.5 °F (36.9 °C)   TempSrc: Oral   SpO2: 99%   Weight: 67.6 kg (149 lb 0.5 oz)   Height: 5' 5" (1.651 m)           Body mass index is 24.8 kg/m².    Physical Exam  Constitutional:       General: She is not in acute distress.     Appearance: Normal appearance. She is not toxic-appearing.   HENT:      Head: Normocephalic and atraumatic.     "  Nose: Nose normal.      Mouth/Throat:      Mouth: Mucous membranes are moist.      Pharynx: Oropharynx is clear.   Eyes:      General: No scleral icterus.     Conjunctiva/sclera: Conjunctivae normal.   Cardiovascular:      Rate and Rhythm: Normal rate.      Heart sounds: Normal heart sounds.   Pulmonary:      Effort: Pulmonary effort is normal. No respiratory distress.      Breath sounds: Normal breath sounds.   Chest:   Breasts:     Right: No swelling, bleeding, inverted nipple, mass, nipple discharge, skin change or tenderness.      Left: No swelling, bleeding, inverted nipple, mass, nipple discharge, skin change or tenderness.      Comments: Bilat breasts nontender, no notable skin changes or nipple drainage, no associated lymphadnopathy  Musculoskeletal:         General: No swelling or tenderness. Normal range of motion.      Cervical back: Normal range of motion and neck supple.   Lymphadenopathy:      Cervical:      Right cervical: No superficial or deep cervical adenopathy.     Left cervical: No superficial or deep cervical adenopathy.      Upper Body:      Right upper body: No supraclavicular, axillary or pectoral adenopathy.      Left upper body: No supraclavicular, axillary or pectoral adenopathy.   Skin:     General: Skin is warm and dry.      Coloration: Skin is not jaundiced or pale.   Neurological:      Mental Status: She is alert and oriented to person, place, and time.      Gait: Gait normal.   Psychiatric:         Mood and Affect: Mood normal.         Behavior: Behavior normal.         Thought Content: Thought content normal.         Judgment: Judgment normal.       ECOG SCORE    1 - Restricted in strenuous activity-ambulatory and able to carry out work of a light nature             LABS    Lab Results   Component Value Date    WBC 4.14 04/19/2024    HGB 12.0 04/19/2024    HCT 36.4 (L) 04/19/2024    MCV 99 (H) 04/19/2024     04/19/2024     IMAGING    DXA Bone Density 10/5/22 (reviewed by  this provider)  Impression:  -The T score associated with the lumbar spine is 0.2 on the current examination.  It was 1.0 on the prior examination.  The T score associated with the left femoral neck is 0.1 on the current examination.  It was 0 on the prior examination.  The T score associated with the right femoral neck is -0.2 on the current examination.  It was 0.1 on the prior examination.  -Normal study     Assessment:       1. Ductal carcinoma in situ (DCIS) of right breast    2. Infiltrating ductal carcinoma of breast, left    3. Weight loss, unintentional    4. Seroma of breast    5. Vitamin D deficiency, unspecified    6. Hypertension associated with diabetes    7. Type 2 diabetes mellitus with hyperglycemia, with long-term current use of insulin                Plan:     Past records reviewed including clinic visits, imaging, labs, medications.     Unintentional weight loss  -her weight is now stable, 149# today, 151#  6 months ago  -reviewed CT chest/abdomen/pelvis done 01/05/2022 as well as reviewed MRI brain done 01/05/2022 which do not reveal any evidence of malignancy  -since the weight has increased and pt is feeling better with included daily walks around neighborhood, will continue to monitor  -periactin no longer needed, appetite has been good  -continue to monitor    DCIS right breast  -diagnosed in 2008  -took tamoxifen for 5 years until December 2013    Stage I T1c N0 infiltrating ductal carcinoma of the left breast  -status post lumpectomy and radiation therapy  -on tamoxifen since October 8, 2018  -labs CBC, chemistries and vitamin-D reviewed, updated 10/19/23  -labs CBC, chemistries reviewed, updated 4/19/24 with normal hgb, macrocytic, anc 1.7 K/uL, otherwise cbc normal, cmp with gluc elevation otherwise normal  -continue tamoxifen until October 2023, prolonged to next visit to ensure MMG results given missed MMG appts  -Dexa completed 10/5/22, normal study, due again Oct 2025  -MMG 11/17/23  no malignancy evidence, repeat due 11/16/24  - she can stop tamoxifen now 4/2024    Seroma left breast  -has hard to palpate seroma, she denies any new concerns at this time  -will continue to monitor    Vitamin-D deficiency  -vitamin-D unable to be ordered by provider at this time, to reach out to pcp for lab testing  -cont otc vit D3, 2000 international units daily    HTN  - 207/91 today, no associated symptoms  - clonidine will be given daughter states when gets back home, didn't give before appt due to sleepiness as SE of med  - advised that I would prefer pt take prior appt as scheduled instead of risk of hypertensive emergency, verbalizes understanding  - management deferred to pcp    Diabetes with hyperglycemia with insulin  - A1c 3/22/24 9.0%, increased from 7.7% (12/15/23), elevated gluc on 4/19/24 labs 276 mg/dL which were completed nonfasting  - discussed importance of diabetic diet for prevention of diabetes related complications ie kidney disease  -  and daughter check blood sugars for pt at home  - management deferred to pcp    RTC 6 months with labs, cbc, cmp, MMG    [facility] NP-C  [facility]  Hematology/Oncology  [address]  [place]  [id number] [phone]

## 2024-04-30 ENCOUNTER — TELEPHONE (OUTPATIENT)
Dept: OPHTHALMOLOGY | Facility: CLINIC | Age: 79
End: 2024-04-30
Payer: MEDICARE

## 2024-04-30 DIAGNOSIS — H25.11 NUCLEAR SCLEROTIC CATARACT OF RIGHT EYE: Primary | ICD-10-CM

## 2024-05-04 DIAGNOSIS — E11.59 HYPERTENSION ASSOCIATED WITH DIABETES: ICD-10-CM

## 2024-05-04 DIAGNOSIS — I15.2 HYPERTENSION ASSOCIATED WITH DIABETES: ICD-10-CM

## 2024-05-05 RX ORDER — AMLODIPINE BESYLATE 10 MG/1
10 TABLET ORAL
Qty: 90 TABLET | Refills: 0 | Status: SHIPPED | OUTPATIENT
Start: 2024-05-05

## 2024-05-05 RX ORDER — CLONIDINE HYDROCHLORIDE 0.2 MG/1
0.2 TABLET ORAL 2 TIMES DAILY
Qty: 180 TABLET | Refills: 3 | Status: SHIPPED | OUTPATIENT
Start: 2024-05-05

## 2024-05-05 NOTE — TELEPHONE ENCOUNTER
No care due was identified.  [facility] Embedded Care Due Messages. Reference number: [id number].   5/04/2024 9:47:30 PM CDT

## 2024-05-05 NOTE — TELEPHONE ENCOUNTER
Refill Routing Note   Medication(s) are not appropriate for processing by [facility] for the following reason(s):        Outside of protocol  Required vitals abnormal    [facility] action(s):  Defer  Route               Appointments  past 12m or future 3m with PCP    Date Provider   Last Visit   4/2/2024 Abram Turner III, MD   Next Visit   7/2/2024 Abram Turner III, MD   ED visits in past 90 days: 0        Note composed:3:09 AM 05/05/2024

## 2024-06-05 ENCOUNTER — TELEPHONE (OUTPATIENT)
Dept: OPHTHALMOLOGY | Facility: CLINIC | Age: 79
End: 2024-06-05
Payer: MEDICARE

## 2024-06-05 NOTE — TELEPHONE ENCOUNTER
Patient given arrival time of  7:00 am on Monday Sarita 10. Nothing to eat or drink after  11:59 pm.   Start drops into the operative eye today. [id number] [facility]

## 2024-06-06 ENCOUNTER — TELEPHONE (OUTPATIENT)
Dept: OPHTHALMOLOGY | Facility: CLINIC | Age: 79
End: 2024-06-06
Payer: MEDICARE

## 2024-06-06 NOTE — TELEPHONE ENCOUNTER
Patient given arrival time of 7:00 am on Monday Sarita 10 . Nothing to eat or drink after 11:59 pm. Start drops into the operative eye today. [id number] [facility]

## 2024-06-25 ENCOUNTER — TELEPHONE (OUTPATIENT)
Dept: OPHTHALMOLOGY | Facility: CLINIC | Age: 79
End: 2024-06-25
Payer: MEDICARE

## 2024-06-28 ENCOUNTER — LAB VISIT (OUTPATIENT)
Dept: LAB | Facility: HOSPITAL | Age: 79
End: 2024-06-28
Attending: INTERNAL MEDICINE
Payer: MEDICARE

## 2024-06-28 DIAGNOSIS — E13.9 LADA (LATENT AUTOIMMUNE DIABETES IN ADULTS), MANAGED AS TYPE 1: ICD-10-CM

## 2024-06-28 DIAGNOSIS — E11.69 HYPERLIPIDEMIA ASSOCIATED WITH TYPE 2 DIABETES MELLITUS: ICD-10-CM

## 2024-06-28 DIAGNOSIS — E78.5 HYPERLIPIDEMIA ASSOCIATED WITH TYPE 2 DIABETES MELLITUS: ICD-10-CM

## 2024-06-28 DIAGNOSIS — Z79.4 TYPE 2 DIABETES MELLITUS WITH HYPERGLYCEMIA, WITH LONG-TERM CURRENT USE OF INSULIN: ICD-10-CM

## 2024-06-28 DIAGNOSIS — E11.65 TYPE 2 DIABETES MELLITUS WITH HYPERGLYCEMIA, WITH LONG-TERM CURRENT USE OF INSULIN: ICD-10-CM

## 2024-06-28 LAB
ANION GAP SERPL CALC-SCNC: 6 MMOL/L (ref 8–16)
CALCIUM SERPL-MCNC: 10 MG/DL (ref 8.7–10.5)
CHLORIDE SERPL-SCNC: 103 MMOL/L (ref 95–110)
CHOLEST SERPL-MCNC: 193 MG/DL (ref 120–199)
CHOLEST/HDLC SERPL: 2.1 {RATIO} (ref 2–5)
CO2 SERPL-SCNC: 29 MMOL/L (ref 23–29)
CREAT SERPL-MCNC: 0.57 MG/DL (ref 0.5–1.4)
EST. GFR  (NO RACE VARIABLE): >60 ML/MIN/1.73 M^2
ESTIMATED AVG GLUCOSE: 220 MG/DL (ref 68–131)
GLUCOSE SERPL-MCNC: 250 MG/DL (ref 70–110)
HBA1C MFR BLD: 9.3 % (ref 4–5.6)
HDLC SERPL-MCNC: 94 MG/DL (ref 40–75)
HDLC SERPL: 48.7 % (ref 20–50)
LDLC SERPL CALC-MCNC: 90.2 MG/DL (ref 63–159)
NONHDLC SERPL-MCNC: 99 MG/DL
POTASSIUM SERPL-SCNC: 4 MMOL/L (ref 3.5–5.1)
SODIUM SERPL-SCNC: 138 MMOL/L (ref 136–145)
TRIGL SERPL-MCNC: 44 MG/DL (ref 30–150)
UUN UR-MCNC: 14 MG/DL (ref 7–17)

## 2024-06-28 PROCEDURE — 80061 LIPID PANEL: CPT | Mod: HCNC | Performed by: INTERNAL MEDICINE

## 2024-06-28 PROCEDURE — 36415 COLL VENOUS BLD VENIPUNCTURE: CPT | Mod: HCNC,PN | Performed by: INTERNAL MEDICINE

## 2024-06-28 PROCEDURE — 83036 HEMOGLOBIN GLYCOSYLATED A1C: CPT | Mod: HCNC | Performed by: INTERNAL MEDICINE

## 2024-06-28 PROCEDURE — 80048 BASIC METABOLIC PNL TOTAL CA: CPT | Mod: HCNC,PN | Performed by: INTERNAL MEDICINE

## 2024-07-02 ENCOUNTER — TELEPHONE (OUTPATIENT)
Dept: INTERNAL MEDICINE | Facility: CLINIC | Age: 79
End: 2024-07-02
Payer: MEDICARE

## 2024-07-02 ENCOUNTER — PATIENT MESSAGE (OUTPATIENT)
Dept: ENDOCRINOLOGY | Facility: CLINIC | Age: 79
End: 2024-07-02
Payer: MEDICARE

## 2024-07-29 ENCOUNTER — TELEPHONE (OUTPATIENT)
Dept: ENDOCRINOLOGY | Facility: CLINIC | Age: 79
End: 2024-07-29
Payer: MEDICARE

## 2024-07-29 NOTE — TELEPHONE ENCOUNTER
"Called and spoke to patients daughter. Patient will contact us after surgery to reschedule appointment.    ----- Message from Janet TINSLEY Rai, MD sent at 7/29/2024  2:57 PM CDT -----  Regarding: RE: pt advice  Contact: [phone]    Okay to reschedule after her surgery.  ----- Message -----  From: Sisi Owens MA  Sent: 7/29/2024   2:15 PM CDT  To: Janet TINSLEY Rai, MD  Subject: FW: pt advice                                    Can I cancel her appt and reschedule when she is ready after surgery?  ----- Message -----  From: Nafisa Lim  Sent: 7/29/2024   1:34 PM CDT  To: Camden TINSLEY Staff  Subject: pt advice                                        .Name Of Caller: Self     Contact Preference?:  [phone]  What is the nature of the call?:    In regards to pt appt 8/7/24 needing to reschedule due to pt having surgery the next day. Pls call   Additional Notes:  "Thank you for all that you do for our patients"  "

## 2024-08-05 ENCOUNTER — PATIENT MESSAGE (OUTPATIENT)
Dept: OPHTHALMOLOGY | Facility: CLINIC | Age: 79
End: 2024-08-05
Payer: MEDICARE

## 2024-08-05 ENCOUNTER — TELEPHONE (OUTPATIENT)
Dept: OPHTHALMOLOGY | Facility: CLINIC | Age: 79
End: 2024-08-05
Payer: MEDICARE

## 2024-08-07 NOTE — PRE-PROCEDURE INSTRUCTIONS
Unable to reach pt via phone.  No answer, no voicemail.  The following message was sent to pt's portal.        Dear Noris     Below you will find basic pre-procedure instructions in preparation for your procedure on 8/8/24 with Dr. Collier     You should have received your arrival time already from Dr's office.     - Nothing to eat or drink after midnight the night before your procedure until after your procedure, except AM meds with small sips of water.     - HOLD all oral Diabetic medications night before and morning of procedure  - HOLD all Insulin morning of procedure  - HOLD all Fluid pills morning of procedure  - HOLD all non-insulin shots until after surgery (Ozempic, Mounjaro, Trulicity, Victoza, Byetta, Wegovy and Adlyxin) (7 days prior)  - HOLD all vitamins, minerals and herbal supplements morning of procedure   - TAKE all B/P meds, EXCEPT those that contain a fluid pill (ex. Lasix, Hydroclorothiazide/HCTZ, Spirnolactone)  - USE inhalers as needed and bring AM of surgery  - USE EYE DROPS as directed  -TAKE blood thinner meds AM of surgery unless otherwise instructed by your provider to not take     - Shower and wash face with antibacterial soap (ex. Dial) for 3 mins PM prior and AM of surgery  - No powder, lotions, creams, oils, gels, ointments, makeup,  or jewelry    - Wear comfortable clothing (button up shirt)     (Patient is required to have a responsible ride to transport home, ride may not leave while patient is in surgery)     -- [facility], 2nd floor Surgery Center, located   @ [address] [place] [address]  2nd Floor Registration        If you have any questions or concerns please feel free to contact your surgeon's office.           Please reply to this message as receipt of delivery.     ALEJANDRO Gregory  [facility]  Pre-Admit - Anesthesia Dept

## 2024-08-08 ENCOUNTER — HOSPITAL ENCOUNTER (OUTPATIENT)
Facility: HOSPITAL | Age: 79
Discharge: HOME | End: 2024-08-08
Attending: OPHTHALMOLOGY | Admitting: OPHTHALMOLOGY
Payer: MEDICARE

## 2024-08-08 VITALS
HEIGHT: 65 IN | DIASTOLIC BLOOD PRESSURE: 68 MMHG | TEMPERATURE: 98 F | HEART RATE: 51 BPM | WEIGHT: 141 LBS | SYSTOLIC BLOOD PRESSURE: 123 MMHG | BODY MASS INDEX: 23.49 KG/M2 | RESPIRATION RATE: 19 BRPM | OXYGEN SATURATION: 98 %

## 2024-08-08 DIAGNOSIS — H25.13 NUCLEAR SCLEROSIS, BILATERAL: ICD-10-CM

## 2024-08-08 DIAGNOSIS — H25.11 NUCLEAR SCLEROTIC CATARACT OF RIGHT EYE: Primary | ICD-10-CM

## 2024-08-08 LAB — POCT GLUCOSE: 196 MG/DL (ref 70–110)

## 2024-08-08 PROCEDURE — 94761 N-INVAS EAR/PLS OXIMETRY MLT: CPT

## 2024-08-08 PROCEDURE — 99152 MOD SED SAME PHYS/QHP 5/>YRS: CPT | Performed by: OPHTHALMOLOGY

## 2024-08-08 PROCEDURE — 82962 GLUCOSE BLOOD TEST: CPT | Performed by: OPHTHALMOLOGY

## 2024-08-08 PROCEDURE — 25000003 PHARM REV CODE 250: Performed by: OPHTHALMOLOGY

## 2024-08-08 PROCEDURE — 99900035 HC TECH TIME PER 15 MIN (STAT)

## 2024-08-08 PROCEDURE — 71000015 HC POSTOP RECOV 1ST HR: Performed by: OPHTHALMOLOGY

## 2024-08-08 PROCEDURE — 66984 XCAPSL CTRC RMVL W/O ECP: CPT | Mod: RT,,, | Performed by: OPHTHALMOLOGY

## 2024-08-08 PROCEDURE — 63600175 PHARM REV CODE 636 W HCPCS: Performed by: OPHTHALMOLOGY

## 2024-08-08 PROCEDURE — 36000706: Performed by: OPHTHALMOLOGY

## 2024-08-08 PROCEDURE — 99152 MOD SED SAME PHYS/QHP 5/>YRS: CPT | Mod: ,,, | Performed by: OPHTHALMOLOGY

## 2024-08-08 PROCEDURE — V2632 POST CHMBR INTRAOCULAR LENS: HCPCS | Performed by: OPHTHALMOLOGY

## 2024-08-08 PROCEDURE — 36000707: Performed by: OPHTHALMOLOGY

## 2024-08-08 DEVICE — LENS CLAREON AUTONOME 21.5D: Type: IMPLANTABLE DEVICE | Site: EYE | Status: FUNCTIONAL

## 2024-08-08 RX ORDER — CYCLOP/TROP/PROPA/PHEN/KET/WAT 1-1-0.1%
1 DROPS (EA) OPHTHALMIC (EYE)
Status: COMPLETED | OUTPATIENT
Start: 2024-08-08 | End: 2024-08-08

## 2024-08-08 RX ORDER — MOXIFLOXACIN 5 MG/ML
SOLUTION/ DROPS OPHTHALMIC
Status: DISCONTINUED | OUTPATIENT
Start: 2024-08-08 | End: 2024-08-08 | Stop reason: HOSPADM

## 2024-08-08 RX ORDER — MOXIFLOXACIN 5 MG/ML
1 SOLUTION/ DROPS OPHTHALMIC
Status: COMPLETED | OUTPATIENT
Start: 2024-08-08 | End: 2024-08-08

## 2024-08-08 RX ORDER — FENTANYL CITRATE 50 UG/ML
25 INJECTION, SOLUTION INTRAMUSCULAR; INTRAVENOUS EVERY 5 MIN PRN
Status: DISCONTINUED | OUTPATIENT
Start: 2024-08-08 | End: 2024-08-08 | Stop reason: HOSPADM

## 2024-08-08 RX ORDER — PROPARACAINE HYDROCHLORIDE 5 MG/ML
1 SOLUTION/ DROPS OPHTHALMIC
Status: DISCONTINUED | OUTPATIENT
Start: 2024-08-08 | End: 2024-08-08 | Stop reason: HOSPADM

## 2024-08-08 RX ORDER — LIDOCAINE HYDROCHLORIDE 40 MG/ML
INJECTION, SOLUTION RETROBULBAR
Status: DISCONTINUED | OUTPATIENT
Start: 2024-08-08 | End: 2024-08-08 | Stop reason: HOSPADM

## 2024-08-08 RX ORDER — PHENYLEPHRINE HYDROCHLORIDE 100 MG/ML
1 SOLUTION/ DROPS OPHTHALMIC
Status: DISCONTINUED | OUTPATIENT
Start: 2024-08-08 | End: 2024-08-08 | Stop reason: HOSPADM

## 2024-08-08 RX ORDER — MIDAZOLAM HYDROCHLORIDE 1 MG/ML
1 INJECTION, SOLUTION INTRAMUSCULAR; INTRAVENOUS
Status: DISCONTINUED | OUTPATIENT
Start: 2024-08-08 | End: 2024-08-08 | Stop reason: HOSPADM

## 2024-08-08 RX ORDER — ACETAMINOPHEN 325 MG/1
650 TABLET ORAL EVERY 4 HOURS PRN
Status: DISCONTINUED | OUTPATIENT
Start: 2024-08-08 | End: 2024-08-08 | Stop reason: HOSPADM

## 2024-08-08 RX ADMIN — FENTANYL CITRATE 25 MCG: 50 INJECTION, SOLUTION INTRAMUSCULAR; INTRAVENOUS at 10:08

## 2024-08-08 RX ADMIN — Medication 1 DROP: at 09:08

## 2024-08-08 RX ADMIN — MOXIFLOXACIN 1 DROP: 5 SOLUTION/ DROPS OPHTHALMIC at 10:08

## 2024-08-08 RX ADMIN — MOXIFLOXACIN OPHTHALMIC 1 DROP: 5 SOLUTION/ DROPS OPHTHALMIC at 09:08

## 2024-08-08 NOTE — OP NOTE
SURGEON:  Lisandra Collier M.D.    PREOPERATIVE DIAGNOSIS:    Nuclear Sclerotic Cataract Right Eye    POSTOPERATIVE DIAGNOSIS:    Nuclear Sclerotic Cataract Right Eye    PROCEDURES:    Phacoemulsification with  intraocular lens, Right eye (23043)  With moderate sedation >10min (56937)    DATE OF SURGERY: 08/08/2024    IMPLANT: [id number] 21.5    ANESTHESIA:  Under my direct supervision, intravenous moderate sedation was administered during the course of this procedure, with continuous monitoring of hemodynamic parameters. Total time of sedation and amount of sedatives are documented in the nursing logs.    COMPLICATIONS:  None    ESTIMATED BLOOD LOSS: None    SPECIMENS: None    INDICATIONS:    The patient has a history of painless progressive visual loss and difficulty with activities of daily living, which specifically include difficult driving at night due to glare and difficulty reading small print, secondary to cataract formation.  After a thorough discussion of the risks, benefits, and alternatives to cataract surgery, including, but not limited to, the rare risks of infection, retinal detachment, hemorrhage, need for additional surgery, loss of vision, and even loss of the eye, the patient voices understanding and desires to proceed.    DESCRIPTION OF PROCEDURE:    The patients IOL calculations were reviewed, and the lens selection confirmed.   After verification and marking of the proper eye in the preop holding area, the patient was brought to the operating room in supine position where the eye was prepped and draped in standard sterile fashion with 5% Betadine and a lid speculum placed in the eye.   Topical 4% Lidocaine was used in addition to the preoperative anesthesia and the procedure was begun by the creation of a paracentesis incision through which viscoelastic was used to fill the anterior chamber.  Next, a keratome blade was used to create a triplanar temporal clear corneal incision and a cystotome and  Rajiata forceps used to fashion a continuous curvilinear capsulorrhexis.  Hydrodissection was carried out using the Smith hydrodissection cannula and the nucleus was found to be mobile.  Phacoemulsification of the nucleus was carried out using a quick chop technique, and all remaining epinuclear and cortical material was removed.  The eye was then reformed with Viscoelastic and the  intraocular lens was implanted into the capsular bag.  All remaining viscoelastics were removed from the eye and at the end of the case the pupil was round, the lens was well-centered within the capsular bag and all wounds were found to be water tight.  Drops of Vigamox and Pred Forte were instilled and a shield was placed over the eye. The patient will follow up with Dr. Collier in the morning.

## 2024-08-08 NOTE — DISCHARGE SUMMARY
Outcome: Successful outpatient ophthalmic surgical procedure  Preprinted Instructions given to patient.  Regular diet.  Activity: No restrictions  Meds: see Med Rec  Condition: stable  Follow up: 1 day with Dr Collier  Disposition: Home  Diagnosis: s/p eye surgery  Date of discharge: 08/08/2024

## 2024-08-08 NOTE — DISCHARGE INSTRUCTIONS
CATARACT SURGERY    POST-OPERATIVE INSTRUCTIONS    · Apply drops THREE times a day into operative eye for 30 days.    · DO NOT rub your eye    · Wear protective sunglasses during the day    · Resume moderate activity    · Bathe/shower/wash face normally    · DO NOT apply makeup around the operative eye for 1 week.         You should expect    - Blurry vision and halos for 24-48 hours    - Dilated pupil for 24-48 hours    - Scratchy feeling in the eye for 1-2 days    - Curved shadow in your peripheral vision for 2-3 weeks    - Occasional flickering of lights for up to 1 week    -If you experience severe pain or nausea, please call Dr Collier or the on-call doctor at [phone]    - Plan to see Dr Collier tomorrow .      [facility]    [address] [facility] [address]    ** Most patients can drive the next day, but if you do not feel comfortable driving, please arrange for transportation.

## 2024-08-09 ENCOUNTER — OFFICE VISIT (OUTPATIENT)
Dept: OPHTHALMOLOGY | Facility: CLINIC | Age: 79
End: 2024-08-09
Payer: MEDICARE

## 2024-08-09 DIAGNOSIS — H25.11 NUCLEAR SCLEROSIS OF RIGHT EYE: ICD-10-CM

## 2024-08-09 DIAGNOSIS — Z98.890 POST-OPERATIVE STATE: Primary | ICD-10-CM

## 2024-08-09 PROCEDURE — 99999 PR PBB SHADOW E&M-EST. PATIENT-LVL II: CPT | Mod: PBBFAC,,, | Performed by: OPHTHALMOLOGY

## 2024-08-13 ENCOUNTER — OFFICE VISIT (OUTPATIENT)
Dept: INTERNAL MEDICINE | Facility: CLINIC | Age: 79
End: 2024-08-13
Payer: MEDICARE

## 2024-08-13 VITALS
OXYGEN SATURATION: 98 % | WEIGHT: 150.81 LBS | DIASTOLIC BLOOD PRESSURE: 66 MMHG | HEART RATE: 68 BPM | SYSTOLIC BLOOD PRESSURE: 110 MMHG | BODY MASS INDEX: 25.13 KG/M2 | HEIGHT: 65 IN

## 2024-08-13 DIAGNOSIS — E13.9 LADA (LATENT AUTOIMMUNE DIABETES IN ADULTS), MANAGED AS TYPE 1: Primary | ICD-10-CM

## 2024-08-13 PROCEDURE — 1126F AMNT PAIN NOTED NONE PRSNT: CPT | Mod: HCNC,CPTII,S$GLB, | Performed by: INTERNAL MEDICINE

## 2024-08-13 PROCEDURE — 1101F PT FALLS ASSESS-DOCD LE1/YR: CPT | Mod: HCNC,CPTII,S$GLB, | Performed by: INTERNAL MEDICINE

## 2024-08-13 PROCEDURE — 1159F MED LIST DOCD IN RCRD: CPT | Mod: HCNC,CPTII,S$GLB, | Performed by: INTERNAL MEDICINE

## 2024-08-13 PROCEDURE — 3288F FALL RISK ASSESSMENT DOCD: CPT | Mod: HCNC,CPTII,S$GLB, | Performed by: INTERNAL MEDICINE

## 2024-08-13 PROCEDURE — 99999 PR PBB SHADOW E&M-EST. PATIENT-LVL V: CPT | Mod: PBBFAC,HCNC,, | Performed by: INTERNAL MEDICINE

## 2024-08-13 PROCEDURE — 99214 OFFICE O/P EST MOD 30 MIN: CPT | Mod: HCNC,S$GLB,, | Performed by: INTERNAL MEDICINE

## 2024-08-13 PROCEDURE — 1160F RVW MEDS BY RX/DR IN RCRD: CPT | Mod: HCNC,CPTII,S$GLB, | Performed by: INTERNAL MEDICINE

## 2024-08-13 PROCEDURE — 3074F SYST BP LT 130 MM HG: CPT | Mod: HCNC,CPTII,S$GLB, | Performed by: INTERNAL MEDICINE

## 2024-08-13 PROCEDURE — 1157F ADVNC CARE PLAN IN RCRD: CPT | Mod: HCNC,CPTII,S$GLB, | Performed by: INTERNAL MEDICINE

## 2024-08-13 PROCEDURE — 3078F DIAST BP <80 MM HG: CPT | Mod: HCNC,CPTII,S$GLB, | Performed by: INTERNAL MEDICINE

## 2024-08-13 RX ORDER — DEXAMETHASONE 0.7 MG/1
IMPLANT INTRAVITREAL
COMMUNITY
Start: 2024-03-07

## 2024-08-13 NOTE — PROGRESS NOTES
"  Assessment:         1. 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 female.    Chief Complaint: Follow-up      Interim Hx  Has been following with optho  CANCELED appt with endocrine  Seen by heme/onc   Last seen by me in April -  PROVIDER canceled in July for jury duty    Concerns today  Fu     Chronic problems      Diabetes  She presents for her follow-up diabetic visit. She has type 2 diabetes mellitus. Her disease course has been worsening. Current diabetic treatment includes insulin injections. An ACE inhibitor/angiotensin II receptor blocker is being taken. She sees a podiatrist.Eye exam is current.   Hypertension  This is a chronic problem. The current episode started more than 1 year ago. The problem has been waxing and waning since onset. Past treatments include calcium channel blockers, angiotensin blockers and alpha 1 blockers.   Hyperlipidemia        Review of Systems   All other systems reviewed and are negative.            Health Maintenance Due   Topic Date Due    TETANUS VACCINE  Never done    Shingles Vaccine (1 of 2) Never done    RSV Vaccine (Age 60+ and Pregnant patients) (1 - 1-dose 60+ series) Never done    COVID-19 Vaccine (4 - 2023-24 season) 09/01/2023         Objective:     /66 (BP Location: Left arm, Patient Position: Sitting, BP Method: Large (Manual))   Pulse 68   Ht 5' 5" (1.651 m)   Wt 68.4 kg (150 lb 12.7 oz)   LMP 08/13/1999 " "(Approximate)   SpO2 98%   BMI 25.09 kg/m²         8/13/2024    10:09 AM 8/8/2024     9:41 AM 4/23/2024     9:42 AM 4/2/2024     1:39 PM 2/27/2024     2:07 PM   Vitals   Height 5' 5" (1.651 m) 5' 5" (1.651 m) 5' 5" (1.651 m) 5' 4" (1.626 m) 5' 4" (1.626 m)   Weight (lbs) 150.79 141 149.03 151.02 150.79   BMI (kg/m2) 25.1 23.5 24.8 25.9 25.9                Physical Exam  Vitals and nursing note reviewed.   Constitutional:       General: She is not in acute distress.     Appearance: She is well-developed. She is not diaphoretic.   HENT:      Head: Normocephalic.      Nose: Nose normal.   Eyes:      General:         Right eye: No discharge.         Left eye: No discharge.      Conjunctiva/sclera: Conjunctivae normal.      Pupils: Pupils are equal, round, and reactive to light.   Cardiovascular:      Rate and Rhythm: Normal rate and regular rhythm.      Heart sounds: Normal heart sounds. No murmur heard.     No friction rub. No gallop.   Pulmonary:      Effort: Pulmonary effort is normal. No respiratory distress.   Abdominal:      General: Bowel sounds are normal. There is no distension.      Palpations: Abdomen is soft.   Musculoskeletal:         General: No deformity. Normal range of motion.      Cervical back: Normal range of motion.   Skin:     General: Skin is warm.   Neurological:      Mental Status: She is alert and oriented to person, place, and time.      Cranial Nerves: No cranial nerve deficit.           Recent Results (from the past 336 hour(s))   Hemoglobin A1C    Collection Time: 06/28/24  7:44 AM   Result Value Ref Range    Hemoglobin A1C 9.3 (H) 4.0 - 5.6 %    Estimated Avg Glucose 220 (H) 68 - 131 mg/dL   Basic Metabolic Panel    Collection Time: 06/28/24  7:44 AM   Result Value Ref Range    Sodium 138 136 - 145 mmol/L    Potassium 4.0 3.5 - 5.1 mmol/L    Chloride 103 95 - 110 mmol/L    CO2 29 23 - 29 mmol/L    Glucose 250 (H) 70 - 110 mg/dL    BUN 14 7 - 17 mg/dL    Creatinine 0.57 0.50 - 1.40 mg/dL " "   Calcium 10.0 8.7 - 10.5 mg/dL    Anion Gap 6 (L) 8 - 16 mmol/L    eGFR >60.0 >60 mL/min/1.73 m^2   Lipid Panel    Collection Time: 24  7:44 AM   Result Value Ref Range    Cholesterol 193 120 - 199 mg/dL    Triglycerides 44 30 - 150 mg/dL    HDL 94 (H) 40 - 75 mg/dL    LDL Cholesterol 90.2 63.0 - 159.0 mg/dL    HDL/Cholesterol Ratio 48.7 20.0 - 50.0 %    Total Cholesterol/HDL Ratio 2.1 2.0 - 5.0    Non-HDL Cholesterol 99 mg/dL         Future Appointments   Date Time Provider Department Center   2024 10:20 AM Abram Turner III, MD [facility]   2024  9:45 AM Lisandra Collier MD [facility]   2024  1:00 PM [facility] MAMMO1 [facility] MAMMO [facility]   2024  1:50 PM LAB, [facility] RVPH LAB [facility]   2024  9:30 AM Cecilia Chandra NP [facility] HEM ONC [place] [place] [facility]         Medication List with Changes/Refills   Current Medications    AMLODIPINE (NORVASC) 10 MG TABLET    TAKE 1 TABLET EVERY DAY    ASPIRIN (ECOTRIN) 81 MG EC TABLET    Take 81 mg by mouth once daily.    BAQSIMI 3 MG/ACTUATION SPRY    by Each Nostril route.    BD ULTRA-FINE IRWIN PEN NEEDLE 32 GAUGE X 5/32" NDLE    3 TIMES A DAY    BD ULTRA-FINE SHORT PEN NEEDLE 31 GAUGE X 5/16" NDLE    SMARTSI Each SUB-Q Daily    BEVACIZUMAB 3.25 MG/0.13 ML SYRG        BLOOD-GLUCOSE METER,CONTINUOUS (DEXCOM G6 ) MISC    1 each by Misc.(Non-Drug; Combo Route) route once daily at 6am.    BLOOD-GLUCOSE SENSOR (DEXCOM G6 SENSOR) LEONOR    1 each by Misc.(Non-Drug; Combo Route) route once daily.    BLOOD-GLUCOSE TRANSMITTER (DEXCOM G6 TRANSMITTER) LEONOR    1 each by Misc.(Non-Drug; Combo Route) route once daily.    BUSPIRONE (BUSPAR) 15 MG TABLET    Take 1 tablet (15 mg total) by mouth 2 (two) times daily.    CHOLECALCIFEROL, VITAMIN D3, (VITAMIN D3) 50 MCG (2,000 UNIT) CAP    Take 1 capsule by mouth once daily.    CLONIDINE (CATAPRES) 0.2 MG TABLET    TAKE 1 TABLET TWICE DAILY    COENZYME Q10 100 MG CAPSULE    Take 100 mg by " mouth once daily.    CYANOCOBALAMIN 500 MCG TABLET    Take 500 mcg by mouth once daily.    DONEPEZIL (ARICEPT) 5 MG TABLET    Take 1 tablet (5 mg total) by mouth every evening.    FISH OIL-OMEGA-3 FATTY ACIDS 300-1,000 MG CAPSULE    Take 1 g by mouth once daily.    FLUAD QUAD 2023-24,65Y UP,,PF, 60 MCG (15 MCG X 4)/0.5 ML SYRG        FUROSEMIDE (LASIX) 40 MG TABLET    Take 40 mg by mouth once daily.     GLUCOSE 4 GM CHEWABLE TABLET    Take 4 tablets (16 g total) by mouth as needed for Low blood sugar (If having symptoms of blurry vision, palpitations, confusion, shakiness.  Please check sugars and if sugar below 70 please take 4 tablets and re-check sugar everry 15 minutes until sugars are above 70 and symptoms resolve.).    INSULIN ASPART U-100 (NOVOLOG FLEXPEN U-100 INSULIN) 100 UNIT/ML (3 ML) INPN PEN    Inject 8 Units into the skin 3 (three) times daily with meals.    LANTUS SOLOSTAR U-100 INSULIN GLARGINE 100 UNITS/ML SUBQ PEN    Inject 20 Units into the skin every evening.    LOSARTAN (COZAAR) 100 MG TABLET    Take 1 tablet (100 mg total) by mouth once daily.    MIRTAZAPINE (REMERON) 7.5 MG TAB    Take 1 tablet (7.5 mg total) by mouth every evening.    OFLOXACIN (FLOXIN) 0.3 % OTIC SOLUTION    INSTILL 5 DROPS INTO THE RIGHT EAR ONCE DAILY    OZURDEX 0.7 MG IMPL INTRAVITREAL IMPLANT        PREDNISOLON/GATIFLOX/BROMFENAC (PREDNISOL ACE-GATIFLOX-BROMFEN) 1-0.5-0.075 % DRPS    Apply 1 drop to eye 3 (three) times daily. in operative eye for 1 month after surgery    TAMOXIFEN (NOLVADEX) 20 MG TAB    TAKE 1 TABLET ONE TIME DAILY    TRUE METRIX GLUCOSE TEST STRIP STRP    1 strip by In Vitro route every morning.         Disclaimer:  This note has been generated using voice-recognition software. There may be grammatical or spelling errors that have been missed during proof-reading

## 2024-08-16 ENCOUNTER — TELEPHONE (OUTPATIENT)
Dept: OPHTHALMOLOGY | Facility: CLINIC | Age: 79
End: 2024-08-16

## 2024-08-16 ENCOUNTER — OFFICE VISIT (OUTPATIENT)
Dept: OPHTHALMOLOGY | Facility: CLINIC | Age: 79
End: 2024-08-16
Payer: MEDICARE

## 2024-08-16 DIAGNOSIS — H25.13 NUCLEAR SCLEROSIS OF BOTH EYES: ICD-10-CM

## 2024-08-16 DIAGNOSIS — H25.11 NUCLEAR SCLEROSIS OF RIGHT EYE: ICD-10-CM

## 2024-08-16 DIAGNOSIS — H25.12 NUCLEAR SCLEROTIC CATARACT OF LEFT EYE: Primary | ICD-10-CM

## 2024-08-16 DIAGNOSIS — Z98.890 POST-OPERATIVE STATE: Primary | ICD-10-CM

## 2024-08-16 PROCEDURE — 99999 PR PBB SHADOW E&M-EST. PATIENT-LVL IV: CPT | Mod: PBBFAC,,, | Performed by: OPHTHALMOLOGY

## 2024-08-16 RX ORDER — PHENYLEPHRINE HYDROCHLORIDE 100 MG/ML
1 SOLUTION/ DROPS OPHTHALMIC
OUTPATIENT
Start: 2024-08-16

## 2024-08-16 RX ORDER — PHENYLEPHRINE HYDROCHLORIDE 25 MG/ML
1 SOLUTION/ DROPS OPHTHALMIC
OUTPATIENT
Start: 2024-08-16

## 2024-08-16 RX ORDER — SODIUM CHLORIDE 0.9 % (FLUSH) 0.9 %
10 SYRINGE (ML) INJECTION
OUTPATIENT
Start: 2024-08-16

## 2024-08-16 RX ORDER — MOXIFLOXACIN 5 MG/ML
1 SOLUTION/ DROPS OPHTHALMIC
OUTPATIENT
Start: 2024-08-16

## 2024-08-16 RX ORDER — TETRACAINE HYDROCHLORIDE 5 MG/ML
1 SOLUTION OPHTHALMIC
OUTPATIENT
Start: 2024-08-16

## 2024-08-16 RX ORDER — TROPICAMIDE 10 MG/ML
1 SOLUTION/ DROPS OPHTHALMIC
OUTPATIENT
Start: 2024-08-16

## 2024-08-16 NOTE — PROGRESS NOTES
HPI    08/08/2024 IMPLANT: [email or web address] 21.5 OD    Patient is here today for 1 week phaco OD. No complaints at this time.    PMB TID OD  Last edited by Vianey Corado on 8/16/2024  9:46 AM.            Assessment /Plan     For exam results, see Encounter Report.    Post-operative state    Nuclear sclerosis of right eye      Slit lamp exam:  L/L: nl  K: clear, wound sealed  AC: trace cell  Iris/Lens: IOL centered and stable    POW1 s/p phaco: Surgery healing well with no signs of infection or abnormal inflammation.    Patient wishes to proceed with surgery in the second eye. Risks, benefits, alternatives reviewed. IOL selection reviewed.    Left eye  IOL: CNA0T0 22.0    OZURDEX OU for

## 2024-09-19 ENCOUNTER — TELEPHONE (OUTPATIENT)
Dept: OPHTHALMOLOGY | Facility: CLINIC | Age: 79
End: 2024-09-19
Payer: MEDICARE

## 2024-09-19 NOTE — TELEPHONE ENCOUNTER
Patient given arrival time of 7:00 am on Monday September 23 . Nothing to eat or drink after 11:59 pm. Start drops into the operative eye Saturday . [address] [facility]

## 2024-09-20 NOTE — PRE-PROCEDURE INSTRUCTIONS
Unable to reach pt via phone.    No answer, No voicemail.  The following message was sent to pt's portal.        Dear Noris     Below you will find basic pre-procedure instructions in preparation for your procedure on 9/23/24 with Dr. Collier        You should have received your arrival time already from Dr's office.     - Nothing to eat or drink after midnight the night before your procedure until after your procedure, except AM meds with small sips of water.     - HOLD all oral Diabetic medications night before and morning of procedure  - HOLD all Insulin morning of procedure  - HOLD all Fluid pills morning of procedure  - HOLD all non-insulin shots until after surgery (Ozempic, Mounjaro, Trulicity, Victoza, Byetta, Wegovy and Adlyxin) (7 days prior)  - HOLD all vitamins, minerals and herbal supplements morning of procedure   - TAKE all B/P meds, EXCEPT those that contain a fluid pill (ex. Lasix, Hydroclorothiazide/HCTZ, Spirnolactone)  - USE inhalers as needed and bring AM of surgery  - USE EYE DROPS as directed  -TAKE blood thinner meds AM of surgery unless otherwise instructed by your provider to not take     - Shower and wash face with antibacterial soap (ex. Dial) for 3 mins PM prior and AM of surgery  - No powder, lotions, creams, oils, gels, ointments, makeup,  or jewelry    - Wear comfortable clothing (button up shirt)     (Patient is required to have a responsible ride to transport home, ride may not leave while patient is in surgery)     -- [facility], 2nd floor Surgery Center, located   @ [address] [place] [address]  2nd Floor Registration        If you have any questions or concerns please feel free to contact your surgeon's office.           Please reply to this message as receipt of delivery.     ALEJANDRO Gregory  [facility]  Pre-Admit - Anesthesia Dept

## 2024-09-23 ENCOUNTER — TELEPHONE (OUTPATIENT)
Dept: INTERNAL MEDICINE | Facility: CLINIC | Age: 79
End: 2024-09-23
Payer: MEDICARE

## 2024-09-23 ENCOUNTER — HOSPITAL ENCOUNTER (OUTPATIENT)
Facility: HOSPITAL | Age: 79
Discharge: HOME | End: 2024-09-23
Attending: OPHTHALMOLOGY | Admitting: OPHTHALMOLOGY
Payer: MEDICARE

## 2024-09-23 VITALS
OXYGEN SATURATION: 99 % | SYSTOLIC BLOOD PRESSURE: 153 MMHG | RESPIRATION RATE: 20 BRPM | TEMPERATURE: 98 F | HEIGHT: 65 IN | HEART RATE: 49 BPM | DIASTOLIC BLOOD PRESSURE: 72 MMHG | WEIGHT: 140 LBS | BODY MASS INDEX: 23.32 KG/M2

## 2024-09-23 DIAGNOSIS — H25.11 NUCLEAR SCLEROTIC CATARACT OF RIGHT EYE: Primary | ICD-10-CM

## 2024-09-23 PROBLEM — H25.12 NUCLEAR SCLEROTIC CATARACT OF LEFT EYE: Status: ACTIVE | Noted: 2024-08-08

## 2024-09-23 LAB — POCT GLUCOSE: 252 MG/DL (ref 70–110)

## 2024-09-23 PROCEDURE — 82962 GLUCOSE BLOOD TEST: CPT | Performed by: OPHTHALMOLOGY

## 2024-09-23 PROCEDURE — 36000707: Performed by: OPHTHALMOLOGY

## 2024-09-23 PROCEDURE — 94761 N-INVAS EAR/PLS OXIMETRY MLT: CPT

## 2024-09-23 PROCEDURE — V2632 POST CHMBR INTRAOCULAR LENS: HCPCS | Performed by: OPHTHALMOLOGY

## 2024-09-23 PROCEDURE — 63600175 PHARM REV CODE 636 W HCPCS: Performed by: OPHTHALMOLOGY

## 2024-09-23 PROCEDURE — 66982 XCAPSL CTRC RMVL CPLX WO ECP: CPT | Mod: 79,LT,, | Performed by: OPHTHALMOLOGY

## 2024-09-23 PROCEDURE — 25000003 PHARM REV CODE 250: Performed by: OPHTHALMOLOGY

## 2024-09-23 PROCEDURE — 99900035 HC TECH TIME PER 15 MIN (STAT)

## 2024-09-23 PROCEDURE — 71000015 HC POSTOP RECOV 1ST HR: Performed by: OPHTHALMOLOGY

## 2024-09-23 PROCEDURE — 99152 MOD SED SAME PHYS/QHP 5/>YRS: CPT | Mod: ,,, | Performed by: OPHTHALMOLOGY

## 2024-09-23 PROCEDURE — 36000706: Performed by: OPHTHALMOLOGY

## 2024-09-23 DEVICE — LENS CLAREON AUTONOME 22.0D: Type: IMPLANTABLE DEVICE | Site: EYE | Status: FUNCTIONAL

## 2024-09-23 RX ORDER — MOXIFLOXACIN 5 MG/ML
1 SOLUTION/ DROPS OPHTHALMIC
Status: COMPLETED | OUTPATIENT
Start: 2024-09-23 | End: 2024-09-23

## 2024-09-23 RX ORDER — MIDAZOLAM HYDROCHLORIDE 1 MG/ML
1 INJECTION, SOLUTION INTRAMUSCULAR; INTRAVENOUS
Status: DISCONTINUED | OUTPATIENT
Start: 2024-09-23 | End: 2024-09-23 | Stop reason: HOSPADM

## 2024-09-23 RX ORDER — PHENYLEPHRINE HYDROCHLORIDE 100 MG/ML
1 SOLUTION/ DROPS OPHTHALMIC
Status: DISCONTINUED | OUTPATIENT
Start: 2024-09-23 | End: 2024-09-23 | Stop reason: HOSPADM

## 2024-09-23 RX ORDER — MOXIFLOXACIN 5 MG/ML
SOLUTION/ DROPS OPHTHALMIC
Status: DISCONTINUED | OUTPATIENT
Start: 2024-09-23 | End: 2024-09-23 | Stop reason: HOSPADM

## 2024-09-23 RX ORDER — PROPARACAINE HYDROCHLORIDE 5 MG/ML
SOLUTION/ DROPS OPHTHALMIC
Status: DISCONTINUED | OUTPATIENT
Start: 2024-09-23 | End: 2024-09-23 | Stop reason: HOSPADM

## 2024-09-23 RX ORDER — ACETAMINOPHEN 325 MG/1
650 TABLET ORAL EVERY 4 HOURS PRN
Status: DISCONTINUED | OUTPATIENT
Start: 2024-09-23 | End: 2024-09-23 | Stop reason: HOSPADM

## 2024-09-23 RX ORDER — LIDOCAINE HYDROCHLORIDE 40 MG/ML
INJECTION, SOLUTION RETROBULBAR
Status: DISCONTINUED | OUTPATIENT
Start: 2024-09-23 | End: 2024-09-23 | Stop reason: HOSPADM

## 2024-09-23 RX ORDER — SODIUM CHLORIDE 0.9 % (FLUSH) 0.9 %
10 SYRINGE (ML) INJECTION
Status: DISCONTINUED | OUTPATIENT
Start: 2024-09-23 | End: 2024-09-23 | Stop reason: HOSPADM

## 2024-09-23 RX ORDER — PROPARACAINE HYDROCHLORIDE 5 MG/ML
1 SOLUTION/ DROPS OPHTHALMIC
Status: DISCONTINUED | OUTPATIENT
Start: 2024-09-23 | End: 2024-09-23 | Stop reason: HOSPADM

## 2024-09-23 RX ORDER — CYCLOP/TROP/PROPA/PHEN/KET/WAT 1-1-0.1%
1 DROPS (EA) OPHTHALMIC (EYE)
Status: COMPLETED | OUTPATIENT
Start: 2024-09-23 | End: 2024-09-23

## 2024-09-23 RX ADMIN — MOXIFLOXACIN 1 DROP: 5 SOLUTION/ DROPS OPHTHALMIC at 09:09

## 2024-09-23 RX ADMIN — MIDAZOLAM HYDROCHLORIDE 1 MG: 1 INJECTION, SOLUTION INTRAMUSCULAR; INTRAVENOUS at 09:09

## 2024-09-23 RX ADMIN — MOXIFLOXACIN OPHTHALMIC 1 DROP: 5 SOLUTION/ DROPS OPHTHALMIC at 07:09

## 2024-09-23 RX ADMIN — Medication 1 DROP: at 07:09

## 2024-09-23 NOTE — OP NOTE
SURGEON:  Lisandra Collier M.D.    PREOPERATIVE DIAGNOSIS:    Nuclear Sclerotic Cataract Left Eye    POSTOPERATIVE DIAGNOSIS:    Nuclear Sclerotic Cataract Left Eye    PROCEDURES:    COMPLEX Phacoemulsification with  intraocular lens, Left eye (67252)  With moderate sedation >10min (57501)    DATE OF SURGERY: 09/23/2024    IMPLANT: CNA0T0 22.0    ANESTHESIA:  Under my direct supervision, intravenous moderate sedation was administered during the course of this procedure, with continuous monitoring of hemodynamic parameters. Total time of sedation and amount of sedatives are documented in the nursing logs.    COMPLICATIONS:  None    ESTIMATED BLOOD LOSS: None    SPECIMENS: None    INDICATIONS:    The patient has a history of painless progressive visual loss and difficulty with activities of daily living, which specifically include difficult driving at night due to glare and difficulty reading small print, secondary to cataract formation.  After a thorough discussion of the risks, benefits, and alternatives to cataract surgery, including, but not limited to, the rare risks of infection, retinal detachment, hemorrhage, need for additional surgery, loss of vision, and even loss of the eye, the patient voices understanding and desires to proceed.    DESCRIPTION OF PROCEDURE:    The patients IOL calculations were reviewed, and the lens selection confirmed.   After verification and marking of the proper eye in the preop holding area, the patient was brought to the operating room in supine position where the eye was prepped and draped in standard sterile fashion with 5% Betadine and a lid speculum placed in the eye.   Topical 4% Lidocaine was used in addition to the preoperative anesthesia and the procedure was begun by the creation of a paracentesis incision through which viscoelastic was used to fill the anterior chamber.  Next, a keratome blade was used to create a triplanar temporal clear corneal incision and a cystotome and  Rajiata forceps used to fashion a continuous curvilinear capsulorrhexis.  Hydrodissection was carried out using the Smith hydrodissection cannula and the nucleus was found to be mobile.  Phacoemulsification of the nucleus was carried out using a quick chop technique, and all remaining epinuclear and cortical material was removed.  The eye was then reformed with Viscoelastic and the  intraocular lens was implanted into the capsular bag.  All remaining viscoelastics were removed from the eye and at the end of the case the pupil was round, the lens was well-centered within the capsular bag and all wounds were found to be water tight.  Drops of Vigamox and Pred Forte were instilled and a shield was placed over the eye. The patient will follow up with Dr. Collier in the morning.    ADDENDUM: Because the patient had a dense cataract and loss of red reflex, trypan blue was used to stain the anterior capsule.

## 2024-09-23 NOTE — DISCHARGE SUMMARY
Outcome: Successful outpatient ophthalmic surgical procedure  Preprinted Instructions given to patient.  Regular diet.  Activity: No restrictions  Meds: see Med Rec  Condition: stable  Follow up: 1 day with Dr Collier  Disposition: Home  Diagnosis: s/p eye surgery  Date of discharge: 09/23/2024

## 2024-09-23 NOTE — DISCHARGE INSTRUCTIONS
CATARACT SURGERY    POST-OPERATIVE INSTRUCTIONS    · Apply drops THREE times a day into operative eye for 30 days.    · DO NOT rub your eye    · Wear protective sunglasses during the day    · Resume moderate activity    · Bathe/shower/wash face normally    · DO NOT apply makeup around the operative eye for 1 week.         You should expect    - Blurry vision and halos for 24-48 hours    - Dilated pupil for 24-48 hours    - Scratchy feeling in the eye for 1-2 days    - Curved shadow in your peripheral vision for 2-3 weeks    - Occasional flickering of lights for up to 1 week    -If you experience severe pain or nausea, please call Dr Collier or the on-call doctor at [phone]    - Plan to see Dr Collier tomorrow .      [facility]    [address] [facility] [address]    ** Most patients can drive the next day, but if you do not feel comfortable driving, please arrange for transportation.

## 2024-09-24 ENCOUNTER — OFFICE VISIT (OUTPATIENT)
Dept: OPHTHALMOLOGY | Facility: CLINIC | Age: 79
End: 2024-09-24
Payer: MEDICARE

## 2024-09-24 DIAGNOSIS — H25.12 NUCLEAR SCLEROTIC CATARACT OF LEFT EYE: ICD-10-CM

## 2024-09-24 DIAGNOSIS — Z98.890 POST-OPERATIVE STATE: Primary | ICD-10-CM

## 2024-09-24 PROCEDURE — 1159F MED LIST DOCD IN RCRD: CPT | Mod: CPTII,S$GLB,, | Performed by: OPHTHALMOLOGY

## 2024-09-24 PROCEDURE — 1160F RVW MEDS BY RX/DR IN RCRD: CPT | Mod: CPTII,S$GLB,, | Performed by: OPHTHALMOLOGY

## 2024-09-24 PROCEDURE — 3288F FALL RISK ASSESSMENT DOCD: CPT | Mod: CPTII,S$GLB,, | Performed by: OPHTHALMOLOGY

## 2024-09-24 PROCEDURE — 1101F PT FALLS ASSESS-DOCD LE1/YR: CPT | Mod: CPTII,S$GLB,, | Performed by: OPHTHALMOLOGY

## 2024-09-24 PROCEDURE — 1126F AMNT PAIN NOTED NONE PRSNT: CPT | Mod: CPTII,S$GLB,, | Performed by: OPHTHALMOLOGY

## 2024-09-24 PROCEDURE — 99024 POSTOP FOLLOW-UP VISIT: CPT | Mod: S$GLB,,, | Performed by: OPHTHALMOLOGY

## 2024-09-24 PROCEDURE — 99999 PR PBB SHADOW E&M-EST. PATIENT-LVL II: CPT | Mod: PBBFAC,,, | Performed by: OPHTHALMOLOGY

## 2024-09-24 PROCEDURE — 1157F ADVNC CARE PLAN IN RCRD: CPT | Mod: CPTII,S$GLB,, | Performed by: OPHTHALMOLOGY

## 2024-09-24 RX ORDER — LATANOPROST 50 UG/ML
1 SOLUTION/ DROPS OPHTHALMIC NIGHTLY
Qty: 2.5 ML | Refills: 3 | Status: SHIPPED | OUTPATIENT
Start: 2024-09-24 | End: 2024-10-24

## 2024-09-24 NOTE — PROGRESS NOTES
HPI    DATE OF SURGERY: 09/23/2024  IMPLANT: CNA0T0 22.0    1 day Post Op OS  No complaints at this time     PGB TID OS    Last edited by Carlos Balbuena on 9/24/2024  9:18 AM.            Assessment /Plan     For exam results, see Encounter Report.    Post-operative state    Nuclear sclerotic cataract of left eye    Other orders  -     latanoprost 0.005 % ophthalmic solution; Place 1 drop into both eyes every evening.  Dispense: 2.5 mL; Refill: 3      Slit lamp exam:  L/L: nl  K: clear, wound sealed  AC: 1+ cell  Lens: IOL centered and stable    POD1 s/p Phaco/IOL  Appropriate precautions and post op medications reviewed.  Patient instructed to call or come in if symptoms of redness, decreased vision, or pain are experienced.    IOP elevated OU so recommend start Xal qhs and fu with glaucoma    Has Ozurdex implants OU so will need IOP management and follow up with Sandrita

## 2024-09-29 DIAGNOSIS — F02.A18 MILD LATE ONSET ALZHEIMER'S DEMENTIA WITH OTHER BEHAVIORAL DISTURBANCE: ICD-10-CM

## 2024-09-29 DIAGNOSIS — G30.1 MILD LATE ONSET ALZHEIMER'S DEMENTIA WITH OTHER BEHAVIORAL DISTURBANCE: ICD-10-CM

## 2024-09-29 DIAGNOSIS — E11.59 HYPERTENSION ASSOCIATED WITH DIABETES: ICD-10-CM

## 2024-09-29 DIAGNOSIS — I15.2 HYPERTENSION ASSOCIATED WITH DIABETES: ICD-10-CM

## 2024-09-29 NOTE — TELEPHONE ENCOUNTER
Care Due:                  Date            Visit Type   Department     Provider  --------------------------------------------------------------------------------                                EP -                              PRIMARY      [facility] INTERNAL  Last Visit: 08-      CARE ([facility])   MEDICINE       Abram Turner  Next Visit: None Scheduled  None         None Found                                                            Last  Test          Frequency    Reason                     Performed    Due Date  --------------------------------------------------------------------------------    HBA1C.......  6 months...  LANTUS, insulin..........  06- 12-    [facility] Embedded Care Due Messages. Reference number: [id number].   9/29/2024 4:58:21 PM CDT

## 2024-09-30 RX ORDER — DONEPEZIL HYDROCHLORIDE 5 MG/1
5 TABLET, FILM COATED ORAL NIGHTLY
Qty: 90 TABLET | Refills: 3 | Status: SHIPPED | OUTPATIENT
Start: 2024-09-30

## 2024-09-30 RX ORDER — LOSARTAN POTASSIUM 100 MG/1
100 TABLET ORAL
Qty: 90 TABLET | Refills: 3 | Status: SHIPPED | OUTPATIENT
Start: 2024-09-30

## 2024-09-30 RX ORDER — AMLODIPINE BESYLATE 10 MG/1
10 TABLET ORAL
Qty: 90 TABLET | Refills: 0 | Status: SHIPPED | OUTPATIENT
Start: 2024-09-30

## 2024-09-30 NOTE — TELEPHONE ENCOUNTER
Refill Routing Note   Medication(s) are not appropriate for processing by [facility] for the following reason(s):      Required vitals abnormal    [facility] action(s):  Defer Care Due:  None identified            Appointments  past 12m or future 3m with PCP    Date Provider   Last Visit   Visit date not found Jhoan Aguilar MD   Next Visit   Visit date not found Jhoan Aguilar MD   ED visits in past 90 days: 0        Note composed:7:07 AM 09/30/2024

## 2024-09-30 NOTE — TELEPHONE ENCOUNTER
No care due was identified.  Health [facility] Embedded Care Due Messages. Reference number: [id number].   9/30/2024 12:55:59 AM CDT

## 2024-09-30 NOTE — TELEPHONE ENCOUNTER
Refill Routing Note   Medication(s) are not appropriate for processing by [facility] for the following reason(s):        Outside of protocol  Required vitals abnormal    [facility] action(s):  Defer  Route        Medication Therapy Plan: FLOS      Appointments  past 12m or future 3m with PCP    Date Provider   Last Visit   8/13/2024 Abram Turner III, MD   Next Visit   Visit date not found Abram Turner III, MD   ED visits in past 90 days: 0        Note composed:10:31 PM 09/29/2024

## 2024-10-11 ENCOUNTER — TELEPHONE (OUTPATIENT)
Dept: HEMATOLOGY/ONCOLOGY | Facility: CLINIC | Age: 79
End: 2024-10-11
Payer: MEDICARE

## 2024-11-07 DIAGNOSIS — E11.65 UNCONTROLLED TYPE 2 DIABETES MELLITUS WITH HYPERGLYCEMIA: ICD-10-CM

## 2024-11-07 RX ORDER — INSULIN ASPART 100 [IU]/ML
INJECTION, SOLUTION INTRAVENOUS; SUBCUTANEOUS
Qty: 30 ML | Refills: 3 | OUTPATIENT
Start: 2024-11-07

## 2024-11-07 RX ORDER — INSULIN GLARGINE 100 [IU]/ML
24 INJECTION, SOLUTION SUBCUTANEOUS
Qty: 30 ML | Refills: 3 | OUTPATIENT
Start: 2024-11-07

## 2024-11-07 NOTE — TELEPHONE ENCOUNTER
Refill Decision Note   Noris Demarco  is requesting a refill authorization.  Brief Assessment and Rationale for Refill:  Quick Discontinue     Medication Therapy Plan:  Lantus dose adjustment 9/22/23. Novolog dose adjustment 4/2/24      Comments:     Note composed:2:48 PM 11/07/2024

## 2024-11-07 NOTE — TELEPHONE ENCOUNTER
No care due was identified.  Health [facility] Embedded Care Due Messages. Reference number: [id number].   11/07/2024 1:29:36 PM CST

## 2024-11-11 ENCOUNTER — LAB VISIT (OUTPATIENT)
Dept: LAB | Facility: HOSPITAL | Age: 79
End: 2024-11-11
Attending: INTERNAL MEDICINE
Payer: MEDICARE

## 2024-11-11 DIAGNOSIS — R80.9 MICROALBUMINURIA: ICD-10-CM

## 2024-11-11 LAB
ALBUMIN/CREAT UR: 510.8 UG/MG (ref 0–30)
CREAT UR-MCNC: 93 MG/DL (ref 15–325)
MICROALBUMIN UR DL<=1MG/L-MCNC: 475 UG/ML

## 2024-11-11 PROCEDURE — 82043 UR ALBUMIN QUANTITATIVE: CPT | Mod: HCNC,PN | Performed by: INTERNAL MEDICINE

## 2024-11-13 ENCOUNTER — OFFICE VISIT (OUTPATIENT)
Dept: FAMILY MEDICINE | Facility: CLINIC | Age: 79
End: 2024-11-13
Payer: MEDICARE

## 2024-11-13 VITALS
HEART RATE: 67 BPM | HEIGHT: 65 IN | WEIGHT: 143.94 LBS | BODY MASS INDEX: 23.98 KG/M2 | OXYGEN SATURATION: 97 % | SYSTOLIC BLOOD PRESSURE: 130 MMHG | DIASTOLIC BLOOD PRESSURE: 60 MMHG

## 2024-11-13 DIAGNOSIS — E11.65 UNCONTROLLED TYPE 2 DIABETES MELLITUS WITH HYPERGLYCEMIA: ICD-10-CM

## 2024-11-13 DIAGNOSIS — E11.59 HYPERTENSION ASSOCIATED WITH DIABETES: Primary | ICD-10-CM

## 2024-11-13 DIAGNOSIS — R80.9 MICROALBUMINURIA: ICD-10-CM

## 2024-11-13 DIAGNOSIS — I15.2 HYPERTENSION ASSOCIATED WITH DIABETES: Primary | ICD-10-CM

## 2024-11-13 DIAGNOSIS — E13.9 LADA (LATENT AUTOIMMUNE DIABETES IN ADULTS), MANAGED AS TYPE 1: ICD-10-CM

## 2024-11-13 PROCEDURE — 1101F PT FALLS ASSESS-DOCD LE1/YR: CPT | Mod: HCNC,CPTII,S$GLB,

## 2024-11-13 PROCEDURE — 99214 OFFICE O/P EST MOD 30 MIN: CPT | Mod: HCNC,S$GLB,,

## 2024-11-13 PROCEDURE — 1160F RVW MEDS BY RX/DR IN RCRD: CPT | Mod: HCNC,CPTII,S$GLB,

## 2024-11-13 PROCEDURE — 1157F ADVNC CARE PLAN IN RCRD: CPT | Mod: HCNC,CPTII,S$GLB,

## 2024-11-13 PROCEDURE — 1126F AMNT PAIN NOTED NONE PRSNT: CPT | Mod: HCNC,CPTII,S$GLB,

## 2024-11-13 PROCEDURE — 3078F DIAST BP <80 MM HG: CPT | Mod: HCNC,CPTII,S$GLB,

## 2024-11-13 PROCEDURE — 3288F FALL RISK ASSESSMENT DOCD: CPT | Mod: HCNC,CPTII,S$GLB,

## 2024-11-13 PROCEDURE — 3075F SYST BP GE 130 - 139MM HG: CPT | Mod: HCNC,CPTII,S$GLB,

## 2024-11-13 PROCEDURE — 99999 PR PBB SHADOW E&M-EST. PATIENT-LVL V: CPT | Mod: PBBFAC,HCNC,,

## 2024-11-13 PROCEDURE — 1159F MED LIST DOCD IN RCRD: CPT | Mod: HCNC,CPTII,S$GLB,

## 2024-11-13 RX ORDER — AMLODIPINE BESYLATE 10 MG/1
10 TABLET ORAL DAILY
Qty: 90 TABLET | Refills: 0 | Status: SHIPPED | OUTPATIENT
Start: 2024-11-13

## 2024-11-13 RX ORDER — INSULIN ASPART 100 [IU]/ML
8 INJECTION, SOLUTION INTRAVENOUS; SUBCUTANEOUS
Qty: 24 ML | Refills: 1 | Status: SHIPPED | OUTPATIENT
Start: 2024-11-13

## 2024-11-13 RX ORDER — INSULIN GLARGINE 100 [IU]/ML
20 INJECTION, SOLUTION SUBCUTANEOUS NIGHTLY
Qty: 6 ML | Refills: 11 | Status: SHIPPED | OUTPATIENT
Start: 2024-11-13 | End: 2025-11-13

## 2024-11-13 NOTE — PROGRESS NOTES
[facility]    11/13/2024      Subjective:       Patient ID:  Noris is a 78 y.o. female .  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    History of Present Illness    CHIEF COMPLAINT:  Noris presents for medication refills and to address concerns about fluid retention in the legs and feet.    HPI:  Interim Hx   Last seen by ophthalmology   Seen by Johnny 08/2024     Current Concerns   Noris reports swelling in both legs and feet, more pronounced in the left foot with visible changes. The swelling was painful earlier in the week but has since improved. Noris rarely elevates her feet.    Chronic Problems   DM- Last A1c Recent A1C 7.9. Managed with Novolog and lantus. Refilled medication   HTN- Managed with Losartan. Controlled in clinic today   HLD0 Managed with fish oil. Last lipid wnl       Noris denies abdominal pain and current use of oral diabetes medications, reporting insulin use only.    MEDICATIONS:  She is also on Amlodipine and Losartan for blood pressure management. She is on Mirtazapine, Clonidine as needed, and Aricept for dementia.    MEDICAL HISTORY:  Noris has a history of Type 1 Diabetes, Hypertension, and Dementia. She recently received a flu vaccine. Noris has also received both doses of the COVID-19 vaccine and a pneumonia shot.    SURGICAL HISTORY:  Noris recently underwent cataract surgery on both eyes. Following this recent eye surgery, she experienced post-operative agitation.    TEST RESULTS:  Her recent A1C test shows a decrease, indicating improvement. However, a recent test also revealed an increase in protein in her urine.      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    Microalbuminuria    Hyperlipidemia associated with type 2 diabetes mellitus    Hypertensive retinopathy, bilateral    NS (nuclear sclerosis), bilateral    Statin myopathy    Nuclear sclerotic cataract of left eye       Last HgbA1C:    Lab Results   Component Value Date    HGBA1C 7.9 (H) 2024    HGBA1C 9.3 (H) 2024    HGBA1C 9.0 (H) 2024         Last Lipid Panel:    Lab Results   Component Value Date    HDL 98 (H) 2024    HDL 94 (H) 2024    HDL 90 (H) 12/15/2023       Lab Results   Component Value Date    LDLCALC 91.6 2024    LDLCALC 90.2 2024    LDLCALC 78.0 12/15/2023       Lab Results   Component Value Date    TRIG 47 2024    TRIG 44 2024    TRIG 50 12/15/2023       Lab Results   Component Value Date    CHOLHDL 49.2 2024    CHOLHDL 48.7 2024    CHOLHDL 50.6 (H) 12/15/2023         Review of patient's allergies indicates:  No Known Allergies     Medication List with Changes/Refills   Current Medications    ASPIRIN (ECOTRIN) 81 MG EC TABLET    Take 81 mg by mouth once daily.    BAQSIMI 3 MG/ACTUATION SPRY    by Each Nostril route.    BD ULTRA-FINE IRWIN PEN NEEDLE 32 GAUGE X 5/32" NDLE    3 TIMES A DAY    BD ULTRA-FINE SHORT PEN NEEDLE 31 GAUGE X 5/16" NDLE    SMARTSI Each SUB-Q Daily    BEVACIZUMAB 3.25 MG/0.13 ML SYRG        BLOOD-GLUCOSE METER,CONTINUOUS (DEXCOM G6 ) MISC    1 each by Misc.(Non-Drug; Combo Route) route once daily at 6am.    BLOOD-GLUCOSE SENSOR (DEXCOM G6 SENSOR) LEONOR    1 each by Misc.(Non-Drug; Combo Route) route once " daily.    BLOOD-GLUCOSE TRANSMITTER (DEXCOM G6 TRANSMITTER) LEONOR    1 each by Misc.(Non-Drug; Combo Route) route once daily.    BUSPIRONE (BUSPAR) 15 MG TABLET    Take 1 tablet (15 mg total) by mouth 2 (two) times daily.    CHOLECALCIFEROL, VITAMIN D3, (VITAMIN D3) 50 MCG (2,000 UNIT) CAP    Take 1 capsule by mouth once daily.    CLONIDINE (CATAPRES) 0.2 MG TABLET    TAKE 1 TABLET TWICE DAILY    COENZYME Q10 100 MG CAPSULE    Take 100 mg by mouth once daily.    CYANOCOBALAMIN 500 MCG TABLET    Take 500 mcg by mouth once daily.    DIAZEPAM (VALIUM) 5 MG TABLET    Take 1 tablet (5 mg total) by mouth as needed for Anxiety.    DONEPEZIL (ARICEPT) 5 MG TABLET    TAKE 1 TABLET EVERY EVENING    FISH OIL-OMEGA-3 FATTY ACIDS 300-1,000 MG CAPSULE    Take 1 g by mouth once daily.    FLUAD QUAD 2023-24,65Y UP,,PF, 60 MCG (15 MCG X 4)/0.5 ML SYRG        FUROSEMIDE (LASIX) 40 MG TABLET    Take 40 mg by mouth once daily.     GLUCOSE 4 GM CHEWABLE TABLET    Take 4 tablets (16 g total) by mouth as needed for Low blood sugar (If having symptoms of blurry vision, palpitations, confusion, shakiness.  Please check sugars and if sugar below 70 please take 4 tablets and re-check sugar everry 15 minutes until sugars are above 70 and symptoms resolve.).    LATANOPROST 0.005 % OPHTHALMIC SOLUTION    Place 1 drop into both eyes every evening.    LOSARTAN (COZAAR) 100 MG TABLET    TAKE 1 TABLET ONE TIME DAILY    MIRTAZAPINE (REMERON) 7.5 MG TAB    Take 1 tablet (7.5 mg total) by mouth every evening.    OFLOXACIN (FLOXIN) 0.3 % OTIC SOLUTION    INSTILL 5 DROPS INTO THE RIGHT EAR ONCE DAILY    OZURDEX 0.7 MG IMPL INTRAVITREAL IMPLANT        PREDNISOLON/GATIFLOX/BROMFENAC (PREDNISOL ACE-GATIFLOX-BROMFEN) 1-0.5-0.075 % DRPS    Apply 1 drop to eye 3 (three) times daily. in operative eye for 1 month after surgery    TRUE METRIX GLUCOSE TEST STRIP STRP    1 strip by In Vitro route every morning.   Changed and/or Refilled Medications    Modified  "Medication Previous Medication    AMLODIPINE (NORVASC) 10 MG TABLET amLODIPine (NORVASC) 10 MG tablet       Take 1 tablet (10 mg total) by mouth once daily.    TAKE 1 TABLET EVERY DAY    INSULIN ASPART U-100 (NOVOLOG FLEXPEN U-100 INSULIN) 100 UNIT/ML (3 ML) INPN PEN insulin aspart U-100 (NOVOLOG FLEXPEN U-100 INSULIN) 100 unit/mL (3 mL) InPn pen       Inject 8 Units into the skin 3 (three) times daily with meals.    Inject 8 Units into the skin 3 (three) times daily with meals.    LANTUS SOLOSTAR U-100 INSULIN 100 UNIT/ML (3 ML) INPN PEN LANTUS SOLOSTAR U-100 INSULIN glargine 100 units/mL SubQ pen       Inject 20 Units into the skin every evening.    Inject 20 Units into the skin every evening.               Objective:      /60 (BP Location: Right arm, Patient Position: Sitting)   Pulse 67   Ht 5' 5" (1.651 m)   Wt 65.3 kg (143 lb 15.4 oz)   LMP 08/13/1999 (Approximate)   SpO2 97%   BMI 23.96 kg/m²   Estimated body mass index is 23.96 kg/m² as calculated from the following:    Height as of this encounter: 5' 5" (1.651 m).    Weight as of this encounter: 65.3 kg (143 lb 15.4 oz).  Physical Exam  Vitals reviewed.   Constitutional:       General: She is not in acute distress.     Appearance: Normal appearance.   HENT:      Head: Normocephalic and atraumatic.      Nose: Nose normal.      Mouth/Throat:      Mouth: Mucous membranes are moist.   Eyes:      Conjunctiva/sclera: Conjunctivae normal.   Cardiovascular:      Rate and Rhythm: Normal rate and regular rhythm.   Pulmonary:      Effort: Pulmonary effort is normal. No respiratory distress.   Abdominal:      General: Bowel sounds are normal.      Palpations: Abdomen is soft.      Tenderness: There is no abdominal tenderness.   Musculoskeletal:         General: Normal range of motion.      Cervical back: Normal range of motion.      Left lower leg: Edema (+ 1 pitting edema) present.   Skin:     General: Skin is warm.   Neurological:      Mental Status: She " is alert.             Assessment and Plan:     Noris was seen today for follow-up.    Diagnoses and all orders for this visit:    Hypertension associated with diabetes  -     amLODIPine (NORVASC) 10 MG tablet; Take 1 tablet (10 mg total) by mouth once daily.    Uncontrolled type 2 diabetes mellitus with hyperglycemia  -     insulin aspart U-100 (NOVOLOG FLEXPEN U-100 INSULIN) 100 unit/mL (3 mL) InPn pen; Inject 8 Units into the skin 3 (three) times daily with meals.  -     LANTUS SOLOSTAR U-100 INSULIN 100 unit/mL (3 mL) InPn pen; Inject 20 Units into the skin every evening.    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), managed as type 1  -     Lipid Panel; Future  -     Hepatic Function Panel; Future  -     Hemoglobin A1C; Future  -     Basic Metabolic Panel; Future    Microalbuminuria  -     Microalbumin/Creatinine Ratio, Urine; Future          Assessment & Plan    Assessed fluid retention in patient's ankles, noting some edema but not severe enough to warrant diuretic therapy given current blood pressure control  Evaluated A1C level, which have improved  Reviewed protein in urine levels, which have increased, but maxed out on current blood pressure medications  Considered adding Jardiance for kidney protection    FLUID RETENTION:  Emphasized the importance of elevating feet to reduce fluid retention.  Discussed the relationship between blood pressure control and fluid retention.  Noris to elevate feet as much as possible to help reduce fluid retention.  Noris to monitor weight to ensure no fluid overload.    HYPERTENSION:  Continued amlodipine for blood pressure control.  Continued losartan for blood pressure control.    DIABETES:  Refilled Lantus insulin (to be filled through mail order pharmacy).  Refilled Novolog insulin (to be filled through mail order pharmacy).  Informed about the potential use of Jardiance for kidney protection in diabetes      KIDNEY FUNCTION:  Follow up to continue monitoring protein  levels in urine.  Follow up to reassess need for Jardiance or other kidney-protective medications.         The patient was informed of the following statements     Emergency Care: Seek immediate medical attention in the emergency room if you experience any new or worsening symptoms, or if your current symptoms significantly increase in severity.  Patient Acknowledgment: Patient verbalizes understanding of the plan and agrees to proceed with the recommended care.     Follow Up:    FU in 3 months        Other Orders Placed This Visit:  Orders Placed This Encounter   Procedures    Lipid Panel    Hepatic Function Panel    Hemoglobin A1C    Basic Metabolic Panel    Microalbumin/Creatinine Ratio, Urine         This note was generated with the assistance of ambient listening technology. Verbal consent was obtained by the patient and accompanying visitor(s) for the recording of patient appointment to facilitate this note. I attest to having reviewed and edited the generated note for accuracy, though some syntax or spelling errors may persist. Please contact the author of this note for any clarification.    Leni Guy PA-C

## 2024-11-14 ENCOUNTER — OFFICE VISIT (OUTPATIENT)
Dept: OPHTHALMOLOGY | Facility: CLINIC | Age: 79
End: 2024-11-14
Payer: MEDICARE

## 2024-11-14 DIAGNOSIS — Z79.4 TYPE 2 DIABETES MELLITUS WITH BOTH EYES AFFECTED BY MODERATE NONPROLIFERATIVE RETINOPATHY AND MACULAR EDEMA, WITH LONG-TERM CURRENT USE OF INSULIN: Primary | ICD-10-CM

## 2024-11-14 DIAGNOSIS — E11.65 UNCONTROLLED TYPE 2 DIABETES MELLITUS WITH HYPERGLYCEMIA: ICD-10-CM

## 2024-11-14 DIAGNOSIS — H35.033 HYPERTENSIVE RETINOPATHY, BILATERAL: ICD-10-CM

## 2024-11-14 DIAGNOSIS — E11.3313 TYPE 2 DIABETES MELLITUS WITH BOTH EYES AFFECTED BY MODERATE NONPROLIFERATIVE RETINOPATHY AND MACULAR EDEMA, WITH LONG-TERM CURRENT USE OF INSULIN: Primary | ICD-10-CM

## 2024-11-14 PROCEDURE — 99999 PR PBB SHADOW E&M-EST. PATIENT-LVL III: CPT | Mod: PBBFAC,,, | Performed by: OPHTHALMOLOGY

## 2024-11-14 RX ORDER — INSULIN ASPART 100 [IU]/ML
8 INJECTION, SOLUTION INTRAVENOUS; SUBCUTANEOUS
Qty: 24 ML | Refills: 1 | OUTPATIENT
Start: 2024-11-14

## 2024-11-14 RX ORDER — INSULIN GLARGINE 100 [IU]/ML
20 INJECTION, SOLUTION SUBCUTANEOUS NIGHTLY
Qty: 6 ML | Refills: 11 | OUTPATIENT
Start: 2024-11-14 | End: 2025-11-14

## 2024-11-14 NOTE — TELEPHONE ENCOUNTER
No care due was identified.  Health [facility] Embedded Care Due Messages. Reference number: [id number].   11/14/2024 9:44:58 AM CST

## 2024-11-14 NOTE — TELEPHONE ENCOUNTER
Refill Decision Note   Noris Demarco  is requesting a refill authorization.  Brief Assessment and Rationale for Refill:  Quick Discontinue     Medication Therapy Plan:  Date/Time Signed: 11/13/2024 09:14; PRINT      Comments:     Note composed:11:52 AM 11/14/2024

## 2024-11-14 NOTE — PROGRESS NOTES
HPI    Doing well post CE ou, no pain ou and sees few floaters or flashes.  Last edited by Shivani Cristina on 11/14/2024 11:04 AM.        OCT - OD VMT with central DME  OS VMT released with central DME    Prior WFFA - late macular leakage OU  No NV OU      A/P    Mod NPDR OU  T2 uncontrolled on insulin    2. DME OU  With 3. VMT component OD (released OS)  S/p Avastin OU x 2  S/p Ozurdex OU x 1 3/24  11/24 - not seen by me after first Oz.    Ozurdex OU today    4. HTN Ret OU  BS/BP/chol control    5. PCIOL OU      8 weeks OCT No dilate  LDFE 11/24    Risks, benefits, and alternatives to treatment discussed in detail with the patient.  The patient voiced understanding and wished to proceed with the procedure    Injection Procedure Note:  Diagnosis: DME OU    Patient Identified and Time Out complete  Pt marked  Topical Proparacaine and Betadine. Subconj lido  Inject Ozurdex  OU at 6:00 @ 3.5-4mm posterior to limbus  Post Operative Dx: Same  Complications: None  Follow up as above.     all other ROS negative except as per HPI

## 2024-11-20 DIAGNOSIS — E11.65 UNCONTROLLED TYPE 2 DIABETES MELLITUS WITH HYPERGLYCEMIA: ICD-10-CM

## 2024-11-20 RX ORDER — INSULIN GLARGINE 100 [IU]/ML
20 INJECTION, SOLUTION SUBCUTANEOUS NIGHTLY
Qty: 6 ML | Refills: 11 | Status: SHIPPED | OUTPATIENT
Start: 2024-11-20 | End: 2025-11-20

## 2024-11-20 RX ORDER — INSULIN ASPART 100 [IU]/ML
8 INJECTION, SOLUTION INTRAVENOUS; SUBCUTANEOUS
Qty: 24 ML | Refills: 1 | Status: SHIPPED | OUTPATIENT
Start: 2024-11-20 | End: 2024-11-20 | Stop reason: SDUPTHER

## 2024-11-20 RX ORDER — INSULIN ASPART 100 [IU]/ML
8 INJECTION, SOLUTION INTRAVENOUS; SUBCUTANEOUS
Qty: 24 ML | Refills: 1 | Status: SHIPPED | OUTPATIENT
Start: 2024-11-20

## 2024-11-20 RX ORDER — INSULIN GLARGINE 100 [IU]/ML
20 INJECTION, SOLUTION SUBCUTANEOUS NIGHTLY
Qty: 6 ML | Refills: 11 | Status: SHIPPED | OUTPATIENT
Start: 2024-11-20 | End: 2024-11-20 | Stop reason: SDUPTHER

## 2024-11-20 NOTE — TELEPHONE ENCOUNTER
No care due was identified.  [facility] Embedded Care Due Messages. Reference number: [id number].   11/20/2024 9:55:00 AM CST

## 2024-11-20 NOTE — TELEPHONE ENCOUNTER
----- Message from Tien sent at 11/20/2024  2:13 PM CST -----  .Type:  Needs Medical Advice    Who Called: pt daughter Samantha    Would the patient rather a call back or a response via [facility]? Call back  Best Call Back Number:   Additional Information:     Samantha stated pt need a PA for her medication for insulin aspart U-100 (NOVOLOG FLEXPEN U-100 INSULIN) 100 unit/mL (3 mL) InPn pen and would like for it to be done ASAP because pt is almost out of her medication

## 2025-01-14 DIAGNOSIS — Z00.00 ENCOUNTER FOR MEDICARE ANNUAL WELLNESS EXAM: ICD-10-CM

## 2025-01-30 ENCOUNTER — PROCEDURE VISIT (OUTPATIENT)
Dept: OPHTHALMOLOGY | Facility: CLINIC | Age: 80
End: 2025-01-30
Payer: MEDICARE

## 2025-01-30 DIAGNOSIS — H35.033 HYPERTENSIVE RETINOPATHY, BILATERAL: ICD-10-CM

## 2025-01-30 DIAGNOSIS — Z79.4 TYPE 2 DIABETES MELLITUS WITH BOTH EYES AFFECTED BY MODERATE NONPROLIFERATIVE RETINOPATHY AND MACULAR EDEMA, WITH LONG-TERM CURRENT USE OF INSULIN: Primary | ICD-10-CM

## 2025-01-30 DIAGNOSIS — E11.3313 TYPE 2 DIABETES MELLITUS WITH BOTH EYES AFFECTED BY MODERATE NONPROLIFERATIVE RETINOPATHY AND MACULAR EDEMA, WITH LONG-TERM CURRENT USE OF INSULIN: Primary | ICD-10-CM

## 2025-01-30 PROCEDURE — 92134 CPTRZ OPH DX IMG PST SGM RTA: CPT | Mod: S$GLB,,, | Performed by: OPHTHALMOLOGY

## 2025-01-30 PROCEDURE — 92012 INTRM OPH EXAM EST PATIENT: CPT | Mod: S$GLB,,, | Performed by: OPHTHALMOLOGY

## 2025-01-30 NOTE — PROGRESS NOTES
HPI     Follow-up     Additional comments: 8 week Ozurdex ou           Comments    VA stable ou, no pain ou and denies floaters or flashes ou.            Last edited by Shivani Cristina on 1/30/2025 10:21 AM.          OCT - OD VMT with central DME  OS VMT released with central DME    Prior WFFA - late macular leakage OU  No NV OU      A/P    Mod NPDR OU  T2 uncontrolled on insulin    2. DME OU  With 3. VMT component OD (released OS)  S/p Avastin OU x 2  S/p Ozurdex OU x 2  11/14/24 11/24 - not seen by me after first Oz.    Improved obs today    4. HTN Ret OU  BS/BP/chol control    5. PCIOL OU      8 weeks OCT No dilate  LDFE 11/24

## 2025-01-31 NOTE — PROGRESS NOTES
Subjective:       Patient ID: Noris Demarco is a 79 y.o. female.    Chief Complaint:  history of breast cancer    HPI    HPI as per Dr Peralta's 1/18/22 office visit notes:  She underwent a lumpectomy in Jan 2008 for a 5-mm Right Breast DCIS that was found on a routine screening mammogram, completed adjuvant RT in July 2008. She was started on Arimidex at [facility] following radiation treatment and she was intolerant to it because of debilitating and disabling joint pains, she hence was switched to tamoxifen in 12/2008 and completed it in Dec 2013.    She was then diagnosed with infiltrating ductal carcinoma of the lower outer quadrant of the left breast on routine screening mammogram done in May 2018.  Lumpectomy with SLN biopsy was done in June 2018 and a 1.3 cm grade 2 ER positive 95%, OK positive 5% and HER2 Luther negative, Ki-67 30% was noted. Five lymph nodes were removed from the axilla and they were all negative.  Oncotype score came back at 40, she declined adjuvant chemotherapy.  She completed adjuvant radiation therapy under the care of Dr. Chavez on 09/27/2018.    She has a golf ball-sized seroma in the left breast that is hard to palpation but is not painful.    She was intolerant to Arimidex before.  So she was given a prescription for Aromasin, she did not take it secondary to fear of arthralgias.  Hence tamoxifen was started on 10/08/2018.      Interval history:  - she presents for a follow-up appointment for her history of ductal carcinoma in situ and history of breast cancer. She had previously seen Dr. Peralta and nurse practitioner Cecilia Chandra.  - she stopped in tamoxifen in April 2024.  - today, she is doing well. She denies shortness of breath, chest pain, nausea, vomiting, diarrhea, constipation. She has some memory issues.      Review of Systems   Constitutional:  Negative for appetite change and unexpected weight change.   HENT:  Negative for nosebleeds.    Eyes:  Negative for photophobia and visual  disturbance.   Respiratory:  Negative for shortness of breath.    Genitourinary:  Negative for hematuria.   Skin:  Negative for color change and wound.   Neurological:  Negative for dizziness, speech difficulty and light-headedness.   Hematological:  Negative for adenopathy. Does not bruise/bleed easily.   Psychiatric/Behavioral:  The patient is not nervous/anxious.          Objective:      Vitals:    02/05/25 1110   BP: (!) 152/74   BP Location: Right arm   Patient Position: Sitting   Pulse: 74   Resp: 16   SpO2: 100%   Weight: 69.6 kg (153 lb 7 oz)         Body mass index is 25.53 kg/m².    Physical Exam  Constitutional:       General: She is not in acute distress.     Appearance: Normal appearance. She is not toxic-appearing.   HENT:      Head: Normocephalic and atraumatic.      Nose: Nose normal.      Mouth/Throat:      Mouth: Mucous membranes are moist.      Pharynx: Oropharynx is clear.   Eyes:      General: No scleral icterus.     Conjunctiva/sclera: Conjunctivae normal.   Cardiovascular:      Rate and Rhythm: Normal rate.      Heart sounds: Normal heart sounds.   Pulmonary:      Effort: Pulmonary effort is normal. No respiratory distress.      Breath sounds: Normal breath sounds.   Chest:   Breasts:     Right: No swelling, bleeding, inverted nipple, mass, nipple discharge, skin change or tenderness.      Left: No swelling, bleeding, inverted nipple, mass, nipple discharge, skin change or tenderness.      Comments: Bilat breasts nontender, no notable skin changes or nipple drainage, no associated lymphadnopathy  Musculoskeletal:         General: No swelling or tenderness. Normal range of motion.      Cervical back: Normal range of motion and neck supple.   Lymphadenopathy:      Cervical:      Right cervical: No superficial or deep cervical adenopathy.     Left cervical: No superficial or deep cervical adenopathy.      Upper Body:      Right upper body: No supraclavicular, axillary or pectoral adenopathy.       Left upper body: No supraclavicular, axillary or pectoral adenopathy.   Skin:     General: Skin is warm and dry.      Coloration: Skin is not jaundiced or pale.   Neurological:      Mental Status: She is alert and oriented to person, place, and time.      Gait: Gait normal.   Psychiatric:         Mood and Affect: Mood normal.         Behavior: Behavior normal.         Thought Content: Thought content normal.         Judgment: Judgment normal.       ECOG SCORE    1 - Restricted in strenuous activity-ambulatory and able to carry out work of a light nature         LABS    Lab Results   Component Value Date    WBC 4.14 04/19/2024    HGB 12.0 04/19/2024    HCT 36.4 (L) 04/19/2024    MCV 99 (H) 04/19/2024     04/19/2024     IMAGING  Mammogram (11/17/23):     There is no mammographic evidence of malignancy.     DXA Bone Density 10/5/22 (reviewed by this provider)  Impression:  -The T score associated with the lumbar spine is 0.2 on the current examination.  It was 1.0 on the prior examination.  The T score associated with the left femoral neck is 0.1 on the current examination.  It was 0 on the prior examination.  The T score associated with the right femoral neck is -0.2 on the current examination.  It was 0.1 on the prior examination.  -Normal study     Assessment:       1. History of breast cancer    2. History of ductal carcinoma in situ (DCIS) of breast            Plan:     Past records reviewed including clinic visits, imaging, labs, medications.     History of breast cancer / history of ductal carcinoma in situ  - diagnosed in 2008  - took tamoxifen for 5 years until December 2013  - Stage I T1c N0 infiltrating ductal carcinoma of the left breast  - status post lumpectomy and radiation therapy  - on tamoxifen since October 8, 2018. Completed tamoxifen in April 2024.  - will repeat mammogram.  - assuming negative mammogram, return to clinic in February 2026 with repeat mammogram.       Paulie Eckert,  M.D.  Hematology/Oncology  [facility] - [address], [address]  [place] [address]  Phone: [phone]  Fax: [phone]

## 2025-02-05 ENCOUNTER — OFFICE VISIT (OUTPATIENT)
Dept: HEMATOLOGY/ONCOLOGY | Facility: CLINIC | Age: 80
End: 2025-02-05
Payer: MEDICARE

## 2025-02-05 VITALS
WEIGHT: 153.44 LBS | RESPIRATION RATE: 16 BRPM | SYSTOLIC BLOOD PRESSURE: 152 MMHG | OXYGEN SATURATION: 100 % | DIASTOLIC BLOOD PRESSURE: 74 MMHG | BODY MASS INDEX: 25.53 KG/M2 | HEART RATE: 74 BPM

## 2025-02-05 DIAGNOSIS — Z86.000 HISTORY OF DUCTAL CARCINOMA IN SITU (DCIS) OF BREAST: ICD-10-CM

## 2025-02-05 DIAGNOSIS — Z85.3 HISTORY OF BREAST CANCER: Primary | ICD-10-CM

## 2025-02-05 DIAGNOSIS — Z12.31 ENCOUNTER FOR SCREENING MAMMOGRAM FOR BREAST CANCER: ICD-10-CM

## 2025-02-05 PROCEDURE — 3288F FALL RISK ASSESSMENT DOCD: CPT | Mod: HCNC,CPTII,S$GLB, | Performed by: INTERNAL MEDICINE

## 2025-02-05 PROCEDURE — 3077F SYST BP >= 140 MM HG: CPT | Mod: HCNC,CPTII,S$GLB, | Performed by: INTERNAL MEDICINE

## 2025-02-05 PROCEDURE — 1126F AMNT PAIN NOTED NONE PRSNT: CPT | Mod: HCNC,CPTII,S$GLB, | Performed by: INTERNAL MEDICINE

## 2025-02-05 PROCEDURE — 99214 OFFICE O/P EST MOD 30 MIN: CPT | Mod: HCNC,S$GLB,, | Performed by: INTERNAL MEDICINE

## 2025-02-05 PROCEDURE — 1157F ADVNC CARE PLAN IN RCRD: CPT | Mod: HCNC,CPTII,S$GLB, | Performed by: INTERNAL MEDICINE

## 2025-02-05 PROCEDURE — 1101F PT FALLS ASSESS-DOCD LE1/YR: CPT | Mod: HCNC,CPTII,S$GLB, | Performed by: INTERNAL MEDICINE

## 2025-02-05 PROCEDURE — 3078F DIAST BP <80 MM HG: CPT | Mod: HCNC,CPTII,S$GLB, | Performed by: INTERNAL MEDICINE

## 2025-02-05 PROCEDURE — 99999 PR PBB SHADOW E&M-EST. PATIENT-LVL V: CPT | Mod: PBBFAC,HCNC,, | Performed by: INTERNAL MEDICINE

## 2025-02-05 PROCEDURE — 1160F RVW MEDS BY RX/DR IN RCRD: CPT | Mod: HCNC,CPTII,S$GLB, | Performed by: INTERNAL MEDICINE

## 2025-02-05 PROCEDURE — 1159F MED LIST DOCD IN RCRD: CPT | Mod: HCNC,CPTII,S$GLB, | Performed by: INTERNAL MEDICINE

## 2025-02-10 DIAGNOSIS — E11.65 UNCONTROLLED TYPE 2 DIABETES MELLITUS WITH HYPERGLYCEMIA: ICD-10-CM

## 2025-02-10 RX ORDER — INSULIN ASPART 100 [IU]/ML
8 INJECTION, SOLUTION INTRAVENOUS; SUBCUTANEOUS
Qty: 777 ML | Refills: 0 | Status: SHIPPED | OUTPATIENT
Start: 2025-02-10

## 2025-02-10 NOTE — TELEPHONE ENCOUNTER
Future Appointments   Date Time Provider Department Center   3/5/2025 10:00 AM [facility] MAMMO2 [facility] MAMMO [facility]   3/12/2025  7:15 AM [place] [place]   3/12/2025  8:45 AM LAB, [facility] LAB [place]   3/18/2025 10:40 AM Abram Turner III, MD [facility]   3/20/2025 10:20 AM Paulie Eckert MD [facility] HEM ONC [facility]   3/27/2025 10:10 AM [facility]

## 2025-02-18 ENCOUNTER — PATIENT MESSAGE (OUTPATIENT)
Dept: ADMINISTRATIVE | Facility: CLINIC | Age: 80
End: 2025-02-18
Payer: MEDICARE

## 2025-03-12 ENCOUNTER — LAB VISIT (OUTPATIENT)
Dept: LAB | Facility: HOSPITAL | Age: 80
End: 2025-03-12
Payer: MEDICARE

## 2025-03-12 DIAGNOSIS — R80.9 MICROALBUMINURIA: ICD-10-CM

## 2025-03-12 LAB
ALBUMIN/CREAT UR: 674.1 UG/MG (ref 0–30)
CREAT UR-MCNC: 81 MG/DL (ref 15–325)
MICROALBUMIN UR DL<=1MG/L-MCNC: 546 UG/ML

## 2025-03-12 PROCEDURE — 82043 UR ALBUMIN QUANTITATIVE: CPT | Mod: HCNC,PN

## 2025-03-13 ENCOUNTER — HOSPITAL ENCOUNTER (OUTPATIENT)
Dept: RADIOLOGY | Facility: HOSPITAL | Age: 80
Discharge: HOME | End: 2025-03-13
Attending: INTERNAL MEDICINE
Payer: MEDICARE

## 2025-03-13 DIAGNOSIS — Z85.3 HISTORY OF BREAST CANCER: ICD-10-CM

## 2025-03-13 DIAGNOSIS — Z12.31 ENCOUNTER FOR SCREENING MAMMOGRAM FOR BREAST CANCER: ICD-10-CM

## 2025-03-13 PROCEDURE — 77067 SCR MAMMO BI INCL CAD: CPT | Mod: TC,HCNC,PN

## 2025-03-18 ENCOUNTER — OFFICE VISIT (OUTPATIENT)
Dept: INTERNAL MEDICINE | Facility: CLINIC | Age: 80
End: 2025-03-18
Payer: MEDICARE

## 2025-03-18 VITALS
BODY MASS INDEX: 25.61 KG/M2 | WEIGHT: 153.69 LBS | HEIGHT: 65 IN | HEART RATE: 68 BPM | OXYGEN SATURATION: 98 % | SYSTOLIC BLOOD PRESSURE: 142 MMHG | DIASTOLIC BLOOD PRESSURE: 72 MMHG

## 2025-03-18 DIAGNOSIS — E11.3313 TYPE 2 DIABETES MELLITUS WITH BOTH EYES AFFECTED BY MODERATE NONPROLIFERATIVE RETINOPATHY AND MACULAR EDEMA, WITH LONG-TERM CURRENT USE OF INSULIN: Primary | ICD-10-CM

## 2025-03-18 DIAGNOSIS — Z79.4 TYPE 2 DIABETES MELLITUS WITH BOTH EYES AFFECTED BY MODERATE NONPROLIFERATIVE RETINOPATHY AND MACULAR EDEMA, WITH LONG-TERM CURRENT USE OF INSULIN: Primary | ICD-10-CM

## 2025-03-18 DIAGNOSIS — G47.00 INSOMNIA, UNSPECIFIED TYPE: ICD-10-CM

## 2025-03-18 DIAGNOSIS — E11.65 UNCONTROLLED TYPE 2 DIABETES MELLITUS WITH HYPERGLYCEMIA: ICD-10-CM

## 2025-03-18 DIAGNOSIS — T46.6X5A STATIN MYOPATHY: ICD-10-CM

## 2025-03-18 DIAGNOSIS — G72.0 STATIN MYOPATHY: ICD-10-CM

## 2025-03-18 DIAGNOSIS — F02.A18 MILD LATE ONSET ALZHEIMER'S DEMENTIA WITH OTHER BEHAVIORAL DISTURBANCE: ICD-10-CM

## 2025-03-18 DIAGNOSIS — E13.9 LADA (LATENT AUTOIMMUNE DIABETES IN ADULTS), MANAGED AS TYPE 1: ICD-10-CM

## 2025-03-18 DIAGNOSIS — G30.1 MILD LATE ONSET ALZHEIMER'S DEMENTIA WITH OTHER BEHAVIORAL DISTURBANCE: ICD-10-CM

## 2025-03-18 PROCEDURE — 1159F MED LIST DOCD IN RCRD: CPT | Mod: HCNC,CPTII,S$GLB, | Performed by: INTERNAL MEDICINE

## 2025-03-18 PROCEDURE — 99999 PR PBB SHADOW E&M-EST. PATIENT-LVL IV: CPT | Mod: PBBFAC,HCNC,, | Performed by: INTERNAL MEDICINE

## 2025-03-18 PROCEDURE — 1157F ADVNC CARE PLAN IN RCRD: CPT | Mod: HCNC,CPTII,S$GLB, | Performed by: INTERNAL MEDICINE

## 2025-03-18 PROCEDURE — G2211 COMPLEX E/M VISIT ADD ON: HCPCS | Mod: HCNC,S$GLB,, | Performed by: INTERNAL MEDICINE

## 2025-03-18 PROCEDURE — 1126F AMNT PAIN NOTED NONE PRSNT: CPT | Mod: HCNC,CPTII,S$GLB, | Performed by: INTERNAL MEDICINE

## 2025-03-18 PROCEDURE — 1101F PT FALLS ASSESS-DOCD LE1/YR: CPT | Mod: HCNC,CPTII,S$GLB, | Performed by: INTERNAL MEDICINE

## 2025-03-18 PROCEDURE — 3078F DIAST BP <80 MM HG: CPT | Mod: HCNC,CPTII,S$GLB, | Performed by: INTERNAL MEDICINE

## 2025-03-18 PROCEDURE — 3077F SYST BP >= 140 MM HG: CPT | Mod: HCNC,CPTII,S$GLB, | Performed by: INTERNAL MEDICINE

## 2025-03-18 PROCEDURE — 99215 OFFICE O/P EST HI 40 MIN: CPT | Mod: HCNC,S$GLB,, | Performed by: INTERNAL MEDICINE

## 2025-03-18 PROCEDURE — 3288F FALL RISK ASSESSMENT DOCD: CPT | Mod: HCNC,CPTII,S$GLB, | Performed by: INTERNAL MEDICINE

## 2025-03-18 RX ORDER — MIRTAZAPINE 7.5 MG/1
7.5 TABLET, FILM COATED ORAL NIGHTLY
Qty: 90 TABLET | Refills: 3 | Status: SHIPPED | OUTPATIENT
Start: 2025-03-18

## 2025-03-18 NOTE — PROGRESS NOTES
Assessment:         1. Type 2 diabetes mellitus with both eyes affected by moderate nonproliferative retinopathy and macular edema, with long-term current use of insulin    2. 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 Statin myopathy    4. 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 Insomnia, unspecified type    6. Uncontrolled type 2 diabetes mellitus with hyperglycemia          Plan:           Noris was seen today for hypertension and diabetes.    Diagnoses and all orders for this visit:    Type 2 diabetes mellitus with both eyes affected by moderate nonproliferative retinopathy and macular edema, with long-term current use of insulin  -     Basic Metabolic Panel; Standing  -     Hemoglobin A1C; Standing  -     Lipid Panel; Standing  -     Microalbumin/Creatinine Ratio, Urine  -     Ambulatory referral/consult to Endocrinology; Future  -     Microalbumin/Creatinine Ratio, Urine; Future    Mild late onset Alzheimer's dementia with other behavioral disturbance  -     Ambulatory referral/consult to Neurology; Future    Statin myopathy    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    Uncontrolled type 2 diabetes mellitus with hyperglycemia  -     LANTUS SOLOSTAR U-100 INSULIN 100 unit/mL (3 mL) InPn pen; Inject 24 Units into the skin every evening.  -     insulin aspart U-100 (NOVOLOG FLEXPEN U-100 INSULIN) 100 unit/mL (3 mL) InPn pen; Inject 10 Units into the skin 3 (three) times daily with meals.        Assessment & Plan    IMPRESSION:  Assessed blood pressure and A1C, noting significant increase in A1C from 7.9 to 9.5.  Evaluated current insulin regimen and dosing strategy.  Considered Mirtazapine as a more suitable alternative to Lexapro for mood  stabilization and sleep issues.  Assessed urinary incontinence symptoms and recommended timed voiding strategy.  Evaluated need for follow-up with endocrinology and neurology due to complex diabetes management and cognitive concerns.    PATIENT EDUCATION:  - Explained importance of consistent blood pressure monitoring and logging.  - Discussed significance of A1C level and their relation to average blood sugar.  - Provided information on timed voiding as a strategy for managing urinary urgency.    ACTION ITEMS/LIFESTYLE:  - Patient to implement timed voiding every 4-6 hours (morning, noon, and night) to manage urinary urgency.  - Patient to continue monitoring blood sugar multiple times daily.  - Patient to maintain log of home blood pressure readings for next few weeks.    MEDICATIONS:  - Started Mirtazapine to be taken daily in evening.  - Continued Donepezil (for memory).  - Continued insulin regimen: Lantus (long-acting): Adjust dosage based on evening blood sugar (20-24 units); Novolog (short-acting): 5-10 units with meals, adjusting based on pre-meal blood sugar.  - Continued other current medications including amlodipine, aspirin 81 mg, buspirone (as needed for anxiety), vitamin D, clonidine, furosemide, and losartan.    ORDERS:  - Blood pressure check by nurse in 2 weeks.  - Ordered labs in 3 months.    REFERRALS:  - Referred to endocrinology for diabetes management.  - Referred to neurology for cognitive evaluation.    FOLLOW UP:  - Follow up in 3 months.             Subjective:       Patient ID: Noris Demarco is a 79 y.o. female.    Chief Complaint: Hypertension and Diabetes      Interim Hx  Concerns today  Chronic problems        History of Present Illness    CHIEF COMPLAINT:  Patient presents today for follow up    DIABETES MANAGEMENT:  She reports variable morning blood sugar readings ranging from 100-200 mg/dL and expresses uncertainty about proper insulin administration. She previously used a Dexcom  "continuous glucose monitor but discontinued its use. A1C has increased from 7.9 to 9.5.    MUSCULOSKELETAL:  She reports bilateral hand pain with constant soreness and achiness throughout the day. The pain is severe enough that she requires prying her hands open.    NEUROLOGICAL/PSYCHIATRIC:  She experiences episodes of not recognizing people. She becomes upset when hearing shouting and demonstrates increased tearfulness during conversations. She exhibits episodes of agitation and anger. She reports enjoying listening to [email or web address] music on the patio.    GENITOURINARY:  She reports urinary urgency with occasional incontinence, experiencing sudden, strong urges to urinate and sometimes not making it to the bathroom in time. She denies dysuria, hematuria, and back pain.    RECENT HEALTHCARE VISITS:  She reports recent visits with dermatology, oncology, ophthalmology, and primary care.    LABS:  Microalbumin remains persistently elevated. Kidney function tests, electrocardiogram, and liver tests were within normal limits.    SOCIAL HISTORY:  She reports limited exercise.      ROS:  Genitourinary: -dysuria, -hematuria, +urgency  Musculoskeletal: +joint pain  Neurological: +memory loss  Psychiatric: +hallucinations, +anger problems, +agitation, +emotional lability, +excessive crying           Review of Systems        Health Maintenance Due   Topic Date Due    TETANUS VACCINE  Never done    Shingles Vaccine (1 of 2) Never done    RSV Vaccine (Age 60+ and Pregnant patients) (1 - 1-dose 75+ series) Never done    COVID-19 Vaccine (4 - 2024-25 season) 09/01/2024         Objective:     BP (!) 142/72 (BP Location: Right arm, Patient Position: Sitting)   Pulse 68   Ht 5' 5" (1.651 m)   Wt 69.7 kg (153 lb 10.6 oz)   LMP 08/13/1999 (Approximate)   SpO2 98%   BMI 25.57 kg/m²   .Physical Exam    Vitals: Blood pressure: 140/70.  Ears: Non-erythematous external auditory canal. Presence of cerumen in both ears. Non-tender " "ears.  Respiratory: Normal respiratory effort. Clear to auscultation bilaterally. No rales. No rhonchi. No wheezing.               3/18/2025     9:35 AM 2/5/2025    11:10 AM 11/13/2024     8:47 AM 9/23/2024     7:39 AM 8/13/2024    10:09 AM   Vitals   Height 5' 5" (1.651 m)  5' 5" (1.651 m) 5' 5" (1.651 m) 5' 5" (1.651 m)   Weight (lbs) 153.66 153.44 143.96 140 150.79   BMI (kg/m2) 25.6  24 23.3 25.1          Physical Exam  Vitals and nursing note reviewed.   Constitutional:       General: She is not in acute distress.     Appearance: She is well-developed. She is not diaphoretic.   HENT:      Head: Normocephalic.      Nose: Nose normal.   Eyes:      General:         Right eye: No discharge.         Left eye: No discharge.      Conjunctiva/sclera: Conjunctivae normal.      Pupils: Pupils are equal, round, and reactive to light.   Cardiovascular:      Rate and Rhythm: Normal rate and regular rhythm.      Heart sounds: Normal heart sounds. No murmur heard.     No friction rub. No gallop.   Pulmonary:      Effort: Pulmonary effort is normal. No respiratory distress.   Abdominal:      General: Bowel sounds are normal. There is no distension.      Palpations: Abdomen is soft.   Musculoskeletal:         General: No deformity. Normal range of motion.      Cervical back: Normal range of motion.   Skin:     General: Skin is warm.   Neurological:      Mental Status: She is alert. She is disoriented.      Cranial Nerves: No cranial nerve deficit.           Recent Results (from the past 2 weeks)   Lipid Panel    Collection Time: 03/12/25  8:45 AM   Result Value Ref Range    Cholesterol 214 (H) 120 - 199 mg/dL    Triglycerides 46 30 - 150 mg/dL     (H) 40 - 75 mg/dL    LDL Cholesterol 103.8 63.0 - 159.0 mg/dL    HDL/Cholesterol Ratio 47.2 20.0 - 50.0 %    Total Cholesterol/HDL Ratio 2.1 2.0 - 5.0    Non-HDL Cholesterol 113 mg/dL   Hepatic Function Panel    Collection Time: 03/12/25  8:45 AM   Result Value Ref Range    " "Total Protein 7.2 6.0 - 8.4 g/dL    Albumin 3.8 3.5 - 5.2 g/dL    Total Bilirubin 0.7 0.1 - 1.0 mg/dL    AST 29 15 - 46 U/L    ALT 23 10 - 44 U/L    Alkaline Phosphatase 102 38 - 126 U/L   Hemoglobin A1C    Collection Time: 03/12/25  8:45 AM   Result Value Ref Range    Hemoglobin A1C 9.5 (H) 4.0 - 5.6 %    Estimated Avg Glucose 226 (H) 68 - 131 mg/dL   Basic Metabolic Panel    Collection Time: 03/12/25  8:45 AM   Result Value Ref Range    Sodium 138 136 - 145 mmol/L    Potassium 4.3 3.5 - 5.1 mmol/L    Chloride 101 95 - 110 mmol/L    CO2 32 (H) 23 - 29 mmol/L    Glucose 196 (H) 70 - 110 mg/dL    BUN 10 7 - 17 mg/dL    Creatinine 0.60 0.50 - 1.40 mg/dL    Calcium 10.4 8.7 - 10.5 mg/dL    Anion Gap 5 (L) 8 - 16 mmol/L    eGFR >60.0 >60 mL/min/1.73 m^2   Microalbumin/Creatinine Ratio, Urine    Collection Time: 03/12/25  8:52 AM   Result Value Ref Range    Microalbumin, Urine 546.0 ug/mL    Creatinine, Urine 81.0 15.0 - 325.0 mg/dL    Microalb/Creat Ratio 674.1 (H) 0.0 - 30.0 ug/mg         Future Appointments   Date Time Provider Department Center   3/20/2025 10:20 AM Paulie Eckert MD [facility] HEM ONC [facility]   3/27/2025 10:10 AM ETHAN Remy MD [facility] OPHTHA [place]   4/1/2025 10:00 AM INJECTION, [facility]   6/2/2025 10:40 AM Nasreen Addison OD DESC OPTOMTY [place]   6/16/2025  8:15 AM SPECIMEN, [facility] SPECLAB [place]   6/16/2025  9:40 AM LAB, [facility]IN[facility] [place]   6/19/2025  2:30 PM Leni Guy PA-C [facility]   8/5/2025 11:20 AM Miguel Alston MD [facility] NEURO [place] [facility]         Medication List with Changes/Refills   Current Medications    AMLODIPINE (NORVASC) 10 MG TABLET    Take 1 tablet (10 mg total) by mouth once daily.    ASPIRIN (ECOTRIN) 81 MG EC TABLET    Take 81 mg by mouth once daily.    BAQSIMI 3 MG/ACTUATION SPRY    by Each Nostril route.    BD ULTRA-FINE IRWIN PEN NEEDLE 32 GAUGE X 5/32" NDLE    3 TIMES A DAY    BD ULTRA-FINE SHORT PEN " "NEEDLE 31 GAUGE X 16" NDLE    SMARTSI Each SUB-Q Daily    BEVACIZUMAB 3.25 MG/0.13 ML SYRG        BLOOD-GLUCOSE METER,CONTINUOUS (DEXCOM G6 ) MISC    1 each by Misc.(Non-Drug; Combo Route) route once daily at 6am.    BLOOD-GLUCOSE SENSOR (DEXCOM G6 SENSOR) LEONOR    1 each by Misc.(Non-Drug; Combo Route) route once daily.    BLOOD-GLUCOSE TRANSMITTER (DEXCOM G6 TRANSMITTER) LEONOR    1 each by Misc.(Non-Drug; Combo Route) route once daily.    BUSPIRONE (BUSPAR) 15 MG TABLET    Take 1 tablet (15 mg total) by mouth 2 (two) times daily.    CHOLECALCIFEROL, VITAMIN D3, (VITAMIN D3) 50 MCG (2,000 UNIT) CAP    Take 1 capsule by mouth once daily.    CLONIDINE (CATAPRES) 0.2 MG TABLET    TAKE 1 TABLET TWICE DAILY    COENZYME Q10 100 MG CAPSULE    Take 100 mg by mouth once daily.    CYANOCOBALAMIN 500 MCG TABLET    Take 500 mcg by mouth once daily.    DIAZEPAM (VALIUM) 5 MG TABLET    Take 1 tablet (5 mg total) by mouth as needed for Anxiety.    DONEPEZIL (ARICEPT) 5 MG TABLET    TAKE 1 TABLET EVERY EVENING    FISH OIL-OMEGA-3 FATTY ACIDS 300-1,000 MG CAPSULE    Take 1 g by mouth once daily.    FLUAD QUAD -,65Y UP,,PF, 60 MCG (15 MCG X 4)/0.5 ML SYRG        FUROSEMIDE (LASIX) 40 MG TABLET    Take 40 mg by mouth once daily.     GLUCOSE 4 GM CHEWABLE TABLET    Take 4 tablets (16 g total) by mouth as needed for Low blood sugar (If having symptoms of blurry vision, palpitations, confusion, shakiness.  Please check sugars and if sugar below 70 please take 4 tablets and re-check sugar everry 15 minutes until sugars are above 70 and symptoms resolve.).    LATANOPROST 0.005 % OPHTHALMIC SOLUTION    Place 1 drop into both eyes every evening.    LOSARTAN (COZAAR) 100 MG TABLET    TAKE 1 TABLET ONE TIME DAILY    OFLOXACIN (FLOXIN) 0.3 % OTIC SOLUTION    INSTILL 5 DROPS INTO THE RIGHT EAR ONCE DAILY    OZURDEX 0.7 MG IMPL INTRAVITREAL IMPLANT        PREDNISOLON/GATIFLOX/BROMFENAC (PREDNISOL ACE-GATIFLOX-BROMFEN) " 1-0.5-0.075 % DRPS    Apply 1 drop to eye 3 (three) times daily. in operative eye for 1 month after surgery    TRUE METRIX GLUCOSE TEST STRIP STRP    1 strip by In Vitro route every morning.   Changed and/or Refilled Medications    Modified Medication Previous Medication    INSULIN ASPART U-100 (NOVOLOG FLEXPEN U-100 INSULIN) 100 UNIT/ML (3 ML) INPN PEN insulin aspart U-100 (NOVOLOG FLEXPEN U-100 INSULIN) 100 unit/mL (3 mL) InPn pen       Inject 10 Units into the skin 3 (three) times daily with meals.    Inject 8 Units into the skin 3 (three) times daily with meals.    LANTUS SOLOSTAR U-100 INSULIN 100 UNIT/ML (3 ML) INPN PEN LANTUS SOLOSTAR U-100 INSULIN 100 unit/mL (3 mL) InPn pen       Inject 24 Units into the skin every evening.    Inject 20 Units into the skin every evening.    MIRTAZAPINE (REMERON) 7.5 MG TAB mirtazapine (REMERON) 7.5 MG Tab       Take 1 tablet (7.5 mg total) by mouth every evening.    Take 1 tablet (7.5 mg total) by mouth every evening.         Disclaimer:  This note has been generated using voice-recognition software. There may be grammatical or spelling errors that have been missed during proof-reading   This note was generated with the assistance of ambient listening technology. Verbal consent was obtained by the patient and accompanying visitor(s) for the recording of patient appointment to facilitate this note. I attest to having reviewed and edited the generated note for accuracy, though some syntax or spelling errors may persist. Please contact the author of this note for any clarification.        I spent at least 40 mins with preparing to see the patient, obtaining and/or revieweing separately obtained history, preforming a examination and evaluation, counseling, communicating with other health care professional, documenting clinical information in the electronic health record,  and/or coordinating care.

## 2025-03-19 RX ORDER — INSULIN ASPART 100 [IU]/ML
10 INJECTION, SOLUTION INTRAVENOUS; SUBCUTANEOUS
Qty: 777 ML | Refills: 0 | Status: SHIPPED | OUTPATIENT
Start: 2025-03-19

## 2025-03-19 RX ORDER — INSULIN GLARGINE 100 [IU]/ML
24 INJECTION, SOLUTION SUBCUTANEOUS NIGHTLY
Qty: 7.2 ML | Refills: 11 | Status: SHIPPED | OUTPATIENT
Start: 2025-03-19 | End: 2026-03-19

## 2025-03-27 ENCOUNTER — OFFICE VISIT (OUTPATIENT)
Dept: OPHTHALMOLOGY | Facility: CLINIC | Age: 80
End: 2025-03-27
Payer: MEDICARE

## 2025-03-27 DIAGNOSIS — H35.033 HYPERTENSIVE RETINOPATHY, BILATERAL: ICD-10-CM

## 2025-03-27 DIAGNOSIS — E11.3313 TYPE 2 DIABETES MELLITUS WITH BOTH EYES AFFECTED BY MODERATE NONPROLIFERATIVE RETINOPATHY AND MACULAR EDEMA, WITH LONG-TERM CURRENT USE OF INSULIN: Primary | ICD-10-CM

## 2025-03-27 DIAGNOSIS — Z79.4 TYPE 2 DIABETES MELLITUS WITH BOTH EYES AFFECTED BY MODERATE NONPROLIFERATIVE RETINOPATHY AND MACULAR EDEMA, WITH LONG-TERM CURRENT USE OF INSULIN: Primary | ICD-10-CM

## 2025-03-27 PROCEDURE — 99999 PR PBB SHADOW E&M-EST. PATIENT-LVL III: CPT | Mod: PBBFAC,,, | Performed by: OPHTHALMOLOGY

## 2025-03-27 RX ADMIN — Medication 1.25 MG: at 12:03

## 2025-03-27 NOTE — PROGRESS NOTES
HPI     dm    oct      me          Additional comments: Oct     Dm   Last bs      175  to 173  Last A1c    9    Me   Vmt   Missing letter s  on snellen chart  blurry va   Pt has stopped driving   Prev  injections  in the past     Dls 01/3025          Last edited by Kath Villar on 3/27/2025 11:05 AM.        OCT - OD VMT with central DME  OS VMT released with central DME    Prior WFFA 1/24 - late macular leakage OU  No NV OU      A/P    Mod NPDR OU  T2 uncontrolled on insulin    2. DME OU  With 3. VMT component OD (released OS)  S/p Avastin OU x 2  S/p Ozurdex OU x 2  11/14/24 11/24 - not seen by me after first Oz.    Increased DME OU as Ozurdex effect diminishing    Avastin OU today (no copay assistance)    Than plan Focal OU - OD first    4. HTN Ret OU  BS/BP/chol control    5. PCIOL OU      2 weeks Focal OD, then Focal OS to follow    Risks, benefits, and alternatives to treatment discussed in detail with the patient.  The patient voiced understanding and wished to proceed with the procedure    Injection Procedure Note:  Diagnosis: DME OU    Patient Identified and Time Out complete  Pt marked  Topical Proparacaine and Betadine.  Inject Avastin OU at 6:00 @ 3.5-4mm posterior to limbus  Post Operative Dx: Same  Complications: None  Follow up as above.

## 2025-03-30 DIAGNOSIS — E11.59 HYPERTENSION ASSOCIATED WITH DIABETES: ICD-10-CM

## 2025-03-30 DIAGNOSIS — I15.2 HYPERTENSION ASSOCIATED WITH DIABETES: ICD-10-CM

## 2025-03-31 NOTE — TELEPHONE ENCOUNTER
Care Due:                  Date            Visit Type   Department     Provider  --------------------------------------------------------------------------------                                EP -                              PRIMARY      [facility] INTERNAL  Last Visit: 03-      CARE ([facility])   MEDICINE       Abram Turner  Next Visit: None Scheduled  None         None Found                                                            Last  Test          Frequency    Reason                     Performed    Due Date  --------------------------------------------------------------------------------    CBC.........  12 months..  mirtazapine..............  04- 04-    [facility] Embedded Care Due Messages. Reference number: [id number].   3/31/2025 4:02:20 PM CDT

## 2025-03-31 NOTE — TELEPHONE ENCOUNTER
Refill Routing Note   Medication(s) are not appropriate for processing by [facility] for the following reason(s):        No active prescription written by provider  Required vitals abnormal: 142/72     [facility] action(s):  Defer   Requires labs : Yes - CBC            Appointments  past 12m or future 3m with PCP    Date Provider   Last Visit   3/18/2025 Abram Turner III, MD   Next Visit   Visit date not found Abram Turner III, MD   ED visits in past 90 days: 0        Note composed:4:03 PM 03/31/2025

## 2025-03-31 NOTE — TELEPHONE ENCOUNTER
Future Appointments   Date Time Provider Department Center   4/10/2025  2:30 PM ETHAN Remy MD [facility] OPHTHA [place]   5/2/2025 11:20 AM Paulie Eckert MD [facility] HEM ONC [facility]   6/2/2025 10:40 AM Nasreen Addison, BLAD[place] DESC [facility]OM[facility] [place]   6/16/2025  8:15 AM [place] [facility] [facility] [place]   6/16/2025  9:40 AM LAB, GINNY [facility] [place]   6/19/2025  2:30 PM Leni Guy PA-C [facility]   8/5/2025 11:20 AM Miguel Alston MD [facility] NEURO [place] [facility]

## 2025-04-01 RX ORDER — AMLODIPINE BESYLATE 10 MG/1
10 TABLET ORAL DAILY
Qty: 90 TABLET | Refills: 0 | OUTPATIENT
Start: 2025-04-01

## 2025-04-02 ENCOUNTER — PATIENT MESSAGE (OUTPATIENT)
Dept: ADMINISTRATIVE | Facility: HOSPITAL | Age: 80
End: 2025-04-02
Payer: MEDICARE

## 2025-04-02 RX ORDER — AMLODIPINE BESYLATE 10 MG/1
10 TABLET ORAL DAILY
Qty: 30 TABLET | Refills: 0 | Status: SHIPPED | OUTPATIENT
Start: 2025-04-02

## 2025-04-24 DIAGNOSIS — E11.59 HYPERTENSION ASSOCIATED WITH DIABETES: ICD-10-CM

## 2025-04-24 DIAGNOSIS — I15.2 HYPERTENSION ASSOCIATED WITH DIABETES: ICD-10-CM

## 2025-04-24 RX ORDER — AMLODIPINE BESYLATE 10 MG/1
10 TABLET ORAL
Qty: 30 TABLET | Refills: 11 | Status: SHIPPED | OUTPATIENT
Start: 2025-04-24

## 2025-04-24 NOTE — TELEPHONE ENCOUNTER
No care due was identified.  [facility] Embedded Care Due Messages. Reference number: [id number].   4/24/2025 1:26:19 AM CDT

## 2025-04-24 NOTE — TELEPHONE ENCOUNTER
Refill Routing Note   Medication(s) are not appropriate for processing by [facility] for the following reason(s):        Required vitals abnormal    [facility] action(s):  Defer               Appointments  past 12m or future 3m with PCP    Date Provider   Last Visit   3/18/2025 Abram Turner III, MD   Next Visit   Visit date not found Abram Turner III, MD   ED visits in past 90 days: 0        Note composed:9:58 AM 04/24/2025

## 2025-04-25 NOTE — PROGRESS NOTES
Subjective:       Patient ID: Noris Demarco is a 79 y.o. female.    Chief Complaint:  history of breast cancer    HPI    HPI as per Dr Peralta's 1/18/22 office visit notes:  She underwent a lumpectomy in Jan 2008 for a 5-mm Right Breast DCIS that was found on a routine screening mammogram, completed adjuvant RT in July 2008. She was started on Arimidex at [facility] following radiation treatment and she was intolerant to it because of debilitating and disabling joint pains, she hence was switched to tamoxifen in 12/2008 and completed it in Dec 2013.    She was then diagnosed with infiltrating ductal carcinoma of the lower outer quadrant of the left breast on routine screening mammogram done in May 2018.  Lumpectomy with SLN biopsy was done in June 2018 and a 1.3 cm grade 2 ER positive 95%, VT positive 5% and HER2 Luther negative, Ki-67 30% was noted. Five lymph nodes were removed from the axilla and they were all negative.  Oncotype score came back at 40, she declined adjuvant chemotherapy.  She completed adjuvant radiation therapy under the care of Dr. Chavez on 09/27/2018.    She has a golf ball-sized seroma in the left breast that is hard to palpation but is not painful.    She was intolerant to Arimidex before.  So she was given a prescription for Aromasin, she did not take it secondary to fear of arthralgias.  Hence tamoxifen was started on 10/08/2018.    - she stopped in tamoxifen in April 2024.    Interval history:  - she presents for a follow-up appointment for her history of ductal carcinoma in situ and history of breast cancer. She had previously seen Dr. Peralta and nurse practitioner Cecilia Chandra.  - she underwent mammogram on 3/13/25.  - today, she is doing well. No acute changes since last visit. She denies shortness of breath, chest pain, nausea, vomiting, diarrhea, constipation.       Review of Systems   Constitutional:  Negative for appetite change and unexpected weight change.   HENT:  Negative for nosebleeds.     Eyes:  Negative for photophobia and visual disturbance.   Respiratory:  Negative for shortness of breath.    Genitourinary:  Negative for hematuria.   Skin:  Negative for color change and wound.   Neurological:  Negative for dizziness, speech difficulty and light-headedness.   Hematological:  Negative for adenopathy. Does not bruise/bleed easily.   Psychiatric/Behavioral:  The patient is not nervous/anxious.          Objective:      Vitals:    05/02/25 1112   BP: (!) 186/81   Pulse: 62   Temp: 97.2 °F (36.2 °C)   SpO2: 97%   Weight: 77.1 kg (169 lb 15.6 oz)           Body mass index is 28.29 kg/m².    Physical Exam  Constitutional:       General: She is not in acute distress.     Appearance: Normal appearance. She is not toxic-appearing.   HENT:      Head: Normocephalic and atraumatic.      Nose: Nose normal.      Mouth/Throat:      Mouth: Mucous membranes are moist.      Pharynx: Oropharynx is clear.   Eyes:      General: No scleral icterus.     Conjunctiva/sclera: Conjunctivae normal.   Cardiovascular:      Rate and Rhythm: Normal rate.      Heart sounds: Normal heart sounds.   Pulmonary:      Effort: Pulmonary effort is normal. No respiratory distress.      Breath sounds: Normal breath sounds.   Chest:   Breasts:     Right: No swelling, bleeding, inverted nipple, mass, nipple discharge, skin change or tenderness.      Left: No swelling, bleeding, inverted nipple, mass, nipple discharge, skin change or tenderness.      Comments: Bilat breasts nontender, no notable skin changes or nipple drainage, no associated lymphadnopathy  Musculoskeletal:         General: No swelling or tenderness. Normal range of motion.      Cervical back: Normal range of motion and neck supple.   Lymphadenopathy:      Cervical:      Right cervical: No superficial or deep cervical adenopathy.     Left cervical: No superficial or deep cervical adenopathy.      Upper Body:      Right upper body: No supraclavicular, axillary or pectoral  adenopathy.      Left upper body: No supraclavicular, axillary or pectoral adenopathy.   Skin:     General: Skin is warm and dry.      Coloration: Skin is not jaundiced or pale.   Neurological:      Mental Status: She is alert and oriented to person, place, and time.      Gait: Gait normal.   Psychiatric:         Mood and Affect: Mood normal.         Behavior: Behavior normal.         Thought Content: Thought content normal.         Judgment: Judgment normal.       ECOG SCORE    1 - Restricted in strenuous activity-ambulatory and able to carry out work of a light nature         LABS    Lab Results   Component Value Date    WBC 4.14 04/19/2024    HGB 12.0 04/19/2024    HCT 36.4 (L) 04/19/2024    MCV 99 (H) 04/19/2024     04/19/2024     IMAGING  Mammogram (3/13/25): There is no mammographic evidence of malignancy.     Mammogram (11/17/23):     There is no mammographic evidence of malignancy.     DXA Bone Density 10/5/22 (reviewed by this provider)  Impression:  -The T score associated with the lumbar spine is 0.2 on the current examination.  It was 1.0 on the prior examination.  The T score associated with the left femoral neck is 0.1 on the current examination.  It was 0 on the prior examination.  The T score associated with the right femoral neck is -0.2 on the current examination.  It was 0.1 on the prior examination.  -Normal study     Assessment:       1. History of breast cancer    2. History of ductal carcinoma in situ (DCIS) of breast    3. Encounter for screening mammogram for breast cancer            Plan:     Past records reviewed including clinic visits, imaging, labs, medications.     History of breast cancer / history of ductal carcinoma in situ  - diagnosed in 2008  - took tamoxifen for 5 years until December 2013  - Stage I T1c N0 infiltrating ductal carcinoma of the left breast  - status post lumpectomy and radiation therapy  - on tamoxifen since October 8, 2018. Completed tamoxifen in April  2024.  Mammogram (3/13/25): There is no mammographic evidence of malignancy.     - return to clinic in March 2026 with repeat mammogram.     Paulie Eckert M.D.  Hematology/Oncology  [facility] - [address], [address]  [place] [address]  Phone: [phone]  Fax: [phone]

## 2025-05-02 ENCOUNTER — OFFICE VISIT (OUTPATIENT)
Dept: HEMATOLOGY/ONCOLOGY | Facility: CLINIC | Age: 80
End: 2025-05-02
Payer: MEDICARE

## 2025-05-02 VITALS
SYSTOLIC BLOOD PRESSURE: 186 MMHG | BODY MASS INDEX: 28.29 KG/M2 | TEMPERATURE: 97 F | HEART RATE: 62 BPM | WEIGHT: 170 LBS | DIASTOLIC BLOOD PRESSURE: 81 MMHG | OXYGEN SATURATION: 97 %

## 2025-05-02 DIAGNOSIS — Z86.000 HISTORY OF DUCTAL CARCINOMA IN SITU (DCIS) OF BREAST: ICD-10-CM

## 2025-05-02 DIAGNOSIS — Z85.3 HISTORY OF BREAST CANCER: Primary | ICD-10-CM

## 2025-05-02 DIAGNOSIS — Z12.31 ENCOUNTER FOR SCREENING MAMMOGRAM FOR BREAST CANCER: ICD-10-CM

## 2025-05-02 PROCEDURE — 99999 PR PBB SHADOW E&M-EST. PATIENT-LVL V: CPT | Mod: PBBFAC,HCNC,, | Performed by: INTERNAL MEDICINE

## 2025-06-06 ENCOUNTER — PATIENT MESSAGE (OUTPATIENT)
Dept: ADMINISTRATIVE | Facility: HOSPITAL | Age: 80
End: 2025-06-06
Payer: MEDICARE

## 2025-06-09 NOTE — PROGRESS NOTES
Subjective:      Patient ID: Noris Demarco is a 79 y.o. female.    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  Previously seen by Dr Hannah.  Last seen 2024.  This is their first visit with me.      Patients daughter with her at visit as patient with dementia diagnosis.  Lives with , daughter, and grand daughter.  Home health makes bi monthly visits.    With regards to diabetes:     Latest Reference Range & Units 22 16:40 22 08:04   C-Peptide 0.78 - 5.19 ng/mL 0.27 (L) <0.08 (L)   Glutamic Acid Decarb Ab <=0.02 nmol/L 18.2 (H) 28.80 (H)   (L): Data is abnormally low  (H): Data is abnormally high    Diagnosed:   FH: Father and 2 brother  Known complications:  DKA Denies  RN (+)  Eye Exam: 3/2025;  no laser surgery or DR  AURA Denies  Foot exam: Most Recent Foot Exam Date: Not Found  Nephropathy (+)  CAD (+)  Denies history of pancreatitis & personal/family history of medullary thyroid cancer.     Current Regimen: Modified per Dr. Johnny Gamble (long-acting): Adjust dosage based on evening blood sugar (20-24 units)  Novolog (short-acting): 5-10 units with meals, adjusting based on pre-meal blood sugar.     Compliant (+) Has she been taking??  Missed doses Denies  Taking before eating 15 minutes    Other medications tried:  Metformin    Glucose Monitor: SMBG (Trialed CGM, but did not work well for patient due to dementia diagnosis)  2-3 times a day testing  Log reviewed: Patient family dose not have blood sugar logs   Fastins -170s   Lunch:  200s - 300s   Dinner: 200s - 300s    Hypoglycemia:  Rare ~ twice monthly  Hypoglycemia unawareness: Sometimes unable to tell low blood sugars, but may feel bad.   Knows how to correct with 15 grams of carbs- juice, coke, glucose tablets, or a peppermint.     Symptoms:  Polyuria Denies  Polydipsia  Denies  Unintentional/unexplained weight changes:  Denies  Vision changes: Sometime when BG high    Diet/Exercise:  2-3 meals a day,    Breakfast: grits sausage toast eggs   Lunch and Dinner:  Veggie, green beans, chicken, fish  Snacks on sugar free popsicles.   Drinks:  water, coffee  Walks around the neighborhood 3 days a week, 30 mins a day.   Uses stationary bike.   Recent illness, injury, steroids: Denies    Diabetes education: 1 year ago    Diabetes Management Status    Hemoglobin A1C   Date Value Ref Range Status   03/12/2025 9.5 (H) 4.0 - 5.6 % Final     Comment:     ADA Screening Guidelines:  5.7-6.4%  Consistent with prediabetes  >or=6.5%  Consistent with diabetes    High levels of fetal hemoglobin interfere with the HbA1C  assay. Heterozygous hemoglobin variants (HbS, HgC, etc)do  not significantly interfere with this assay.   However, presence of multiple variants may affect accuracy.     11/11/2024 7.9 (H) 4.0 - 5.6 % Final     Comment:     ADA Screening Guidelines:  5.7-6.4%  Consistent with prediabetes  >or=6.5%  Consistent with diabetes    High levels of fetal hemoglobin interfere with the HbA1C  assay. Heterozygous hemoglobin variants (HbS, HgC, etc)do  not significantly interfere with this assay.   However, presence of multiple variants may affect accuracy.     06/28/2024 9.3 (H) 4.0 - 5.6 % Final     Comment:     ADA Screening Guidelines:  5.7-6.4%  Consistent with prediabetes  >or=6.5%  Consistent with diabetes    High levels of fetal hemoglobin interfere with the HbA1C  assay. Heterozygous hemoglobin variants (HbS, HgC, etc)do  not significantly interfere with this assay.   However, presence of multiple variants may affect accuracy.       Hemoglobin A1c   Date Value Ref Range Status   06/16/2025 10.0 (H) 4.0 - 5.6 % Final     Comment:     ADA Screening Guidelines:  5.7-6.4%  Consistent with prediabetes  >=6.5%  Consistent with diabetes    High levels of fetal hemoglobin interfere with the  HbA1C  assay. Heterozygous hemoglobin variants (HbS, HgC, etc)do  not significantly interfere with this assay.   However, presence of multiple variants may affect accuracy.     Statin: Not taking  ACE/ARB: Taking - Losartan 100 mg daily  Screening or Prevention Patient's value Goal Complete/Controlled?   HgA1C Testing and Control   Lab Results   Component Value Date    HGBA1C 10.0 (H) 06/16/2025      Annually/Less than 8% No   Lipid profile : 06/16/2025 Annually Yes   LDL control Lab Results   Component Value Date    LDLCALC 95.0 06/16/2025    Annually/Less than 100 mg/dl  No   Nephropathy screening Lab Results   Component Value Date    LABMICR 602.0 06/16/2025     Lab Results   Component Value Date    PROTEINUA Negative 08/13/2019    Annually Yes   Blood pressure BP Readings from Last 1 Encounters:   06/17/25 (!) 142/76    Less than 140/90 No   Dilated retinal exam : 11/14/2024 Annually Yes   Foot exam   Most Recent Foot Exam Date: Not Found Annually Yes     Computed FIB-4 Calculation unavailable. One or more values for this score either were not found within the given timeframe or did not fit some other criterion.     FIB-4 below 1.30 is considered as low-risk for advanced fibrosis  FIB-4 over 2.67 is considered as high-risk for advanced fibrosis  FIB-4 values between 1.30 and 2.67 are considered as intermediate-risk of advanced fibrosis for ages 36-64.     For ages > 64 the cut-off for low-risk goes to < 2.  This is a screening tool and clinical judgement should be used in the interpretation of these results.      Lab Results   Component Value Date    MICALBCREAT 573.3 (H) 06/16/2025    CREATININE 0.60 03/12/2025     Lab Results   Component Value Date    EGFRNORACEVR >60.0 03/12/2025     Review of Systems:  As above    Objective:   Physical Exam  Constitutional:       Appearance: She is well-developed.   HENT:      Head: Normocephalic.   Eyes:      Conjunctiva/sclera: Conjunctivae normal.   Pulmonary:       "Effort: Pulmonary effort is normal.   Musculoskeletal:         General: Normal range of motion.   Skin:     General: Skin is warm.   Neurological:      Mental Status: She is alert. Mental status is at baseline.         Visit Vitals  BP (!) 142/76 (BP Location: Right arm, Patient Position: Sitting)   Pulse 73   Ht 5' 5" (1.651 m)   Wt 70.4 kg (155 lb 3.3 oz)   LMP 08/13/1999 (Approximate)   SpO2 97%   BMI 25.83 kg/m²       Body mass index is 25.83 kg/m².    Lab Review:   Lab Results   Component Value Date    HGBA1C 10.0 (H) 06/16/2025    HGBA1C 9.5 (H) 03/12/2025    HGBA1C 7.9 (H) 11/11/2024       Lab Results   Component Value Date    CHOL 199 06/16/2025    HDL 93 (H) 06/16/2025    LDLCALC 95.0 06/16/2025    TRIG 55 06/16/2025    CHOLHDL 46.7 06/16/2025     Lab Results   Component Value Date     03/12/2025    K 4.3 03/12/2025     03/12/2025    CO2 32 (H) 03/12/2025     (H) 03/12/2025    BUN 10 03/12/2025    CREATININE 0.60 03/12/2025    CALCIUM 10.4 03/12/2025    PROT 7.2 03/12/2025    ALBUMIN 3.8 03/12/2025    BILITOT 0.7 03/12/2025    ALKPHOS 102 03/12/2025    AST 29 03/12/2025    ALT 23 03/12/2025    ANIONGAP 5 (L) 03/12/2025    ESTGFRAFRICA >60.0 07/12/2022    EGFRNONAA >60.0 07/12/2022    TSH 3.120 08/21/2023     Vit D, 25-Hydroxy   Date Value Ref Range Status   12/08/2021 56 30 - 96 ng/mL Final     Comment:     Vitamin D deficiency.........<10 ng/mL                              Vitamin D insufficiency......10-29 ng/mL       Vitamin D sufficiency........> or equal to 30 ng/mL  Vitamin D toxicity............>100 ng/mL       Assessment and Plan     1. Microalbuminuria        2. Type 2 diabetes mellitus with both eyes affected by moderate nonproliferative retinopathy and macular edema, with long-term current use of insulin  Ambulatory referral/consult to Endocrinology      3. 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 Hypertension associated with diabetes        5. 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  -- A1c not goal <7.5 - 8%.  -- Reviewed logs/CGM:  Patient with out blood sugar logs.  Little data to use for medication adjustments in order to optimize BG.  Blood sugar logs provided and instructed patient to send BG logs in 1 week with blood sugars and insulin dosages administered. \  -- Reviewed patient's current insulin regimen. Clarified proper insulin dose and timing in relation to meals, etc. Insulin injection sites and proper rotation instructed.    -- Advised frequent self blood glucose monitoring.  Patient encouraged to document glucose results and bring them to every clinic visit    -- Hypoglycemia precautions discussed.  -- Call for Bg repeatedly < 90 or > 200.   -- Close adherence to lifestyle changes recommended.   -- Periodic follow ups for eye evaluations, foot care and dental care suggested.  -- Encouraged exercise per ADA guidelines.    -- Encouraged dietary modifications including but not limited to DM diet, low carb snacks, marrying carbohydrates with proteins.  Medication Regimen:  Continue diabetes medication regimen as is with intent to modify to best manage patient BG  Lantus (long-acting): Adjust dosage based on evening blood sugar (20-24 units)  Novolog (short-acting): 5-10 units with meals, adjusting based on pre-meal blood sugar.     Microalbuminuria  See above    Hypertension associated with diabetes  Blood pressure above goal at time of visit and being closely followed per Dr. Turner.  Continue on ARB per ADA guidelines.    Hyperlipidemia " associated with type 2 diabetes mellitus  Last LDL of 95.  Reports intolerance to statin.  Continue diet and lifestyle modification.      Follow up in about 3 months (around 9/17/2025).    DONNA Tamayo  [facility]     Case discussed with Dr. Paris.  Recommendations were discussed with the patient in detail.  The patient verbalized understanding and agrees with the plan outlined as above.     Visit today included increased complexity associated with the care of the problems addressed and managing the longitudinal care of the patient due to the serious and/or complex managed problems.

## 2025-06-16 ENCOUNTER — LAB VISIT (OUTPATIENT)
Dept: LAB | Facility: HOSPITAL | Age: 80
End: 2025-06-16
Attending: INTERNAL MEDICINE
Payer: MEDICARE

## 2025-06-16 DIAGNOSIS — E11.3313 TYPE 2 DIABETES MELLITUS WITH BOTH EYES AFFECTED BY MODERATE NONPROLIFERATIVE RETINOPATHY AND MACULAR EDEMA, WITH LONG-TERM CURRENT USE OF INSULIN: ICD-10-CM

## 2025-06-16 DIAGNOSIS — Z79.4 TYPE 2 DIABETES MELLITUS WITH BOTH EYES AFFECTED BY MODERATE NONPROLIFERATIVE RETINOPATHY AND MACULAR EDEMA, WITH LONG-TERM CURRENT USE OF INSULIN: ICD-10-CM

## 2025-06-16 LAB
ALBUMIN/CREAT UR: 573.3 UG/MG
CREAT UR-MCNC: 105 MG/DL (ref 15–325)
MICROALBUMIN UR-MCNC: 602 UG/ML (ref ?–5000)

## 2025-06-16 PROCEDURE — 82043 UR ALBUMIN QUANTITATIVE: CPT | Mod: PN

## 2025-06-17 ENCOUNTER — OFFICE VISIT (OUTPATIENT)
Dept: ENDOCRINOLOGY | Facility: CLINIC | Age: 80
End: 2025-06-17
Payer: MEDICARE

## 2025-06-17 ENCOUNTER — RESULTS FOLLOW-UP (OUTPATIENT)
Dept: FAMILY MEDICINE | Facility: CLINIC | Age: 80
End: 2025-06-17

## 2025-06-17 VITALS
BODY MASS INDEX: 25.85 KG/M2 | WEIGHT: 155.19 LBS | DIASTOLIC BLOOD PRESSURE: 76 MMHG | SYSTOLIC BLOOD PRESSURE: 142 MMHG | OXYGEN SATURATION: 97 % | HEIGHT: 65 IN | HEART RATE: 73 BPM

## 2025-06-17 DIAGNOSIS — Z79.4 TYPE 2 DIABETES MELLITUS WITH BOTH EYES AFFECTED BY MODERATE NONPROLIFERATIVE RETINOPATHY AND MACULAR EDEMA, WITH LONG-TERM CURRENT USE OF INSULIN: ICD-10-CM

## 2025-06-17 DIAGNOSIS — E78.5 HYPERLIPIDEMIA ASSOCIATED WITH TYPE 2 DIABETES MELLITUS: ICD-10-CM

## 2025-06-17 DIAGNOSIS — R80.9 MICROALBUMINURIA: Primary | ICD-10-CM

## 2025-06-17 DIAGNOSIS — I15.2 HYPERTENSION ASSOCIATED WITH DIABETES: ICD-10-CM

## 2025-06-17 DIAGNOSIS — E13.9 LADA (LATENT AUTOIMMUNE DIABETES IN ADULTS), MANAGED AS TYPE 1: ICD-10-CM

## 2025-06-17 DIAGNOSIS — E11.59 HYPERTENSION ASSOCIATED WITH DIABETES: ICD-10-CM

## 2025-06-17 DIAGNOSIS — E11.69 HYPERLIPIDEMIA ASSOCIATED WITH TYPE 2 DIABETES MELLITUS: ICD-10-CM

## 2025-06-17 DIAGNOSIS — E11.3313 TYPE 2 DIABETES MELLITUS WITH BOTH EYES AFFECTED BY MODERATE NONPROLIFERATIVE RETINOPATHY AND MACULAR EDEMA, WITH LONG-TERM CURRENT USE OF INSULIN: ICD-10-CM

## 2025-06-17 PROCEDURE — 3288F FALL RISK ASSESSMENT DOCD: CPT | Mod: CPTII,S$GLB,,

## 2025-06-17 PROCEDURE — 3078F DIAST BP <80 MM HG: CPT | Mod: CPTII,S$GLB,,

## 2025-06-17 PROCEDURE — 99999 PR PBB SHADOW E&M-EST. PATIENT-LVL V: CPT | Mod: PBBFAC,,,

## 2025-06-17 PROCEDURE — 1157F ADVNC CARE PLAN IN RCRD: CPT | Mod: CPTII,S$GLB,,

## 2025-06-17 PROCEDURE — 1160F RVW MEDS BY RX/DR IN RCRD: CPT | Mod: CPTII,S$GLB,,

## 2025-06-17 PROCEDURE — 1126F AMNT PAIN NOTED NONE PRSNT: CPT | Mod: CPTII,S$GLB,,

## 2025-06-17 PROCEDURE — 3077F SYST BP >= 140 MM HG: CPT | Mod: CPTII,S$GLB,,

## 2025-06-17 PROCEDURE — 99214 OFFICE O/P EST MOD 30 MIN: CPT | Mod: S$GLB,,,

## 2025-06-17 PROCEDURE — 1159F MED LIST DOCD IN RCRD: CPT | Mod: CPTII,S$GLB,,

## 2025-06-17 PROCEDURE — G2211 COMPLEX E/M VISIT ADD ON: HCPCS | Mod: S$GLB,,,

## 2025-06-17 PROCEDURE — 1101F PT FALLS ASSESS-DOCD LE1/YR: CPT | Mod: CPTII,S$GLB,,

## 2025-06-17 RX ORDER — LANCETS
EACH MISCELLANEOUS
Qty: 100 EACH | Refills: 11 | Status: SHIPPED | OUTPATIENT
Start: 2025-06-17

## 2025-06-17 RX ORDER — PEN NEEDLE, DIABETIC 31 GX5/16"
100 NEEDLE, DISPOSABLE MISCELLANEOUS
Qty: 300 EACH | Refills: 3 | Status: SHIPPED | OUTPATIENT
Start: 2025-06-17

## 2025-06-17 NOTE — ASSESSMENT & PLAN NOTE
-- Reviewed goals of therapy are to get the best control we can without hypoglycemia  -- Labs prior to follow up.  -- A1c not goal <7.5 - 8%.  -- Reviewed logs/CGM:  Patient with out blood sugar logs.  Little data to use for medication adjustments in order to optimize BG.  Blood sugar logs provided and instructed patient to send BG logs in 1 week with blood sugars and insulin dosages administered. \  -- Reviewed patient's current insulin regimen. Clarified proper insulin dose and timing in relation to meals, etc. Insulin injection sites and proper rotation instructed.    -- Advised frequent self blood glucose monitoring.  Patient encouraged to document glucose results and bring them to every clinic visit    -- Hypoglycemia precautions discussed.  -- Call for Bg repeatedly < 90 or > 200.   -- Close adherence to lifestyle changes recommended.   -- Periodic follow ups for eye evaluations, foot care and dental care suggested.  -- Encouraged exercise per ADA guidelines.    -- Encouraged dietary modifications including but not limited to DM diet, low carb snacks, marrying carbohydrates with proteins.  Medication Regimen:  Continue diabetes medication regimen as is with intent to modify to best manage patient BG  Lantus (long-acting): Adjust dosage based on evening blood sugar (20-24 units)  Novolog (short-acting): 5-10 units with meals, adjusting based on pre-meal blood sugar.

## 2025-06-17 NOTE — PATIENT INSTRUCTIONS
With regards to diabetes;  Keep blood sugar logs including insulin administered and send via [employer] in 1 week  Return to clinic in 3 months with labs prior.

## 2025-06-19 ENCOUNTER — OFFICE VISIT (OUTPATIENT)
Dept: FAMILY MEDICINE | Facility: CLINIC | Age: 80
End: 2025-06-19
Payer: MEDICARE

## 2025-06-19 VITALS
SYSTOLIC BLOOD PRESSURE: 134 MMHG | WEIGHT: 155.88 LBS | OXYGEN SATURATION: 98 % | HEART RATE: 61 BPM | DIASTOLIC BLOOD PRESSURE: 84 MMHG | BODY MASS INDEX: 25.97 KG/M2 | HEIGHT: 65 IN

## 2025-06-19 DIAGNOSIS — E11.69 HYPERLIPIDEMIA ASSOCIATED WITH TYPE 2 DIABETES MELLITUS: ICD-10-CM

## 2025-06-19 DIAGNOSIS — E11.59 HYPERTENSION ASSOCIATED WITH DIABETES: ICD-10-CM

## 2025-06-19 DIAGNOSIS — G30.1 MILD LATE ONSET ALZHEIMER'S DEMENTIA WITH OTHER BEHAVIORAL DISTURBANCE: ICD-10-CM

## 2025-06-19 DIAGNOSIS — F02.A18 MILD LATE ONSET ALZHEIMER'S DEMENTIA WITH OTHER BEHAVIORAL DISTURBANCE: ICD-10-CM

## 2025-06-19 DIAGNOSIS — E78.5 HYPERLIPIDEMIA ASSOCIATED WITH TYPE 2 DIABETES MELLITUS: ICD-10-CM

## 2025-06-19 DIAGNOSIS — E11.3313 TYPE 2 DIABETES MELLITUS WITH BOTH EYES AFFECTED BY MODERATE NONPROLIFERATIVE RETINOPATHY AND MACULAR EDEMA, WITH LONG-TERM CURRENT USE OF INSULIN: Primary | ICD-10-CM

## 2025-06-19 DIAGNOSIS — Z79.4 TYPE 2 DIABETES MELLITUS WITH BOTH EYES AFFECTED BY MODERATE NONPROLIFERATIVE RETINOPATHY AND MACULAR EDEMA, WITH LONG-TERM CURRENT USE OF INSULIN: Primary | ICD-10-CM

## 2025-06-19 DIAGNOSIS — R80.9 MICROALBUMINURIA: ICD-10-CM

## 2025-06-19 DIAGNOSIS — I15.2 HYPERTENSION ASSOCIATED WITH DIABETES: ICD-10-CM

## 2025-06-19 PROCEDURE — G2211 COMPLEX E/M VISIT ADD ON: HCPCS | Mod: S$GLB,,,

## 2025-06-19 PROCEDURE — 1160F RVW MEDS BY RX/DR IN RCRD: CPT | Mod: CPTII,S$GLB,,

## 2025-06-19 PROCEDURE — 3079F DIAST BP 80-89 MM HG: CPT | Mod: CPTII,S$GLB,,

## 2025-06-19 PROCEDURE — 99999 PR PBB SHADOW E&M-EST. PATIENT-LVL V: CPT | Mod: PBBFAC,,,

## 2025-06-19 PROCEDURE — 1157F ADVNC CARE PLAN IN RCRD: CPT | Mod: CPTII,S$GLB,,

## 2025-06-19 PROCEDURE — 1126F AMNT PAIN NOTED NONE PRSNT: CPT | Mod: CPTII,S$GLB,,

## 2025-06-19 PROCEDURE — 99214 OFFICE O/P EST MOD 30 MIN: CPT | Mod: S$GLB,,,

## 2025-06-19 PROCEDURE — 1159F MED LIST DOCD IN RCRD: CPT | Mod: CPTII,S$GLB,,

## 2025-06-19 PROCEDURE — 3288F FALL RISK ASSESSMENT DOCD: CPT | Mod: CPTII,S$GLB,,

## 2025-06-19 PROCEDURE — 3075F SYST BP GE 130 - 139MM HG: CPT | Mod: CPTII,S$GLB,,

## 2025-06-19 PROCEDURE — 1101F PT FALLS ASSESS-DOCD LE1/YR: CPT | Mod: CPTII,S$GLB,,

## 2025-06-19 NOTE — PROGRESS NOTES
[facility]    6/19/2025      Subjective:       Patient ID:  Noris is a 79 y.o. female .  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    History of Present Illness    CHIEF COMPLAINT:  Noris presents today for follow up of diabetes management.    Interim Hx   Last seen by Cesia - 06/17       DIABETES MANAGEMENT:  Reports poorly controlled diabetes with recent A1C increase from 9.5 to 10. Blood sugar patterns demonstrate significant variability, with morning blood sugars ranging between 100-200 before eating, and nighttime blood sugars reaching 400-500. She experiences episodes of hypoglycemia, with blood sugar dropping as low as 40, and reports feeling woozy when blood sugar reaches the 80s. She currently takes Novolog 10 units twice daily with meals and Lantus 20-24 units at night, with dosage adjusted based on blood sugar. She is unable to use continuous glucose monitor due to dementia-related challenges with device adherence. Her caregiver reports difficulty managing blood sugar fluctuations and is committed to maintaining detailed blood sugar logs for medication adjustment.    LABS:  Cholesterol profile shows elevated HDL and LDL trending down to 95. Protein in urine is present, likely secondary to poorly controlled diabetes.    CURRENT MEDICATIONS:  She takes Amlodipine daily in morning, Losartan daily in morning, Clonidine twice daily (morning and night), and Mirtazapine nightly.      ROS:  Constitutional: -chills, -fever, +lightheaded if meals are missed  Respiratory: -cough, -shortness of breath  Cardiovascular: -chest pain  Gastrointestinal: -abdominal pain, -constipation, -diarrhea, -nausea, -vomiting  Neurological: -dizziness, -lightheadedness, -headaches, +confusion or disorientation           Problem List[1]      Last HgbA1C:    Lab Results  "  Component Value Date    HGBA1C 10.0 (H) 06/16/2025    HGBA1C 9.5 (H) 03/12/2025    HGBA1C 7.9 (H) 11/11/2024         Last Lipid Panel:    Lab Results   Component Value Date    HDL 93 (H) 06/16/2025     (H) 03/12/2025    HDL 98 (H) 11/11/2024       Lab Results   Component Value Date    LDLCALC 95.0 06/16/2025    LDLCALC 103.8 03/12/2025    LDLCALC 91.6 11/11/2024       Lab Results   Component Value Date    TRIG 55 06/16/2025    TRIG 46 03/12/2025    TRIG 47 11/11/2024       Lab Results   Component Value Date    CHOLHDL 46.7 06/16/2025    CHOLHDL 47.2 03/12/2025    CHOLHDL 49.2 11/11/2024         Review of patient's allergies indicates:  No Known Allergies     Medication List with Changes/Refills   Current Medications    AMLODIPINE (NORVASC) 10 MG TABLET    TAKE 1 TABLET ONE TIME DAILY    ASPIRIN (ECOTRIN) 81 MG EC TABLET    Take 81 mg by mouth once daily.    BAQSIMI 3 MG/ACTUATION SPRY    by Each Nostril route.    BD ULTRA-FINE SHORT PEN NEEDLE 31 GAUGE X 5/16" NDLE    Inject 100 each into the skin 2 hours after meals and at bedtime.    BEVACIZUMAB 3.25 MG/0.13 ML SYRG        BLOOD-GLUCOSE METER,CONTINUOUS (DEXCOM G6 ) MISC    1 each by Misc.(Non-Drug; Combo Route) route once daily at 6am.    BLOOD-GLUCOSE SENSOR (DEXCOM G6 SENSOR) LEONOR    1 each by Misc.(Non-Drug; Combo Route) route once daily.    BLOOD-GLUCOSE TRANSMITTER (DEXCOM G6 TRANSMITTER) LEONOR    1 each by Misc.(Non-Drug; Combo Route) route once daily.    BUSPIRONE (BUSPAR) 15 MG TABLET    Take 1 tablet (15 mg total) by mouth 2 (two) times daily.    CHOLECALCIFEROL, VITAMIN D3, (VITAMIN D3) 50 MCG (2,000 UNIT) CAP    Take 1 capsule by mouth once daily.    CLONIDINE (CATAPRES) 0.2 MG TABLET    TAKE 1 TABLET TWICE DAILY    COENZYME Q10 100 MG CAPSULE    Take 100 mg by mouth once daily.    CYANOCOBALAMIN 500 MCG TABLET    Take 500 mcg by mouth once daily.    DIAZEPAM (VALIUM) 5 MG TABLET    Take 1 tablet (5 mg total) by mouth as needed for " "Anxiety.    DONEPEZIL (ARICEPT) 5 MG TABLET    TAKE 1 TABLET EVERY EVENING    FISH OIL-OMEGA-3 FATTY ACIDS 300-1,000 MG CAPSULE    Take 1 g by mouth once daily.    FLUAD QUAD 2023-24,65Y UP,,PF, 60 MCG (15 MCG X 4)/0.5 ML SYRG        FUROSEMIDE (LASIX) 40 MG TABLET    Take 40 mg by mouth once daily.     GLUCOSE 4 GM CHEWABLE TABLET    Take 4 tablets (16 g total) by mouth as needed for Low blood sugar (If having symptoms of blurry vision, palpitations, confusion, shakiness.  Please check sugars and if sugar below 70 please take 4 tablets and re-check sugar everry 15 minutes until sugars are above 70 and symptoms resolve.).    INSULIN ASPART U-100 (NOVOLOG FLEXPEN U-100 INSULIN) 100 UNIT/ML (3 ML) INPN PEN    Inject 10 Units into the skin 3 (three) times daily with meals.    LANCETS MISC    Use to test blood sugar 4 times daily    LANTUS SOLOSTAR U-100 INSULIN 100 UNIT/ML (3 ML) INPN PEN    Inject 24 Units into the skin every evening.    LATANOPROST 0.005 % OPHTHALMIC SOLUTION    Place 1 drop into both eyes every evening.    LOSARTAN (COZAAR) 100 MG TABLET    TAKE 1 TABLET ONE TIME DAILY    MIRTAZAPINE (REMERON) 7.5 MG TAB    Take 1 tablet (7.5 mg total) by mouth every evening.    OFLOXACIN (FLOXIN) 0.3 % OTIC SOLUTION    INSTILL 5 DROPS INTO THE RIGHT EAR ONCE DAILY    OZURDEX 0.7 MG IMPL INTRAVITREAL IMPLANT        PREDNISOLON/GATIFLOX/BROMFENAC (PREDNISOL ACE-GATIFLOX-BROMFEN) 1-0.5-0.075 % DRPS    Apply 1 drop to eye 3 (three) times daily. in operative eye for 1 month after surgery    TRUE METRIX GLUCOSE TEST STRIP STRP    1 strip by In Vitro route every morning.               Objective:      /84 (BP Location: Left arm, Patient Position: Sitting)   Pulse 61   Ht 5' 5" (1.651 m)   Wt 70.7 kg (155 lb 13.8 oz)   LMP 08/13/1999 (Approximate)   SpO2 98%   BMI 25.94 kg/m²   Estimated body mass index is 25.94 kg/m² as calculated from the following:    Height as of this encounter: 5' 5" (1.651 m).    Weight as " of this encounter: 70.7 kg (155 lb 13.8 oz).    Physical Exam  Vitals reviewed.   Constitutional:       General: She is not in acute distress.     Appearance: Normal appearance.   HENT:      Head: Normocephalic and atraumatic.      Mouth/Throat:      Mouth: Mucous membranes are moist.   Eyes:      Conjunctiva/sclera: Conjunctivae normal.   Cardiovascular:      Rate and Rhythm: Normal rate and regular rhythm.   Pulmonary:      Effort: Pulmonary effort is normal. No respiratory distress.   Musculoskeletal:         General: Normal range of motion.      Cervical back: Normal range of motion.   Skin:     General: Skin is warm.   Neurological:      Mental Status: She is alert.      Comments: Oriented to person and place but not time    Psychiatric:         Mood and Affect: Mood normal.         Behavior: Behavior normal.             Assessment and Plan:     Assessment & Plan      IMPRESSION:  - A1C increased from 9.5 to 10, indicating poor glycemic control.  - Reviewed current insulin regimen: 10 units Novolog twice daily and 20-24 units Lantus at night.  - Acknowledged recent endocrinology consult and their recommendation to continue current insulin doses until more data is provided   - Deferred medication changes pending review of glucose logs.  - Noted oncreased HDL and decreased LDL.  - Identified proteinuria, likely secondary to poorly controlled diabetes.  - Cognitive impairment present, unable to answer orientation questions.    PLAN SUMMARY:  - Continue insulin regimen: Novolog 10 units twice daily with meals, Lantus 20-24 units at bedtime  - Continue mirtazapine at bedtime for mood disturbance  - Maintain current antihypertensive regimen: amlodipine and losartan daily in morning, clonidine twice daily  - Send glucose logs to endocrinologist in 1 week for potential medication adjustments  - Follow up in 3 months    1. Type 2 diabetes mellitus with both eyes affected by moderate nonproliferative retinopathy and  macular edema, with long-term current use of insulin (Primary)  - Monitor blood sugar daily.  - A1C has increased from 9.5 to 10, indicating worsening control.  - Endocrinologist review showed fasting blood sugars ranging from 100-200, postprandial levels 200-300, with nighttime levels occasionally reaching 400-500.  - Continue insulin regimen: Novolog 10 units twice daily with meals and Lantus 20-24 units at bedtime, with adjustments based on blood sugar readings.  - Noris to send glucose logs to endocrinologist in 1 week for potential medication adjustments.  - Microalbumin/Creatinine Ratio, Urine; Future    2. Hypertension associated with diabetes  - Monitor blood pressure regularly.  - Continue current antihypertensive regimen: amlodipine and losartan daily in the morning, and clonidine twice daily.    3. Hyperlipidemia associated with type 2 diabetes mellitus  Continue statin     4. 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  - Continue mirtazapine at bedtime for associated mood disturbance.    5. Microalbuminuria  - Monitored protein in urine, indicating possible diabetic nephropathy.  - Explained that proteinuria could result from poorly controlled diabetes or hypertension.  - Emphasized importance of improving glycemic control to reduce protein in urine.     FOLLOW-UP:  - Follow up in 3 months with labs prior         The patient was informed of the following statements     Emergency Care:Seek immediate medical attention in the emergency room if you experience any new or worsening symptoms, or if your current condition significantly changes or becomes more severe.  Patient Acknowledgment: Patient verbalizes understanding of the plan and agrees to proceed with the recommended care.      Follow Up:  9/24/2025 Abram Turner III, MD   Future Appointments   Date Time Provider Department  [place]   6/26/2025  1:00 PM Miguel Alston MD [facility] NEURO [facility]   9/16/2025 11:00 AM SAME DAY LAB, [facility] RVPH LAB [facility]   [facility]17[facility]20[facility] 10[facility]30 [facility] [place]   9/17/2025 10:45 AM SPECIMEN, [facility] LAB [place]   9/22/2025 10:15 AM Carlotta Hernandez, DIANA [facility] POD [place]   9/23/2025  3:00 PM Jennifer Kimbrough, FNP-C [facility] ENDODIA [address]   9/24/2025  2:00 PM Abram Turner III, MD [facility] FAM MED [place]   3/16/2026 11:00 AM RVPH MAMMO1 RVPH MAMMO [facility]   3/23/2026 11:00 AM Paulie Eckert MD [facility] HEM ONC [facility]                       Other Orders Placed This Visit:  Orders Placed This Encounter   Procedures    Microalbumin/Creatinine Ratio, Urine         This note was generated with the assistance of ambient listening technology. Verbal consent was obtained by the patient and accompanying visitor(s) for the recording of patient appointment to facilitate this note. I attest to having reviewed and edited the generated note for accuracy, though some syntax or spelling errors may persist. Please contact the author of this note for any clarification.        Leni Guy PA-C        I spent a total of 30 minutes on the day of the visit.This includes face to face time and non-face to face time preparing to see the patient (eg, review of tests), obtaining and/or reviewing separately obtained history, documenting clinical information in the electronic or other health record, independently interpreting results and communicating results to the patient/family/caregiver, or care coordinator.          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

## 2025-06-24 NOTE — PROGRESS NOTES
[facility]  Clinic Note    Date of Service: 6/26/2025  Patient seen at the request of: Abram Turner III, MD    Reason for Consultation  progressive dementia    Assessment:  Noris Demarco is a 79 y.o. female who presents with progressive dementia.    Patient presents for progressive memory loss and confusion for the last 10 years.  Family history is significant for memory loss in the patient's mother and maternal grandfather.  Agitation and confusion noted at night with the patient wandering out of the house one night.  Patient is unable to cook and no longer manages the finances.    MoCA was a 4/30 with 0/5 on delayed recall.    Presentation is concerning for Alzheimer's disease.        Plan:    - MRI brain wo contrast  - ptau-181  - ptau-217  - AD Detect Beta-amyloid 42/40 ratio  - start memantine 5 mg twice daily  - start Rexulti with goal dose 2mg in the evening   - 0.5 mg daily for 7 days,   - then 1 mg daily for 7 days,   - then 2 mg daily      Transfer to more permanent neurologist    A dictation device was used to produce this document. Use of such devices sometimes results in grammatical errors or replacement of words that sound similarly.     Signed:    Miguel Alston MD  Neurology/Epilepsy  06/26/2025 1:55 PM      HPI:  Noris Demarco is a 79 y.o. 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   reports significant confusion at night.  She will say she wants to go home and will get agitated.  She walked out of the house once.      Patient lives with her , daughter, son-in-law, and grandchild.   does the cooking.  Patient makes beds and helps clean around the house.  Patient used to manage the  finances but  has had to take over.    Patient's mother and grandfather had memory issues.  Grandfather  when he wandered out of the house at night in the cold.      This is the extent of the patient's complaints at this time.      TSH   Date Value Ref Range Status   2023 3.120 0.400 - 4.000 uIU/mL Final     Comment:     Warning:  Heterophilic antibodies in serum or plasma of   certain individuals are known to cause interference with   immunoassays. These antibodies may be present in blood samples   from individuals regularly exposed to animal or who have been   treated with animal products.     Patients taking high doses of supplemental biotin may have  negatively biased results.      2023 4.220 (H) 0.400 - 4.000 uIU/mL Final     Comment:     Warning:  Heterophilic antibodies in serum or plasma of   certain individuals are known to cause interference with   immunoassays. These antibodies may be present in blood samples   from individuals regularly exposed to animal or who have been   treated with animal products.     Patients taking high doses of supplemental biotin may have  negatively biased results.        Vitamin B-12   Date Value Ref Range Status   2023 >1400 (H) 180 - 914 ng/L Final     Comment:     Test Performed by:  [facility] [id number] [place] [address]  : Khai Pike M.D. Ph.D.; CLIA# [id number]       Hemoglobin A1C   Date Value Ref Range Status   2025 9.5 (H) 4.0 - 5.6 % Final     Comment:     ADA Screening Guidelines:  5.7-6.4%  Consistent with prediabetes  >or=6.5%  Consistent with diabetes    High levels of fetal hemoglobin interfere with the HbA1C  assay. Heterozygous hemoglobin variants (HbS, HgC, etc)do  not significantly interfere with this assay.   However, presence of multiple variants may affect accuracy.     2024 7.9 (H) 4.0 - 5.6 % Final     Comment:     ADA Screening  Guidelines:  5.7-6.4%  Consistent with prediabetes  >or=6.5%  Consistent with diabetes    High levels of fetal hemoglobin interfere with the HbA1C  assay. Heterozygous hemoglobin variants (HbS, HgC, etc)do  not significantly interfere with this assay.   However, presence of multiple variants may affect accuracy.       Hemoglobin A1c   Date Value Ref Range Status   2025 10.0 (H) 4.0 - 5.6 % Final     Comment:     ADA Screening Guidelines:  5.7-6.4%  Consistent with prediabetes  >=6.5%  Consistent with diabetes    High levels of fetal hemoglobin interfere with the HbA1C  assay. Heterozygous hemoglobin variants (HbS, HgC, etc)do  not significantly interfere with this assay.   However, presence of multiple variants may affect accuracy.         Review of Systems:  ROS negative unless noted in HPI    Past Surgical History:  Past Surgical History:   Procedure Laterality Date    ADJACENT TISSUE TRANSFER Left 6/15/2018    Procedure: ADJACENT TISSUE TRANSFER Tissue Rearrangement;  Surgeon: Luca Johnson MD;  Location: [address];  Service: General;  Laterality: Left;    BREAST BIOPSY Right     BREAST BIOPSY Left 2018    BREAST LUMPECTOMY Right     BREAST LUMPECTOMY Left     CATARACT EXTRACTION W/  INTRAOCULAR LENS IMPLANT Right 2024    Procedure: EXTRACTION, CATARACT, WITH IOL INSERTION;  Surgeon: Lisandra Collier MD;  Location: [facility] OR;  Service: Ophthalmology;  Laterality: Right;    CATARACT EXTRACTION W/  INTRAOCULAR LENS IMPLANT Left 2024    Procedure: EXTRACTION, CATARACT, WITH IOL INSERTION;  Surgeon: Lisandra Collier MD;  Location: [facility] OR;  Service: Ophthalmology;  Laterality: Left;     SECTION, LOW TRANSVERSE  ,1979    x2    CHOLECYSTECTOMY      laparoscopic    DILATION AND CURETTAGE OF UTERUS  2012    INJECTION FOR SENTINEL NODE IDENTIFICATION Left 6/15/2018    Procedure: INJECTION, FOR SENTINEL NODE IDENTIFICATION;  Surgeon: Luca Johnson MD;   "[facility];  Service: General;  Laterality: Left;    SENTINEL LYMPH NODE BIOPSY Left 6/15/2018    Procedure: BIOPSY, LYMPH NODE, SENTINEL;  Surgeon: Luca Johnson MD;  [facility];  Service: General;  Laterality: Left;    TUBAL LIGATION      @ time of        Family History:  Family History   Problem Relation Name Age of Onset    Hypertension Mother      Heart failure Mother      Hypertension Brother      Hypertension Sister      Coronary artery disease Brother      Stroke Brother      Coronary artery disease Brother      Stroke Brother      Colon cancer Neg Hx      Ovarian cancer Neg Hx         Social History:  Social History[1]    Allergies:  Patient has no known allergies.    Outpatient Medications:  Prior to Admission medications    Medication Sig Start Date End Date Taking? Authorizing Provider   amLODIPine (NORVASC) 10 MG tablet TAKE 1 TABLET ONE TIME DAILY 25   Abram Turner III, MD   aspirin (ECOTRIN) 81 MG EC tablet Take 81 mg by mouth once daily.    Provider, Historical   BAQSIMI 3 mg/actuation Spry by Each Nostril route. 3/4/24   Provider, Historical   BD ULTRA-FINE SHORT PEN NEEDLE 31 gauge x 5/16" Ndle Inject 100 each into the skin 2 hours after meals and at bedtime. 25   Jennifer Kimbrough FNP-ERYN   bevacizumab 3.25 mg/0.13 mL Syrg  24   Provider, Historical   blood-glucose meter,continuous (DEXCOM G6 ) Misc 1 each by Misc.(Non-Drug; Combo Route) route once daily at 6am.  Patient not taking: Reported on 2025   Abram Turner III, MD   blood-glucose sensor (DEXCOM G6 SENSOR) Xiao 1 each by Misc.(Non-Drug; Combo Route) route once daily. 12/2/22 3/18/25  Abram Turner III, MD   blood-glucose transmitter (DEXCOM G6 TRANSMITTER) Xiao 1 each by Misc.(Non-Drug; Combo Route) route once daily.  Patient not taking: Reported on 2025   Abram Turner III, MD   busPIRone (BUSPAR) 15 MG tablet Take 1 tablet (15 mg total) by " mouth 2 (two) times daily. 9/22/23   Abram Turner III, MD   cholecalciferol, vitamin D3, (VITAMIN D3) 50 mcg (2,000 unit) Cap Take 1 capsule by mouth once daily.    Provider, Historical   cloNIDine (CATAPRES) 0.2 MG tablet TAKE 1 TABLET TWICE DAILY 5/5/24   Jhoan Aguilar MD   coenzyme Q10 100 mg capsule Take 100 mg by mouth once daily.    Provider, Historical   cyanocobalamin 500 MCG tablet Take 500 mcg by mouth once daily.    Provider, Historical   diazePAM (VALIUM) 5 MG tablet Take 1 tablet (5 mg total) by mouth as needed for Anxiety. 9/23/24 3/18/25  Lisandra Collier MD   donepeziL (ARICEPT) 5 MG tablet TAKE 1 TABLET EVERY EVENING 9/30/24   Jhoan Aguilar MD   fish oil-omega-3 fatty acids 300-1,000 mg capsule Take 1 g by mouth once daily.    Provider, Historical   FLUAD QUAD 2023-24,65Y UP,,PF, 60 mcg (15 mcg x 4)/0.5 mL Syrg  9/22/23   Provider, Historical   furosemide (LASIX) 40 MG tablet Take 40 mg by mouth once daily.     Provider, Historical   glucose 4 GM chewable tablet Take 4 tablets (16 g total) by mouth as needed for Low blood sugar (If having symptoms of blurry vision, palpitations, confusion, shakiness.  Please check sugars and if sugar below 70 please take 4 tablets and re-check sugar everry 15 minutes until sugars are above 70 and symptoms resolve.). 6/29/22 3/18/25  Yordy Archuleta MD   insulin aspart U-100 (NOVOLOG FLEXPEN U-100 INSULIN) 100 unit/mL (3 mL) InPn pen Inject 10 Units into the skin 3 (three) times daily with meals. 3/19/25   Abram Turner III, MD   lancets Misc Use to test blood sugar 4 times daily 6/17/25   Jennifer Kimbrough, FNP-C   LANTUS SOLOSTAR U-100 INSULIN 100 unit/mL (3 mL) InPn pen Inject 24 Units into the skin every evening. 3/19/25 3/19/26  Abram Turner III, MD   latanoprost 0.005 % ophthalmic solution Place 1 drop into both eyes every evening. 9/24/24 3/18/25  Lisandra Collier MD   losartan (COZAAR) 100 MG tablet TAKE 1 TABLET ONE TIME DAILY 9/30/24  "  Jhoan Aguilar MD   mirtazapine (REMERON) 7.5 MG Tab Take 1 tablet (7.5 mg total) by mouth every evening. 3/18/25   Abram Turner III, MD   ofloxacin (FLOXIN) 0.3 % otic solution INSTILL 5 DROPS INTO THE RIGHT EAR ONCE DAILY 10/25/23   Abram Turner III, MD   OZURDEX 0.7 mg Impl intravitreal implant  3/7/24   Provider, Historical   prednisolon/gatiflox/bromfenac (PREDNISOL ACE-GATIFLOX-BROMFEN) 1-0.5-0.075 % DrpS Apply 1 drop to eye 3 (three) times daily. in operative eye for 1 month after surgery 4/12/24   Lisandra Collier MD   TRUE METRIX GLUCOSE TEST STRIP Strp 1 strip by In Vitro route every morning. 5/25/23   Abram Turner III, MD       Physical exam:    Vitals: BP (!) 177/89 (BP Location: Left arm, Patient Position: Sitting)   Pulse 79   Ht 5' 5" (1.651 m)   Wt 70.7 kg (155 lb 13.8 oz)   LMP 08/13/1999 (Approximate)   BMI 25.94 kg/m²       General:   in no distress, well-nourished, well-developed, appears stated age.  Head/Neck:   Normocephalic,atraumatic  Pulm:  Non-labored breathing     Mental Status: Alert. Speech spontaneous and fluent without paraphasias; no dysarthria  CN: III, IV, VI: EOM intact without nystagmus or diplopia.   VII: Facial movement full and symmetric.   VIII: Hearing grossly normal to conversation.  IX, X: Palate midline with symmetric elevation.    XII: Tongue midline without fasciculations.  Motor: Normal bulk throughout all four extremities.   RUE: antigravity  LUE: antigravity   RLE: antigravity  LLE: antigravity  No tremors     MoCA 6/26/2025:        Imaging:  All pertinent imaging was personally reviewed.      Results for orders placed during the hospital encounter of 01/05/22    MRI Brain W WO Contrast    Narrative  EXAMINATION:  MRI BRAIN W WO CONTRAST    CLINICAL HISTORY:  Malignant neoplasm of unspecified site of left female breastBreast cancer, cachexia;    TECHNIQUE:  Sagittal T1. Axial T1, T2, T2 FLAIR, GRE, DWI.  Axial and coronal T1 post " contrast.    COMPARISON:  None available.    FINDINGS:  There is no restricted diffusion. Moderate degree of patchy, nonspecific T2 FLAIR hyperintensity within the periventricular white matter.  No abnormal enhancement.  Tiny focus of hypointense signal on GRE series involving the white matter adjacent to the temporal horn of the left lateral ventricle    Moderate generalized atrophy with slight ventriculomegaly that likely relates to central atrophy due to white matter volume loss.  Partially empty sella.  There are preserved arterial flow-voids on T2 weighted imaging. The paranasal sinuses and mastoid air cells are clear.    No abnormal marrow signal is identified.    Impression  1. No abnormal enhancement to suggest metastatic disease.  2. Moderate generalized atrophy and ventriculomegaly that is likely ex vacuo in nature.  3. Tiny focus of hemosiderin deposition within the white matter adjacent to the occipital horn on the left likely relates to a focus of old microhemorrhage relating to chronic microvascular ischemic disease.      Electronically signed by: Griffin Campbell Jr., MD  Date:    2022  Time:    15:27         [1]   Social History  Tobacco Use    Smoking status: Former     Current packs/day: 0.00     Average packs/day: 1 pack/day for 14.0 years (14.0 ttl pk-yrs)     Types: Cigarettes     Start date: 1961     Quit date: 1975     Years since quittin.1    Smokeless tobacco: Never   Substance Use Topics    Alcohol use: No    Drug use: No

## 2025-06-26 ENCOUNTER — PATIENT MESSAGE (OUTPATIENT)
Dept: FAMILY MEDICINE | Facility: CLINIC | Age: 80
End: 2025-06-26
Payer: MEDICARE

## 2025-06-26 ENCOUNTER — OFFICE VISIT (OUTPATIENT)
Dept: NEUROLOGY | Facility: CLINIC | Age: 80
End: 2025-06-26
Payer: MEDICARE

## 2025-06-26 ENCOUNTER — LAB VISIT (OUTPATIENT)
Dept: LAB | Facility: HOSPITAL | Age: 80
End: 2025-06-26
Attending: STUDENT IN AN ORGANIZED HEALTH CARE EDUCATION/TRAINING PROGRAM
Payer: MEDICARE

## 2025-06-26 ENCOUNTER — PATIENT MESSAGE (OUTPATIENT)
Dept: ENDOCRINOLOGY | Facility: CLINIC | Age: 80
End: 2025-06-26
Payer: MEDICARE

## 2025-06-26 VITALS
WEIGHT: 155.88 LBS | HEART RATE: 79 BPM | DIASTOLIC BLOOD PRESSURE: 89 MMHG | BODY MASS INDEX: 25.97 KG/M2 | HEIGHT: 65 IN | SYSTOLIC BLOOD PRESSURE: 177 MMHG

## 2025-06-26 DIAGNOSIS — G30.1 MILD LATE ONSET ALZHEIMER'S DEMENTIA WITH OTHER BEHAVIORAL DISTURBANCE: ICD-10-CM

## 2025-06-26 DIAGNOSIS — F02.A18 MILD LATE ONSET ALZHEIMER'S DEMENTIA WITH OTHER BEHAVIORAL DISTURBANCE: ICD-10-CM

## 2025-06-26 DIAGNOSIS — G30.1 MILD LATE ONSET ALZHEIMER'S DEMENTIA WITH OTHER BEHAVIORAL DISTURBANCE: Primary | ICD-10-CM

## 2025-06-26 DIAGNOSIS — F02.A18 MILD LATE ONSET ALZHEIMER'S DEMENTIA WITH OTHER BEHAVIORAL DISTURBANCE: Primary | ICD-10-CM

## 2025-06-26 DIAGNOSIS — R41.3 OTHER AMNESIA: ICD-10-CM

## 2025-06-26 PROCEDURE — 36415 COLL VENOUS BLD VENIPUNCTURE: CPT

## 2025-06-26 PROCEDURE — 1126F AMNT PAIN NOTED NONE PRSNT: CPT | Mod: CPTII,S$GLB,, | Performed by: STUDENT IN AN ORGANIZED HEALTH CARE EDUCATION/TRAINING PROGRAM

## 2025-06-26 PROCEDURE — 3288F FALL RISK ASSESSMENT DOCD: CPT | Mod: CPTII,S$GLB,, | Performed by: STUDENT IN AN ORGANIZED HEALTH CARE EDUCATION/TRAINING PROGRAM

## 2025-06-26 PROCEDURE — 99999 PR PBB SHADOW E&M-EST. PATIENT-LVL V: CPT | Mod: PBBFAC,,, | Performed by: STUDENT IN AN ORGANIZED HEALTH CARE EDUCATION/TRAINING PROGRAM

## 2025-06-26 PROCEDURE — 1157F ADVNC CARE PLAN IN RCRD: CPT | Mod: CPTII,S$GLB,, | Performed by: STUDENT IN AN ORGANIZED HEALTH CARE EDUCATION/TRAINING PROGRAM

## 2025-06-26 PROCEDURE — 1101F PT FALLS ASSESS-DOCD LE1/YR: CPT | Mod: CPTII,S$GLB,, | Performed by: STUDENT IN AN ORGANIZED HEALTH CARE EDUCATION/TRAINING PROGRAM

## 2025-06-26 PROCEDURE — 3079F DIAST BP 80-89 MM HG: CPT | Mod: CPTII,S$GLB,, | Performed by: STUDENT IN AN ORGANIZED HEALTH CARE EDUCATION/TRAINING PROGRAM

## 2025-06-26 PROCEDURE — 82234 BETA-AMYLOID 1-42 (ABETA 42): CPT

## 2025-06-26 PROCEDURE — 99214 OFFICE O/P EST MOD 30 MIN: CPT | Mod: S$GLB,,, | Performed by: STUDENT IN AN ORGANIZED HEALTH CARE EDUCATION/TRAINING PROGRAM

## 2025-06-26 PROCEDURE — 83520 IMMUNOASSAY QUANT NOS NONAB: CPT

## 2025-06-26 PROCEDURE — 1159F MED LIST DOCD IN RCRD: CPT | Mod: CPTII,S$GLB,, | Performed by: STUDENT IN AN ORGANIZED HEALTH CARE EDUCATION/TRAINING PROGRAM

## 2025-06-26 PROCEDURE — 3077F SYST BP >= 140 MM HG: CPT | Mod: CPTII,S$GLB,, | Performed by: STUDENT IN AN ORGANIZED HEALTH CARE EDUCATION/TRAINING PROGRAM

## 2025-06-26 PROCEDURE — 84393 TAU PHOSPHORYLATED EA: CPT

## 2025-06-26 RX ORDER — BREXPIPRAZOLE 2 MG/1
2 TABLET ORAL DAILY
Qty: 30 TABLET | Refills: 11 | Status: SHIPPED | OUTPATIENT
Start: 2025-06-26

## 2025-06-26 RX ORDER — MEMANTINE HYDROCHLORIDE 5 MG/1
5 TABLET ORAL 2 TIMES DAILY
Qty: 60 TABLET | Refills: 11 | Status: SHIPPED | OUTPATIENT
Start: 2025-06-26 | End: 2026-06-26

## 2025-06-26 RX ORDER — BREXPIPRAZOLE 0.5 MG/1
TABLET ORAL
Qty: 21 TABLET | Refills: 0 | Status: SHIPPED | OUTPATIENT
Start: 2025-06-26 | End: 2025-07-10

## 2025-06-26 NOTE — PATIENT INSTRUCTIONS
- MRI brain wo contrast  - ptau-181  - ptau-217  - AD Detect Beta-amyloid 42/40 ratio  - start memantine 5 mg twice daily  - start Rexulti with goal dose 2mg in the evening   - 0.5 mg daily for 7 days,   - then 1 mg daily for 7 days,   - then 2 mg daily

## 2025-06-30 LAB
IMMUNOLOGIST REVIEW: ABNORMAL
M PHOSPHO-TAU 217: 0.8 PG/ML

## 2025-07-02 LAB — LC PHOSPHO-TAU (181P): 1.7 PG/ML (ref 0–0.97)

## 2025-07-04 LAB
ABETA 42/40 RATIO: 0.15
ABETA40: 346 PG/ML
ABETA42: 52 PG/ML

## 2025-07-07 ENCOUNTER — HOSPITAL ENCOUNTER (OUTPATIENT)
Dept: RADIOLOGY | Facility: HOSPITAL | Age: 80
Discharge: HOME | End: 2025-07-07
Attending: STUDENT IN AN ORGANIZED HEALTH CARE EDUCATION/TRAINING PROGRAM
Payer: MEDICARE

## 2025-07-07 DIAGNOSIS — F02.A18 MILD LATE ONSET ALZHEIMER'S DEMENTIA WITH OTHER BEHAVIORAL DISTURBANCE: ICD-10-CM

## 2025-07-07 DIAGNOSIS — R41.3 OTHER AMNESIA: ICD-10-CM

## 2025-07-07 DIAGNOSIS — G30.1 MILD LATE ONSET ALZHEIMER'S DEMENTIA WITH OTHER BEHAVIORAL DISTURBANCE: ICD-10-CM

## 2025-07-07 PROCEDURE — 70551 MRI BRAIN STEM W/O DYE: CPT | Mod: 26,,, | Performed by: STUDENT IN AN ORGANIZED HEALTH CARE EDUCATION/TRAINING PROGRAM

## 2025-07-07 PROCEDURE — 70551 MRI BRAIN STEM W/O DYE: CPT | Mod: TC,PN

## 2025-07-30 DIAGNOSIS — G30.1 MILD LATE ONSET ALZHEIMER'S DEMENTIA WITH OTHER BEHAVIORAL DISTURBANCE: ICD-10-CM

## 2025-07-30 DIAGNOSIS — G47.00 INSOMNIA, UNSPECIFIED TYPE: ICD-10-CM

## 2025-07-30 DIAGNOSIS — F02.A18 MILD LATE ONSET ALZHEIMER'S DEMENTIA WITH OTHER BEHAVIORAL DISTURBANCE: ICD-10-CM

## 2025-07-30 DIAGNOSIS — E11.59 HYPERTENSION ASSOCIATED WITH DIABETES: ICD-10-CM

## 2025-07-30 DIAGNOSIS — I15.2 HYPERTENSION ASSOCIATED WITH DIABETES: ICD-10-CM

## 2025-07-30 NOTE — TELEPHONE ENCOUNTER
Care Due:                  Date            Visit Type   Department     Provider  --------------------------------------------------------------------------------                                EP -                              PRIMARY      [facility] INTERNAL  Last Visit: 03-      CARE ([facility])   KODAK Turner                               -                              PRIMARY      [facility] FAMILY  Next Visit: 09-      CARE ([facility])   MEDICINE       Abram Turner                                                            Last  Test          Frequency    Reason                     Performed    Due Date  --------------------------------------------------------------------------------    CBC.........  12 months..  mirtazapine..............  04- 04-    [facility] Embedded Care Due Messages. Reference number: [id number].   7/30/2025 1:36:11 PM CDT

## 2025-07-31 RX ORDER — MIRTAZAPINE 7.5 MG/1
7.5 TABLET, FILM COATED ORAL NIGHTLY
Qty: 90 TABLET | Refills: 2 | Status: SHIPPED | OUTPATIENT
Start: 2025-07-31

## 2025-07-31 RX ORDER — MEMANTINE HYDROCHLORIDE 5 MG/1
5 TABLET ORAL 2 TIMES DAILY
Qty: 180 TABLET | Refills: 1 | Status: SHIPPED | OUTPATIENT
Start: 2025-07-31 | End: 2026-07-31

## 2025-07-31 RX ORDER — LOSARTAN POTASSIUM 100 MG/1
100 TABLET ORAL DAILY
Qty: 90 TABLET | Refills: 2 | Status: SHIPPED | OUTPATIENT
Start: 2025-07-31

## 2025-07-31 NOTE — TELEPHONE ENCOUNTER
Refill Routing Note   Medication(s) are not appropriate for processing by [facility] for the following reason(s):        Required vitals abnormal  Outside of protocol  Drug-disease interaction    [facility] action(s):  Defer  Route   Requires labs : Yes           Pharmacist review requested: Yes     Appointments  past 12m or future 3m with PCP    Date Provider   Last Visit   3/18/2025 Abram Turner III, MD   Next Visit   9/24/2025 Abram Turner III, MD   ED visits in past 90 days: 0        Note composed:10:54 PM 07/30/2025

## 2025-07-31 NOTE — TELEPHONE ENCOUNTER
Refill Routing Note   Medication(s) are not appropriate for processing by [facility] for the following reason(s):        Required vitals abnormal  Outside of protocol  Drug-disease interaction    [facility] action(s):  Defer  Route   Requires labs : Yes      Medication Therapy Plan: Drug-Disease: mirtazapine and Statin myopathy    Pharmacist review requested: Yes     Appointments  past 12m or future 3m with PCP    Date Provider   Last Visit   3/18/2025 Abram Turner III, MD   Next Visit   9/24/2025 Abram Turner III, MD   ED visits in past 90 days: 0        Note composed:10:04 PM 07/30/2025

## 2025-08-01 DIAGNOSIS — Z79.4 TYPE 2 DIABETES MELLITUS WITH HYPERGLYCEMIA, WITH LONG-TERM CURRENT USE OF INSULIN: ICD-10-CM

## 2025-08-01 DIAGNOSIS — E11.65 TYPE 2 DIABETES MELLITUS WITH HYPERGLYCEMIA, WITH LONG-TERM CURRENT USE OF INSULIN: ICD-10-CM

## 2025-08-01 NOTE — TELEPHONE ENCOUNTER
No care due was identified.  [facility] Embedded Care Due Messages. Reference number: [id number].   8/01/2025 2:23:05 PM CDT

## 2025-08-04 RX ORDER — CALCIUM CITRATE/VITAMIN D3 200MG-6.25
1 TABLET ORAL EVERY MORNING
Qty: 100 EACH | Refills: 3 | Status: SHIPPED | OUTPATIENT
Start: 2025-08-04

## 2025-08-26 DIAGNOSIS — I15.2 HYPERTENSION ASSOCIATED WITH DIABETES: ICD-10-CM

## 2025-08-26 DIAGNOSIS — E11.59 HYPERTENSION ASSOCIATED WITH DIABETES: ICD-10-CM

## 2025-08-26 RX ORDER — AMLODIPINE BESYLATE 10 MG/1
10 TABLET ORAL DAILY
Qty: 90 TABLET | Refills: 1 | Status: SHIPPED | OUTPATIENT
Start: 2025-08-26

## (undated) DEVICE — STOCKINET 4INX48

## (undated) DEVICE — SPONGE LAP 18X18 PREWASHED

## (undated) DEVICE — SUT MCRYL PLUS 4-0 PS2 27IN

## (undated) DEVICE — DRESSING TELFA STRL 4X3 LF

## (undated) DEVICE — PACK UNIVERSAL SPLIT II

## (undated) DEVICE — Device

## (undated) DEVICE — NEOGUARD COVER 4X30CM STERILE

## (undated) DEVICE — SEE MEDLINE ITEM 157128

## (undated) DEVICE — DRAPE STERI INSTRUMENT 1018

## (undated) DEVICE — SYR SLIP TIP 1CC

## (undated) DEVICE — SEE MEDLINE ITEM 146417

## (undated) DEVICE — SOL BETADINE 5%

## (undated) DEVICE — ELECTRODE REM PLYHSV RETURN 9

## (undated) DEVICE — APPLICATOR CHLORAPREP ORN 26ML

## (undated) DEVICE — CONTAINER SPECIMEN STRL 4OZ

## (undated) DEVICE — GAUZE SPONGE 4X4 12PLY

## (undated) DEVICE — SUT VICRYL PLUS 4-0 P3 18IN

## (undated) DEVICE — COVER PROBE NL STRL 3.6X96IN

## (undated) DEVICE — TRAY MINOR GEN SURG

## (undated) DEVICE — ELECTRODE BLADE INSULATED 1 IN

## (undated) DEVICE — SOL 0.9% NACL IRRI.IN STERIL

## (undated) DEVICE — GAUZE FLUFF XXLG 36X36 2 PLY

## (undated) DEVICE — DRAPE THYROID WITH ARMBOARD

## (undated) DEVICE — SOL POVIDONE SCRUB IODINE 4 OZ

## (undated) DEVICE — GLOVE SENSICARE PI SURG 7.5

## (undated) DEVICE — NDL HYPO REG 25G X 1 1/2

## (undated) DEVICE — ADHESIVE DERMABOND ADVANCED

## (undated) DEVICE — SYR DISP LL 5CC

## (undated) DEVICE — SYS LABEL CORRECT MED

## (undated) DEVICE — SUT VICRYL 3-0 27 SH

## (undated) DEVICE — SEE MEDLINE ITEM 152622

## (undated) DEVICE — NDL 18GA X1 1/2 REG BEVEL

## (undated) DEVICE — SUT 2/0 30IN SILK BLK BRAI